# Patient Record
Sex: FEMALE | Race: WHITE | NOT HISPANIC OR LATINO | Employment: OTHER | URBAN - METROPOLITAN AREA
[De-identification: names, ages, dates, MRNs, and addresses within clinical notes are randomized per-mention and may not be internally consistent; named-entity substitution may affect disease eponyms.]

---

## 2017-01-04 ENCOUNTER — TELEPHONE (OUTPATIENT)
Dept: FAMILY MEDICINE | Facility: CLINIC | Age: 62
End: 2017-01-04

## 2017-01-04 NOTE — TELEPHONE ENCOUNTER
----- Message from Megan Colón sent at 1/4/2017  4:32 PM CST -----  Contact:  call //[phone]    Calling to  Speak to the  Nurse about  Seeing the   Doctor  For a  Abdoul  In  Blood  Sugar ,, pt  Would  Per  To see dr   Because  Of    High  sugar, but  If the dr advises  Patient to  See np  She  Will // waiting on  Answer // please call

## 2017-01-04 NOTE — TELEPHONE ENCOUNTER
Called pt, she stated she is having some spike in blood sugar and would like to be seen tomorrow. Was able to schedule her in [place] and she verbally understood.

## 2017-03-05 RX ORDER — FENOFIBRATE 200 MG/1
CAPSULE ORAL
Qty: 90 CAPSULE | Refills: 0 | Status: SHIPPED | OUTPATIENT
Start: 2017-03-05 | End: 2017-03-08 | Stop reason: SDUPTHER

## 2017-03-08 RX ORDER — FENOFIBRATE 200 MG/1
200 CAPSULE ORAL DAILY
Qty: 90 CAPSULE | Refills: 0 | Status: SHIPPED | OUTPATIENT
Start: 2017-03-08 | End: 2017-09-25 | Stop reason: SDUPTHER

## 2017-03-13 ENCOUNTER — TELEPHONE (OUTPATIENT)
Dept: FAMILY MEDICINE | Facility: CLINIC | Age: 62
End: 2017-03-13

## 2017-03-13 DIAGNOSIS — I10 ESSENTIAL HYPERTENSION: Primary | ICD-10-CM

## 2017-03-13 DIAGNOSIS — E03.9 HYPOTHYROIDISM, UNSPECIFIED TYPE: ICD-10-CM

## 2017-03-13 DIAGNOSIS — E11.9 TYPE II OR UNSPECIFIED TYPE DIABETES MELLITUS WITHOUT MENTION OF COMPLICATION, NOT STATED AS UNCONTROLLED: ICD-10-CM

## 2017-03-13 NOTE — TELEPHONE ENCOUNTER
----- Message from Haylie Tejada RT sent at 3/10/2017  3:08 PM CST -----  Contact: pt    pt , requesting lab orders to be put in prior to her appt with Dr. Au on 03/29/2017, please call to confirm, thanks.

## 2017-03-14 NOTE — TELEPHONE ENCOUNTER
----- Message from Kylie Munoz sent at 3/14/2017  4:17 PM CDT -----  Contact: self  Patient called regarding missed call from this morning. Please contact [phone] (chsf)

## 2017-03-14 NOTE — TELEPHONE ENCOUNTER
Called pt, notified lab orders are in and she verbally understood she stated she will come in and have done one day a week before appt.

## 2017-03-15 ENCOUNTER — PATIENT OUTREACH (OUTPATIENT)
Dept: ADMINISTRATIVE | Facility: HOSPITAL | Age: 62
End: 2017-03-15

## 2017-03-15 NOTE — LETTER
March 23, 2017    Bing Kearns  [address] [place] [address]             [facility]  [address]  [place] [address]  Phone: [phone] Dear Mell Kearns:    We have tried to reach you by mychart unsuccessfully.          [facility] is committed to your overall health.  To help you get the most out of each of your visits, we will review your information to make sure you are up to date on all of your recommended tests and/or procedures.       Dr. Au has found that you may be due for One time Hepatitis C screening lab test ( a viral condition that harms the liver), diabetic foot exam, mammogram, Hemoglobin A1C, annual dilated eye exam     If you have had any of the above done at another facility, please bring the records or information with you so that your record at [facility] will be complete.     If you are currently taking medication, please bring it with you to your appointment for review.     Nolvia Manzano LPN Clinical Care Coordinator   [facility] [address] [facility] [address]   [place] [address]   [phone] (p)   [phone] (f)

## 2017-03-21 ENCOUNTER — LAB VISIT (OUTPATIENT)
Dept: LAB | Facility: HOSPITAL | Age: 62
End: 2017-03-21
Attending: FAMILY MEDICINE
Payer: COMMERCIAL

## 2017-03-21 DIAGNOSIS — E11.9 TYPE II OR UNSPECIFIED TYPE DIABETES MELLITUS WITHOUT MENTION OF COMPLICATION, NOT STATED AS UNCONTROLLED: ICD-10-CM

## 2017-03-21 DIAGNOSIS — I10 ESSENTIAL HYPERTENSION: ICD-10-CM

## 2017-03-21 DIAGNOSIS — E03.9 HYPOTHYROIDISM, UNSPECIFIED TYPE: ICD-10-CM

## 2017-03-21 LAB
ALBUMIN SERPL BCP-MCNC: 3.8 G/DL
ALP SERPL-CCNC: 93 U/L
ALT SERPL W/O P-5'-P-CCNC: 58 U/L
ANION GAP SERPL CALC-SCNC: 13 MMOL/L
AST SERPL-CCNC: 53 U/L
BILIRUB SERPL-MCNC: 0.5 MG/DL
BUN SERPL-MCNC: 12 MG/DL
CALCIUM SERPL-MCNC: 9.6 MG/DL
CHLORIDE SERPL-SCNC: 101 MMOL/L
CHOLEST/HDLC SERPL: 5.3 {RATIO}
CO2 SERPL-SCNC: 25 MMOL/L
CREAT SERPL-MCNC: 1.3 MG/DL
EST. GFR  (AFRICAN AMERICAN): 50.8 ML/MIN/1.73 M^2
EST. GFR  (NON AFRICAN AMERICAN): 44.1 ML/MIN/1.73 M^2
GLUCOSE SERPL-MCNC: 221 MG/DL
HDL/CHOLESTEROL RATIO: 18.9 %
HDLC SERPL-MCNC: 169 MG/DL
HDLC SERPL-MCNC: 32 MG/DL
LDLC SERPL CALC-MCNC: 77 MG/DL
NONHDLC SERPL-MCNC: 137 MG/DL
POTASSIUM SERPL-SCNC: 3.8 MMOL/L
PROT SERPL-MCNC: 7.5 G/DL
SODIUM SERPL-SCNC: 139 MMOL/L
TRIGL SERPL-MCNC: 300 MG/DL
TSH SERPL DL<=0.005 MIU/L-ACNC: 2.1 UIU/ML

## 2017-03-21 PROCEDURE — 80061 LIPID PANEL: CPT

## 2017-03-21 PROCEDURE — 36415 COLL VENOUS BLD VENIPUNCTURE: CPT | Mod: PO

## 2017-03-21 PROCEDURE — 83036 HEMOGLOBIN GLYCOSYLATED A1C: CPT

## 2017-03-21 PROCEDURE — 80053 COMPREHEN METABOLIC PANEL: CPT

## 2017-03-21 PROCEDURE — 84443 ASSAY THYROID STIM HORMONE: CPT

## 2017-03-22 LAB
ESTIMATED AVG GLUCOSE: 189 MG/DL
HBA1C MFR BLD HPLC: 8.2 %

## 2017-03-29 ENCOUNTER — OFFICE VISIT (OUTPATIENT)
Dept: FAMILY MEDICINE | Facility: CLINIC | Age: 62
End: 2017-03-29
Payer: COMMERCIAL

## 2017-03-29 VITALS
DIASTOLIC BLOOD PRESSURE: 70 MMHG | WEIGHT: 195.75 LBS | HEIGHT: 65 IN | OXYGEN SATURATION: 92 % | HEART RATE: 86 BPM | SYSTOLIC BLOOD PRESSURE: 102 MMHG | BODY MASS INDEX: 32.61 KG/M2

## 2017-03-29 DIAGNOSIS — Z00.00 ROUTINE HEALTH MAINTENANCE: Primary | ICD-10-CM

## 2017-03-29 DIAGNOSIS — I10 ESSENTIAL HYPERTENSION: ICD-10-CM

## 2017-03-29 DIAGNOSIS — E03.9 HYPOTHYROIDISM, UNSPECIFIED TYPE: ICD-10-CM

## 2017-03-29 PROCEDURE — 3078F DIAST BP <80 MM HG: CPT | Mod: S$GLB,,, | Performed by: FAMILY MEDICINE

## 2017-03-29 PROCEDURE — 99999 PR PBB SHADOW E&M-EST. PATIENT-LVL III: CPT | Mod: PBBFAC,,, | Performed by: FAMILY MEDICINE

## 2017-03-29 PROCEDURE — 3074F SYST BP LT 130 MM HG: CPT | Mod: S$GLB,,, | Performed by: FAMILY MEDICINE

## 2017-03-29 PROCEDURE — 99396 PREV VISIT EST AGE 40-64: CPT | Mod: S$GLB,,, | Performed by: FAMILY MEDICINE

## 2017-03-29 RX ORDER — METFORMIN HYDROCHLORIDE 500 MG/1
500 TABLET, EXTENDED RELEASE ORAL
Qty: 90 TABLET | Refills: 3 | Status: SHIPPED | OUTPATIENT
Start: 2017-03-29 | End: 2018-01-05

## 2017-03-29 NOTE — PROGRESS NOTES
HPI  Bing Kearns is a 61 y.o. female with multiple medical diagnoses as listed in the medical history and problem list that presents for Annual Exam; Hyperglycemia; Diabetic Foot Exam; and Labs Only (would like to be tested for hep c)  .      Diabetes   She presents for her follow-up diabetic visit. She has type 2 diabetes mellitus. Her disease course has been worsening. There are no hypoglycemic associated symptoms. Pertinent negatives for hypoglycemia include no confusion, dizziness, headaches, nervousness/anxiousness or tremors. Associated symptoms include polydipsia and weakness. Pertinent negatives for diabetes include no chest pain, no fatigue, no polyuria and no weight loss. There are no hypoglycemic complications. There are no diabetic complications. Current diabetic treatment includes diet (had discontinued metformin due to good control). She is compliant with treatment some of the time. Her weight is increasing steadily. She is following a generally healthy diet. Her home blood glucose trend is increasing steadily. Her breakfast blood glucose range is generally >200 mg/dl. An ACE inhibitor/angiotensin II receptor blocker is being taken. She sees a podiatrist.Eye exam is current.       PAST MEDICAL HISTORY:  Past Medical History:   Diagnosis Date    Anxiety     Cancer     colon    Chronic kidney disease     stage 3    Diabetes mellitus     Glaucoma     Hyperlipemia     Hypertension     Thyroid disease     hypothyroid       PAST SURGICAL HISTORY:  Past Surgical History:   Procedure Laterality Date     SECTION      COLON SURGERY      COLONOSCOPY Left 2015    Procedure: COLONOSCOPY;  Surgeon: Chet Woodard Jr., MD;  Location[facility];  Service: Endoscopy;  Laterality: Left;    HYSTERECTOMY      PORTACATH PLACEMENT Left     and later removed        SOCIAL HISTORY:  Social History     Social History    Marital status:      Spouse name: N/A    Number of children:  N/A    Years of education: N/A     Occupational History    Not on file.     Social History Main Topics    Smoking status: Former Smoker    Smokeless tobacco: Never Used    Alcohol use No    Drug use: No    Sexual activity: Not on file     Other Topics Concern    Not on file     Social History Narrative       FAMILY HISTORY:  Family History   Problem Relation Age of Onset    Cancer Mother     Heart disease Father     COPD Father     Hypertension Sister     Diabetes Sister     Anemia Sister     Cancer Sister        ALLERGIES AND MEDICATIONS: updated and reviewed.  Review of patient's allergies indicates:   Allergen Reactions    Codeine     Sulfa (sulfonamide antibiotics)      Other reaction(s): Unknown     Current Outpatient Prescriptions   Medication Sig Dispense Refill    amlodipine (NORVASC) 5 MG tablet       aspirin (ECOTRIN) 81 MG EC tablet Take 81 mg by mouth once daily.        atenolol (TENORMIN) 50 MG tablet TAKE ONE TABLET BY MOUTH DAILY 90 tablet 3    cholestyramine (QUESTRAN) 4 gram packet Take 4 g by mouth once daily.        clonazePAM (KLONOPIN) 0.5 MG tablet Take 1 tablet (0.5 mg total) by mouth 2 (two) times daily. 60 tablet 3    cloNIDine (CATAPRES) 0.1 MG tablet Take 0.2 mg by mouth nightly.      fenofibrate micronized (LOFIBRA) 200 MG Cap Take 1 capsule (200 mg total) by mouth once daily. 90 capsule 0    fluticasone (FLONASE) 50 mcg/actuation nasal spray 2 sprays by Each Nare route once daily. Each nare 16 g 9    furosemide (LASIX) 40 MG tablet Take 1 tablet (40 mg total) by mouth once daily. 1 tablet 0    glycopyrrolate (ROBINUL) 2 MG tablet Take 2 mg by mouth 3 (three) times daily.        iron polysaccharides (NIFEREX) 150 mg capsule Take 150 mg by mouth every morning.        latanoprost 0.005 % ophthalmic solution       levothyroxine (SYNTHROID) 75 MCG tablet Take 1 tablet (75 mcg total) by mouth once daily. 90 tablet 1    losartan (COZAAR) 100 MG tablet TAKE ONE  TABLET BY MOUTH ONCE DAILY 90 tablet 2    metoclopramide (REGLAN) 10 MG tablet Take 10 mg by mouth every evening.        omeprazole (PRILOSEC) 20 MG capsule TAKE ONE CAPSULE BY MOUTH TWICE A DAY 60 capsule 10    orphenadrine (NORFLEX) 100 mg tablet Take 1 tablet (100 mg total) by mouth 2 (two) times daily as needed. 180 tablet 3    sertraline (ZOLOFT) 25 MG tablet Take 1 tablet (25 mg total) by mouth once daily. 90 tablet 0    simvastatin (ZOCOR) 40 MG tablet TAKE 1 TABLET BY MOUTH EVERY EVENING 90 tablet 2    sumatriptan (IMITREX) 100 MG tablet Take 1 tablet (100 mg total) by mouth once. 30 tablet 6    tramadol (ULTRAM) 50 mg tablet Take 50 mg by mouth every 6 (six) hours as needed.        diphenoxylate-atropine (LOMOTIL) 2.5-0.025 mg per tablet Take 1 tablet by mouth.      metformin (GLUCOPHAGE-XR) 500 MG 24 hr tablet Take 1 tablet (500 mg total) by mouth daily with breakfast. 90 tablet 3    omega-3 acid ethyl esters (LOVAZA) 1 gram capsule Take 1 capsule (1 g total) by mouth 2 (two) times daily. 180 capsule 4     No current facility-administered medications for this visit.        ROS  Review of Systems   Constitutional: Negative for fatigue, fever, unexpected weight change and weight loss.   HENT: Negative for congestion, hearing loss, rhinorrhea and sore throat.    Eyes: Negative for visual disturbance.   Respiratory: Negative for cough, chest tightness, shortness of breath and wheezing.    Cardiovascular: Negative for chest pain, palpitations and leg swelling.   Gastrointestinal: Negative for abdominal distention, abdominal pain, blood in stool, constipation, diarrhea, nausea and vomiting.   Endocrine: Positive for polydipsia. Negative for polyuria.   Genitourinary: Negative for difficulty urinating, dysuria, frequency, hematuria, menstrual problem, pelvic pain, urgency and vaginal bleeding.   Musculoskeletal: Negative for back pain, joint swelling and neck pain.   Skin: Negative for rash.  "  Neurological: Positive for weakness and light-headedness. Negative for dizziness, tremors, numbness and headaches.   Psychiatric/Behavioral: Negative for confusion, dysphoric mood and sleep disturbance. The patient is not nervous/anxious.        Physical Exam  Vitals:    03/29/17 1327   BP: 102/70   Pulse: 86    Body mass index is 32.58 kg/(m^2).  Weight: 88.8 kg (195 lb 12.3 oz)   Height: 5' 5" (165.1 cm)     Physical Exam   Constitutional: She is oriented to person, place, and time. She appears well-developed and well-nourished.   HENT:   Head: Normocephalic and atraumatic.   Right Ear: External ear normal.   Left Ear: External ear normal.   Nose: Nose normal.   Mouth/Throat: Oropharynx is clear and moist.   Eyes: Conjunctivae and EOM are normal. Pupils are equal, round, and reactive to light. No scleral icterus.   Neck: Normal range of motion. Neck supple. No JVD present. No thyromegaly present.   Cardiovascular: Normal rate, regular rhythm and normal heart sounds.  Exam reveals no gallop and no friction rub.    No murmur heard.  Pulses:       Dorsalis pedis pulses are 1+ on the right side, and 1+ on the left side.   Pulmonary/Chest: Effort normal and breath sounds normal. She has no wheezes. She has no rales.   Abdominal: Soft. Bowel sounds are normal. She exhibits no distension and no mass. There is no tenderness. There is no rebound and no guarding.   Musculoskeletal: Normal range of motion. She exhibits no edema.        Right foot: There is no deformity.        Left foot: There is no deformity.   Feet:   Right Foot:   Protective Sensation: 4 sites tested. 4 sites sensed.   Skin Integrity: Negative for skin breakdown.   Left Foot:   Protective Sensation: 4 sites tested. 4 sites sensed.   Skin Integrity: Negative for skin breakdown.   Lymphadenopathy:     She has no cervical adenopathy.   Neurological: She is alert and oriented to person, place, and time. She has normal strength. No cranial nerve deficit or " sensory deficit.   Skin: Skin is warm and dry. No rash noted.   Vitals reviewed.      Health Maintenance       Date Due Completion Date    Hepatitis C Screening 1955 ---    Foot Exam 9/30/1964 ---    Mammogram 7/29/2017 7/29/2016 (Done)    Override on 7/29/2016: Done ([employer])    Override on 5/27/2014: Done (per [facility] claims data)    Hemoglobin A1c 9/21/2017 3/21/2017    Override on 11/3/2014: Done    Pneumococcal PPSV23 (High Risk) (2) 10/12/2017 10/12/2012    Pap Smear 11/26/2017 11/26/2014 (Done)    Override on 11/26/2014: Done    Eye Exam 12/29/2017 12/29/2016 (Done)    Override on 12/29/2016: Done (in [place]ore's office)    Override on 12/11/2015: Done ([place])    Override on 11/19/2014: Done    Lipid Panel 3/21/2018 3/21/2017    Colonoscopy 9/19/2024 9/19/2014 (Done)    Override on 9/19/2014: Done (per [employer] claims data)    TETANUS VACCINE 7/21/2025 7/21/2015          Assessment & Plan    Routine health maintenance  -     Hepatitis C antibody; Future; Expected date: 6/29/17  - Health maintenance reviewed  - Diet and exercise education.    Type II or unspecified type diabetes mellitus without mention of complication, uncontrolled  -     metformin (GLUCOPHAGE-XR) 500 MG 24 hr tablet; Take 1 tablet (500 mg total) by mouth daily with breakfast.  Dispense: 90 tablet; Refill: 3  -     Hemoglobin A1c; Future; Expected date: 6/27/17  -     Ambulatory Referral to Diabetes Education  - Diet and exercise education.  - Serial glucose monitoring  - Continue current therapy    Essential hypertension  - Continue current therapy  - Serial blood pressure monitoring  - Diet and exercise education.    Hypothyroidism, unspecified type  - Continue current therapy       Return in about 3 months (around 6/29/2017).

## 2017-03-29 NOTE — MR AVS SNAPSHOT
[facility]  [address] [facility] [address] [place] [address]  Phone: [phone]  Fax: [phone]                  Bing Kearns   3/29/2017 1:15 PM   Office Visit    Description:  Female : 1955   Provider:  Benjamín Au MD   Department:  [facility]           Reason for Visit     Annual Exam     Hyperglycemia     Diabetic Foot Exam     Labs Only           Diagnoses this Visit        Comments    Routine health maintenance    -  Primary     Type II or unspecified type diabetes mellitus without mention of complication, uncontrolled         Essential hypertension         Hypothyroidism, unspecified type                To Do List           Future Appointments        Provider Department Dept Phone    2017 1:00 PM [place] ENDOCRINE EDUCATOR [facility] Diabetes Management [phone]    2017 10:00 AM LAB, [place] [facility] [phone]    7/3/2017 2:00 PM Benjamín Au MD [facility] [phone]    7/10/2017 2:45 PM Benjamín Au MD [facility] [phone]      Goals (5 Years of Data)     None      Follow-Up and Disposition     Return in about 3 months (around 2017).    Follow-up and Disposition History       These Medications        Disp Refills Start End    metformin (GLUCOPHAGE-XR) 500 MG 24 hr tablet 90 tablet 3 3/29/2017 3/29/2018    Take 1 tablet (500 mg total) by mouth daily with breakfast. - Oral    Pharmacy: [id number] - [place] - [address] Ph #: [phone]         [facility] On Call     [facility] Nurse Care Line -  Assistance  Registered nurses in the [facility] provide clinical advisement, health education, appointment booking, and other advisory services.  Call for this free service at [phone].             Medications           Message regarding Medications     Verify the changes and/or additions to your  medication regime listed below are the same as discussed with your clinician today.  If any of these changes or additions are incorrect, please notify your healthcare provider.        START taking these NEW medications        Refills    metformin (GLUCOPHAGE-XR) 500 MG 24 hr tablet 3    Sig: Take 1 tablet (500 mg total) by mouth daily with breakfast.    Class: Normal    Route: Oral           Verify that the below list of medications is an accurate representation of the medications you are currently taking.  If none reported, the list may be blank. If incorrect, please contact your healthcare provider. Carry this list with you in case of emergency.           Current Medications     amlodipine (NORVASC) 5 MG tablet     aspirin (ECOTRIN) 81 MG EC tablet Take 81 mg by mouth once daily.      atenolol (TENORMIN) 50 MG tablet TAKE ONE TABLET BY MOUTH DAILY    cholestyramine (QUESTRAN) 4 gram packet Take 4 g by mouth once daily.      clonazePAM (KLONOPIN) 0.5 MG tablet Take 1 tablet (0.5 mg total) by mouth 2 (two) times daily.    cloNIDine (CATAPRES) 0.1 MG tablet Take 0.2 mg by mouth nightly.    fenofibrate micronized (LOFIBRA) 200 MG Cap Take 1 capsule (200 mg total) by mouth once daily.    fluticasone (FLONASE) 50 mcg/actuation nasal spray 2 sprays by Each Nare route once daily. Each nare    furosemide (LASIX) 40 MG tablet Take 1 tablet (40 mg total) by mouth once daily.    glycopyrrolate (ROBINUL) 2 MG tablet Take 2 mg by mouth 3 (three) times daily.      iron polysaccharides (NIFEREX) 150 mg capsule Take 150 mg by mouth every morning.      latanoprost 0.005 % ophthalmic solution     levothyroxine (SYNTHROID) 75 MCG tablet Take 1 tablet (75 mcg total) by mouth once daily.    losartan (COZAAR) 100 MG tablet TAKE ONE TABLET BY MOUTH ONCE DAILY    metoclopramide (REGLAN) 10 MG tablet Take 10 mg by mouth every evening.      omeprazole (PRILOSEC) 20 MG capsule TAKE ONE CAPSULE BY MOUTH TWICE A DAY    orphenadrine (NORFLEX)  "100 mg tablet Take 1 tablet (100 mg total) by mouth 2 (two) times daily as needed.    sertraline (ZOLOFT) 25 MG tablet Take 1 tablet (25 mg total) by mouth once daily.    simvastatin (ZOCOR) 40 MG tablet TAKE 1 TABLET BY MOUTH EVERY EVENING    sumatriptan (IMITREX) 100 MG tablet Take 1 tablet (100 mg total) by mouth once.    tramadol (ULTRAM) 50 mg tablet Take 50 mg by mouth every 6 (six) hours as needed.      diphenoxylate-atropine (LOMOTIL) 2.5-0.025 mg per tablet Take 1 tablet by mouth.    metformin (GLUCOPHAGE-XR) 500 MG 24 hr tablet Take 1 tablet (500 mg total) by mouth daily with breakfast.    omega-3 acid ethyl esters (LOVAZA) 1 gram capsule Take 1 capsule (1 g total) by mouth 2 (two) times daily.           Clinical Reference Information           Your Vitals Were     BP Pulse Height Weight SpO2 BMI    102/70 86 5' 5" (1.651 m) 88.8 kg (195 lb 12.3 oz) 92% 32.58 kg/m2      Blood Pressure          Most Recent Value    BP  102/70      Allergies as of 3/29/2017     Codeine    Sulfa (Sulfonamide Antibiotics)      Immunizations Administered on Date of Encounter - 3/29/2017     None      Orders Placed During Today's Visit      Normal Orders This Visit    Ambulatory Referral to Diabetes Education     Future Labs/Procedures Expected by Expires    Hemoglobin A1c  6/27/2017 9/25/2017    Hepatitis C antibody  6/29/2017 9/29/2017      Language Assistance Services     ATTENTION: Language assistance services are available, free of charge. Please call [phone].      ATENCIÓN: Si irmala daron, tiene a menendez disposición servicios gratuitos de asistencia lingüística. Llame al [phone].     [facility] Ý: N?u b?n nói Ti?ng Vi?t, có các d?ch v? h? tr? ngôn ng? mi?n phí dành cho b?n. G?i s? [phone].         [facility] complies with applicable Federal civil rights laws and does not discriminate on the basis of race, color, national origin, age, disability, or sex.        "

## 2017-03-31 RX ORDER — CLONAZEPAM 0.5 MG/1
TABLET ORAL
Qty: 60 TABLET | Refills: 3 | Status: SHIPPED | OUTPATIENT
Start: 2017-03-31 | End: 2017-09-07 | Stop reason: SDUPTHER

## 2017-04-02 ENCOUNTER — PATIENT MESSAGE (OUTPATIENT)
Dept: FAMILY MEDICINE | Facility: CLINIC | Age: 62
End: 2017-04-02

## 2017-04-17 ENCOUNTER — PATIENT MESSAGE (OUTPATIENT)
Dept: FAMILY MEDICINE | Facility: CLINIC | Age: 62
End: 2017-04-17

## 2017-04-18 RX ORDER — ATENOLOL 50 MG/1
TABLET ORAL
Qty: 90 TABLET | Refills: 3 | Status: SHIPPED | OUTPATIENT
Start: 2017-04-18 | End: 2017-04-19

## 2017-04-19 RX ORDER — ATENOLOL 50 MG/1
50 TABLET ORAL DAILY
Qty: 90 TABLET | Refills: 3 | Status: SHIPPED | OUTPATIENT
Start: 2017-04-19 | End: 2018-04-23 | Stop reason: SDUPTHER

## 2017-04-27 ENCOUNTER — PATIENT MESSAGE (OUTPATIENT)
Dept: FAMILY MEDICINE | Facility: CLINIC | Age: 62
End: 2017-04-27

## 2017-04-27 DIAGNOSIS — E11.9 DIABETES MELLITUS WITHOUT COMPLICATION: Primary | ICD-10-CM

## 2017-05-09 ENCOUNTER — PATIENT MESSAGE (OUTPATIENT)
Dept: FAMILY MEDICINE | Facility: CLINIC | Age: 62
End: 2017-05-09

## 2017-05-24 RX ORDER — ORPHENADRINE CITRATE 100 MG/1
TABLET, EXTENDED RELEASE ORAL
Qty: 180 TABLET | Refills: 3 | Status: SHIPPED | OUTPATIENT
Start: 2017-05-24 | End: 2018-07-09 | Stop reason: SDUPTHER

## 2017-05-29 ENCOUNTER — PATIENT MESSAGE (OUTPATIENT)
Dept: FAMILY MEDICINE | Facility: CLINIC | Age: 62
End: 2017-05-29

## 2017-06-12 ENCOUNTER — PATIENT MESSAGE (OUTPATIENT)
Dept: FAMILY MEDICINE | Facility: CLINIC | Age: 62
End: 2017-06-12

## 2017-06-13 RX ORDER — LOSARTAN POTASSIUM 100 MG/1
TABLET ORAL
Qty: 90 TABLET | Refills: 3 | Status: SHIPPED | OUTPATIENT
Start: 2017-06-13 | End: 2017-09-25 | Stop reason: SDUPTHER

## 2017-06-13 RX ORDER — SIMVASTATIN 40 MG/1
TABLET, FILM COATED ORAL
Qty: 90 TABLET | Refills: 3 | Status: SHIPPED | OUTPATIENT
Start: 2017-06-13 | End: 2018-06-18 | Stop reason: SDUPTHER

## 2017-06-26 ENCOUNTER — LAB VISIT (OUTPATIENT)
Dept: LAB | Facility: HOSPITAL | Age: 62
End: 2017-06-26
Attending: FAMILY MEDICINE
Payer: COMMERCIAL

## 2017-06-26 DIAGNOSIS — E11.9 DIABETES MELLITUS WITHOUT COMPLICATION: ICD-10-CM

## 2017-06-26 PROCEDURE — 83036 HEMOGLOBIN GLYCOSYLATED A1C: CPT

## 2017-06-26 PROCEDURE — 36415 COLL VENOUS BLD VENIPUNCTURE: CPT | Mod: PO

## 2017-06-27 LAB
ESTIMATED AVG GLUCOSE: 148 MG/DL
HBA1C MFR BLD HPLC: 6.8 %

## 2017-07-03 ENCOUNTER — OFFICE VISIT (OUTPATIENT)
Dept: FAMILY MEDICINE | Facility: CLINIC | Age: 62
End: 2017-07-03
Payer: COMMERCIAL

## 2017-07-03 VITALS
BODY MASS INDEX: 32.83 KG/M2 | DIASTOLIC BLOOD PRESSURE: 78 MMHG | WEIGHT: 197.06 LBS | RESPIRATION RATE: 18 BRPM | SYSTOLIC BLOOD PRESSURE: 116 MMHG | HEART RATE: 100 BPM | HEIGHT: 65 IN

## 2017-07-03 DIAGNOSIS — E11.9 DIABETES MELLITUS WITHOUT COMPLICATION: Primary | ICD-10-CM

## 2017-07-03 DIAGNOSIS — Z12.31 ENCOUNTER FOR SCREENING MAMMOGRAM FOR BREAST CANCER: ICD-10-CM

## 2017-07-03 DIAGNOSIS — G47.33 OSA (OBSTRUCTIVE SLEEP APNEA): ICD-10-CM

## 2017-07-03 PROCEDURE — 4010F ACE/ARB THERAPY RXD/TAKEN: CPT | Mod: S$GLB,,, | Performed by: FAMILY MEDICINE

## 2017-07-03 PROCEDURE — 99214 OFFICE O/P EST MOD 30 MIN: CPT | Mod: S$GLB,,, | Performed by: FAMILY MEDICINE

## 2017-07-03 PROCEDURE — 99999 PR PBB SHADOW E&M-EST. PATIENT-LVL III: CPT | Mod: PBBFAC,,, | Performed by: FAMILY MEDICINE

## 2017-07-03 PROCEDURE — 3044F HG A1C LEVEL LT 7.0%: CPT | Mod: S$GLB,,, | Performed by: FAMILY MEDICINE

## 2017-07-03 NOTE — PROGRESS NOTES
Subjective:       Patient ID: Bing Kearns is a 61 y.o. female    Chief Complaint: Diabetes (follow up; hm due: mammogram)    Diabetes   She presents for her follow-up diabetic visit. She has type 2 diabetes mellitus. Her disease course has been stable. Hypoglycemia symptoms include hunger (occasionally down to 70), sweats and tremors. There are no diabetic associated symptoms. There are no hypoglycemic complications. Symptoms are stable. There are no diabetic complications. Current diabetic treatment includes oral agent (dual therapy). She is compliant with treatment all of the time. Her home blood glucose trend is decreasing steadily. An ACE inhibitor/angiotensin II receptor blocker is being taken. Eye exam is current.       Review of Systems   Neurological: Positive for tremors.     Patient with dream behavior that leads to dyspnea. She has witnessed apnea and snoring.    Objective:   Physical Exam   Constitutional: She is oriented to person, place, and time. She appears well-developed and well-nourished.   Neurological: She is alert and oriented to person, place, and time.   Vitals reviewed.       Results for orders placed or performed in visit on 06/26/17   Hemoglobin A1c   Result Value Ref Range    Hemoglobin A1C 6.8 (H) 4.0 - 5.6 %    Estimated Avg Glucose 148 (H) 68 - 131 mg/dL       Assessment:       1. Diabetes mellitus without complication     2. Encounter for screening mammogram for breast cancer  Mammo Digital Screening Bilat with CAD   3. FRANCISCO (obstructive sleep apnea)  Ambulatory consult for Sleep Study         Plan:       Diabetes mellitus without complication  - Diet and exercise education.  - Serial glucose monitoring  - Continue current therapy    Encounter for screening mammogram for breast cancer  -     Mammo Digital Screening Bilat with CAD; Future; Expected date: 07/03/2017    FRANCISCO (obstructive sleep apnea)  -     Ambulatory consult for Sleep Study    Return in about 6 months (around  1/3/2018).

## 2017-09-07 RX ORDER — CLONAZEPAM 0.5 MG/1
TABLET ORAL
Qty: 60 TABLET | Refills: 3 | Status: SHIPPED | OUTPATIENT
Start: 2017-09-07 | End: 2018-03-21 | Stop reason: SDUPTHER

## 2017-09-07 RX ORDER — CLONAZEPAM 0.5 MG/1
0.5 TABLET ORAL 2 TIMES DAILY
Qty: 60 TABLET | Refills: 3 | OUTPATIENT
Start: 2017-09-07

## 2017-09-13 DIAGNOSIS — J30.9 ALLERGIC RHINITIS: ICD-10-CM

## 2017-09-14 RX ORDER — FLUTICASONE PROPIONATE 50 MCG
SPRAY, SUSPENSION (ML) NASAL
Qty: 16 G | Refills: 11 | Status: SHIPPED | OUTPATIENT
Start: 2017-09-14 | End: 2018-01-05 | Stop reason: ALTCHOICE

## 2017-09-26 RX ORDER — LOSARTAN POTASSIUM 100 MG/1
TABLET ORAL
Qty: 90 TABLET | Refills: 3 | Status: SHIPPED | OUTPATIENT
Start: 2017-09-26 | End: 2018-06-08 | Stop reason: CLARIF

## 2017-09-26 RX ORDER — FENOFIBRATE 200 MG/1
200 CAPSULE ORAL DAILY
Qty: 90 CAPSULE | Refills: 3 | Status: SHIPPED | OUTPATIENT
Start: 2017-09-26 | End: 2018-10-05 | Stop reason: SDUPTHER

## 2017-10-11 DIAGNOSIS — E03.9 ACQUIRED HYPOTHYROIDISM: ICD-10-CM

## 2017-10-12 RX ORDER — LEVOTHYROXINE SODIUM 75 UG/1
75 TABLET ORAL DAILY
Qty: 30 TABLET | Refills: 9 | Status: SHIPPED | OUTPATIENT
Start: 2017-10-12 | End: 2018-02-06 | Stop reason: SDUPTHER

## 2018-01-05 ENCOUNTER — LAB VISIT (OUTPATIENT)
Dept: LAB | Facility: HOSPITAL | Age: 63
End: 2018-01-05
Attending: FAMILY MEDICINE
Payer: COMMERCIAL

## 2018-01-05 ENCOUNTER — OFFICE VISIT (OUTPATIENT)
Dept: FAMILY MEDICINE | Facility: CLINIC | Age: 63
End: 2018-01-05
Payer: COMMERCIAL

## 2018-01-05 VITALS
TEMPERATURE: 98 F | HEART RATE: 84 BPM | HEIGHT: 65 IN | WEIGHT: 189.63 LBS | BODY MASS INDEX: 31.59 KG/M2 | SYSTOLIC BLOOD PRESSURE: 114 MMHG | RESPIRATION RATE: 16 BRPM | DIASTOLIC BLOOD PRESSURE: 70 MMHG

## 2018-01-05 DIAGNOSIS — R30.0 DYSURIA: ICD-10-CM

## 2018-01-05 DIAGNOSIS — J31.0 RHINITIS MEDICAMENTOSA: ICD-10-CM

## 2018-01-05 DIAGNOSIS — Z00.00 ROUTINE HEALTH MAINTENANCE: Primary | ICD-10-CM

## 2018-01-05 DIAGNOSIS — Z13.5 DIABETIC RETINOPATHY SCREENING: ICD-10-CM

## 2018-01-05 DIAGNOSIS — T48.5X5A RHINITIS MEDICAMENTOSA: ICD-10-CM

## 2018-01-05 DIAGNOSIS — E11.9 DIABETES MELLITUS WITHOUT COMPLICATION: ICD-10-CM

## 2018-01-05 DIAGNOSIS — E03.9 HYPOTHYROIDISM, UNSPECIFIED TYPE: ICD-10-CM

## 2018-01-05 DIAGNOSIS — I10 ESSENTIAL HYPERTENSION: ICD-10-CM

## 2018-01-05 LAB
BACTERIA #/AREA URNS HPF: ABNORMAL /HPF
BILIRUB UR QL STRIP: NEGATIVE
CLARITY UR: CLEAR
COLOR UR: YELLOW
GLUCOSE UR QL STRIP: NEGATIVE
HGB UR QL STRIP: NEGATIVE
KETONES UR QL STRIP: NEGATIVE
LEUKOCYTE ESTERASE UR QL STRIP: ABNORMAL
MICROSCOPIC COMMENT: ABNORMAL
NITRITE UR QL STRIP: NEGATIVE
PH UR STRIP: 6 [PH] (ref 5–8)
PROT UR QL STRIP: NEGATIVE
SP GR UR STRIP: 1.02 (ref 1–1.03)
SQUAMOUS #/AREA URNS HPF: 2 /HPF
URN SPEC COLLECT METH UR: ABNORMAL
WBC #/AREA URNS HPF: 7 /HPF (ref 0–5)

## 2018-01-05 PROCEDURE — 81000 URINALYSIS NONAUTO W/SCOPE: CPT | Mod: PO

## 2018-01-05 PROCEDURE — 99396 PREV VISIT EST AGE 40-64: CPT | Mod: S$GLB,,, | Performed by: FAMILY MEDICINE

## 2018-01-05 PROCEDURE — 99999 PR PBB SHADOW E&M-EST. PATIENT-LVL III: CPT | Mod: PBBFAC,,, | Performed by: FAMILY MEDICINE

## 2018-01-05 RX ORDER — METFORMIN HYDROCHLORIDE 500 MG/1
500 TABLET, EXTENDED RELEASE ORAL 2 TIMES DAILY WITH MEALS
Qty: 180 TABLET | Refills: 3 | Status: SHIPPED | OUTPATIENT
Start: 2018-01-05 | End: 2019-01-14

## 2018-01-05 RX ORDER — TAMSULOSIN HYDROCHLORIDE 0.4 MG/1
0.4 CAPSULE ORAL DAILY
COMMUNITY
End: 2019-01-29

## 2018-01-05 RX ORDER — FLUTICASONE PROPIONATE 50 MCG
2 SPRAY, SUSPENSION (ML) NASAL 2 TIMES DAILY
Qty: 16 G | Refills: 11 | Status: SHIPPED | OUTPATIENT
Start: 2018-01-05 | End: 2019-01-28 | Stop reason: SDUPTHER

## 2018-01-05 RX ORDER — AZELASTINE 1 MG/ML
1 SPRAY, METERED NASAL 2 TIMES DAILY
Qty: 30 ML | Refills: 11 | Status: SHIPPED | OUTPATIENT
Start: 2018-01-05 | End: 2019-01-05 | Stop reason: SDUPTHER

## 2018-01-05 NOTE — PROGRESS NOTES
HPI  Bing Kearns is a 62 y.o. female with multiple medical diagnoses as listed in the medical history and problem list that presents for Diabetes  .      Diabetes   She presents for her follow-up diabetic visit. She has type 2 diabetes mellitus. Her disease course has been stable. There are no hypoglycemic associated symptoms. Pertinent negatives for hypoglycemia include no confusion, dizziness, headaches, nervousness/anxiousness or tremors. There are no diabetic associated symptoms. Pertinent negatives for diabetes include no chest pain, no fatigue and no weakness. There are no hypoglycemic complications. Symptoms are stable. There are no diabetic complications. Current diabetic treatment includes oral agent (monotherapy). She is compliant with treatment all of the time. An ACE inhibitor/angiotensin II receptor blocker is being taken. Eye exam is not current.   URI    This is a new problem. The current episode started 1 to 4 weeks ago. The problem has been unchanged. Associated symptoms include congestion, coughing, dysuria (seeing Nephrology), rhinorrhea, a sore throat and swollen glands. Pertinent negatives include no abdominal pain, chest pain, diarrhea, headaches, nausea, neck pain, rash, vomiting or wheezing. She has tried decongestant (has been using Afrin hourly) for the symptoms. The treatment provided mild relief.       PAST MEDICAL HISTORY:  Past Medical History:   Diagnosis Date    Anxiety     Cancer     colon    Chronic kidney disease     stage 3    Diabetes mellitus     Glaucoma     Hyperlipemia     Hypertension     Thyroid disease     hypothyroid       PAST SURGICAL HISTORY:  Past Surgical History:   Procedure Laterality Date     SECTION      COLON SURGERY      COLONOSCOPY Left 2015    Procedure: COLONOSCOPY;  Surgeon: Chet Woodard Jr., MD;  Location: [facility];  Service: Endoscopy;  Laterality: Left;    HYSTERECTOMY      PORTACATH PLACEMENT Left     and later  removed        SOCIAL HISTORY:  Social History     Social History    Marital status:      Spouse name: N/A    Number of children: N/A    Years of education: N/A     Occupational History    Not on file.     Social History Main Topics    Smoking status: Former Smoker    Smokeless tobacco: Never Used    Alcohol use No    Drug use: No    Sexual activity: Not on file     Other Topics Concern    Not on file     Social History Narrative    No narrative on file       FAMILY HISTORY:  Family History   Problem Relation Age of Onset    Cancer Mother     Heart disease Father     COPD Father     Hypertension Sister     Diabetes Sister     Anemia Sister     Cancer Sister        ALLERGIES AND MEDICATIONS: updated and reviewed.  Review of patient's allergies indicates:   Allergen Reactions    Codeine     Sulfa (sulfonamide antibiotics)      Other reaction(s): Unknown     Current Outpatient Prescriptions   Medication Sig Dispense Refill    amlodipine (NORVASC) 5 MG tablet Take 5 mg by mouth once daily.       aspirin (ECOTRIN) 81 MG EC tablet Take 81 mg by mouth once daily.        atenolol (TENORMIN) 50 MG tablet Take 1 tablet (50 mg total) by mouth once daily. 90 tablet 3    clonazePAM (KLONOPIN) 0.5 MG tablet TAKE 1 TABLET (0.5 MG TOTAL) BY MOUTH 2 (TWO) TIMES DAILY. 60 tablet 3    cloNIDine (CATAPRES) 0.1 MG tablet Take 0.1 mg by mouth once daily.       diphenoxylate-atropine (LOMOTIL) 2.5-0.025 mg per tablet Take 1 tablet by mouth as needed.       fenofibrate micronized (LOFIBRA) 200 MG Cap TAKE 1 CAPSULE (200 MG TOTAL) BY MOUTH ONCE DAILY. 90 capsule 3    iron polysaccharides (NIFEREX) 150 mg capsule Take 150 mg by mouth every morning.        latanoprost 0.005 % ophthalmic solution       levothyroxine (SYNTHROID) 75 MCG tablet TAKE 1 TABLET (75 MCG TOTAL) BY MOUTH ONCE DAILY. 30 tablet 9    losartan (COZAAR) 100 MG tablet TAKE ONE TABLET BY MOUTH ONCE DAILY 90 tablet 3    omeprazole  (PRILOSEC) 20 MG capsule TAKE ONE CAPSULE BY MOUTH TWICE A DAY (Patient taking differently: TAKE TWO CAPSULEs BY MOUTH TWICE A DAY) 60 capsule 10    orphenadrine (NORFLEX) 100 mg tablet TAKE 1 TABLET BY MOUTH TWICE DAILY AS NEEDED 180 tablet 3    sertraline (ZOLOFT) 25 MG tablet Take 1 tablet (25 mg total) by mouth once daily. 90 tablet 0    simvastatin (ZOCOR) 40 MG tablet TAKE 1 TABLET BY MOUTH EVERY EVENING 90 tablet 3    tamsulosin (FLOMAX) 0.4 mg Cp24 Take 0.4 mg by mouth once daily.      azelastine (ASTELIN) 137 mcg (0.1 %) nasal spray 1 spray (137 mcg total) by Nasal route 2 (two) times daily. 30 mL 11    cholestyramine (QUESTRAN) 4 gram packet Take 4 g by mouth once daily.        fluticasone (FLONASE) 50 mcg/actuation nasal spray 2 sprays (100 mcg total) by Each Nare route 2 (two) times daily. 16 g 11    glycopyrrolate (ROBINUL) 2 MG tablet Take 2 mg by mouth 3 (three) times daily.        metFORMIN (GLUCOPHAGE-XR) 500 MG 24 hr tablet Take 1 tablet (500 mg total) by mouth 2 (two) times daily with meals. 180 tablet 3    sumatriptan (IMITREX) 100 MG tablet Take 1 tablet (100 mg total) by mouth once. 30 tablet 6    tramadol (ULTRAM) 50 mg tablet Take 50 mg by mouth every 6 (six) hours as needed.         No current facility-administered medications for this visit.        ROS  Review of Systems   Constitutional: Negative for fatigue, fever and unexpected weight change.   HENT: Positive for congestion, rhinorrhea and sore throat. Negative for hearing loss.    Eyes: Negative for visual disturbance.   Respiratory: Positive for cough. Negative for chest tightness, shortness of breath and wheezing.    Cardiovascular: Negative for chest pain, palpitations and leg swelling.   Gastrointestinal: Negative for abdominal distention, abdominal pain, blood in stool, constipation, diarrhea, nausea and vomiting.   Genitourinary: Positive for dysuria (seeing Nephrology). Negative for difficulty urinating, frequency,  "hematuria, menstrual problem, pelvic pain, urgency and vaginal bleeding.   Musculoskeletal: Negative for back pain, joint swelling and neck pain.   Skin: Negative for rash.   Neurological: Negative for dizziness, tremors, weakness, light-headedness, numbness and headaches.   Psychiatric/Behavioral: Negative for confusion, dysphoric mood and sleep disturbance. The patient is not nervous/anxious.        Physical Exam  Vitals:    01/05/18 1328   BP: 114/70   Pulse: 84   Resp: 16   Temp: 98.1 °F (36.7 °C)    Body mass index is 31.55 kg/m².  Weight: 86 kg (189 lb 9.5 oz)   Height: 5' 5" (165.1 cm)     Physical Exam   Constitutional: She is oriented to person, place, and time. She appears well-developed and well-nourished.   HENT:   Head: Normocephalic and atraumatic.   Right Ear: External ear normal.   Left Ear: External ear normal.   Nose: Nose normal.   Mouth/Throat: Oropharynx is clear and moist.   Eyes: Conjunctivae and EOM are normal. Pupils are equal, round, and reactive to light. No scleral icterus.   Neck: Normal range of motion. Neck supple. No JVD present. No thyromegaly present.   Cardiovascular: Normal rate, regular rhythm and normal heart sounds.  Exam reveals no gallop and no friction rub.    No murmur heard.  Pulmonary/Chest: Effort normal and breath sounds normal. She has no wheezes. She has no rales.   Abdominal: Soft. Bowel sounds are normal. She exhibits no distension and no mass. There is no tenderness. There is no rebound and no guarding.   Musculoskeletal: Normal range of motion. She exhibits no edema.   Lymphadenopathy:     She has no cervical adenopathy.   Neurological: She is alert and oriented to person, place, and time. She has normal strength. No cranial nerve deficit or sensory deficit.   Skin: Skin is warm and dry. No rash noted.   Vitals reviewed.      Health Maintenance       Date Due Completion Date    Pneumococcal PPSV23 (High Risk) (2) 10/12/2017 10/12/2012    Hemoglobin A1c 12/26/2017 " 6/26/2017    Override on 11/3/2014: Done    Eye Exam 12/29/2017 12/29/2016 (Done)    Override on 12/29/2016: Done (in Lenore's office)    Override on 12/11/2015: Done ([facility])    Override on 11/19/2014: Done    Lipid Panel 03/21/2018 3/21/2017    Foot Exam 03/29/2018 3/29/2017    Mammogram 09/19/2018 9/19/2017    Override on 7/29/2016: Done ([facility])    Override on 5/27/2014: Done (per [facility] claims data)    TETANUS VACCINE 07/21/2025 7/21/2015          Assessment & Plan    Routine health maintenance  -     Comprehensive metabolic panel; Future; Expected date: 01/05/2018  -     Lipid panel; Future; Expected date: 01/05/2018  - Health maintenance reviewed  - Diet and exercise education.    Diabetes mellitus without complication  -     Hemoglobin A1c; Future; Expected date: 01/05/2018  -     INCREASE metFORMIN (GLUCOPHAGE-XR) 500 MG 24 hr tablet; Take 1 tablet (500 mg total) by mouth 2 (two) times daily with meals.  Dispense: 180 tablet; Refill: 3  - Continue current therapy  - Serial blood pressure monitoring  - Diet and exercise education.    Hypothyroidism, unspecified type  -     TSH; Future; Expected date: 01/05/2018  - Continue current therapy    Essential hypertension  - Continue current therapy  - Serial blood pressure monitoring  - Diet and exercise education.    Rhinitis medicamentosa  -     fluticasone (FLONASE) 50 mcg/actuation nasal spray; 2 sprays (100 mcg total) by Each Nare route 2 (two) times daily.  Dispense: 16 g; Refill: 11  -     azelastine (ASTELIN) 137 mcg (0.1 %) nasal spray; 1 spray (137 mcg total) by Nasal route 2 (two) times daily.  Dispense: 30 mL; Refill: 11  - ENT If persists    Dysuria  -     Urinalysis; Future; Expected date: 01/05/2018    Return in about 6 months (around 7/5/2018).

## 2018-02-06 DIAGNOSIS — E03.9 ACQUIRED HYPOTHYROIDISM: ICD-10-CM

## 2018-02-06 RX ORDER — LEVOTHYROXINE SODIUM 75 UG/1
TABLET ORAL
Qty: 30 TABLET | Refills: 11 | Status: SHIPPED | OUTPATIENT
Start: 2018-02-06 | End: 2019-01-27 | Stop reason: SDUPTHER

## 2018-03-21 ENCOUNTER — TELEPHONE (OUTPATIENT)
Dept: FAMILY MEDICINE | Facility: CLINIC | Age: 63
End: 2018-03-21

## 2018-03-21 NOTE — TELEPHONE ENCOUNTER
----- Message from Tacos Jerez sent at 3/21/2018 11:11 AM CDT -----  Contact: Nevaeh Herring with [facility] called to advise that she received a faxed Rx for this patient shebelieves it was attended for [facility]. Suzanna faxed it over. Thanks,

## 2018-03-23 RX ORDER — CLONAZEPAM 0.5 MG/1
TABLET ORAL
Qty: 60 TABLET | Refills: 3 | Status: SHIPPED | OUTPATIENT
Start: 2018-03-23 | End: 2018-07-19 | Stop reason: SDUPTHER

## 2018-03-23 NOTE — TELEPHONE ENCOUNTER
----- Message from Mary Espinosa sent at 3/23/2018 11:28 AM CDT -----  Contact: self [phone]  Patient requesting a refill on clonazepam 0.5.    [facility] [address] [phone].

## 2018-04-02 ENCOUNTER — LAB VISIT (OUTPATIENT)
Dept: LAB | Facility: HOSPITAL | Age: 63
End: 2018-04-02
Attending: FAMILY MEDICINE
Payer: MEDICAID

## 2018-04-02 ENCOUNTER — OFFICE VISIT (OUTPATIENT)
Dept: FAMILY MEDICINE | Facility: CLINIC | Age: 63
End: 2018-04-02
Payer: MEDICAID

## 2018-04-02 VITALS
BODY MASS INDEX: 31.67 KG/M2 | HEART RATE: 83 BPM | WEIGHT: 190.06 LBS | HEIGHT: 65 IN | SYSTOLIC BLOOD PRESSURE: 126 MMHG | OXYGEN SATURATION: 96 % | DIASTOLIC BLOOD PRESSURE: 74 MMHG | RESPIRATION RATE: 16 BRPM

## 2018-04-02 DIAGNOSIS — B37.0 THRUSH: Primary | ICD-10-CM

## 2018-04-02 DIAGNOSIS — E03.9 HYPOTHYROIDISM, UNSPECIFIED TYPE: ICD-10-CM

## 2018-04-02 DIAGNOSIS — E11.9 DIABETES MELLITUS WITHOUT COMPLICATION: ICD-10-CM

## 2018-04-02 DIAGNOSIS — Z00.00 ROUTINE HEALTH MAINTENANCE: ICD-10-CM

## 2018-04-02 DIAGNOSIS — E11.9 TYPE 2 DIABETES MELLITUS WITHOUT COMPLICATION, WITHOUT LONG-TERM CURRENT USE OF INSULIN: ICD-10-CM

## 2018-04-02 LAB
ALBUMIN SERPL BCP-MCNC: 4.2 G/DL
ALP SERPL-CCNC: 80 U/L
ALT SERPL W/O P-5'-P-CCNC: 21 U/L
ANION GAP SERPL CALC-SCNC: 13 MMOL/L
AST SERPL-CCNC: 26 U/L
BILIRUB SERPL-MCNC: 0.5 MG/DL
BUN SERPL-MCNC: 13 MG/DL
CALCIUM SERPL-MCNC: 9.5 MG/DL
CHLORIDE SERPL-SCNC: 103 MMOL/L
CHOLEST SERPL-MCNC: 141 MG/DL
CHOLEST/HDLC SERPL: 3.6 {RATIO}
CO2 SERPL-SCNC: 23 MMOL/L
CREAT SERPL-MCNC: 1.2 MG/DL
EST. GFR  (AFRICAN AMERICAN): 55.6 ML/MIN/1.73 M^2
EST. GFR  (NON AFRICAN AMERICAN): 48.2 ML/MIN/1.73 M^2
ESTIMATED AVG GLUCOSE: 140 MG/DL
GLUCOSE SERPL-MCNC: 154 MG/DL
HBA1C MFR BLD HPLC: 6.5 %
HDLC SERPL-MCNC: 39 MG/DL
HDLC SERPL: 27.7 %
LDLC SERPL CALC-MCNC: 59.6 MG/DL
NONHDLC SERPL-MCNC: 102 MG/DL
POTASSIUM SERPL-SCNC: 4.6 MMOL/L
PROT SERPL-MCNC: 8.1 G/DL
SODIUM SERPL-SCNC: 139 MMOL/L
TRIGL SERPL-MCNC: 212 MG/DL
TSH SERPL DL<=0.005 MIU/L-ACNC: 1.24 UIU/ML

## 2018-04-02 PROCEDURE — 83036 HEMOGLOBIN GLYCOSYLATED A1C: CPT

## 2018-04-02 PROCEDURE — 99999 PR PBB SHADOW E&M-EST. PATIENT-LVL V: CPT | Mod: PBBFAC,,, | Performed by: PHYSICIAN ASSISTANT

## 2018-04-02 PROCEDURE — 36415 COLL VENOUS BLD VENIPUNCTURE: CPT | Mod: PO

## 2018-04-02 PROCEDURE — 80053 COMPREHEN METABOLIC PANEL: CPT

## 2018-04-02 PROCEDURE — 99214 OFFICE O/P EST MOD 30 MIN: CPT | Mod: S$PBB,,, | Performed by: PHYSICIAN ASSISTANT

## 2018-04-02 PROCEDURE — 84443 ASSAY THYROID STIM HORMONE: CPT

## 2018-04-02 PROCEDURE — 80061 LIPID PANEL: CPT

## 2018-04-02 PROCEDURE — 99215 OFFICE O/P EST HI 40 MIN: CPT | Mod: PBBFAC,PO | Performed by: PHYSICIAN ASSISTANT

## 2018-04-02 RX ORDER — NYSTATIN 100000 [USP'U]/ML
6 SUSPENSION ORAL 4 TIMES DAILY
Qty: 473 ML | Refills: 0 | Status: SHIPPED | OUTPATIENT
Start: 2018-04-02 | End: 2018-04-12

## 2018-04-02 NOTE — PATIENT INSTRUCTIONS
Candida Infection: Thrush  Thrush is a type of fungal infection in the mouth and throat. Thrush does not usually affect healthy adults. It is more common in people with a weakened immune system. It is also more likely if you take antibiotics. Thrush is normally not contagious.  Understanding fungus in the mouth and throat  Your mouth and throat normally contain millions of tiny organisms. These include bacteria and yeasts. Many of these do not cause any problems. In fact, they may help fight disease.  Yeasts are a type of fungus. A type of yeast called Candida normally lives on your skin. It is also found on the membranes of your mouth and throat. Usually, this yeast grows only in small amounts and is harmless. But in some cases, Candida can grow out of control and cause thrush. Thrush is related to other kinds of Candida infections that can grow all over the body. Thrush refers to an infection of only the mouth and throat.  What causes thrush?  Thrush happens when something lets too much Candida grow inside your mouth and throat. Certain things that change the normal balance of organisms in the mouth can lead to thrush. One example is antibiotic medicine. This medicine may kill some of the normal bacteria in your mouth. Candida can then grow freely. People on antibiotics have an increased risk for thrush.  You have a higher risk for thrush if you:  · Wear dentures  · Are getting chemotherapy  · Are getting radiation therapy  · Have diabetes  · Have a transplanted organ  · Use corticosteroids  · Have a weakened immune system, such as from AIDS  · Are an older adult  Symptoms of thrush  Some people with thrush do not have any symptoms. Symptoms of thrush can include:  · A dry, cottony feeling in your mouth  · Loss of taste  · Pain while eating or swallowing  · White or red patches inside your mouth or on the back of your throat  Diagnosing thrush  Your health care provider will ask about your medical history and  your symptoms. He or she will look closely at your mouth and throat. White or red patches will be scraped with a tongue depressor. The sample will be sent to a lab to test. This test can usually confirm thrush.  If you have thrush, you may also have esophageal candidiasis. This is common in people who have HIV. Your health care provider may check for this condition with an upper endoscopy. This is a procedure to look at the esophagus. A tissue sample may be taken to test.  Treatment for thrush  It is important to treat thrush early. Untreated, it may turn into a more serious form of widespread infection. Thrush is usually treated with antifungal medicine. The medicine is put directly in your mouth and throat. You may be given a swish and swallow medicine or an antifungal lozenge.  In some cases, you may need an antifungal pill. This can remove Candida throughout your body. Or you may need medicine through an IV. These treatments depend on how severe your infection is, and what other health conditions you have.  If you are at high risk for thrush, you may need to keep taking oral antifungal medicine. This is to help prevent thrush in the future.  What happens if you dont get treated for thrush?  If untreated, the Candida may spread throughout your body. They may even enter your bloodstream. This can cause serious problems, such as organ failure and even death. Bloodstream infection may need to be treated with high doses of antifungal medicine through an IV.  Systemic infection is much more likely in people who are very ill. It is also more common in those who have serious problems with their immune system. Additional risk factors for systemic infection in very ill people include:  · Central venous lines  · IV nutrition  · Broad-spectrum antibiotics  · Kidney failure  · Recent surgery  Preventing thrush  You may be able to help prevent some cases of thrush. Make sure to:  · Practice good oral hygiene. Try using a  chlorhexidine mouthwash.  · Clean your dentures regularly as instructed. Make sure they fit you correctly.  · After using a corticosteroid inhaler, rinse out your mouth with water or mouthwash.  · Do not use broad-spectrum antibiotics, if possible.  · Get treated for health problems that increase your risk for thrush, such as diabetes.     When to call the health care provider  Call your health care provider right away if you have any of these:  · Cottony feeling in your mouth  · Loss of taste  · Pain while eating or swallowing  · White or red patches inside your mouth or on the back of your throat   Date Last Reviewed: 3/19/2015  © 0999-1311 [employer]. [address] [place] [address], [place] [address]. All rights reserved. This information is not intended as a substitute for professional medical care. Always follow your healthcare professional's instructions.

## 2018-04-02 NOTE — PROGRESS NOTES
Subjective:       Patient ID: Bing Kearns is a 62 y.o. female    Chief Complaint: Oral Swelling    HPI  Mrs Kearns presents today CO swelling and pain of her tongue and mouth for the past 3 months.  She has a history of type II DM and her last A1C was 6.8 on 6/26/17.  She did labs this morning but the results are not yet available.        Review of Systems   Constitutional: Negative for chills and fever.   HENT: Negative for congestion.         Tongue swelling   Eyes: Negative for visual disturbance.   Respiratory: Negative for cough.    Cardiovascular: Negative for chest pain.   Gastrointestinal: Negative for abdominal pain, diarrhea, nausea and vomiting.   Skin: Negative for rash.   Neurological: Negative for headaches.        Objective:   Physical Exam   Constitutional: She is oriented to person, place, and time. She appears well-developed and well-nourished.   HENT:   Head: Normocephalic and atraumatic.   Right Ear: External ear normal.   Left Ear: External ear normal.   Nose: Nose normal.   Mouth/Throat: Oropharynx is clear and moist. No oropharyngeal exudate.   tongue shows edema and white film at the lateral aspects of the tongue and adjacent oral mucosa   Eyes: Conjunctivae and EOM are normal. Pupils are equal, round, and reactive to light.   Neck: Normal range of motion. Neck supple. No thyromegaly present.   Cardiovascular: Normal rate, regular rhythm, normal heart sounds and intact distal pulses.  Exam reveals no gallop and no friction rub.    No murmur heard.  Pulmonary/Chest: Effort normal and breath sounds normal. No respiratory distress. She has no wheezes. She has no rales.   Musculoskeletal: Normal range of motion. She exhibits no edema.   Lymphadenopathy:     She has no cervical adenopathy.   Neurological: She is alert and oriented to person, place, and time. She has normal reflexes.   Skin: Skin is warm and dry. No rash noted.   Psychiatric: She has a normal mood and affect. Her behavior  is normal. Judgment and thought content normal.        Assessment:       1. Thrush  nystatin (MYCOSTATIN) 100,000 unit/mL suspension   2. Type 2 diabetes mellitus without complication, without long-term current use of insulin          Plan:       Thrush  -     START nystatin (MYCOSTATIN) 100,000 unit/mL suspension; Take 6 mLs (600,000 Units total) by mouth 4 (four) times daily.  Dispense: 473 mL; Refill: 0  - she will contact me in 48-72 hours to let me know how she is doing, consider oral treatment if liver function on today's labs is ok.      Type 2 diabetes mellitus without complication, without long-term current use of insulin  - continue current treatment

## 2018-04-10 ENCOUNTER — PATIENT MESSAGE (OUTPATIENT)
Dept: FAMILY MEDICINE | Facility: CLINIC | Age: 63
End: 2018-04-10

## 2018-04-10 DIAGNOSIS — B37.0 THRUSH: Primary | ICD-10-CM

## 2018-04-11 RX ORDER — FLUCONAZOLE 200 MG/1
200 TABLET ORAL DAILY
Qty: 7 TABLET | Refills: 0 | Status: SHIPPED | OUTPATIENT
Start: 2018-04-11 | End: 2018-04-18

## 2018-04-17 ENCOUNTER — PATIENT MESSAGE (OUTPATIENT)
Dept: FAMILY MEDICINE | Facility: CLINIC | Age: 63
End: 2018-04-17

## 2018-04-18 NOTE — TELEPHONE ENCOUNTER
I would like to schedule a follow up with  Som on a day with Dr Au is also seeing patient's so that he can take a look.       AEF

## 2018-04-20 ENCOUNTER — LAB VISIT (OUTPATIENT)
Dept: LAB | Facility: HOSPITAL | Age: 63
End: 2018-04-20
Attending: PHYSICIAN ASSISTANT
Payer: MEDICAID

## 2018-04-20 ENCOUNTER — OFFICE VISIT (OUTPATIENT)
Dept: FAMILY MEDICINE | Facility: CLINIC | Age: 63
End: 2018-04-20
Payer: MEDICAID

## 2018-04-20 VITALS
OXYGEN SATURATION: 97 % | BODY MASS INDEX: 31.99 KG/M2 | DIASTOLIC BLOOD PRESSURE: 78 MMHG | WEIGHT: 192 LBS | SYSTOLIC BLOOD PRESSURE: 130 MMHG | HEART RATE: 78 BPM | HEIGHT: 65 IN

## 2018-04-20 DIAGNOSIS — K13.79 MOUTH PAIN: ICD-10-CM

## 2018-04-20 DIAGNOSIS — K13.79 MOUTH PAIN: Primary | ICD-10-CM

## 2018-04-20 LAB — VIT B12 SERPL-MCNC: 269 PG/ML

## 2018-04-20 PROCEDURE — 99999 PR PBB SHADOW E&M-EST. PATIENT-LVL V: CPT | Mod: PBBFAC,,, | Performed by: PHYSICIAN ASSISTANT

## 2018-04-20 PROCEDURE — 82607 VITAMIN B-12: CPT

## 2018-04-20 PROCEDURE — 87070 CULTURE OTHR SPECIMN AEROBIC: CPT

## 2018-04-20 PROCEDURE — 36415 COLL VENOUS BLD VENIPUNCTURE: CPT | Mod: PO

## 2018-04-20 PROCEDURE — 99215 OFFICE O/P EST HI 40 MIN: CPT | Mod: PBBFAC,PO | Performed by: PHYSICIAN ASSISTANT

## 2018-04-20 PROCEDURE — 99214 OFFICE O/P EST MOD 30 MIN: CPT | Mod: S$PBB,,, | Performed by: PHYSICIAN ASSISTANT

## 2018-04-20 NOTE — PROGRESS NOTES
Subjective:       Patient ID: Bing Kearns is a 62 y.o. female    Chief Complaint: Follow-up (Pt. here today for a follow- up visit with brady Son. states she finished Nystatin and Diflucan with no results. pt states she feels as it is in her throat now and her lips are numb)    HPI  Mrs Kearns presents today for follow up for her mouth pain.  She did not get any relief with nystatin X 7 days then she was treated with Diflucan oral daily X 7 days.  She did not get any relief with either treatment.  She reports pain in her tongue and gums.       Review of Systems   Constitutional: Negative for activity change, chills and fever.   HENT: Negative for congestion and sore throat.    Eyes: Negative for visual disturbance.   Respiratory: Negative for cough and shortness of breath.    Cardiovascular: Negative for chest pain and palpitations.   Gastrointestinal: Negative for abdominal pain, diarrhea, nausea and vomiting.   Endocrine: Negative for polydipsia and polyuria.   Musculoskeletal: Negative for myalgias.   Skin: Negative for rash.   Neurological: Negative for headaches.   Psychiatric/Behavioral: The patient is not nervous/anxious.         Objective:   Physical Exam   Constitutional: She is oriented to person, place, and time. She appears well-developed and well-nourished. No distress.   HENT:   Head: Normocephalic and atraumatic.   Tongue, inside of cheeks bilaterally show a white film and some edema.  No open wound, no induration, no vesicles.    Eyes: EOM are normal. Pupils are equal, round, and reactive to light.   Neck: Normal range of motion. Neck supple.   Cardiovascular: Normal rate, regular rhythm, normal heart sounds and intact distal pulses.  Exam reveals no gallop and no friction rub.    No murmur heard.  Pulmonary/Chest: Effort normal and breath sounds normal. No respiratory distress. She has no wheezes. She has no rales. She exhibits no tenderness.   Musculoskeletal: Normal range of motion.    Neurological: She is alert and oriented to person, place, and time.   Skin: Skin is warm and dry. No rash noted. She is not diaphoretic.   Psychiatric: She has a normal mood and affect. Her behavior is normal. Judgment and thought content normal.        Assessment:       1. Mouth pain  Vitamin B12    CULTURE, AEROBIC  (SPECIFY SOURCE)    Ambulatory referral to ENT        Plan:       Mouth pain  -     Vitamin B12; Future; Expected date: 04/20/2018  -     CULTURE, AEROBIC  (SPECIFY SOURCE)  -     Ambulatory referral to ENT

## 2018-04-23 LAB — BACTERIA SPEC AEROBE CULT: NORMAL

## 2018-04-23 RX ORDER — ATENOLOL 50 MG/1
50 TABLET ORAL DAILY
Qty: 90 TABLET | Refills: 2 | Status: SHIPPED | OUTPATIENT
Start: 2018-04-23 | End: 2021-06-01

## 2018-04-25 ENCOUNTER — OFFICE VISIT (OUTPATIENT)
Dept: OTOLARYNGOLOGY | Facility: CLINIC | Age: 63
End: 2018-04-25
Payer: MEDICAID

## 2018-04-25 ENCOUNTER — LAB VISIT (OUTPATIENT)
Dept: LAB | Facility: HOSPITAL | Age: 63
End: 2018-04-25
Attending: NURSE PRACTITIONER
Payer: MEDICAID

## 2018-04-25 VITALS
HEART RATE: 88 BPM | HEIGHT: 65 IN | DIASTOLIC BLOOD PRESSURE: 70 MMHG | BODY MASS INDEX: 31.92 KG/M2 | WEIGHT: 191.56 LBS | SYSTOLIC BLOOD PRESSURE: 110 MMHG

## 2018-04-25 DIAGNOSIS — D50.8 OTHER IRON DEFICIENCY ANEMIA: ICD-10-CM

## 2018-04-25 DIAGNOSIS — K13.70 ORAL MUCOSAL LESION: ICD-10-CM

## 2018-04-25 DIAGNOSIS — D50.8 OTHER IRON DEFICIENCY ANEMIA: Primary | ICD-10-CM

## 2018-04-25 DIAGNOSIS — K13.79 ORAL PAIN OF UNKNOWN ETIOLOGY: ICD-10-CM

## 2018-04-25 LAB
BASOPHILS # BLD AUTO: 0.13 K/UL
BASOPHILS NFR BLD: 1.2 %
DIFFERENTIAL METHOD: ABNORMAL
EOSINOPHIL # BLD AUTO: 0.1 K/UL
EOSINOPHIL NFR BLD: 1 %
ERYTHROCYTE [DISTWIDTH] IN BLOOD BY AUTOMATED COUNT: 12.1 %
FERRITIN SERPL-MCNC: 339 NG/ML
FOLATE SERPL-MCNC: 7.8 NG/ML
HCT VFR BLD AUTO: 37 %
HGB BLD-MCNC: 11.8 G/DL
IMM GRANULOCYTES # BLD AUTO: 0.06 K/UL
IMM GRANULOCYTES NFR BLD AUTO: 0.6 %
LYMPHOCYTES # BLD AUTO: 3.2 K/UL
LYMPHOCYTES NFR BLD: 30.3 %
MCH RBC QN AUTO: 29.6 PG
MCHC RBC AUTO-ENTMCNC: 31.9 G/DL
MCV RBC AUTO: 93 FL
MONOCYTES # BLD AUTO: 0.9 K/UL
MONOCYTES NFR BLD: 8.8 %
NEUTROPHILS # BLD AUTO: 6.1 K/UL
NEUTROPHILS NFR BLD: 58.1 %
NRBC BLD-RTO: 0 /100 WBC
PLATELET # BLD AUTO: 400 K/UL
PMV BLD AUTO: 9.7 FL
RBC # BLD AUTO: 3.98 M/UL
WBC # BLD AUTO: 10.48 K/UL

## 2018-04-25 PROCEDURE — 99215 OFFICE O/P EST HI 40 MIN: CPT | Mod: PBBFAC,PO,25 | Performed by: NURSE PRACTITIONER

## 2018-04-25 PROCEDURE — 88305 TISSUE EXAM BY PATHOLOGIST: CPT | Performed by: PATHOLOGY

## 2018-04-25 PROCEDURE — 40810 EXCISION OF MOUTH LESION: CPT | Mod: PBBFAC,PO,LT | Performed by: NURSE PRACTITIONER

## 2018-04-25 PROCEDURE — 40810 EXCISION OF MOUTH LESION: CPT | Mod: S$PBB,,, | Performed by: NURSE PRACTITIONER

## 2018-04-25 PROCEDURE — 36415 COLL VENOUS BLD VENIPUNCTURE: CPT | Mod: PO

## 2018-04-25 PROCEDURE — 99999 PR PBB SHADOW E&M-EST. PATIENT-LVL V: CPT | Mod: PBBFAC,,, | Performed by: NURSE PRACTITIONER

## 2018-04-25 PROCEDURE — 88305 TISSUE EXAM BY PATHOLOGIST: CPT | Mod: 26,,, | Performed by: PATHOLOGY

## 2018-04-25 PROCEDURE — 82728 ASSAY OF FERRITIN: CPT

## 2018-04-25 PROCEDURE — 86038 ANTINUCLEAR ANTIBODIES: CPT

## 2018-04-25 PROCEDURE — 85025 COMPLETE CBC W/AUTO DIFF WBC: CPT

## 2018-04-25 PROCEDURE — 99214 OFFICE O/P EST MOD 30 MIN: CPT | Mod: 25,S$PBB,, | Performed by: NURSE PRACTITIONER

## 2018-04-25 PROCEDURE — 82746 ASSAY OF FOLIC ACID SERUM: CPT

## 2018-04-25 NOTE — LETTER
April 25, 2018      Lora Golden PA-C  [address] [facility] [address]  [facility] [address]           [facility]  [address] [facility] [address]  [facility] [address]  Phone: [phone]  Fax: [phone]          Patient: Bing Kearns   MR Number: [id number]   YOB: 1955   Date of Visit: 4/25/2018       Dear Lora Golden:    Thank you for referring Bing Kearns to me for evaluation. Attached you will find relevant portions of my assessment and plan of care.    If you have questions, please do not hesitate to call me. I look forward to following Bing Kearns along with you.    Sincerely,    Arianne Pastor, ASHA    Enclosure  CC:  No Recipients    If you would like to receive this communication electronically, please contact [email or web address] or [phone] to request more information on [employer] access.    For providers and/or their staff who would like to refer a patient to [facility], please contact us through our one-stop-shop provider referral line, [facility], at [phone].    If you feel you have received this communication in error or would no longer like to receive these types of communications, please e-mail [email or web address]

## 2018-04-25 NOTE — PROGRESS NOTES
"Subjective:       Patient ID: Bing Kearns is a 62 y.o. female.    Chief Complaint: Oral Pain    HPI   Patient reports painful sores in mouth, on tongue and inside cheeks, since December. She states it is affecting her speech, "can't talk right because I'm tripping over my tongue." Also affecting how her food tastes ("doesn't taste right"). Lips feel tingly. She states the taste buds on the tip of her tongue are raised and very painful. She was given Nystatin and Diflucan by PCP which did not help. Culture showed only normal oropharyngeal bertram with no predominant organism. Vit B12 level was normal.   Patient had worsening anemia labs done in December but states she was told to discontinue her iron supplement in December. She states that is when her mouth issues started.   Her Hgb1C currently 6.5.     Review of Systems   Constitutional: Negative.    HENT: Positive for mouth sores.         Alteration of taste  Painful anterior glossal papillae   Eyes: Negative.    Respiratory: Negative.    Cardiovascular: Negative.    Gastrointestinal: Negative.    Musculoskeletal: Negative.    Skin: Negative.    Neurological: Negative.    Hematological: Negative.    Psychiatric/Behavioral: Negative.        Objective:      Physical Exam   Constitutional: She is oriented to person, place, and time. Vital signs are normal. She appears well-developed and well-nourished. She is cooperative. She does not appear ill. No distress.   HENT:   Head: Normocephalic and atraumatic.   Right Ear: Hearing, tympanic membrane, external ear and ear canal normal. Tympanic membrane is not erythematous. No middle ear effusion.   Left Ear: Hearing, tympanic membrane, external ear and ear canal normal. Tympanic membrane is not erythematous.  No middle ear effusion.   Nose: Nose normal. No mucosal edema or rhinorrhea. Right sinus exhibits no maxillary sinus tenderness and no frontal sinus tenderness. Left sinus exhibits no maxillary sinus tenderness " and no frontal sinus tenderness.   Mouth/Throat: Uvula is midline, oropharynx is clear and moist and mucous membranes are normal. Mucous membranes are not pale, not dry and not cyanotic. Oral lesions (hyperkeratosis bilateral buccal mucosa L>R) present. No oropharyngeal exudate, posterior oropharyngeal edema or posterior oropharyngeal erythema.       No evidence of candida in oral cavity.     SEPARATE PROCEDURE NOTE:    After written consent obtain, hurricane topical anesthetic applied w/gauze to site for several minutes. Area injected with 1% xylocaine w/epi. Cup forceps and scissors used to excise lesion from left buccal mucosa. AgNO3 applied to sites for hemostasis. Pt tolerated procedure well.     Eyes: Conjunctivae, EOM and lids are normal. Pupils are equal, round, and reactive to light. Right eye exhibits no discharge. Left eye exhibits no discharge. No scleral icterus.   Neck: Trachea normal and normal range of motion. Neck supple. No tracheal deviation present. No thyroid mass and no thyromegaly present.   Cardiovascular: Normal rate.    Pulmonary/Chest: Effort normal. No stridor. No respiratory distress. She has no wheezes.   Musculoskeletal: Normal range of motion.   Lymphadenopathy:        Head (right side): No submental, no submandibular, no tonsillar, no preauricular and no posterior auricular adenopathy present.        Head (left side): No submental, no submandibular, no tonsillar, no preauricular and no posterior auricular adenopathy present.     She has no cervical adenopathy.        Right cervical: No superficial cervical and no posterior cervical adenopathy present.       Left cervical: No superficial cervical and no posterior cervical adenopathy present.   Neurological: She is alert and oriented to person, place, and time. She has normal strength. Coordination and gait normal.   Skin: Skin is warm, dry and intact. No lesion and no rash noted. She is not diaphoretic. No cyanosis. No pallor.    Psychiatric: She has a normal mood and affect. Her speech is normal and behavior is normal. Judgment and thought content normal. Cognition and memory are normal.   Nursing note and vitals reviewed.      Assessment:       1. Other iron deficiency anemia    2. Oral mucosal lesion        Plan:     CBC, Ferritin, Folate, SCAR -- will call patient with results as soon as available    Biopsy taken of left buccal mucosa to rule out premalignancy. Will call patient with results as soon as available.  Patient discontinued iron supplement (in the setting of worsening anemia) right before onset of oral symptoms. Consider restarting iron supplements.   Reassured no current evidence of infection (fungal or bacterial)

## 2018-04-26 LAB — ANA SER QL IF: NORMAL

## 2018-05-04 ENCOUNTER — PATIENT MESSAGE (OUTPATIENT)
Dept: OTOLARYNGOLOGY | Facility: CLINIC | Age: 63
End: 2018-05-04

## 2018-05-04 NOTE — TELEPHONE ENCOUNTER
I called [facility] lab and pathology report not ready yet.  Specimen sent to [facility].  It will be about 7 days before report will be in.  Please advise.

## 2018-05-07 ENCOUNTER — PATIENT MESSAGE (OUTPATIENT)
Dept: OTOLARYNGOLOGY | Facility: CLINIC | Age: 63
End: 2018-05-07

## 2018-05-08 ENCOUNTER — TELEPHONE (OUTPATIENT)
Dept: OTOLARYNGOLOGY | Facility: CLINIC | Age: 63
End: 2018-05-08

## 2018-05-08 NOTE — TELEPHONE ENCOUNTER
patient called she is Arianne Pastor NP patient having a lot mouth pain.  Biopsy Arianne did last week is not back Pathology department stated they sen to [place] and still in process. Please advise if you can order some special mouth wash to help with her discomfort.

## 2018-05-15 ENCOUNTER — PATIENT MESSAGE (OUTPATIENT)
Dept: OTOLARYNGOLOGY | Facility: CLINIC | Age: 63
End: 2018-05-15

## 2018-05-22 ENCOUNTER — PATIENT MESSAGE (OUTPATIENT)
Dept: OTOLARYNGOLOGY | Facility: CLINIC | Age: 63
End: 2018-05-22

## 2018-05-24 ENCOUNTER — OFFICE VISIT (OUTPATIENT)
Dept: OTOLARYNGOLOGY | Facility: CLINIC | Age: 63
End: 2018-05-24
Payer: MEDICAID

## 2018-05-24 VITALS
HEART RATE: 81 BPM | BODY MASS INDEX: 31.73 KG/M2 | WEIGHT: 190.69 LBS | TEMPERATURE: 97 F | SYSTOLIC BLOOD PRESSURE: 123 MMHG | DIASTOLIC BLOOD PRESSURE: 84 MMHG

## 2018-05-24 DIAGNOSIS — K13.70 ORAL LESION: Primary | ICD-10-CM

## 2018-05-24 PROCEDURE — 99213 OFFICE O/P EST LOW 20 MIN: CPT | Mod: S$PBB,,, | Performed by: OTOLARYNGOLOGY

## 2018-05-24 PROCEDURE — 99213 OFFICE O/P EST LOW 20 MIN: CPT | Mod: PBBFAC | Performed by: OTOLARYNGOLOGY

## 2018-05-24 PROCEDURE — 99999 PR PBB SHADOW E&M-EST. PATIENT-LVL III: CPT | Mod: PBBFAC,,, | Performed by: OTOLARYNGOLOGY

## 2018-05-24 RX ORDER — LIDOCAINE HYDROCHLORIDE 10 MG/ML
1 INJECTION, SOLUTION EPIDURAL; INFILTRATION; INTRACAUDAL; PERINEURAL ONCE
Status: CANCELLED | OUTPATIENT
Start: 2018-05-24 | End: 2018-05-24

## 2018-05-24 NOTE — LETTER
May 28, 2018      Arianne Pastor, ASHA  [address] [facility] [address] [place] [address]           [facility]  [address]  [facility] [address]  Phone: [phone]  Fax: [phone]          Patient: Bing Kearns   MR Number: [id number]   YOB: 1955   Date of Visit: 5/24/2018       Dear Arianne Pastor:    Thank you for referring Bing Kearns to me for evaluation. Attached you will find relevant portions of my assessment and plan of care.    If you have questions, please do not hesitate to call me. I look forward to following Bing Kearns along with you.    Sincerely,    Velasquez Juárez MD    Enclosure  CC:  No Recipients    If you would like to receive this communication electronically, please contact [email or web address] or [phone] to request more information on [employer] access.    For providers and/or their staff who would like to refer a patient to [facility], please contact us through our one-stop-shop provider referral line, [facility], at [phone].    If you feel you have received this communication in error or would no longer like to receive these types of communications, please e-mail [email or web address]

## 2018-05-28 PROBLEM — K13.70 ORAL LESION: Status: ACTIVE | Noted: 2018-05-28

## 2018-05-28 NOTE — PROGRESS NOTES
Chief Complaint   Patient presents with    Mouth Lesions       HPI   62 y.o. female presents from [facility] Pastor for evaluation of lesions of the bilateral buccal mucosa.  She underwent biopsy of her L buccal mucosa.  Path revealed a proliferative process and noted that verrucous carcinoma could not be ruled out. She has no complaints today and presents for evaluation and discussion of her treatment options.     Review of Systems   Constitutional: Negative for fatigue and unexpected weight change.   HENT: Per HPI.  Eyes: Negative for visual disturbance.   Respiratory: Negative for shortness of breath, hemoptysis   Cardiovascular: Negative for chest pain and palpitations.   Musculoskeletal: Negative for decreased ROM, back pain.   Skin: Negative for rash, sunburn, itching.   Neurological: Negative for dizziness and seizures.   Hematological: Negative for adenopathy. Does not bruise/bleed easily.   Endocrine: Negative for rapid weight loss/weight gain, heat/cold intolerance.     Past Medical History   Patient Active Problem List   Diagnosis    Hyperlipemia    Cancer    HBP (high blood pressure)    Hypothyroid    Type 2 diabetes mellitus    Chronic renal insufficiency, stage II (mild)    Anxiety           Past Surgical History   Past Surgical History:   Procedure Laterality Date     SECTION      COLON SURGERY      COLONOSCOPY Left 2015    Procedure: COLONOSCOPY;  Surgeon: Chet Woodard Jr., MD;  Location: [facility];  Service: Endoscopy;  Laterality: Left;    HYSTERECTOMY      PORTACATH PLACEMENT Left     and later removed          Family History   Family History   Problem Relation Age of Onset    Cancer Mother     Heart disease Father     COPD Father     Hypertension Sister     Diabetes Sister     Anemia Sister     Cancer Sister            Social History   .  Social History     Social History    Marital status:      Spouse name: N/A    Number of children: N/A    Years of  education: N/A     Occupational History    Not on file.     Social History Main Topics    Smoking status: Former Smoker    Smokeless tobacco: Never Used    Alcohol use No    Drug use: No    Sexual activity: Not on file     Other Topics Concern    Not on file     Social History Narrative    No narrative on file         Allergies   Review of patient's allergies indicates:   Allergen Reactions    Codeine     Sulfa (sulfonamide antibiotics)      Other reaction(s): Unknown           Physical Exam     Vitals:    05/24/18 1039   BP: 123/84   Pulse: 81   Temp: 96.8 °F (36 °C)         Body mass index is 31.73 kg/m².      General: AOx3, NAD   Respiratory:  Symmetric chest rise, normal effort  Nose: No gross nasal septal deviation. Inferior Turbinates WNL bilaterally. No septal perforation. No masses/lesions.   Oral Cavity: Leukoplakic changes of the bilateral buccal mucosa.   Oral Tongue mobile, no lesions noted. Hard Palate WNL. No buccal or FOM lesions.  Oropharynx:  No masses/lesions of the posterior pharyngeal wall. Tonsillar fossa without lesions. Soft palate without masses. Midline uvula.   Neck: No scars.  No cervical lymphadenopathy, thyromegaly or thyroid nodules.  Normal range of motion.    Face: House Brackmann I bilaterally.     Assessment/Plan  Problem List Items Addressed This Visit        ENT    Oral lesion - Primary     Proliferative lesion bilateral buccal mucosa.  I recommended that we proceed with narrow-margin excision of her L buccal lesion in order to evaluate the possibility of verrucous carcinoma.      We discussed the risks, benefits, indications, and alternatives to composite resection of the oral soft tissues. The risks were described to include, but not be limited to, infection, trismus,  bleeding, scarring, temporary or permanent impairment of speech and swallowing, delayed healing, malocclusion, poor control of saliva with drooling, pooling of secretions, aspiration, pain, loss of  tongue mobility, recurrence, and the need for additional procedures. Time was allowed for questions, and all questions were answered to the patient's satisfaction.    Surgery is scheduled on 6/15/18.           Relevant Orders    Case Request Operating Room: EXCISION, NEOPLASM, MOUTH (Completed)

## 2018-05-28 NOTE — ASSESSMENT & PLAN NOTE
Proliferative lesion bilateral buccal mucosa.  I recommended that we proceed with narrow-margin excision of her L buccal lesion in order to evaluate the possibility of verrucous carcinoma.      We discussed the risks, benefits, indications, and alternatives to composite resection of the oral soft tissues. The risks were described to include, but not be limited to, infection, trismus,  bleeding, scarring, temporary or permanent impairment of speech and swallowing, delayed healing, malocclusion, poor control of saliva with drooling, pooling of secretions, aspiration, pain, loss of tongue mobility, recurrence, and the need for additional procedures. Time was allowed for questions, and all questions were answered to the patient's satisfaction.    Surgery is scheduled on 6/15/18.

## 2018-06-04 ENCOUNTER — TELEPHONE (OUTPATIENT)
Dept: FAMILY MEDICINE | Facility: CLINIC | Age: 63
End: 2018-06-04

## 2018-06-04 NOTE — TELEPHONE ENCOUNTER
----- Message from Kylie Munoz sent at 6/4/2018  2:13 PM CDT -----  Type:  Patient Returning Call    Who Called:  patient  Who Left Message for Patient: Na  Does the patient know what this is regarding?:  yes  Best Call Back Number:  [phone] (home)     Additional Information: Na     1520- Discharge instructions given to mother who verbalized understanding and stated she has no questions.  ID bands verified.  Clamp and alarm removed.  Sleep sack given.    1535- Mother stated that she is ready for discharge.  Infant secured in car seat by mother and infant discharged to home, no change noted in condition.

## 2018-06-04 NOTE — TELEPHONE ENCOUNTER
Spoke with patient- Dr Au is going to be out the day of her appt in July. Rescheduled to 7/13/18. Patient agrees.

## 2018-06-08 ENCOUNTER — ANESTHESIA EVENT (OUTPATIENT)
Dept: SURGERY | Facility: HOSPITAL | Age: 63
End: 2018-06-08
Payer: MEDICAID

## 2018-06-08 NOTE — PRE ADMISSION SCREENING
Anesthesia Assessment: Preoperative EQUATION    Planned Procedure: Procedure(s) (LRB):  EXCISION, NEOPLASM, MOUTH (Bilateral)  Requested Anesthesia Type:General  Surgeon: Velasquez Juárez MD  Service: ENT  Known or anticipated Date of Surgery:6/15/2018    Surgeon notes: reviewed    Electronic QUestionnaire Assessment completed via nurse interview with patient.        NO AQ    Triage considerations:     The patient has no apparent active cardiac condition (No unstable coronary Syndrome such as severe unstable angina or recent [<1 month] myocardial infarction, decompensated CHF, severe valvular   disease or significant arrhythmia)    Previous anesthesia records:GETA, MAC and Complications noted-HX of PONV   12/16/15 colonoscopy:  Airway/Jaw/Neck:  Airway Findings: Mouth Opening: Normal Tongue: Normal  General Airway Assessment: Adult  Mallampati: II  TM Distance: Normal, at least 6 cm  Jaw/Neck Findings:  Neck ROM: Normal ROM       Last PCP note: within 3 months , within [facility]   Subspecialty notes: Cardiology: General, ENT    Other important co-morbidities: HTN/HLP, DM2, Hypothyroidism, CKD stage 2, HX colon cancer, obesity     Tests already available:  Available tests,  within 3 months . 5/11/18 CBC, renal function. 4/2/18 A1c.             Instructions given. (See in Nurse's note)    Optimization:  Anesthesia Preop Clinic Assessment  Indicated-not required for this procedure       Plan:    Testing:  EKG if not obtained form OS     Consultation:cards for last visit note and testing     Patient  has previously scheduled Medical Appointment: none    Navigation: Tests Scheduled. TBD             Consults scheduled.             Results will be tracked by Preop Clinic.

## 2018-06-08 NOTE — ANESTHESIA PREPROCEDURE EVALUATION
Pre Admission Screening  Tiffanie Leigh RN      []Hide copied text  Anesthesia Assessment: Preoperative EQUATION     Planned Procedure: Procedure(s) (LRB):  EXCISION, NEOPLASM, MOUTH (Bilateral)  Requested Anesthesia Type:General  Surgeon: Velasquez Juárez MD  Service: ENT  Known or anticipated Date of Surgery:6/15/2018     Surgeon notes: reviewed     Electronic QUestionnaire Assessment completed via nurse interview with patient.         NO AQ     Triage considerations:      The patient has no apparent active cardiac condition (No unstable coronary Syndrome such as severe unstable angina or recent [<1 month] myocardial infarction, decompensated CHF, severe valvular   disease or significant arrhythmia)     Previous anesthesia records:GETA, MAC and Complications noted-HX of PONV   12/16/15 colonoscopy:  Airway/Jaw/Neck:  Airway Findings: Mouth Opening: Normal Tongue: Normal  General Airway Assessment: Adult  Mallampati: II  TM Distance: Normal, at least 6 cm  Jaw/Neck Findings:  Neck ROM: Normal ROM        Last PCP note: within 3 months , [facility]   Subspecialty notes: Cardiology: General, ENT     Other important co-morbidities: HTN/HLP, DM2, Hypothyroidism, CKD stage 2, HX colon cancer, obesity     Tests already available:  Available tests,  within 3 months . 5/11/18 CBC, renal function. 4/2/18 A1c.                             Instructions given. (See in Nurse's note)     Optimization:  Anesthesia Preop Clinic Assessment  Indicated-not required for this procedure                Plan:    Testing:  EKG if not obtained form OS                           Consultation:cards for last visit note and testing                           Patient  has previously scheduled Medical Appointment: none     Navigation: Tests Scheduled. TBD                        Consults scheduled.                        Results will be tracked by Preop Clinic.                               Electronically signed by Tiffanie Leigh RN  "at 6/8/2018 12:44 PM        Pre-admit on 6/15/2018            Detailed Report        6/11/18 OS cardiology records obtained and will scan to media. EKG 1/2018, echo & stress test, office note 2/2018 06/08/2018  Bing Kearns is a 62 y.o., female.    Anesthesia Evaluation         Review of Systems  Anesthesia Hx:  Hx of Anesthetic complications Some PONV History of prior surgery of interest to airway management or planning: Previous anesthesia: MAC 12/2015 colonoscopy with MAC.  Procedure performed at an [facility] Facility. Denies Family Hx of Anesthesia complications.  Personal Hx of Anesthesia complications, Post-Operative Nausea/Vomiting, in the past, but not with recent anesthetics / prophylaxis   Social:  Former Smoker, No Alcohol Use    Hematology/Oncology:  Hematology Normal       -- Cancer in past history:  Other (see Oncology comments) Oncology Comments: HX colon cancer treated with colectomy     EENT/Dental:   Oral lesion   Cardiovascular:   Hypertension, well controlled  Denies Angina. hyperlipidemia 3 METS at best Functional Capacity 4 METS  Valvular Heart Disease: (pt states she was told she has a "leaky valve", follows up yearly with cards, no intervention needed at this time.)    Hypertension , Well Controlled on Rx    Pulmonary:  Pulmonary Normal  Denies Shortness of breath.  Denies Recent URI. Reports snoaring   Renal/:  Kidney Function/Disease, Chronic Kidney Disease (CKD) , CKD Stage II (GFR 60-89)    Hepatic/GI:   GERD, well controlled    Musculoskeletal:  Musculoskeletal General/Symptoms: low back pain. Functional capacity is ambulatory without assistance.    Neurological:  Neurology Normal    Endocrine:   Diabetes, type 2  Diabetes, Type 2 Diabetes , controlled by oral hypoglycemics. Typical AM glucose range: 140 , most recent HgA1c value was 6.5 on 4/2/18.  Thyroid Disease " Hypothyroidism  Metabolic Disorders, Obesity / BMI > 30  Psych:   depression          Physical Exam  General:  Obesity    Airway/Jaw/Neck:  Airway Findings: Mouth Opening: Small, but > 3cm Tongue: Normal  General Airway Assessment: Adult  Mallampati: III  Improves to III with phonation.  TM Distance: Normal, at least 6 cm  Jaw/Neck Findings:  Neck ROM: Normal ROM      Dental:  Dental Findings: In tact   Chest/Lungs:  Chest/Lungs Findings: Clear to auscultation         Mental Status:  Mental Status Findings:  Cooperative         Anesthesia Plan  Type of Anesthesia, risks & benefits discussed:  Anesthesia Type:  general  Patient's Preference:   Intra-op Monitoring Plan: standard ASA monitors  Intra-op Monitoring Plan Comments:   Post Op Pain Control Plan: multimodal analgesia, IV/PO Opioids PRN and per primary service following discharge from PACU  Post Op Pain Control Plan Comments:   Induction:   IV  Beta Blocker:  Patient is on a Beta-Blocker and has received one dose within the past 24 hours (No further documentation required).       Informed Consent: Patient understands risks and agrees with Anesthesia plan.  Questions answered. Anesthesia consent signed with patient.  ASA Score: 2     Day of Surgery Review of History & Physical:    H&P update referred to the surgeon.         Ready For Surgery From Anesthesia Perspective.

## 2018-06-14 ENCOUNTER — TELEPHONE (OUTPATIENT)
Dept: OTOLARYNGOLOGY | Facility: CLINIC | Age: 63
End: 2018-06-14

## 2018-06-15 ENCOUNTER — ANESTHESIA (OUTPATIENT)
Dept: SURGERY | Facility: HOSPITAL | Age: 63
End: 2018-06-15
Payer: MEDICAID

## 2018-06-15 ENCOUNTER — HOSPITAL ENCOUNTER (OUTPATIENT)
Facility: HOSPITAL | Age: 63
Discharge: HOME | End: 2018-06-15
Attending: OTOLARYNGOLOGY | Admitting: OTOLARYNGOLOGY
Payer: MEDICAID

## 2018-06-15 VITALS
SYSTOLIC BLOOD PRESSURE: 134 MMHG | HEART RATE: 72 BPM | TEMPERATURE: 98 F | OXYGEN SATURATION: 98 % | HEIGHT: 65 IN | WEIGHT: 190 LBS | RESPIRATION RATE: 18 BRPM | BODY MASS INDEX: 31.65 KG/M2 | DIASTOLIC BLOOD PRESSURE: 77 MMHG

## 2018-06-15 DIAGNOSIS — K13.70 ORAL LESION: ICD-10-CM

## 2018-06-15 LAB
POCT GLUCOSE: 111 MG/DL (ref 70–110)
POCT GLUCOSE: 148 MG/DL (ref 70–110)

## 2018-06-15 PROCEDURE — 82962 GLUCOSE BLOOD TEST: CPT | Performed by: OTOLARYNGOLOGY

## 2018-06-15 PROCEDURE — 00170 ANES INTRAORAL PX NOS: CPT | Performed by: OTOLARYNGOLOGY

## 2018-06-15 PROCEDURE — 37000009 HC ANESTHESIA EA ADD 15 MINS: Performed by: OTOLARYNGOLOGY

## 2018-06-15 PROCEDURE — 71000015 HC POSTOP RECOV 1ST HR: Performed by: OTOLARYNGOLOGY

## 2018-06-15 PROCEDURE — 25000003 PHARM REV CODE 250: Performed by: NURSE ANESTHETIST, CERTIFIED REGISTERED

## 2018-06-15 PROCEDURE — 88305 TISSUE EXAM BY PATHOLOGIST: CPT | Performed by: PATHOLOGY

## 2018-06-15 PROCEDURE — 63600175 PHARM REV CODE 636 W HCPCS: Performed by: ANESTHESIOLOGY

## 2018-06-15 PROCEDURE — 71000033 HC RECOVERY, INTIAL HOUR: Performed by: OTOLARYNGOLOGY

## 2018-06-15 PROCEDURE — 37000008 HC ANESTHESIA 1ST 15 MINUTES: Performed by: OTOLARYNGOLOGY

## 2018-06-15 PROCEDURE — 88305 TISSUE EXAM BY PATHOLOGIST: CPT | Mod: 26,,, | Performed by: PATHOLOGY

## 2018-06-15 PROCEDURE — 25000003 PHARM REV CODE 250: Performed by: OTOLARYNGOLOGY

## 2018-06-15 PROCEDURE — 36000706: Performed by: OTOLARYNGOLOGY

## 2018-06-15 PROCEDURE — D9220A PRA ANESTHESIA: Mod: CRNA,,, | Performed by: NURSE ANESTHETIST, CERTIFIED REGISTERED

## 2018-06-15 PROCEDURE — 27000221 HC OXYGEN, UP TO 24 HOURS

## 2018-06-15 PROCEDURE — 36000707: Performed by: OTOLARYNGOLOGY

## 2018-06-15 PROCEDURE — 40812 EXCISE/REPAIR MOUTH LESION: CPT | Mod: ,,, | Performed by: OTOLARYNGOLOGY

## 2018-06-15 PROCEDURE — D9220A PRA ANESTHESIA: Mod: ANES,,, | Performed by: ANESTHESIOLOGY

## 2018-06-15 PROCEDURE — 94761 N-INVAS EAR/PLS OXIMETRY MLT: CPT | Mod: 59

## 2018-06-15 PROCEDURE — 63600175 PHARM REV CODE 636 W HCPCS: Performed by: OTOLARYNGOLOGY

## 2018-06-15 PROCEDURE — 71000039 HC RECOVERY, EACH ADD'L HOUR: Performed by: OTOLARYNGOLOGY

## 2018-06-15 PROCEDURE — 63600175 PHARM REV CODE 636 W HCPCS: Performed by: NURSE ANESTHETIST, CERTIFIED REGISTERED

## 2018-06-15 RX ORDER — ONDANSETRON 8 MG/1
8 TABLET, ORALLY DISINTEGRATING ORAL ONCE
Status: DISCONTINUED | OUTPATIENT
Start: 2018-06-15 | End: 2018-06-15 | Stop reason: HOSPADM

## 2018-06-15 RX ORDER — ROCURONIUM BROMIDE 10 MG/ML
INJECTION, SOLUTION INTRAVENOUS
Status: DISCONTINUED | OUTPATIENT
Start: 2018-06-15 | End: 2018-06-15

## 2018-06-15 RX ORDER — PROPOFOL 10 MG/ML
VIAL (ML) INTRAVENOUS
Status: DISCONTINUED | OUTPATIENT
Start: 2018-06-15 | End: 2018-06-15

## 2018-06-15 RX ORDER — TRAMADOL HYDROCHLORIDE 50 MG/1
50 TABLET ORAL ONCE AS NEEDED
Status: COMPLETED | OUTPATIENT
Start: 2018-06-15 | End: 2018-06-15

## 2018-06-15 RX ORDER — MIDAZOLAM HYDROCHLORIDE 1 MG/ML
INJECTION, SOLUTION INTRAMUSCULAR; INTRAVENOUS
Status: DISCONTINUED | OUTPATIENT
Start: 2018-06-15 | End: 2018-06-15

## 2018-06-15 RX ORDER — FENTANYL CITRATE 50 UG/ML
INJECTION, SOLUTION INTRAMUSCULAR; INTRAVENOUS
Status: DISCONTINUED | OUTPATIENT
Start: 2018-06-15 | End: 2018-06-15

## 2018-06-15 RX ORDER — SODIUM CHLORIDE 9 MG/ML
INJECTION, SOLUTION INTRAVENOUS CONTINUOUS PRN
Status: DISCONTINUED | OUTPATIENT
Start: 2018-06-15 | End: 2018-06-15

## 2018-06-15 RX ORDER — FENTANYL CITRATE 50 UG/ML
25 INJECTION, SOLUTION INTRAMUSCULAR; INTRAVENOUS EVERY 5 MIN PRN
Status: COMPLETED | OUTPATIENT
Start: 2018-06-15 | End: 2018-06-15

## 2018-06-15 RX ORDER — TRAMADOL HYDROCHLORIDE 50 MG/1
TABLET ORAL
Status: COMPLETED
Start: 2018-06-15 | End: 2018-06-15

## 2018-06-15 RX ORDER — LIDOCAINE HYDROCHLORIDE 10 MG/ML
1 INJECTION, SOLUTION EPIDURAL; INFILTRATION; INTRACAUDAL; PERINEURAL ONCE
Status: DISCONTINUED | OUTPATIENT
Start: 2018-06-15 | End: 2018-06-15 | Stop reason: HOSPADM

## 2018-06-15 RX ORDER — ACETAMINOPHEN 325 MG/1
650 TABLET ORAL EVERY 4 HOURS PRN
Status: DISCONTINUED | OUTPATIENT
Start: 2018-06-15 | End: 2018-06-15 | Stop reason: HOSPADM

## 2018-06-15 RX ORDER — LIDOCAINE HCL/PF 100 MG/5ML
SYRINGE (ML) INTRAVENOUS
Status: DISCONTINUED | OUTPATIENT
Start: 2018-06-15 | End: 2018-06-15

## 2018-06-15 RX ORDER — SODIUM CHLORIDE 0.9 % (FLUSH) 0.9 %
3 SYRINGE (ML) INJECTION
Status: DISCONTINUED | OUTPATIENT
Start: 2018-06-15 | End: 2018-06-15 | Stop reason: HOSPADM

## 2018-06-15 RX ORDER — ONDANSETRON 2 MG/ML
INJECTION INTRAMUSCULAR; INTRAVENOUS
Status: DISCONTINUED | OUTPATIENT
Start: 2018-06-15 | End: 2018-06-15

## 2018-06-15 RX ADMIN — PROPOFOL 30 MG: 10 INJECTION, EMULSION INTRAVENOUS at 01:06

## 2018-06-15 RX ADMIN — FENTANYL CITRATE 25 MCG: 50 INJECTION, SOLUTION INTRAMUSCULAR; INTRAVENOUS at 02:06

## 2018-06-15 RX ADMIN — MIDAZOLAM HYDROCHLORIDE 2 MG: 1 INJECTION, SOLUTION INTRAMUSCULAR; INTRAVENOUS at 01:06

## 2018-06-15 RX ADMIN — LIDOCAINE HYDROCHLORIDE 80 MG: 20 INJECTION, SOLUTION INTRAVENOUS at 01:06

## 2018-06-15 RX ADMIN — SODIUM CHLORIDE: 0.9 INJECTION, SOLUTION INTRAVENOUS at 12:06

## 2018-06-15 RX ADMIN — ONDANSETRON 4 MG: 2 INJECTION INTRAMUSCULAR; INTRAVENOUS at 01:06

## 2018-06-15 RX ADMIN — PROPOFOL 170 MG: 10 INJECTION, EMULSION INTRAVENOUS at 01:06

## 2018-06-15 RX ADMIN — SUGAMMADEX 200 MG: 100 INJECTION, SOLUTION INTRAVENOUS at 01:06

## 2018-06-15 RX ADMIN — TRAMADOL HYDROCHLORIDE 50 MG: 50 TABLET, FILM COATED ORAL at 02:06

## 2018-06-15 RX ADMIN — FENTANYL CITRATE 100 MCG: 50 INJECTION, SOLUTION INTRAMUSCULAR; INTRAVENOUS at 01:06

## 2018-06-15 RX ADMIN — ROCURONIUM BROMIDE 30 MG: 10 INJECTION, SOLUTION INTRAVENOUS at 01:06

## 2018-06-15 RX ADMIN — AMPICILLIN AND SULBACTAM 3 G: 2; 1 INJECTION, POWDER, FOR SOLUTION INTRAVENOUS at 01:06

## 2018-06-15 RX ADMIN — FENTANYL CITRATE 50 MCG: 50 INJECTION, SOLUTION INTRAMUSCULAR; INTRAVENOUS at 01:06

## 2018-06-15 NOTE — PLAN OF CARE
Patient and spouse state they are ready to be discharged. Instructions given to patient and family. Both verbalize understanding. Patient tolerating po liquids with no difficulty. Patient states pain is at a tolerable level for them. Anesthesia consent and surgical consent in chart upon patient's discharge from [facility].

## 2018-06-15 NOTE — BRIEF OP NOTE
[facility]  Brief Operative Note     SUMMARY     Surgery Date: 6/15/2018     Surgeon(s) and Role:     * Velasquez Juárez MD - Primary    Assisting Surgeon: None    Pre-op Diagnosis:  Oral lesion [K13.70]    Post-op Diagnosis:  Post-Op Diagnosis Codes:     * Oral lesion [K13.70]    Procedure(s) (LRB):  EXCISION, NEOPLASM, MOUTH (Left)    Anesthesia: General    Description of the findings of the procedure: Dictated    Findings/Key Components: Dictated    Estimated Blood Loss: Minimal         Specimens:   Specimen (12h ago through future)    Start     Ordered    06/15/18 1333  Specimen to Pathology - Surgery  Once     Comments:  1.) Left buccal lesion- Permanent.      06/15/18 1333          Discharge Note    SUMMARY     Admit Date: 6/15/2018    Discharge Date and Time:  06/15/2018 1:37 PM    Hospital Course (synopsis of major diagnoses, care, treatment, and services provided during the course of the hospital stay): Uncomplicated     Final Diagnosis: Post-Op Diagnosis Codes:     * Oral lesion [K13.70]    Disposition: Home or Self Care    Follow Up/Patient Instructions:   RTC 1 week.    Medications:  Reconciled Home Medications:      Medication List      ASK your doctor about these medications    amLODIPine 5 MG tablet  Commonly known as:  NORVASC  Take 5 mg by mouth once daily.     aspirin 81 MG EC tablet  Commonly known as:  ECOTRIN  Take 81 mg by mouth once daily.     atenolol 50 MG tablet  Commonly known as:  TENORMIN  Take 1 tablet (50 mg total) by mouth once daily.     azelastine 137 mcg (0.1 %) nasal spray  Commonly known as:  ASTELIN  1 spray (137 mcg total) by Nasal route 2 (two) times daily.     cholestyramine 4 gram packet  Commonly known as:  QUESTRAN  Take 4 g by mouth once daily.     clonazePAM 0.5 MG tablet  Commonly known as:  KLONOPIN  TAKE 1 TABLET (0.5 MG TOTAL) BY MOUTH 2 (TWO) TIMES DAILY.     cloNIDine 0.1 MG tablet  Commonly known as:  CATAPRES  Take 0.1 mg by mouth once  daily.     diphenhydrAMINE-aluminum-magnesium hydroxide-simethicone-lidocaine HCl 2%  Swish and spit 10 mLs 4 (four) times daily.     fenofibrate micronized 200 MG Cap  Commonly known as:  LOFIBRA  TAKE 1 CAPSULE (200 MG TOTAL) BY MOUTH ONCE DAILY.     FLOMAX 0.4 mg Cp24  Generic drug:  tamsulosin  Take 0.4 mg by mouth once daily.     fluticasone 50 mcg/actuation nasal spray  Commonly known as:  FLONASE  2 sprays (100 mcg total) by Each Nare route 2 (two) times daily.     glycopyrrolate 2 MG Tab  Commonly known as:  ROBINUL  Take 2 mg by mouth 3 (three) times daily.     iron polysaccharides 150 mg iron Cap  Commonly known as:  NIFEREX  Take 150 mg by mouth every morning.     latanoprost 0.005 % ophthalmic solution     levothyroxine 75 MCG tablet  Commonly known as:  SYNTHROID  TAKE ONE TABLET BY MOUTH DAILY     LOMOTIL 2.5-0.025 mg per tablet  Generic drug:  diphenoxylate-atropine 2.5-0.025 mg  Take 1 tablet by mouth as needed.     metFORMIN 500 MG 24 hr tablet  Commonly known as:  GLUCOPHAGE-XR  Take 1 tablet (500 mg total) by mouth 2 (two) times daily with meals.     omeprazole 20 MG capsule  Commonly known as:  PRILOSEC  TAKE ONE CAPSULE BY MOUTH TWICE A DAY     orphenadrine 100 mg tablet  Commonly known as:  NORFLEX  TAKE 1 TABLET BY MOUTH TWICE DAILY AS NEEDED     sertraline 25 MG tablet  Commonly known as:  ZOLOFT  Take 1 tablet (25 mg total) by mouth once daily.     simvastatin 40 MG tablet  Commonly known as:  ZOCOR  TAKE 1 TABLET BY MOUTH EVERY EVENING     sumatriptan 100 MG tablet  Commonly known as:  IMITREX  Take 1 tablet (100 mg total) by mouth once.     traMADol 50 mg tablet  Commonly known as:  ULTRAM  Take 50 mg by mouth every 6 (six) hours as needed.          No discharge procedures on file.

## 2018-06-15 NOTE — TRANSFER OF CARE
"Anesthesia Transfer of Care Note    Patient: Bing Kearns    Procedure(s) Performed: Procedure(s) (LRB):  EXCISION, NEOPLASM, MOUTH (Left)    Patient location: PACU    Anesthesia Type: general    Transport from OR: Transported from OR on 6-10 L/min O2 by face mask with adequate spontaneous ventilation    Post pain: adequate analgesia    Post assessment: no apparent anesthetic complications and tolerated procedure well    Post vital signs: stable    Level of consciousness: sedated and responds to stimulation    Nausea/Vomiting: no nausea/vomiting    Complications: none    Transfer of care protocol was followed      Last vitals:   Visit Vitals  /70 (BP Location: Left arm, Patient Position: Lying)   Pulse 87   Temp 37.1 °C (98.8 °F) (Temporal)   Resp 20   Ht 5' 5" (1.651 m)   Wt 86.2 kg (190 lb)   SpO2 98%   Breastfeeding? No   BMI 31.62 kg/m²     "

## 2018-06-15 NOTE — PLAN OF CARE
Pt AAOx4. Complaints of moderate but improving pain to incision site (left mouth). Site remains intact no drainage noted. Tolerating sips of water. VSS. Safety maintained. Pt to be discharged home per [facility].

## 2018-06-15 NOTE — INTERVAL H&P NOTE
The patient has been examined and the H&P has been reviewed:    I concur with the findings and no changes have occurred since H&P was written.    Anesthesia/Surgery risks, benefits and alternative options discussed and understood by patient/family.          Active Hospital Problems    Diagnosis  POA    Oral lesion [K13.70]  Yes      Resolved Hospital Problems    Diagnosis Date Resolved POA   No resolved problems to display.

## 2018-06-15 NOTE — DISCHARGE INSTRUCTIONS
Soft diet; advance as tolerated. Keep mouth clean. Oral rinses at needed.     Recovery After Procedural Sedation (Adult)  You have been given medicine by vein to make you sleep during your surgery. This may have included both a pain medicine and sleeping medicine. Most of the effects have worn off. But you may still have some drowsiness for the next 6 to 8 hours.  Home care  Follow these guidelines when you get home:  · For the next 8 hours, you should be watched by a responsible adult. This person should make sure your condition is not getting worse.  · Don't drink any alcohol for the next 24 hours.  · Don't drive, operate dangerous machinery, or make important business or personal decisions during the next 24 hours.  Note: Your healthcare provider may tell you not to take any medicine by mouth for pain or sleep in the next 4 hours. These medicines may react with the medicines you were given in the hospital. This could cause a much stronger response than usual.  Follow-up care  Follow up with your healthcare provider if you are not alert and back to your usual level of activity within 12 hours.  When to seek medical advice  Call your healthcare provider right away if any of these occur:  · Drowsiness gets worse  · Weakness or dizziness gets worse  · Repeated vomiting  · You can't be awakened   Date Last Reviewed: 10/18/2016  © 3979-8582 [employer]. [address] [place] [address], [place] [address]. All rights reserved. This information is not intended as a substitute for professional medical care. Always follow your healthcare professional's instructions.

## 2018-06-15 NOTE — H&P (VIEW-ONLY)
Chief Complaint   Patient presents with    Mouth Lesions       HPI   62 y.o. female [facility] from [facility] for evaluation of lesions of the bilateral buccal mucosa.  She underwent biopsy of her L buccal mucosa.  Path revealed a proliferative process and noted that verrucous carcinoma could not be ruled out. She has no complaints today and presents for evaluation and discussion of her treatment options.     Review of Systems   Constitutional: Negative for fatigue and unexpected weight change.   HENT: Per HPI.  Eyes: Negative for visual disturbance.   Respiratory: Negative for shortness of breath, hemoptysis   Cardiovascular: Negative for chest pain and palpitations.   Musculoskeletal: Negative for decreased ROM, back pain.   Skin: Negative for rash, sunburn, itching.   Neurological: Negative for dizziness and seizures.   Hematological: Negative for adenopathy. Does not bruise/bleed easily.   Endocrine: Negative for rapid weight loss/weight gain, heat/cold intolerance.     Past Medical History   Patient Active Problem List   Diagnosis    Hyperlipemia    Cancer    HBP (high blood pressure)    Hypothyroid    Type 2 diabetes mellitus    Chronic renal insufficiency, stage II (mild)    Anxiety           Past Surgical History   Past Surgical History:   Procedure Laterality Date     SECTION      COLON SURGERY      COLONOSCOPY Left 2015    Procedure: COLONOSCOPY;  Surgeon: Chet Woodard Jr., MD;  Location: [facility];  Service: Endoscopy;  Laterality: Left;    HYSTERECTOMY      PORTACATH PLACEMENT Left     and later removed          Family History   Family History   Problem Relation Age of Onset    Cancer Mother     Heart disease Father     COPD Father     Hypertension Sister     Diabetes Sister     Anemia Sister     Cancer Sister            Social History   .  Social History     Social History    Marital status:      Spouse name: N/A    Number of children: N/A    Years of  education: N/A     Occupational History    Not on file.     Social History Main Topics    Smoking status: Former Smoker    Smokeless tobacco: Never Used    Alcohol use No    Drug use: No    Sexual activity: Not on file     Other Topics Concern    Not on file     Social History Narrative    No narrative on file         Allergies   Review of patient's allergies indicates:   Allergen Reactions    Codeine     Sulfa (sulfonamide antibiotics)      Other reaction(s): Unknown           Physical Exam     Vitals:    05/24/18 1039   BP: 123/84   Pulse: 81   Temp: 96.8 °F (36 °C)         Body mass index is 31.73 kg/m².      General: AOx3, NAD   Respiratory:  Symmetric chest rise, normal effort  Nose: No gross nasal septal deviation. Inferior Turbinates WNL bilaterally. No septal perforation. No masses/lesions.   Oral Cavity: Leukoplakic changes of the bilateral buccal mucosa.   Oral Tongue mobile, no lesions noted. Hard Palate WNL. No buccal or FOM lesions.  Oropharynx:  No masses/lesions of the posterior pharyngeal wall. Tonsillar fossa without lesions. Soft palate without masses. Midline uvula.   Neck: No scars.  No cervical lymphadenopathy, thyromegaly or thyroid nodules.  Normal range of motion.    Face: House Brackmann I bilaterally.     Assessment/Plan  Problem List Items Addressed This Visit        ENT    Oral lesion - Primary     Proliferative lesion bilateral buccal mucosa.  I recommended that we proceed with narrow-margin excision of her L buccal lesion in order to evaluate the possibility of verrucous carcinoma.      We discussed the risks, benefits, indications, and alternatives to composite resection of the oral soft tissues. The risks were described to include, but not be limited to, infection, trismus,  bleeding, scarring, temporary or permanent impairment of speech and swallowing, delayed healing, malocclusion, poor control of saliva with drooling, pooling of secretions, aspiration, pain, loss of  tongue mobility, recurrence, and the need for additional procedures. Time was allowed for questions, and all questions were answered to the patient's satisfaction.    Surgery is scheduled on 6/15/18.           Relevant Orders    Case Request Operating Room: EXCISION, NEOPLASM, MOUTH (Completed)

## 2018-06-18 ENCOUNTER — TELEPHONE (OUTPATIENT)
Dept: OTOLARYNGOLOGY | Facility: CLINIC | Age: 63
End: 2018-06-18

## 2018-06-18 RX ORDER — SIMVASTATIN 80 MG/1
TABLET, FILM COATED ORAL
Qty: 90 TABLET | Refills: 2 | Status: SHIPPED | OUTPATIENT
Start: 2018-06-18 | End: 2019-02-28 | Stop reason: SDUPTHER

## 2018-06-18 NOTE — OP NOTE
DATE OF PROCEDURE:  06/15/2018.    PREOPERATIVE DIAGNOSES:  1.  Bilateral buccal mucosa leukoplakia.  2.  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    POSTOPERATIVE DIAGNOSES:  1.  Bilateral buccal mucosa leukoplakia.  2.  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    PROCEDURE:  Excisional biopsy, left buccal mucosal lesion.    SURGEON:  Velasquez Juárez M.D.    ANESTHESIA:  General endotracheal.    ESTIMATED BLOOD LOSS:  Minimal.    SPECIMENS:  Left buccal mucosal lesion for permanent.    INDICATIONS FOR PROCEDURE:  Ms. Kearns is a 62-year-old woman with a history of   bilateral buccal mucosa leukoplakia.  She had recently undergone an office   biopsy, which could not rule out verrucous carcinoma.  It was recommended that   we proceed to the operating room for the aforementioned procedures in order to   rule out this malignant diagnosis.  She was apprised of the risks, benefits and   alternatives to surgery.  In spite of the risks inherent to surgery, she   provided informed consent.    PROCEDURE IN DETAIL:  The patient was taken to the operating room and placed on   the operating table in a supine position.  General endotracheal anesthesia was   induced by the Anesthesia team.  The patient was then draped in the standard   sterile fashion.    To begin, the whole lesion was exposed by placing a bite block and the   self-retaining cheek retractor.  The lesion in question was noted in the left   buccal mucosa just inferior to the orifice of Stensen's duct.  Taking care to   avoid injury to Stensen's duct, this lesion was circumferentially incised and   amputated from the deep attachments buccinator muscle.  It was sent to Pathology   for permanent analysis.  Once the lesion had been excised, hemostasis was   achieved with electrocautery and the wound was closed with 3-0 Vicryl.  The   patient was then handed back to Anesthesia, awakened, extubated and transported   to Recovery in satisfactory condition.  There were no intraoperative    complications.  I was present and participated in the entire procedure.      CPH/MING  dd: 06/18/2018 07:19:38 (CDT)  td: 06/18/2018 10:24:32 (CDT)  Doc ID   #[id number]  Job ID #[id number]    CC:

## 2018-06-18 NOTE — TELEPHONE ENCOUNTER
----- Message from Lis Viramontes sent at 6/18/2018  1:21 PM CDT -----  Contact: Pt   Pt called to schedule a Post op visit .    Pt would like a call back at [phone]    Thank You

## 2018-06-19 NOTE — ANESTHESIA POSTPROCEDURE EVALUATION
"Anesthesia Post Evaluation    Patient: Bing Kearns    Procedure(s) Performed: Procedure(s) (LRB):  EXCISION, NEOPLASM, MOUTH (Left)    Final Anesthesia Type: general  Patient location during evaluation: PACU  Patient participation: Yes- Able to Participate  Level of consciousness: awake and alert  Post-procedure vital signs: reviewed and stable  Pain management: adequate  Airway patency: patent  PONV status at discharge: No PONV  Anesthetic complications: no      Cardiovascular status: blood pressure returned to baseline  Respiratory status: unassisted  Hydration status: euvolemic  Follow-up not needed.        Visit Vitals  /77   Pulse 72   Temp 36.5 °C (97.7 °F) (Skin)   Resp 18   Ht 5' 5" (1.651 m)   Wt 86.2 kg (190 lb)   SpO2 98%   Breastfeeding? No   BMI 31.62 kg/m²       Pain/Hugo Score: No Data Recorded      "

## 2018-06-21 ENCOUNTER — OFFICE VISIT (OUTPATIENT)
Dept: OTOLARYNGOLOGY | Facility: CLINIC | Age: 63
End: 2018-06-21
Payer: MEDICAID

## 2018-06-21 VITALS
DIASTOLIC BLOOD PRESSURE: 64 MMHG | WEIGHT: 188.69 LBS | SYSTOLIC BLOOD PRESSURE: 123 MMHG | HEART RATE: 89 BPM | TEMPERATURE: 98 F | BODY MASS INDEX: 31.4 KG/M2

## 2018-06-21 DIAGNOSIS — K13.70 ORAL LESION: Primary | ICD-10-CM

## 2018-06-21 PROCEDURE — 99999 PR PBB SHADOW E&M-EST. PATIENT-LVL IV: CPT | Mod: PBBFAC,,, | Performed by: NURSE PRACTITIONER

## 2018-06-21 PROCEDURE — 99214 OFFICE O/P EST MOD 30 MIN: CPT | Mod: PBBFAC | Performed by: NURSE PRACTITIONER

## 2018-06-21 PROCEDURE — 99024 POSTOP FOLLOW-UP VISIT: CPT | Mod: ,,, | Performed by: NURSE PRACTITIONER

## 2018-06-21 NOTE — ASSESSMENT & PLAN NOTE
Bing Kearns is 1 week s/p excisional biopsy, left buccal mucosal lesion. Dr. Juárez was present to discuss her path report. Questions answered. Will RTC in 1 month, sooner if needed.

## 2018-06-21 NOTE — PROGRESS NOTES
Chief Complaint   Patient presents with    Post-op Evaluation       HPI   62 y.o. female presents for a post op visit. She has been doing well since her surgery. She does have pain to her incision site. No other complaints.    She was referred to Dr. Juárez from [facility] for evaluation of lesions of the bilateral buccal mucosa.  She underwent biopsy of her L buccal mucosa.  Path revealed a proliferative process and noted that verrucous carcinoma could not be ruled out. She has no complaints today and presents for evaluation and discussion of her treatment options.     Review of Systems   Constitutional: Negative for fatigue and unexpected weight change.   HENT: Per HPI.  Eyes: Negative for visual disturbance.   Respiratory: Negative for shortness of breath, hemoptysis   Cardiovascular: Negative for chest pain and palpitations.   Musculoskeletal: Negative for decreased ROM, back pain.   Skin: Negative for rash, sunburn, itching.   Neurological: Negative for dizziness and seizures.   Hematological: Negative for adenopathy. Does not bruise/bleed easily.   Endocrine: Negative for rapid weight loss/weight gain, heat/cold intolerance.     Past Medical History   Patient Active Problem List   Diagnosis    Hyperlipemia    Cancer    HBP (high blood pressure)    Hypothyroid    Type 2 diabetes mellitus    Chronic renal insufficiency, stage II (mild)    Anxiety    Oral lesion           Past Surgical History   Past Surgical History:   Procedure Laterality Date     SECTION      COLON SURGERY      COLONOSCOPY Left 2015    Procedure: COLONOSCOPY;  Surgeon: Chet Woodard Jr., MD;  Location: [facility];  Service: Endoscopy;  Laterality: Left;    HYSTERECTOMY      PORTACATH PLACEMENT Left     and later removed     SURGICAL REMOVAL OF NEOPLASM OF MOUTH Left 6/15/2018    Procedure: EXCISION, NEOPLASM, MOUTH;  Surgeon: Velasquez Juárez MD;  Location: [address];  Service: ENT;  Laterality: Left;          Family History   Family History   Problem Relation Age of Onset    Cancer Mother     Heart disease Father     COPD Father     Hypertension Sister     Diabetes Sister     Anemia Sister     Cancer Sister            Social History   .  Social History     Social History    Marital status:      Spouse name: N/A    Number of children: N/A    Years of education: N/A     Occupational History    Not on file.     Social History Main Topics    Smoking status: Former Smoker    Smokeless tobacco: Never Used    Alcohol use No    Drug use: No    Sexual activity: Not on file     Other Topics Concern    Not on file     Social History Narrative    No narrative on file         Allergies   Review of patient's allergies indicates:   Allergen Reactions    Codeine     Sulfa (sulfonamide antibiotics)      Other reaction(s): Unknown           Physical Exam     Vitals:    06/21/18 1054   BP: 123/64   Pulse: 89   Temp: 98.3 °F (36.8 °C)         Body mass index is 31.4 kg/m².      General: AOx3, NAD   Respiratory:  Symmetric chest rise, normal effort  Oral Cavity: Left buccal mucosa Incision CDI.  No redness, drainage, or fluid collection noted.  Edges well approximated. Oral tongue mobile, no lesions noted. Hard Palate WNL.   Oropharynx:  No masses/lesions of the posterior pharyngeal wall. Tonsillar fossa without lesions. Soft palate without masses. Midline uvula.   Neck: No scars.  No cervical lymphadenopathy, thyromegaly or thyroid nodules.  Normal range of motion.    Face: House Brackmann I bilaterally.       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  -Negative for invasive carcinoma.  Comment: The pattern is not typical of verrucous carcinoma. Dr. Sherman Goodwin has reviewed the case and  concurs.    Assessment/Plan  Problem List Items Addressed This Visit        ENT    Oral lesion - Primary     Bing Kearns is 1  week s/p excisional biopsy, left buccal mucosal lesion. Dr. Juárez was present to discuss her path report. Questions answered. Will RTC in 1 month, sooner if needed.

## 2018-07-09 RX ORDER — ORPHENADRINE CITRATE 100 MG/1
TABLET, EXTENDED RELEASE ORAL
Qty: 180 TABLET | Refills: 0 | Status: SHIPPED | OUTPATIENT
Start: 2018-07-09 | End: 2018-10-02 | Stop reason: SDUPTHER

## 2018-07-13 ENCOUNTER — OFFICE VISIT (OUTPATIENT)
Dept: FAMILY MEDICINE | Facility: CLINIC | Age: 63
End: 2018-07-13
Payer: MEDICAID

## 2018-07-13 VITALS
WEIGHT: 190.5 LBS | SYSTOLIC BLOOD PRESSURE: 118 MMHG | HEIGHT: 65 IN | RESPIRATION RATE: 16 BRPM | BODY MASS INDEX: 31.74 KG/M2 | HEART RATE: 80 BPM | DIASTOLIC BLOOD PRESSURE: 80 MMHG

## 2018-07-13 DIAGNOSIS — G25.0 BENIGN ESSENTIAL TREMOR: ICD-10-CM

## 2018-07-13 DIAGNOSIS — E11.9 DIABETES MELLITUS WITHOUT COMPLICATION: Primary | ICD-10-CM

## 2018-07-13 DIAGNOSIS — I10 ESSENTIAL HYPERTENSION: ICD-10-CM

## 2018-07-13 PROCEDURE — 99214 OFFICE O/P EST MOD 30 MIN: CPT | Mod: S$PBB,,, | Performed by: FAMILY MEDICINE

## 2018-07-13 PROCEDURE — 99999 PR PBB SHADOW E&M-EST. PATIENT-LVL III: CPT | Mod: PBBFAC,,, | Performed by: FAMILY MEDICINE

## 2018-07-13 PROCEDURE — 99213 OFFICE O/P EST LOW 20 MIN: CPT | Mod: PBBFAC,PO | Performed by: FAMILY MEDICINE

## 2018-07-13 NOTE — PROGRESS NOTES
Subjective:       Patient ID: Bing Kearns is a 62 y.o. female    Chief Complaint: Follow-up (6 month f/u ) and Shaking (noticed tremors in hands and arms sometimes )    Diabetes   She presents for her follow-up diabetic visit. She has type 2 diabetes mellitus. Her disease course has been stable. Hypoglycemia symptoms include tremors (fine intention tremor). There are no diabetic associated symptoms. There are no hypoglycemic complications. Symptoms are stable. There are no diabetic complications. Current diabetic treatment includes oral agent (monotherapy). She is compliant with treatment all of the time. An ACE inhibitor/angiotensin II receptor blocker is being taken. Eye exam is current.       Review of Systems   Neurological: Positive for tremors (fine intention tremor).        Objective:   Physical Exam   Constitutional: She is oriented to person, place, and time. She appears well-developed and well-nourished.   Cardiovascular:   Pulses:       Dorsalis pedis pulses are 1+ on the right side, and 1+ on the left side.   Musculoskeletal:        Right foot: There is no deformity.        Left foot: There is no deformity.   Feet:   Right Foot:   Protective Sensation: 4 sites tested. 4 sites sensed.   Skin Integrity: Negative for skin breakdown.   Left Foot:   Protective Sensation: 4 sites tested. 4 sites sensed.   Skin Integrity: Negative for skin breakdown.   Neurological: She is alert and oriented to person, place, and time.   Vitals reviewed.       Assessment:       1. Diabetes mellitus without complication     2. Essential hypertension     3. Benign essential tremor          Plan:       Diabetes mellitus without complication  - Diet and exercise education.  - Serial glucose monitoring  - Continue current therapy    Essential hypertension  - Continue current therapy  - Serial blood pressure monitoring  - Diet and exercise education.     Benign essential tremor  - Continue beta blocker  - Reassurance

## 2018-07-18 NOTE — PROGRESS NOTES
Chief Complaint   Patient presents with    Follow-up       HPI   62 y.o. female presents for a post op visit. She has been doing well since her surgery. She states she has a sore throat and dry mouth. The dry mouth has been an ongoing problem. No other complaints.    [facility].  She underwent biopsy of her L buccal mucosa.  Path revealed a proliferative process and noted that verrucous carcinoma could not be ruled out. She has no complaints today and presents for evaluation and discussion of her treatment options.     Review of Systems   Constitutional: Negative for fatigue and unexpected weight change.   HENT: Per HPI.  Eyes: Negative for visual disturbance.   Respiratory: Negative for shortness of breath, hemoptysis   Cardiovascular: Negative for chest pain and palpitations.   Musculoskeletal: Negative for decreased ROM, back pain.   Skin: Negative for rash, sunburn, itching.   Neurological: Negative for dizziness and seizures.   Hematological: Negative for adenopathy. Does not bruise/bleed easily.   Endocrine: Negative for rapid weight loss/weight gain, heat/cold intolerance.     Past Medical History   Patient Active Problem List   Diagnosis    Hyperlipemia    Cancer    HBP (high blood pressure)    Hypothyroid    Type 2 diabetes mellitus    Chronic renal insufficiency, stage II (mild)    Anxiety    Oral lesion           Past Surgical History   Past Surgical History:   Procedure Laterality Date     SECTION      COLON SURGERY      COLONOSCOPY Left 2015    Procedure: COLONOSCOPY;  Surgeon: Chet Woodard Jr., MD;  Location: [facility];  Service: Endoscopy;  Laterality: Left;    HYSTERECTOMY      PORTACATH PLACEMENT Left     and later removed     SURGICAL REMOVAL OF NEOPLASM OF MOUTH Left 6/15/2018    Procedure: EXCISION, NEOPLASM, MOUTH;  Surgeon: Velasquez Juárez MD;  Location: [facility]  2ND FLR;  Service: ENT;  Laterality: Left;         Family History   Family History   Problem Relation Age of Onset    Cancer Mother     Heart disease Father     COPD Father     Hypertension Sister     Diabetes Sister     Anemia Sister     Cancer Sister            Social History   .  Social History     Social History    Marital status:      Spouse name: N/A    Number of children: N/A    Years of education: N/A     Occupational History    Not on file.     Social History Main Topics    Smoking status: Former Smoker    Smokeless tobacco: Never Used    Alcohol use No    Drug use: No    Sexual activity: Not on file     Other Topics Concern    Not on file     Social History Narrative    No narrative on file         Allergies   Review of patient's allergies indicates:   Allergen Reactions    Codeine     Sulfa (sulfonamide antibiotics)      Other reaction(s): Unknown           Physical Exam     Vitals:    07/19/18 1131   BP: 124/72   Pulse: 75   Temp: 99.2 °F (37.3 °C)         Body mass index is 31.77 kg/m².      General: AOx3, NAD   Respiratory:  Symmetric chest rise, normal effort  Oral Cavity: Mouth noted to be dry. Left buccal mucosa Incision CDI.  No redness, drainage, or fluid collection noted.  Edges well approximated. Oral tongue mobile, no lesions noted. Hard Palate WNL.   Oropharynx:  No masses/lesions of the posterior pharyngeal wall. Tonsillar fossa without lesions. Soft palate without masses. Midline uvula.   Neck: No scars.  No cervical lymphadenopathy, thyromegaly or thyroid nodules.  Normal range of motion.    Face: House Brackmann I bilaterally.       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  -Negative for invasive carcinoma.  Comment: The pattern is not typical of verrucous carcinoma. Dr. Sherman Goodwin has reviewed the case and  concurs.    Assessment/Plan  Problem List Items Addressed This Visit         ENT    Oral lesion - Primary     Bing Box Som is 1 month s/p excisional biopsy, left buccal mucosal lesion. Dr. Juárez was present to examine the patient. Other than dry mouth, exam is otherwise normal. Magic Mouthwash prescribed. Questions answered. Will RTC in 1 month, sooner if needed.

## 2018-07-19 ENCOUNTER — OFFICE VISIT (OUTPATIENT)
Dept: OTOLARYNGOLOGY | Facility: CLINIC | Age: 63
End: 2018-07-19
Payer: MEDICAID

## 2018-07-19 VITALS
HEART RATE: 75 BPM | SYSTOLIC BLOOD PRESSURE: 124 MMHG | WEIGHT: 190.94 LBS | BODY MASS INDEX: 31.77 KG/M2 | TEMPERATURE: 99 F | DIASTOLIC BLOOD PRESSURE: 72 MMHG

## 2018-07-19 DIAGNOSIS — K13.70 ORAL LESION: Primary | ICD-10-CM

## 2018-07-19 PROCEDURE — 99024 POSTOP FOLLOW-UP VISIT: CPT | Mod: ,,, | Performed by: NURSE PRACTITIONER

## 2018-07-19 PROCEDURE — 99999 PR PBB SHADOW E&M-EST. PATIENT-LVL IV: CPT | Mod: PBBFAC,,, | Performed by: NURSE PRACTITIONER

## 2018-07-19 PROCEDURE — 99214 OFFICE O/P EST MOD 30 MIN: CPT | Mod: PBBFAC | Performed by: NURSE PRACTITIONER

## 2018-07-19 RX ORDER — CLONAZEPAM 0.5 MG/1
TABLET ORAL
Qty: 60 TABLET | Refills: 3 | Status: SHIPPED | OUTPATIENT
Start: 2018-07-19 | End: 2018-12-31 | Stop reason: SDUPTHER

## 2018-07-19 NOTE — ASSESSMENT & PLAN NOTE
Bing Kearns is 1 month s/p excisional biopsy, left buccal mucosal lesion. Dr. Juárez was present to examine the patient. Other than dry mouth, exam is otherwise normal. Magic Mouthwash prescribed. Questions answered. Will RTC in 1 month, sooner if needed.

## 2018-08-23 ENCOUNTER — OFFICE VISIT (OUTPATIENT)
Dept: OTOLARYNGOLOGY | Facility: CLINIC | Age: 63
End: 2018-08-23
Payer: MEDICAID

## 2018-08-23 VITALS
SYSTOLIC BLOOD PRESSURE: 111 MMHG | DIASTOLIC BLOOD PRESSURE: 60 MMHG | TEMPERATURE: 98 F | BODY MASS INDEX: 31.92 KG/M2 | WEIGHT: 191.81 LBS | HEART RATE: 88 BPM

## 2018-08-23 DIAGNOSIS — K11.7 XEROSTOMIA: Primary | ICD-10-CM

## 2018-08-23 DIAGNOSIS — K13.70 ORAL LESION: ICD-10-CM

## 2018-08-23 PROCEDURE — 99213 OFFICE O/P EST LOW 20 MIN: CPT | Mod: PBBFAC | Performed by: OTOLARYNGOLOGY

## 2018-08-23 PROCEDURE — 99999 PR PBB SHADOW E&M-EST. PATIENT-LVL III: CPT | Mod: PBBFAC,,, | Performed by: OTOLARYNGOLOGY

## 2018-08-23 PROCEDURE — 99024 POSTOP FOLLOW-UP VISIT: CPT | Mod: ,,, | Performed by: OTOLARYNGOLOGY

## 2018-08-23 NOTE — ASSESSMENT & PLAN NOTE
Etiology uncertain.  Anti SS-A and SS-B ab ordered to assess for Sjogren's.  I will contact her with the results of these studies.

## 2018-08-23 NOTE — PROGRESS NOTES
Chief Complaint   Patient presents with    Post-op Evaluation       HPI   62 y.o. female presents from [facility] for evaluation of lesions of the bilateral buccal mucosa.  She underwent biopsy of her L buccal mucosa.  Path revealed a proliferative process and noted that verrucous carcinoma could not be ruled out. Biopsy of the L buccal mucosa revealed mild dysplasia and parakeratosis.  She reports persistent dry mouth, altered taste and burning of the mouth.  She has used Magic Mouthwash with some benefit.     Review of Systems   Constitutional: Negative for fatigue and unexpected weight change.   HENT: Per HPI.  Eyes: Negative for visual disturbance.   Respiratory: Negative for shortness of breath, hemoptysis   Cardiovascular: Negative for chest pain and palpitations.   Musculoskeletal: Negative for decreased ROM, back pain.   Skin: Negative for rash, sunburn, itching.   Neurological: Negative for dizziness and seizures.   Hematological: Negative for adenopathy. Does not bruise/bleed easily.   Endocrine: Negative for rapid weight loss/weight gain, heat/cold intolerance.     Past Medical History   Patient Active Problem List   Diagnosis    Hyperlipemia    Cancer    HBP (high blood pressure)    Hypothyroid    Type 2 diabetes mellitus    Chronic renal insufficiency, stage II (mild)    Anxiety    Oral lesion    Xerostomia           Past Surgical History   Past Surgical History:   Procedure Laterality Date     SECTION      COLON SURGERY      HYSTERECTOMY      PORTACATH PLACEMENT Left     and later removed          Family History   Family History   Problem Relation Age of Onset    Cancer Mother     Heart disease Father     COPD Father     Hypertension Sister     Diabetes Sister     Anemia Sister     Cancer Sister            Social History   .  Social History     Socioeconomic History    Marital status:      Spouse name: Not on file    Number of children: Not on file    Years  of education: Not on file    Highest education level: Not on file   Social Needs    Financial resource strain: Not on file    Food insecurity - worry: Not on file    Food insecurity - inability: Not on file    Transportation needs - medical: Not on file    Transportation needs - non-medical: Not on file   Occupational History    Not on file   Tobacco Use    Smoking status: Former Smoker    Smokeless tobacco: Never Used   Substance and Sexual Activity    Alcohol use: No    Drug use: No    Sexual activity: Not on file   Other Topics Concern    Not on file   Social History Narrative    Not on file         Allergies   Review of patient's allergies indicates:   Allergen Reactions    Codeine     Sulfa (sulfonamide antibiotics)      Other reaction(s): Unknown           Physical Exam     Vitals:    08/23/18 1026   BP: 111/60   Pulse: 88   Temp: 98.2 °F (36.8 °C)         Body mass index is 31.92 kg/m².      General: AOx3, NAD   Respiratory:  Symmetric chest rise, normal effort  Nose: No gross nasal septal deviation. Inferior Turbinates WNL bilaterally. No septal perforation. No masses/lesions.   Oral Cavity: Leukoplakic changes of the bilateral buccal mucosa.   Oral Tongue mobile.  Small (3 mm) leukoplakic lesion L lateral tongue. Hard Palate WNL. No buccal or FOM lesions.  Oropharynx:  No masses/lesions of the posterior pharyngeal wall. Tonsillar fossa without lesions. Soft palate without masses. Midline uvula.   Neck: No scars.  No cervical lymphadenopathy, thyromegaly or thyroid nodules.  Normal range of motion.    Face: House Brackmann I bilaterally.     Assessment/Plan  Problem List Items Addressed This Visit        ENT    Oral lesion     Biopsies benign.  Will follow.             Other    Xerostomia - Primary     Etiology uncertain.  Anti SS-A and SS-B ab ordered to assess for Sjogren's.  I will contact her with the results of these studies.          Relevant Orders    ANTI -SSA ANTIBODY    ANTI-SSB  ANTIBODY

## 2018-08-27 ENCOUNTER — PATIENT MESSAGE (OUTPATIENT)
Dept: OTOLARYNGOLOGY | Facility: CLINIC | Age: 63
End: 2018-08-27

## 2018-10-02 RX ORDER — ORPHENADRINE CITRATE 100 MG/1
TABLET, EXTENDED RELEASE ORAL
Qty: 180 TABLET | Refills: 0 | Status: SHIPPED | OUTPATIENT
Start: 2018-10-02 | End: 2019-01-06 | Stop reason: SDUPTHER

## 2018-10-08 RX ORDER — FENOFIBRATE 200 MG/1
200 CAPSULE ORAL DAILY
Qty: 90 CAPSULE | Refills: 3 | Status: SHIPPED | OUTPATIENT
Start: 2018-10-08 | End: 2019-08-28 | Stop reason: SDUPTHER

## 2018-10-08 NOTE — TELEPHONE ENCOUNTER
----- Message from Josiah López sent at 10/8/2018  4:19 PM CDT -----  Type:  RX Refill Request    Who Called:  Patient  Refill or New Rx:  Refill  RX Name and Strength:  Losartan 100 mg tab  How is the patient currently taking it? (ex. 1XDay):  1XDay  Is this a 30 day or 90 day RX:  90  Preferred Pharmacy with phone number:        [facility]/pharmacy #[id number] - [place], [place] - [address] [address]  [address] [place] [address]  Phone: [phone] Fax: [phone]      Local or Mail Order: Local   Ordering Provider:  Angely  Best Call Back Number:  [phone]    PATIENT STATES THAT SHE IS COMPLETELY OUT.

## 2018-10-09 RX ORDER — LOSARTAN POTASSIUM 100 MG/1
TABLET ORAL
Qty: 90 TABLET | Refills: 3 | Status: SHIPPED | OUTPATIENT
Start: 2018-10-09 | End: 2019-12-30 | Stop reason: SDUPTHER

## 2018-10-19 DIAGNOSIS — E11.9 TYPE 2 DIABETES MELLITUS WITHOUT COMPLICATION: ICD-10-CM

## 2018-11-27 ENCOUNTER — PATIENT MESSAGE (OUTPATIENT)
Dept: OTOLARYNGOLOGY | Facility: CLINIC | Age: 63
End: 2018-11-27

## 2018-12-20 ENCOUNTER — PATIENT MESSAGE (OUTPATIENT)
Dept: FAMILY MEDICINE | Facility: CLINIC | Age: 63
End: 2018-12-20

## 2019-01-02 RX ORDER — CLONAZEPAM 0.5 MG/1
TABLET ORAL
Qty: 60 TABLET | Refills: 3 | Status: SHIPPED | OUTPATIENT
Start: 2019-01-02 | End: 2019-07-23 | Stop reason: SDUPTHER

## 2019-01-05 DIAGNOSIS — T48.5X5A RHINITIS MEDICAMENTOSA: ICD-10-CM

## 2019-01-05 DIAGNOSIS — J31.0 RHINITIS MEDICAMENTOSA: ICD-10-CM

## 2019-01-06 RX ORDER — AZELASTINE 1 MG/ML
1 SPRAY, METERED NASAL 2 TIMES DAILY
Qty: 30 ML | Refills: 9 | Status: SHIPPED | OUTPATIENT
Start: 2019-01-06 | End: 2019-11-25 | Stop reason: SDUPTHER

## 2019-01-07 RX ORDER — ORPHENADRINE CITRATE 100 MG/1
TABLET, EXTENDED RELEASE ORAL
Qty: 180 TABLET | Refills: 2 | Status: SHIPPED | OUTPATIENT
Start: 2019-01-07 | End: 2019-11-12 | Stop reason: SDUPTHER

## 2019-01-11 DIAGNOSIS — E11.9 TYPE 2 DIABETES MELLITUS WITHOUT COMPLICATION, UNSPECIFIED WHETHER LONG TERM INSULIN USE: ICD-10-CM

## 2019-01-14 ENCOUNTER — OFFICE VISIT (OUTPATIENT)
Dept: FAMILY MEDICINE | Facility: CLINIC | Age: 64
End: 2019-01-14
Payer: MEDICAID

## 2019-01-14 ENCOUNTER — TELEPHONE (OUTPATIENT)
Dept: OTOLARYNGOLOGY | Facility: CLINIC | Age: 64
End: 2019-01-14

## 2019-01-14 ENCOUNTER — TELEPHONE (OUTPATIENT)
Dept: FAMILY MEDICINE | Facility: CLINIC | Age: 64
End: 2019-01-14

## 2019-01-14 VITALS
TEMPERATURE: 99 F | OXYGEN SATURATION: 97 % | BODY MASS INDEX: 26.63 KG/M2 | HEIGHT: 65 IN | HEART RATE: 84 BPM | WEIGHT: 159.81 LBS | SYSTOLIC BLOOD PRESSURE: 122 MMHG | DIASTOLIC BLOOD PRESSURE: 84 MMHG

## 2019-01-14 DIAGNOSIS — Z00.00 ROUTINE HEALTH MAINTENANCE: ICD-10-CM

## 2019-01-14 DIAGNOSIS — E11.9 DIABETES MELLITUS WITHOUT COMPLICATION: Primary | ICD-10-CM

## 2019-01-14 DIAGNOSIS — E03.9 HYPOTHYROIDISM, UNSPECIFIED TYPE: ICD-10-CM

## 2019-01-14 DIAGNOSIS — Z23 NEED FOR PNEUMOCOCCAL VACCINATION: Primary | ICD-10-CM

## 2019-01-14 DIAGNOSIS — I10 ESSENTIAL HYPERTENSION: ICD-10-CM

## 2019-01-14 PROCEDURE — 99999 PR PBB SHADOW E&M-EST. PATIENT-LVL IV: CPT | Mod: PBBFAC,,, | Performed by: FAMILY MEDICINE

## 2019-01-14 PROCEDURE — 99999 PR PBB SHADOW E&M-EST. PATIENT-LVL IV: ICD-10-PCS | Mod: PBBFAC,,, | Performed by: FAMILY MEDICINE

## 2019-01-14 PROCEDURE — 90471 IMMUNIZATION ADMIN: CPT | Mod: PBBFAC,PO

## 2019-01-14 PROCEDURE — 99214 OFFICE O/P EST MOD 30 MIN: CPT | Mod: PBBFAC,PO | Performed by: FAMILY MEDICINE

## 2019-01-14 PROCEDURE — 99396 PREV VISIT EST AGE 40-64: CPT | Mod: S$PBB,,, | Performed by: FAMILY MEDICINE

## 2019-01-14 PROCEDURE — 99396 PR PREVENTIVE VISIT,EST,40-64: ICD-10-PCS | Mod: S$PBB,,, | Performed by: FAMILY MEDICINE

## 2019-01-14 RX ORDER — METFORMIN HYDROCHLORIDE 500 MG/1
500 TABLET, EXTENDED RELEASE ORAL DAILY
COMMUNITY
Start: 2019-01-14 | End: 2019-01-28 | Stop reason: SDUPTHER

## 2019-01-14 NOTE — TELEPHONE ENCOUNTER
----- Message from Tacos Jerez sent at 1/14/2019  2:33 PM CST -----  Contact: patient  Type: Needs Medical Advice    Who Called:  patient  Symptoms (please be specific):  Sores in mouth  How long has patient had these symptoms:    Pharmacy name and phone #:    Best Call Back Number: [phone]  Additional Information: patient stated that  recommends that she see . Requesting a call back

## 2019-01-14 NOTE — TELEPHONE ENCOUNTER
Dr Au would like to know if you will evaluate this patient.  She has mouth issue for over a year. Pt has seen Arianne Pastor NP and Dr. Juárez with no relief.  Please advise

## 2019-01-14 NOTE — PROGRESS NOTES
HPI  Bing Kearns is a 63 y.o. female with multiple medical diagnoses as listed in the medical history and problem list that presents for Follow-up  .    HPI  Here today for routine health maintenance.  She is still seeing ENT regarding persistent painful mouth lesions    PAST MEDICAL HISTORY:  Past Medical History:   Diagnosis Date    Anxiety     Cancer     colon    Chronic kidney disease     stage 3    Diabetes mellitus     Glaucoma     Hyperlipemia     Hypertension     Thyroid disease     hypothyroid       PAST SURGICAL HISTORY:  Past Surgical History:   Procedure Laterality Date     SECTION      COLON SURGERY      COLONOSCOPY Left 2015    [facility]    EXCISION, NEOPLASM, MOUTH Left 6/15/2018    [facility] Velasquez [facility]    HYSTERECTOMY      PORTACATH PLACEMENT Left     and later removed        SOCIAL HISTORY:  Social History     Socioeconomic History    Marital status:      Spouse name: Not on file    Number of children: Not on file    Years of education: Not on file    Highest education level: Not on file   Social Needs    Financial resource strain: Not on file    Food insecurity - worry: Not on file    Food insecurity - inability: Not on file    Transportation needs - medical: Not on file    Transportation needs - non-medical: Not on file   Occupational History    Not on file   Tobacco Use    Smoking status: Former Smoker    Smokeless tobacco: Never Used   Substance and Sexual Activity    Alcohol use: No    Drug use: No    Sexual activity: Not on file   Other Topics Concern    Not on file   Social History Narrative    Not on file       FAMILY HISTORY:  Family History   Problem Relation Age of Onset    Cancer Mother     Heart disease Father     COPD Father     Hypertension Sister     Diabetes Sister     Anemia Sister     Cancer Sister        ALLERGIES AND MEDICATIONS: updated  and reviewed.  Review of patient's allergies indicates:   Allergen Reactions    Codeine     Sulfa (sulfonamide antibiotics)      Other reaction(s): Unknown     Current Outpatient Medications   Medication Sig Dispense Refill    (Magic mouthwash) 1:1:1 Benadryl 12.5mg/5ml liq, aluminum & magnesium hydroxide-simehticone (Maalox), lidocaine viscous 2% Swish and spit 5 mLs every 4 (four) hours as needed. for mouth sores 450 mL 2    amlodipine (NORVASC) 5 MG tablet Take 5 mg by mouth once daily.       aspirin (ECOTRIN) 81 MG EC tablet Take 81 mg by mouth once daily.        atenolol (TENORMIN) 50 MG tablet Take 1 tablet (50 mg total) by mouth once daily. 90 tablet 2    azelastine (ASTELIN) 137 mcg (0.1 %) nasal spray 1 SPRAY (137 MCG TOTAL) BY NASAL ROUTE 2 (TWO) TIMES DAILY. 30 mL 9    cholestyramine (QUESTRAN) 4 gram packet Take 4 g by mouth once daily.        clonazePAM (KLONOPIN) 0.5 MG tablet TAKE 1 TABLET (0.5 MG TOTAL) BY MOUTH 2 (TWO) TIMES DAILY. 60 tablet 3    cloNIDine (CATAPRES) 0.1 MG tablet Take 0.1 mg by mouth once daily.       diphenoxylate-atropine (LOMOTIL) 2.5-0.025 mg per tablet Take 1 tablet by mouth as needed.       fenofibrate micronized (LOFIBRA) 200 MG Cap Take 1 capsule (200 mg total) by mouth once daily. 90 capsule 3    fluticasone (FLONASE) 50 mcg/actuation nasal spray 2 sprays (100 mcg total) by Each Nare route 2 (two) times daily. 16 g 11    glycopyrrolate (ROBINUL) 2 MG tablet Take 2 mg by mouth 3 (three) times daily.        iron polysaccharides (NIFEREX) 150 mg capsule Take 150 mg by mouth every morning.        latanoprost 0.005 % ophthalmic solution       levothyroxine (SYNTHROID) 75 MCG tablet TAKE ONE TABLET BY MOUTH DAILY 30 tablet 11    losartan (COZAAR) 100 MG tablet TAKE ONE TABLET BY MOUTH ONCE DAILY 90 tablet 3    omeprazole (PRILOSEC) 20 MG capsule TAKE ONE CAPSULE BY MOUTH TWICE A DAY (Patient taking differently: TAKE TWO CAPSULEs BY MOUTH TWICE A DAY) 60  "capsule 10    orphenadrine (NORFLEX) 100 mg tablet TAKE 1 TABLET BY MOUTH TWICE DAILY AS NEEDED 180 tablet 2    sertraline (ZOLOFT) 25 MG tablet Take 1 tablet (25 mg total) by mouth once daily. 90 tablet 0    simvastatin (ZOCOR) 80 MG tablet TAKE ONE TABLET BY MOUTH IN THE EVENING 90 tablet 2    tamsulosin (FLOMAX) 0.4 mg Cp24 Take 0.4 mg by mouth once daily.      tramadol (ULTRAM) 50 mg tablet Take 50 mg by mouth every 6 (six) hours as needed.        metFORMIN (GLUCOPHAGE-XR) 500 MG 24 hr tablet Take 1 tablet (500 mg total) by mouth once daily.      sumatriptan (IMITREX) 100 MG tablet Take 1 tablet (100 mg total) by mouth once. 30 tablet 6     No current facility-administered medications for this visit.        ROS  Review of Systems   Constitutional: Negative for activity change and unexpected weight change.   HENT: Positive for rhinorrhea (stable, chronic). Negative for hearing loss and trouble swallowing.    Eyes: Positive for visual disturbance. Negative for discharge.   Respiratory: Negative for chest tightness and wheezing.    Cardiovascular: Negative for chest pain and palpitations.   Gastrointestinal: Positive for diarrhea (stable, chronic.  Seeing Dr. Bosch). Negative for blood in stool, constipation and vomiting.   Endocrine: Negative for polydipsia and polyuria.   Genitourinary: Negative for difficulty urinating, dysuria, hematuria and menstrual problem.   Musculoskeletal: Positive for neck pain (stable, chronic). Negative for arthralgias and joint swelling.   Neurological: Positive for weakness (occasionally in extremities with tremors.  occasional low blood sugar weekly). Negative for headaches.   Psychiatric/Behavioral: Negative for confusion and dysphoric mood.       Physical Exam  Vitals:    01/14/19 1317   BP: 122/84   Pulse: 84   Temp: 99.2 °F (37.3 °C)    Body mass index is 26.6 kg/m².  Weight: 72.5 kg (159 lb 13.3 oz)   Height: 5' 5" (165.1 cm)     Physical Exam   Constitutional: She is " oriented to person, place, and time. She appears well-developed and well-nourished.   HENT:   Head: Normocephalic and atraumatic.   Right Ear: External ear normal.   Left Ear: External ear normal.   Nose: Nose normal.   Mouth/Throat: Oropharynx is clear and moist.   Eyes: Conjunctivae and EOM are normal. Pupils are equal, round, and reactive to light. No scleral icterus.   Neck: Normal range of motion. Neck supple. No JVD present. No thyromegaly present.   Cardiovascular: Normal rate, regular rhythm and normal heart sounds. Exam reveals no gallop and no friction rub.   No murmur heard.  Pulmonary/Chest: Effort normal and breath sounds normal. She has no wheezes. She has no rales.   Abdominal: Soft. Bowel sounds are normal. She exhibits no distension and no mass. There is no tenderness. There is no rebound and no guarding.   Musculoskeletal: Normal range of motion. She exhibits no edema.   Lymphadenopathy:     She has no cervical adenopathy.   Neurological: She is alert and oriented to person, place, and time. She has normal strength. No cranial nerve deficit or sensory deficit.   Skin: Skin is warm and dry. No rash noted.   Vitals reviewed.      Health Maintenance       Date Due Completion Date    Pneumococcal Vaccine (Highest Risk) (3 of 3 - PPSV23) 10/12/2017 4/11/2016    Mammogram 09/19/2018 9/19/2017    Override on 7/29/2016: Done ([facility])    Override on 5/27/2014: Done (per [facility] claims data)    Hemoglobin A1c 10/02/2018 4/2/2018    Override on 11/3/2014: Done    Eye Exam 12/13/2018 12/13/2017 (Done)    Override on 12/13/2017: Done (with Dr. Tolentino's office)    Override on 12/29/2016: Done (in Lenore's office)    Override on 12/11/2015: Done (Tam)    Override on 11/19/2014: Done    Lipid Panel 04/02/2019 4/2/2018    Foot Exam 07/13/2019 7/13/2018    Low Dose Statin 08/23/2019 8/23/2018    TETANUS VACCINE 07/21/2025 7/21/2015    Colonoscopy 12/16/2025 12/16/2015    Override on 9/19/2014:  Done (per [facility] claims data)          Assessment & Plan    Diabetes mellitus without complication  -     Comprehensive metabolic panel; Future; Expected date: 01/14/2019  -     Lipid panel; Future; Expected date: 01/14/2019  -     Hemoglobin A1c; Future; Expected date: 01/14/2019  - REDUCE Metformin to QD secondary to hypoglycemia  - Follow-up in about 6 months (around 7/14/2019).    Essential hypertension  - Continue current therapy  - Serial blood pressure monitoring  - Diet and exercise education.    Hypothyroidism, unspecified type  -     TSH; Future; Expected date: 01/14/2019  - Continue current therapy    Routine health maintenance  -     Mammo Digital Screening Bilat; Future; Expected date: 01/14/2019  - Continue ENT regarding mouth lesions      Follow-up in about 6 months (around 7/14/2019).

## 2019-01-16 ENCOUNTER — PATIENT MESSAGE (OUTPATIENT)
Dept: FAMILY MEDICINE | Facility: CLINIC | Age: 64
End: 2019-01-16

## 2019-01-16 DIAGNOSIS — F41.9 ANXIETY: Primary | ICD-10-CM

## 2019-01-16 NOTE — TELEPHONE ENCOUNTER
Let us try hydroxyzine.  He should not drive when taking this medication.  I will send to the pharmacy now.

## 2019-01-27 ENCOUNTER — PATIENT MESSAGE (OUTPATIENT)
Dept: FAMILY MEDICINE | Facility: CLINIC | Age: 64
End: 2019-01-27

## 2019-01-27 DIAGNOSIS — E03.9 ACQUIRED HYPOTHYROIDISM: ICD-10-CM

## 2019-01-27 DIAGNOSIS — T48.5X5A RHINITIS MEDICAMENTOSA: ICD-10-CM

## 2019-01-27 DIAGNOSIS — J31.0 RHINITIS MEDICAMENTOSA: ICD-10-CM

## 2019-01-28 ENCOUNTER — PATIENT MESSAGE (OUTPATIENT)
Dept: FAMILY MEDICINE | Facility: CLINIC | Age: 64
End: 2019-01-28

## 2019-01-28 DIAGNOSIS — E11.9 DIABETES MELLITUS WITHOUT COMPLICATION: ICD-10-CM

## 2019-01-28 RX ORDER — METFORMIN HYDROCHLORIDE 500 MG/1
500 TABLET, EXTENDED RELEASE ORAL DAILY
Qty: 90 TABLET | Refills: 0 | Status: SHIPPED | OUTPATIENT
Start: 2019-01-28 | End: 2019-04-11 | Stop reason: SDUPTHER

## 2019-01-28 RX ORDER — LEVOTHYROXINE SODIUM 75 UG/1
TABLET ORAL
Qty: 30 TABLET | Refills: 9 | Status: SHIPPED | OUTPATIENT
Start: 2019-01-28 | End: 2019-11-13 | Stop reason: SDUPTHER

## 2019-01-28 RX ORDER — FLUTICASONE PROPIONATE 50 MCG
2 SPRAY, SUSPENSION (ML) NASAL 2 TIMES DAILY
Qty: 16 G | Refills: 11 | Status: SHIPPED | OUTPATIENT
Start: 2019-01-28 | End: 2020-03-12

## 2019-01-29 ENCOUNTER — LAB VISIT (OUTPATIENT)
Dept: LAB | Facility: HOSPITAL | Age: 64
End: 2019-01-29
Attending: FAMILY MEDICINE
Payer: MEDICAID

## 2019-01-29 ENCOUNTER — OFFICE VISIT (OUTPATIENT)
Dept: OTOLARYNGOLOGY | Facility: CLINIC | Age: 64
End: 2019-01-29
Payer: MEDICAID

## 2019-01-29 VITALS
SYSTOLIC BLOOD PRESSURE: 124 MMHG | WEIGHT: 189 LBS | DIASTOLIC BLOOD PRESSURE: 83 MMHG | BODY MASS INDEX: 31.49 KG/M2 | HEIGHT: 65 IN

## 2019-01-29 DIAGNOSIS — E03.9 HYPOTHYROIDISM, UNSPECIFIED TYPE: ICD-10-CM

## 2019-01-29 DIAGNOSIS — K13.79 EROSION OF ORAL MUCOSA: Primary | ICD-10-CM

## 2019-01-29 DIAGNOSIS — E11.9 DIABETES MELLITUS WITHOUT COMPLICATION: ICD-10-CM

## 2019-01-29 DIAGNOSIS — K11.7 XEROSTOMIA: ICD-10-CM

## 2019-01-29 DIAGNOSIS — K13.21 LEUKOPLAKIA OF ORAL MUCOSA: ICD-10-CM

## 2019-01-29 LAB
ALBUMIN SERPL BCP-MCNC: 4.1 G/DL
ALP SERPL-CCNC: 106 U/L
ALT SERPL W/O P-5'-P-CCNC: 28 U/L
ANION GAP SERPL CALC-SCNC: 10 MMOL/L
AST SERPL-CCNC: 40 U/L
BILIRUB SERPL-MCNC: 0.5 MG/DL
BUN SERPL-MCNC: 16 MG/DL
CALCIUM SERPL-MCNC: 10.3 MG/DL
CHLORIDE SERPL-SCNC: 99 MMOL/L
CHOLEST SERPL-MCNC: 159 MG/DL
CHOLEST/HDLC SERPL: 3.5 {RATIO}
CO2 SERPL-SCNC: 25 MMOL/L
CREAT SERPL-MCNC: 1.1 MG/DL
EST. GFR  (AFRICAN AMERICAN): >60 ML/MIN/1.73 M^2
EST. GFR  (NON AFRICAN AMERICAN): 53.2 ML/MIN/1.73 M^2
ESTIMATED AVG GLUCOSE: 169 MG/DL
GLUCOSE SERPL-MCNC: 190 MG/DL
HBA1C MFR BLD HPLC: 7.5 %
HDLC SERPL-MCNC: 45 MG/DL
HDLC SERPL: 28.3 %
LDLC SERPL CALC-MCNC: 62.4 MG/DL
NONHDLC SERPL-MCNC: 114 MG/DL
POTASSIUM SERPL-SCNC: 4.2 MMOL/L
PROT SERPL-MCNC: 8 G/DL
SODIUM SERPL-SCNC: 134 MMOL/L
TRIGL SERPL-MCNC: 258 MG/DL
TSH SERPL DL<=0.005 MIU/L-ACNC: 2.5 UIU/ML

## 2019-01-29 PROCEDURE — 99213 OFFICE O/P EST LOW 20 MIN: CPT | Mod: S$PBB,,, | Performed by: OTOLARYNGOLOGY

## 2019-01-29 PROCEDURE — 36415 COLL VENOUS BLD VENIPUNCTURE: CPT | Mod: PO

## 2019-01-29 PROCEDURE — 80053 COMPREHEN METABOLIC PANEL: CPT

## 2019-01-29 PROCEDURE — 99213 PR OFFICE/OUTPT VISIT, EST, LEVL III, 20-29 MIN: ICD-10-PCS | Mod: S$PBB,,, | Performed by: OTOLARYNGOLOGY

## 2019-01-29 PROCEDURE — 99999 PR PBB SHADOW E&M-EST. PATIENT-LVL III: ICD-10-PCS | Mod: PBBFAC,,, | Performed by: OTOLARYNGOLOGY

## 2019-01-29 PROCEDURE — 99213 OFFICE O/P EST LOW 20 MIN: CPT | Mod: PBBFAC,PO | Performed by: OTOLARYNGOLOGY

## 2019-01-29 PROCEDURE — 80061 LIPID PANEL: CPT

## 2019-01-29 PROCEDURE — 99999 PR PBB SHADOW E&M-EST. PATIENT-LVL III: CPT | Mod: PBBFAC,,, | Performed by: OTOLARYNGOLOGY

## 2019-01-29 PROCEDURE — 84443 ASSAY THYROID STIM HORMONE: CPT

## 2019-01-29 PROCEDURE — 83036 HEMOGLOBIN GLYCOSYLATED A1C: CPT

## 2019-01-29 RX ORDER — IRON POLYSACCHARIDE COMPLEX 150 MG
CAPSULE ORAL
COMMUNITY
End: 2019-02-19

## 2019-01-29 RX ORDER — DIPHENOXYLATE HYDROCHLORIDE AND ATROPINE SULFATE 2.5; .025 MG/1; MG/1
1 TABLET ORAL
COMMUNITY
End: 2019-12-03 | Stop reason: CLARIF

## 2019-01-29 RX ORDER — CLOBETASOL PROPIONATE 0.05 MG/G
GEL TOPICAL 2 TIMES DAILY
Qty: 30 G | Refills: 1 | Status: SHIPPED | OUTPATIENT
Start: 2019-01-29 | End: 2019-02-28 | Stop reason: SDUPTHER

## 2019-01-29 RX ORDER — DICYCLOMINE HYDROCHLORIDE 10 MG/1
CAPSULE ORAL
Refills: 0 | COMMUNITY
Start: 2019-01-04 | End: 2020-03-10

## 2019-01-29 NOTE — PROGRESS NOTES
Patient Name:  Date of Encounter: 1/29/2019  Provider: Dedra Khan MD  Referring MD:  PCP:  Derm:  Cardiology:    CC:  Dry mouth; chronic mouth pain due to sores on cheeks and tongue    HPI   63 y.o. female presents from [facility]  for evaluation of lesions of the bilateral buccal mucosa.  She underwent biopsy of her L buccal mucosa.  Path revealed a proliferative process and noted that verrucous carcinoma could not be ruled out. Biopsy of the L buccal mucosa revealed mild dysplasia and parakeratosis.  She reports persistent dry mouth, altered taste and burning of the mouth.  She has used Magic Mouthwash with some benefit.     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  -Negative for invasive carcinoma.  Comment: The pattern is not typical of verrucous    She reports that she was last seen by Dr. Juárez August/2018.  She has been seen by other physicians including a dermatologist who have told her that there is nothing that they can do for her symptoms of xerostomia, numbness of the oral cavity, and discomfort with the lesions involving her buccal mucosa and tongue. She also reports dysguesia to all foods x1 year--  she feels like her taste buds or swollen    ROS:  Constitutional: Negative for activity change and appetite change, weight loss.   Taste: + dysguesia  Pain: + chronic mouth pain  Eyes: Negative for discharge, visual changes.   Respiratory: Negative for difficulty breathing and wheezing   Cardiovascular: Negative for chest pain.   Gastrointestinal: Negative for abdominal distention and abdominal pain.   Endocrine: Negative for cold intolerance and heat intolerance.   Genitourinary: Negative for dysuria.   Musculoskeletal: Negative for gait problem, muscle pain and joint swelling.   Skin: Negative for color change and pallor; negative for skin lesions.   Neurological: Negative for syncope and weakness; no numbness  face.   Psychiatric/Behavioral: Negative for agitation and confusion; negative for depression.    Physical Exam:      Constitutional  · General Appearance: well nourished, well-developed, alert, oriented, in no acute distress  · Communication: ability, understanding, normal  Head and Face  · Inspection: normocephalic, atraumatic, no scars, lesions or masses   · Palpation: no stepoffs, sinus tenderness or masses  · Parotid glands: no masses, stones, swelling or tenderness  Eyes  · Ocular Motility / Alignment: normal alignment, motility, no proptosis, enophthalmus or nystagmus  · Conjunctiva: not injected  · Eyelids: no hooding, lag, entropion, or ectropion  Nose  · External Nose: no lesions, tenderness, trauma or deformity  Oral Cavity / Oropharynx  · Lips: upper and lower lips pink and moist  · Teeth: good dentition  · Gingiva: healthy  · Oral Mucosa: diffuse leukoplakia left buccsal mucsoa that his rough to palpation extending from post sup aspect adjacent to retromolar trigone to oral commissure with a well healed incision horizontally through the lesion; leukoplakia right buccual mucosa from mid buccal mucosa to oral commissure; slightly painful to touch  · Floor of Mouth: normal, no lesions, salivary ducts patent  · Tongue: moist, normal mobility, mild leukoplakia left lateral tongue at junction of ventral tongue  · Palate: soft and hard palates without lesions or ulcers  Oropharynx: tonsils and walls without erythema, exudate, base of tongue soft to palpation  Neck  · Inspection and Palpation: no erythema, induration, emphysema, tenderness or masses  · Larynx and Trachea: normal position; normal crepitus  · Thyroid: no tenderness, enlargement or nodules  · Submandibular Glands: no masses or tenderness  Lymphatic:  · Anterior, Posterior, Submandibular, Submental, Supraclavicular: no lymphadenopathy present  Chest / Respiratory  · Chest: no stridor or retractions, normal effort and  expansion  Cardiovascular:  · Pulses: 2+ carotid pulses bilaterally  · Auscultation: deferred  Neurological  · Cranial Nerves: grossly intact  · General: no focal deficits  Psychiatric  · Orientation: oriented to time, place and person  · Mood and Affect: no depression, anxiety or agitation  Extremities  · Inspection: moves all extremities well      Test results:  Labs:  Path: see path results above    Records reviewed: path and notes in Epic as summarized above     Assessment:   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 Previous biopsy was negative for carcinoma -it revealed mild dysplasia.  Mild leukoplakia tongue  These lesions are similar in appearance to erosive lichen planus, although patient has no history of lichen planus.    Plan:  Biotene oral  products for xerostomia  Clobetasol gel to lesions and mouth twice daily  Follow-up in 1 month for re-evaluation.  At that time if there are any suspicious areas biopsies will be performed.

## 2019-01-31 ENCOUNTER — TELEPHONE (OUTPATIENT)
Dept: OTOLARYNGOLOGY | Facility: CLINIC | Age: 64
End: 2019-01-31

## 2019-02-06 NOTE — TELEPHONE ENCOUNTER
I spoke with the patient she picked up RX at a different pharmacy and only picked up for $22.00.  She said thanks for being willing to work on prior auth but she picked it up and has been using it daily.

## 2019-02-07 ENCOUNTER — TELEPHONE (OUTPATIENT)
Dept: OTOLARYNGOLOGY | Facility: CLINIC | Age: 64
End: 2019-02-07

## 2019-02-07 NOTE — TELEPHONE ENCOUNTER
Left vm patient needs to reschedule appointment to earlier that morning Dr. Khan will not be in clinic at the current appointment time

## 2019-02-19 ENCOUNTER — OFFICE VISIT (OUTPATIENT)
Dept: OTOLARYNGOLOGY | Facility: CLINIC | Age: 64
End: 2019-02-19
Payer: COMMERCIAL

## 2019-02-19 VITALS
WEIGHT: 193.5 LBS | HEART RATE: 81 BPM | BODY MASS INDEX: 32.24 KG/M2 | DIASTOLIC BLOOD PRESSURE: 70 MMHG | HEIGHT: 65 IN | SYSTOLIC BLOOD PRESSURE: 104 MMHG

## 2019-02-19 DIAGNOSIS — K13.21 LEUKOPLAKIA OF ORAL MUCOSA: ICD-10-CM

## 2019-02-19 DIAGNOSIS — K13.21 LEUKOPLAKIA, TONGUE: Primary | ICD-10-CM

## 2019-02-19 DIAGNOSIS — K13.21 LEUKOPLAKIA ORAL MUCOSA: ICD-10-CM

## 2019-02-19 DIAGNOSIS — K11.7 XEROSTOMIA: ICD-10-CM

## 2019-02-19 PROCEDURE — 88312 SPECIAL STAINS GROUP 1: CPT | Mod: 26,,, | Performed by: PATHOLOGY

## 2019-02-19 PROCEDURE — 99213 OFFICE O/P EST LOW 20 MIN: CPT | Mod: 25,S$GLB,, | Performed by: OTOLARYNGOLOGY

## 2019-02-19 PROCEDURE — 99999 PR PBB SHADOW E&M-EST. PATIENT-LVL IV: CPT | Mod: PBBFAC,,, | Performed by: OTOLARYNGOLOGY

## 2019-02-19 PROCEDURE — 88305 TISSUE EXAM BY PATHOLOGIST: CPT | Mod: 26,,, | Performed by: PATHOLOGY

## 2019-02-19 PROCEDURE — 41100 BIOPSY OF TONGUE: CPT | Mod: S$GLB,,, | Performed by: OTOLARYNGOLOGY

## 2019-02-19 PROCEDURE — 3008F PR BODY MASS INDEX (BMI) DOCUMENTED: ICD-10-PCS | Mod: CPTII,S$GLB,, | Performed by: OTOLARYNGOLOGY

## 2019-02-19 PROCEDURE — 88305 TISSUE SPECIMEN TO PATHOLOGY, ENT: ICD-10-PCS | Mod: 26,,, | Performed by: PATHOLOGY

## 2019-02-19 PROCEDURE — 3008F BODY MASS INDEX DOCD: CPT | Mod: CPTII,S$GLB,, | Performed by: OTOLARYNGOLOGY

## 2019-02-19 PROCEDURE — 99999 PR PBB SHADOW E&M-EST. PATIENT-LVL IV: ICD-10-PCS | Mod: PBBFAC,,, | Performed by: OTOLARYNGOLOGY

## 2019-02-19 PROCEDURE — 40808 PR BIOPSY OF MOUTH LESION: ICD-10-PCS | Mod: 51,S$GLB,, | Performed by: OTOLARYNGOLOGY

## 2019-02-19 PROCEDURE — 99213 PR OFFICE/OUTPT VISIT, EST, LEVL III, 20-29 MIN: ICD-10-PCS | Mod: 25,S$GLB,, | Performed by: OTOLARYNGOLOGY

## 2019-02-19 PROCEDURE — 88305 TISSUE EXAM BY PATHOLOGIST: CPT | Performed by: PATHOLOGY

## 2019-02-19 PROCEDURE — 41100 PR BIOPSY TONGUE,ANTER 2/3: ICD-10-PCS | Mod: S$GLB,,, | Performed by: OTOLARYNGOLOGY

## 2019-02-19 PROCEDURE — 88312 TISSUE SPECIMEN TO PATHOLOGY, ENT: ICD-10-PCS | Mod: 26,,, | Performed by: PATHOLOGY

## 2019-02-19 PROCEDURE — 40808 BIOPSY OF MOUTH LESION: CPT | Mod: 51,S$GLB,, | Performed by: OTOLARYNGOLOGY

## 2019-02-19 NOTE — PROGRESS NOTES
Patient Name:  Date of Encounter: 1/29/2019  Provider: Dedra Khan MD  Referring MD:  PCP:  Derm:  Cardiology:    CC:  Dry mouth; chronic mouth pain due to sores on cheeks and tongue    HPI   63 y.o. female presents from [facility] for evaluation of lesions of the bilateral buccal mucosa.  She underwent biopsy of her L buccal mucosa.  Path revealed a proliferative process and noted that verrucous carcinoma could not be ruled out. Biopsy of the L buccal mucosa revealed mild dysplasia and parakeratosis.  She reports persistent dry mouth, altered taste and burning of the mouth.  She has used Magic Mouthwash with some benefit.     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  -Negative for invasive carcinoma.  Comment: The pattern is not typical of verrucous    She reports that she was last seen by Dr. Juárez August/2018.  She has been seen by other physicians including a dermatologist who have told her that there is nothing that they can do for her symptoms of xerostomia, numbness of the oral cavity, and discomfort with the lesions involving her buccal mucosa and tongue. She also reports dysguesia to all foods x1 year--  she feels like her taste buds or swollen    2/19/2019    Patient is here for F/U mouth pain/lesions. 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  She was rx'ed clobetasol gel at time of last visit.  She reports that it has not helped  She has an area on each side of tongue that is painful.  Biotene has helped minimally for dry mouth    ROS:  Constitutional: Negative for activity change and appetite change, weight loss.   Taste: + dysguesia  Pain: + chronic mouth pain  Eyes: Negative for discharge, visual changes.   Respiratory: Negative for difficulty breathing and wheezing   Cardiovascular: Negative for chest pain.    Gastrointestinal: Negative for abdominal distention and abdominal pain.   Endocrine: Negative for cold intolerance and heat intolerance.   Genitourinary: Negative for dysuria.   Musculoskeletal: Negative for gait problem, muscle pain and joint swelling.   Skin: Negative for color change and pallor; negative for skin lesions.   Neurological: Negative for syncope and weakness; no numbness face.   Psychiatric/Behavioral: Negative for agitation and confusion; negative for depression.    Physical Exam:      Constitutional  · General Appearance: well nourished, well-developed, alert, oriented, in no acute distress  · Communication: ability, understanding, normal  Oral Cavity / Oropharynx  · Lips: upper and lower lips pink and moist  · Teeth: good dentition  · Gingiva: healthy  · Oral Mucosa: diffuse leukoplakia left buccsal mucsoa that his rough to palpation extending from post sup aspect adjacent to retromolar trigone to oral commissure with a well healed incision horizontally through the lesion; leukoplakia right buccual mucosa from mid buccal mucosa to oral commissure; slightly painful to touch--worse than last visit  · Floor of Mouth: normal, no lesions, salivary ducts patent  · Tongue: moist, normal mobility,thickened leukoplakia right left lateral tongue at junction of ventral tongue, L>R--overall worse than last visit  · Palate: soft and hard palates without lesions or ulcers  Oropharynx: tonsils and walls without erythema, exudate, base of tongue soft to palpation  Neurological  · Cranial Nerves: grossly intact  · General: no focal deficits  Psychiatric  · Orientation: oriented to time, place and person  · Mood and Affect: no depression, anxiety or agitation     Assessment:   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 Previous biopsy was negative for carcinoma -it revealed mild dysplasia.  Thickened leukoplakia bilatearl lateral tongue  L>R  Lesions overall worse than last visit  These lesions are similar in appearance to erosive lichen planus, although patient has no history of lichen planus.    Plan:  Continue clobetasol gel and Biotene  Biopsy of left lateral tongue and left cheek today  Follow-up in 8 days for results    Procedure  Area to be biopsied on the left lateral tongue and left cheek were more thickened than the other areas.  A consent was signed by the patient explaining risks, benefits and alternatives and she was to proceed with biopsies following questions  The areas to be biopsy were injected with 1% lidocaine with 1:100,000 epinephrine  A 3 mm punch biopsy was used to biopsy the lesion on the left lateral tongue and a 3 mm punch biopsy was used to biopsy the lesion involving the left buccal mucosa  Hemostasis was obtained using silver nitrate  Patient rinsed until clear  There was no bleeding  There were no complications    She was instructed to rinse following each meal

## 2019-02-20 PROBLEM — K13.21 LEUKOPLAKIA, TONGUE: Status: ACTIVE | Noted: 2019-02-20

## 2019-02-20 PROBLEM — K13.21 LEUKOPLAKIA ORAL MUCOSA: Status: ACTIVE | Noted: 2019-02-20

## 2019-02-28 ENCOUNTER — OFFICE VISIT (OUTPATIENT)
Dept: OTOLARYNGOLOGY | Facility: CLINIC | Age: 64
End: 2019-02-28
Payer: COMMERCIAL

## 2019-02-28 VITALS — WEIGHT: 193.13 LBS | BODY MASS INDEX: 32.18 KG/M2 | HEIGHT: 65 IN

## 2019-02-28 DIAGNOSIS — K11.7 XEROSTOMIA: Primary | ICD-10-CM

## 2019-02-28 DIAGNOSIS — K13.79 EROSION OF ORAL MUCOSA: ICD-10-CM

## 2019-02-28 DIAGNOSIS — K13.21 LEUKOPLAKIA OF ORAL MUCOSA: ICD-10-CM

## 2019-02-28 PROCEDURE — 99024 POSTOP FOLLOW-UP VISIT: CPT | Mod: S$GLB,,, | Performed by: OTOLARYNGOLOGY

## 2019-02-28 PROCEDURE — 99999 PR PBB SHADOW E&M-EST. PATIENT-LVL III: CPT | Mod: PBBFAC,,, | Performed by: OTOLARYNGOLOGY

## 2019-02-28 PROCEDURE — 99999 PR PBB SHADOW E&M-EST. PATIENT-LVL III: ICD-10-PCS | Mod: PBBFAC,,, | Performed by: OTOLARYNGOLOGY

## 2019-02-28 PROCEDURE — 99024 PR POST-OP FOLLOW-UP VISIT: ICD-10-PCS | Mod: S$GLB,,, | Performed by: OTOLARYNGOLOGY

## 2019-02-28 RX ORDER — CLOBETASOL PROPIONATE 0.05 MG/G
GEL TOPICAL 2 TIMES DAILY
Qty: 60 G | Refills: 5 | Status: SHIPPED | OUTPATIENT
Start: 2019-02-28 | End: 2019-03-30

## 2019-02-28 NOTE — PROGRESS NOTES
Patient Name:  Date of Encounter: 1/29/2019  Provider: Dedra Khan MD  Referring MD:  PCP:  Derm:  Cardiology:    CC:  Dry mouth; chronic mouth pain due to sores on cheeks and tongue    HPI   63 y.o. female presents from [facility] for evaluation of lesions of the bilateral buccal mucosa.  She underwent biopsy of her L buccal mucosa.  Path revealed a proliferative process and noted that verrucous carcinoma could not be ruled out. Biopsy of the L buccal mucosa revealed mild dysplasia and parakeratosis.  She reports persistent dry mouth, altered taste and burning of the mouth.  She has used Magic Mouthwash with some benefit.     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  -Negative for invasive carcinoma.  Comment: The pattern is not typical of verrucous    She reports that she was last seen by Dr. Juárez August/2018.  She has been seen by other physicians including a dermatologist who have told her that there is nothing that they can do for her symptoms of xerostomia, numbness of the oral cavity, and discomfort with the lesions involving her buccal mucosa and tongue. She also reports dysguesia to all foods x1 year--  she feels like her taste buds or swollen    2/19/2019    Patient is here for F/U mouth pain/lesions. 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  She was rx'ed clobetasol gel at time of last visit.  She reports that it has not helped  She has an area on each side of tongue that is painful.  Biotene has helped minimally for dry mouth    2/27/2019  Patient is here today for biopsy results.  She reports that the tissue overlying her tongue buccal mucosa is much softer.  Her tongue actually peeled and it was clear, but the leukoplakia return within the next 48 hr.  Her pain however has decreased from an 8 down to a 2.  Overall she  has improved    ROS:  Constitutional: Negative for activity change and appetite change, weight loss.   Taste: + dysguesia  Pain: + chronic mouth pain--better  Eyes: Negative for discharge, visual changes.   Respiratory: Negative for difficulty breathing and wheezing   Cardiovascular: Negative for chest pain.   Gastrointestinal: Negative for abdominal distention and abdominal pain.   Endocrine: Negative for cold intolerance and heat intolerance.   Genitourinary: Negative for dysuria.   Musculoskeletal: Negative for gait problem, muscle pain and joint swelling.   Skin: Negative for color change and pallor; negative for skin lesions.   Neurological: Negative for syncope and weakness; no numbness face.   Psychiatric/Behavioral: Negative for agitation and confusion; negative for depression.    Physical Exam:      Constitutional  · General Appearance: well nourished, well-developed, alert, oriented, in no acute distress  · Communication: ability, understanding, normal  Oral Cavity / Oropharynx  · Lips: upper and lower lips pink and moist  · Teeth: good dentition  · Gingiva: healthy  · Oral Mucosa: diffuse leukoplakia left buccsal mucsoa that his rough to palpation extending from post sup aspect adjacent to retromolar trigone to oral commissure with a well healed incision horizontally through the lesion; leukoplakia right buccual mucosa from mid buccal mucosa to oral commissure; pain to palpation resolved; lesions much softer and not as thick  · Floor of Mouth: normal, no lesions, salivary ducts patent  · Tongue: moist, normal mobility,thickened leukoplakia right left lateral tongue at junction of ventral tongue, L>R--much improved since last visit (see abovr)  · Palate: soft and hard palates without lesions or ulcers  Oropharynx: tonsils and walls without erythema, exudate, base of tongue soft to palpation  Neurological  · Cranial Nerves: grossly intact  · General: no focal deficits  Psychiatric  · Orientation: oriented  to time, place and person  · Mood and Affect: no depression, anxiety or agitation    FINAL PATHOLOGIC DIAGNOSIS  1. TONGUE, LEFT LATERAL, BIOPSY:  Acutely inflamed squamous mucosa with marked acanthosis (thickening) and severe parakeratosis, see note.  A GMS stain for fungal elements is pending and will be reported in a supplemental report .  No atypia or carcinoma.  2. ORAL CAVITY, LEFT BUCCAL MUCOSA, BIOPSY:  Superficial fragments of acanthotic (thickened) squamous mucosa with parakeratosis, see note.  A GMS stain for fungal elements is pending and will be reported in a supplemental report .  No atypia or carcinoma.  NOTE: In specimen 1, focal areas of acute and chronic submucosal inflammation are noted; however, the findings  are not characteristic of a lichenoid mucositis. In specimen 2, the fragment is superficial and evaluation for  lichenoid mucositis is limited by the superficial nature of the biopsy. Of note, both specimens 1 and 2 demonstrate  markedly thickened squamous mucosa with parakeratosis which would attribute to the findings noted clinically.  GMS stains for fungal elements on both parts are pending. The overall findings are suggestive chronic irritation.  Definitive features of a lichenoid type inflammatory process are not identified.    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 Previous biopsy was negative for carcinoma -it revealed mild dysplasia.  Thickened leukoplakia bilatearl lateral tongue L>R  Lesions and pain overall much improved with continued use of Clobetasol  These lesions are similar in appearance to erosive lichen planus vs lichenoid inflammation--however biopsy dud not confirm this    Plan:  Continue clobetasol gel and Biotene  Follow-up in 5 weeks

## 2019-03-04 RX ORDER — SIMVASTATIN 80 MG/1
80 TABLET, FILM COATED ORAL NIGHTLY
Qty: 90 TABLET | Refills: 3 | Status: SHIPPED | OUTPATIENT
Start: 2019-03-04 | End: 2020-03-02 | Stop reason: SDUPTHER

## 2019-04-11 DIAGNOSIS — E11.9 DIABETES MELLITUS WITHOUT COMPLICATION: ICD-10-CM

## 2019-04-14 RX ORDER — METFORMIN HYDROCHLORIDE 500 MG/1
TABLET, EXTENDED RELEASE ORAL
Qty: 90 TABLET | Refills: 2 | Status: SHIPPED | OUTPATIENT
Start: 2019-04-14 | End: 2019-05-20 | Stop reason: SDUPTHER

## 2019-04-16 ENCOUNTER — OFFICE VISIT (OUTPATIENT)
Dept: OTOLARYNGOLOGY | Facility: CLINIC | Age: 64
End: 2019-04-16
Payer: COMMERCIAL

## 2019-04-16 VITALS
DIASTOLIC BLOOD PRESSURE: 83 MMHG | HEART RATE: 83 BPM | SYSTOLIC BLOOD PRESSURE: 127 MMHG | BODY MASS INDEX: 33.76 KG/M2 | HEIGHT: 65 IN | WEIGHT: 202.63 LBS | RESPIRATION RATE: 16 BRPM

## 2019-04-16 DIAGNOSIS — K13.21 LEUKOPLAKIA OF ORAL MUCOSA: Primary | ICD-10-CM

## 2019-04-16 DIAGNOSIS — R43.2 DYSGEUSIA: ICD-10-CM

## 2019-04-16 PROCEDURE — 99999 PR PBB SHADOW E&M-EST. PATIENT-LVL IV: ICD-10-PCS | Mod: PBBFAC,,, | Performed by: OTOLARYNGOLOGY

## 2019-04-16 PROCEDURE — 3074F PR MOST RECENT SYSTOLIC BLOOD PRESSURE < 130 MM HG: ICD-10-PCS | Mod: CPTII,S$GLB,, | Performed by: OTOLARYNGOLOGY

## 2019-04-16 PROCEDURE — 3074F SYST BP LT 130 MM HG: CPT | Mod: CPTII,S$GLB,, | Performed by: OTOLARYNGOLOGY

## 2019-04-16 PROCEDURE — 3008F PR BODY MASS INDEX (BMI) DOCUMENTED: ICD-10-PCS | Mod: CPTII,S$GLB,, | Performed by: OTOLARYNGOLOGY

## 2019-04-16 PROCEDURE — 99213 PR OFFICE/OUTPT VISIT, EST, LEVL III, 20-29 MIN: ICD-10-PCS | Mod: S$GLB,,, | Performed by: OTOLARYNGOLOGY

## 2019-04-16 PROCEDURE — 3079F DIAST BP 80-89 MM HG: CPT | Mod: CPTII,S$GLB,, | Performed by: OTOLARYNGOLOGY

## 2019-04-16 PROCEDURE — 3079F PR MOST RECENT DIASTOLIC BLOOD PRESSURE 80-89 MM HG: ICD-10-PCS | Mod: CPTII,S$GLB,, | Performed by: OTOLARYNGOLOGY

## 2019-04-16 PROCEDURE — 99213 OFFICE O/P EST LOW 20 MIN: CPT | Mod: S$GLB,,, | Performed by: OTOLARYNGOLOGY

## 2019-04-16 PROCEDURE — 3008F BODY MASS INDEX DOCD: CPT | Mod: CPTII,S$GLB,, | Performed by: OTOLARYNGOLOGY

## 2019-04-16 PROCEDURE — 99999 PR PBB SHADOW E&M-EST. PATIENT-LVL IV: CPT | Mod: PBBFAC,,, | Performed by: OTOLARYNGOLOGY

## 2019-04-16 NOTE — PROGRESS NOTES
Patient Name:  Date of Encounter: 1/29/2019  Provider: Dedra Khan MD  Referring MD:  PCP:  Derm:  Cardiology:    CC:  Dry mouth; chronic mouth pain due to sores on cheeks and tongue    HPI   63 y.o. female presents from [facility] for evaluation of lesions of the bilateral buccal mucosa.  She underwent biopsy of her L buccal mucosa.  Path revealed a proliferative process and noted that verrucous carcinoma could not be ruled out. Biopsy of the L buccal mucosa revealed mild dysplasia and parakeratosis.  She reports persistent dry mouth, altered taste and burning of the mouth.  She has used Magic Mouthwash with some benefit.     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  -Negative for invasive carcinoma.  Comment: The pattern is not typical of verrucous    She reports that she was last seen by Dr. Juárez August/2018.  She has been seen by other physicians including a dermatologist who have told her that there is nothing that they can do for her symptoms of xerostomia, numbness of the oral cavity, and discomfort with the lesions involving her buccal mucosa and tongue. She also reports dysguesia to all foods x1 year--  she feels like her taste buds or swollen    2/19/2019    Patient is here for F/U mouth pain/lesions. 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  She was rx'ed clobetasol gel at time of last visit.  She reports that it has not helped  She has an area on each side of tongue that is painful.  Biotene has helped minimally for dry mouth    She denies any change in mouthwash, tooth paste or any medicines prior to the start of this problem    2/27/2019  Patient is here today for biopsy results.  She reports that the tissue overlying her tongue buccal mucosa is much softer.  Her tongue actually peeled and it was clear, but the  leukoplakia return within the next 48 hr.  Her pain however has decreased from an 8 down to a 2.  Overall she has improved    4/16/2019  Patient is here for follow-up of leukoplakia bilateral tongue and buccal mucosa. She has had biopsies in the past which were significant for dysplasia but no carcinoma.  She has been treated with clobetasol gel and  Biotene which have improved her symptoms but the lesions remain. The gel helps the lesions on her buccal mucosa but not the tongue.  She has pain along the lateral border of tongue and along the ventral surface  She continues to bite her cheeks    She also has a foul taste in her mouth and most foods do not taste good to her.    ROS:  Constitutional: Negative for activity change and appetite change, weight loss.   Taste: + dysguesia  Pain: + chronic mouth pain--better  Eyes: Negative for discharge, visual changes.   Respiratory: Negative for difficulty breathing and wheezing   Cardiovascular: Negative for chest pain.   Gastrointestinal: Negative for abdominal distention and abdominal pain.   Endocrine: Negative for cold intolerance and heat intolerance.   Genitourinary: Negative for dysuria.   Musculoskeletal: Negative for gait problem, muscle pain and joint swelling.   Skin: Negative for color change and pallor; negative for skin lesions.   Neurological: Negative for syncope and weakness; no numbness face.   Psychiatric/Behavioral: Negative for agitation and confusion; negative for depression.    Physical Exam:      Constitutional  · General Appearance: well nourished, well-developed, alert, oriented, in no acute distress  · Communication: ability, understanding, normal  Oral Cavity / Oropharynx  · Lips: upper and lower lips pink and moist  · Teeth: good dentition  · Gingiva: healthy  · Oral Mucosa: diffuse leukoplakia left buccal mucsoa that is not as thick as previous exam and is no longer rough to palpation-it is also nontender;leukoplakia right buccal mucosa not as  severe as left--soft to palpation and non tender;  Leukoplakia right and left lateral tongue is improved and not as thick--also NT  · Floor of Mouth: normal, no lesions, salivary ducts patent  · Palate: soft and hard palates without lesions or ulcers  Oropharynx: tonsils and walls without erythema, exudate, base of tongue soft to palpation  Neurological  · Cranial Nerves: grossly intact  · General: no focal deficits  Psychiatric  · Orientation: oriented to time, place and person  · Mood and Affect: no depression, anxiety or agitation      Assessment:   Leukoplakia involving bilateral buccal mucosa and lateral borders of tongue  Lesions and pain overall much improved with continued use of Clobetasol but foul taste is unimproved and lesions persist  Previous biopsies are are consistent with acute and chronically inflamed mucosa with parakeratosis and 1 biopsy with mild dysplasia  Etiology unclear    Plan:  Continue clobetasol gel, magic mouthwash a biopsy is consistent with acute and chronic nd Biotene  Will discuss with OMFS and call patient  May benefit from referral to [facility]

## 2019-04-17 ENCOUNTER — TELEPHONE (OUTPATIENT)
Dept: FAMILY MEDICINE | Facility: CLINIC | Age: 64
End: 2019-04-17

## 2019-04-17 NOTE — TELEPHONE ENCOUNTER
----- Message from Josiah López sent at 4/17/2019 11:48 AM CDT -----  Type:  Sooner Apoointment Request    Caller is requesting a sooner appointment.  Caller declined first available appointment listed below.  Caller will not accept being placed on the waitlist and is requesting a message be sent to doctor.    Name of Caller:  Patient  When is the first available appointment?  07/19  Symptoms:  3 month FU  Best Call Back Number:  [phone]  Additional Information:  Patient's appointment on 04/17 was canceled without her knowledge

## 2019-04-18 ENCOUNTER — PATIENT MESSAGE (OUTPATIENT)
Dept: FAMILY MEDICINE | Facility: CLINIC | Age: 64
End: 2019-04-18

## 2019-04-18 PROBLEM — R43.2 DYSGEUSIA: Status: ACTIVE | Noted: 2019-04-18

## 2019-04-19 ENCOUNTER — PATIENT MESSAGE (OUTPATIENT)
Dept: FAMILY MEDICINE | Facility: CLINIC | Age: 64
End: 2019-04-19

## 2019-04-19 ENCOUNTER — PATIENT MESSAGE (OUTPATIENT)
Dept: OTOLARYNGOLOGY | Facility: CLINIC | Age: 64
End: 2019-04-19

## 2019-04-22 ENCOUNTER — PATIENT MESSAGE (OUTPATIENT)
Dept: FAMILY MEDICINE | Facility: CLINIC | Age: 64
End: 2019-04-22

## 2019-04-29 ENCOUNTER — PATIENT MESSAGE (OUTPATIENT)
Dept: FAMILY MEDICINE | Facility: CLINIC | Age: 64
End: 2019-04-29

## 2019-04-29 ENCOUNTER — PATIENT MESSAGE (OUTPATIENT)
Dept: OTOLARYNGOLOGY | Facility: CLINIC | Age: 64
End: 2019-04-29

## 2019-04-30 ENCOUNTER — TELEPHONE (OUTPATIENT)
Dept: OTOLARYNGOLOGY | Facility: CLINIC | Age: 64
End: 2019-04-30

## 2019-04-30 DIAGNOSIS — K13.21 LEUKOPLAKIA OF ORAL CAVITY: Primary | ICD-10-CM

## 2019-04-30 DIAGNOSIS — R43.2 DYSGEUSIA: ICD-10-CM

## 2019-04-30 DIAGNOSIS — K14.6 BURNING TONGUE: ICD-10-CM

## 2019-04-30 NOTE — TELEPHONE ENCOUNTER
Called Dr. Singh;s office got the fax number and faxed information to office and received confirmation.

## 2019-04-30 NOTE — TELEPHONE ENCOUNTER
----- Message from Dedra Khan MD sent at 4/30/2019 10:31 AM CDT -----  Referral placed in Epic to dr Singh  Dentist on [facility]  I have his office number in referral  Please call and get fax and send and schedule ASAP  Thanks

## 2019-05-06 ENCOUNTER — OFFICE VISIT (OUTPATIENT)
Dept: FAMILY MEDICINE | Facility: CLINIC | Age: 64
End: 2019-05-06
Payer: COMMERCIAL

## 2019-05-06 VITALS
BODY MASS INDEX: 34.19 KG/M2 | SYSTOLIC BLOOD PRESSURE: 130 MMHG | HEART RATE: 76 BPM | WEIGHT: 205.5 LBS | OXYGEN SATURATION: 97 % | DIASTOLIC BLOOD PRESSURE: 80 MMHG

## 2019-05-06 DIAGNOSIS — E11.65 UNCONTROLLED TYPE 2 DIABETES MELLITUS WITH HYPERGLYCEMIA: Primary | ICD-10-CM

## 2019-05-06 PROCEDURE — 99214 PR OFFICE/OUTPT VISIT, EST, LEVL IV, 30-39 MIN: ICD-10-PCS | Mod: S$GLB,,, | Performed by: FAMILY MEDICINE

## 2019-05-06 PROCEDURE — 3045F PR MOST RECENT HEMOGLOBIN A1C LEVEL 7.0-9.0%: ICD-10-PCS | Mod: CPTII,S$GLB,, | Performed by: FAMILY MEDICINE

## 2019-05-06 PROCEDURE — 3008F PR BODY MASS INDEX (BMI) DOCUMENTED: ICD-10-PCS | Mod: CPTII,S$GLB,, | Performed by: FAMILY MEDICINE

## 2019-05-06 PROCEDURE — 3075F PR MOST RECENT SYSTOLIC BLOOD PRESS GE 130-139MM HG: ICD-10-PCS | Mod: CPTII,S$GLB,, | Performed by: FAMILY MEDICINE

## 2019-05-06 PROCEDURE — 3075F SYST BP GE 130 - 139MM HG: CPT | Mod: CPTII,S$GLB,, | Performed by: FAMILY MEDICINE

## 2019-05-06 PROCEDURE — 99214 OFFICE O/P EST MOD 30 MIN: CPT | Mod: S$GLB,,, | Performed by: FAMILY MEDICINE

## 2019-05-06 PROCEDURE — 3008F BODY MASS INDEX DOCD: CPT | Mod: CPTII,S$GLB,, | Performed by: FAMILY MEDICINE

## 2019-05-06 PROCEDURE — 3079F DIAST BP 80-89 MM HG: CPT | Mod: CPTII,S$GLB,, | Performed by: FAMILY MEDICINE

## 2019-05-06 PROCEDURE — 3045F PR MOST RECENT HEMOGLOBIN A1C LEVEL 7.0-9.0%: CPT | Mod: CPTII,S$GLB,, | Performed by: FAMILY MEDICINE

## 2019-05-06 PROCEDURE — 99999 PR PBB SHADOW E&M-EST. PATIENT-LVL III: CPT | Mod: PBBFAC,,, | Performed by: FAMILY MEDICINE

## 2019-05-06 PROCEDURE — 3079F PR MOST RECENT DIASTOLIC BLOOD PRESSURE 80-89 MM HG: ICD-10-PCS | Mod: CPTII,S$GLB,, | Performed by: FAMILY MEDICINE

## 2019-05-06 PROCEDURE — 99999 PR PBB SHADOW E&M-EST. PATIENT-LVL III: ICD-10-PCS | Mod: PBBFAC,,, | Performed by: FAMILY MEDICINE

## 2019-05-06 NOTE — PROGRESS NOTES
Subjective:       Patient ID: Bing Kearns is a 63 y.o. female    Chief Complaint: Follow-up    Diabetes   She presents for her follow-up diabetic visit. Her disease course has been worsening. There are no hypoglycemic associated symptoms. Pertinent negatives for hypoglycemia include no confusion or headaches. Associated symptoms include fatigue, polydipsia, polyuria and weakness. Pertinent negatives for diabetes include no chest pain. There are no hypoglycemic complications. Symptoms are worsening. There are no diabetic complications. Current diabetic treatment includes oral agent (monotherapy) (increased metformin to bid). She is compliant with treatment all of the time. Her breakfast blood glucose range is generally >200 mg/dl. An ACE inhibitor/angiotensin II receptor blocker is being taken. Eye exam is current.       Review of Systems   Constitutional: Positive for fatigue and unexpected weight change. Negative for activity change.   HENT: Negative for hearing loss, rhinorrhea and trouble swallowing.    Eyes: Negative for discharge and visual disturbance.   Respiratory: Negative for chest tightness and wheezing.    Cardiovascular: Negative for chest pain and palpitations.   Gastrointestinal: Positive for diarrhea. Negative for blood in stool, constipation and vomiting.   Endocrine: Positive for polydipsia and polyuria.   Genitourinary: Negative for difficulty urinating, dysuria, hematuria and menstrual problem.   Musculoskeletal: Negative for arthralgias, joint swelling and neck pain.   Neurological: Positive for weakness. Negative for headaches.   Psychiatric/Behavioral: Negative for confusion and dysphoric mood.        Objective:   Physical Exam   Constitutional: She is oriented to person, place, and time. She appears well-developed and well-nourished.   Neurological: She is alert and oriented to person, place, and time.   Vitals reviewed.       Assessment:       1. Uncontrolled type 2 diabetes  mellitus with hyperglycemia  Hemoglobin A1c    dulaglutide (TRULICITY) 0.75 mg/0.5 mL PnIj        Plan:       Uncontrolled type 2 diabetes mellitus with hyperglycemia  -     Hemoglobin A1c; Future; Expected date: 08/04/2019  -     ADD dulaglutide (TRULICITY) 0.75 mg/0.5 mL PnIj; Inject 0.5 mLs (0.75 mg total) into the skin every 7 days.  Dispense: 0.5 mL; Refill: 11  - Diet and exercise education.  - Serial glucose monitoring  - Continue current therapy   - Follow up in about 3 months (around 8/6/2019).

## 2019-05-07 ENCOUNTER — DOCUMENTATION ONLY (OUTPATIENT)
Dept: FAMILY MEDICINE | Facility: CLINIC | Age: 64
End: 2019-05-07

## 2019-05-08 ENCOUNTER — LAB VISIT (OUTPATIENT)
Dept: LAB | Facility: HOSPITAL | Age: 64
End: 2019-05-08
Attending: INTERNAL MEDICINE
Payer: COMMERCIAL

## 2019-05-08 DIAGNOSIS — E11.9 DIABETES MELLITUS WITHOUT COMPLICATION: ICD-10-CM

## 2019-05-08 DIAGNOSIS — R80.9 PROTEINURIA: ICD-10-CM

## 2019-05-08 DIAGNOSIS — E11.29 TYPE II DIABETES MELLITUS WITH RENAL MANIFESTATIONS: ICD-10-CM

## 2019-05-08 DIAGNOSIS — N18.30 CHRONIC KIDNEY DISEASE, STAGE III (MODERATE): ICD-10-CM

## 2019-05-08 DIAGNOSIS — D63.1 ANEMIA OF CHRONIC RENAL FAILURE: Primary | ICD-10-CM

## 2019-05-08 DIAGNOSIS — N18.9 ANEMIA OF CHRONIC RENAL FAILURE: Primary | ICD-10-CM

## 2019-05-08 DIAGNOSIS — I10 ESSENTIAL HYPERTENSION, MALIGNANT: ICD-10-CM

## 2019-05-08 LAB
ALBUMIN SERPL BCP-MCNC: 3.6 G/DL (ref 3.5–5.2)
ALBUMIN/CREAT UR: NORMAL UG/MG (ref 0–30)
ANION GAP SERPL CALC-SCNC: 11 MMOL/L (ref 8–16)
BASOPHILS # BLD AUTO: 0.1 K/UL (ref 0–0.2)
BASOPHILS NFR BLD: 0.7 % (ref 0–1.9)
BILIRUB UR QL STRIP: NEGATIVE
BUN SERPL-MCNC: 14 MG/DL (ref 8–23)
CALCIUM SERPL-MCNC: 9.9 MG/DL (ref 8.7–10.5)
CHLORIDE SERPL-SCNC: 96 MMOL/L (ref 95–110)
CLARITY UR: CLEAR
CO2 SERPL-SCNC: 23 MMOL/L (ref 23–29)
COLOR UR: YELLOW
CREAT SERPL-MCNC: 1 MG/DL (ref 0.5–1.4)
CREAT UR-MCNC: 24 MG/DL (ref 15–325)
DIFFERENTIAL METHOD: ABNORMAL
EOSINOPHIL # BLD AUTO: 0.2 K/UL (ref 0–0.5)
EOSINOPHIL NFR BLD: 1.3 % (ref 0–8)
ERYTHROCYTE [DISTWIDTH] IN BLOOD BY AUTOMATED COUNT: 12.7 % (ref 11.5–14.5)
EST. GFR  (AFRICAN AMERICAN): >60 ML/MIN/1.73 M^2
EST. GFR  (NON AFRICAN AMERICAN): 59.7 ML/MIN/1.73 M^2
GLUCOSE SERPL-MCNC: 119 MG/DL (ref 70–110)
GLUCOSE UR QL STRIP: NEGATIVE
HCT VFR BLD AUTO: 36 % (ref 37–48.5)
HGB BLD-MCNC: 11.6 G/DL (ref 12–16)
HGB UR QL STRIP: NEGATIVE
IMM GRANULOCYTES # BLD AUTO: 0.14 K/UL (ref 0–0.04)
IMM GRANULOCYTES NFR BLD AUTO: 1 % (ref 0–0.5)
KETONES UR QL STRIP: NEGATIVE
LEUKOCYTE ESTERASE UR QL STRIP: NEGATIVE
LYMPHOCYTES # BLD AUTO: 2.9 K/UL (ref 1–4.8)
LYMPHOCYTES NFR BLD: 21.4 % (ref 18–48)
MAGNESIUM SERPL-MCNC: 2 MG/DL (ref 1.6–2.6)
MCH RBC QN AUTO: 30.1 PG (ref 27–31)
MCHC RBC AUTO-ENTMCNC: 32.2 G/DL (ref 32–36)
MCV RBC AUTO: 93 FL (ref 82–98)
MICROALBUMIN UR DL<=1MG/L-MCNC: <2.5 UG/ML
MONOCYTES # BLD AUTO: 1.4 K/UL (ref 0.3–1)
MONOCYTES NFR BLD: 10.1 % (ref 4–15)
NEUTROPHILS # BLD AUTO: 8.8 K/UL (ref 1.8–7.7)
NEUTROPHILS NFR BLD: 65.5 % (ref 38–73)
NITRITE UR QL STRIP: NEGATIVE
NRBC BLD-RTO: 0 /100 WBC
PH UR STRIP: 6 [PH] (ref 5–8)
PHOSPHATE SERPL-MCNC: 3.9 MG/DL (ref 2.7–4.5)
PLATELET # BLD AUTO: 412 K/UL (ref 150–350)
PMV BLD AUTO: 9.4 FL (ref 9.2–12.9)
POTASSIUM SERPL-SCNC: 4.7 MMOL/L (ref 3.5–5.1)
PROT UR QL STRIP: NEGATIVE
RBC # BLD AUTO: 3.86 M/UL (ref 4–5.4)
SODIUM SERPL-SCNC: 130 MMOL/L (ref 136–145)
SP GR UR STRIP: <=1.005 (ref 1–1.03)
URATE SERPL-MCNC: 4.4 MG/DL (ref 2.4–5.7)
URN SPEC COLLECT METH UR: ABNORMAL
WBC # BLD AUTO: 13.43 K/UL (ref 3.9–12.7)

## 2019-05-08 PROCEDURE — 85025 COMPLETE CBC W/AUTO DIFF WBC: CPT

## 2019-05-08 PROCEDURE — 80069 RENAL FUNCTION PANEL: CPT

## 2019-05-08 PROCEDURE — 36415 COLL VENOUS BLD VENIPUNCTURE: CPT | Mod: PO

## 2019-05-08 PROCEDURE — 83735 ASSAY OF MAGNESIUM: CPT

## 2019-05-08 PROCEDURE — 87086 URINE CULTURE/COLONY COUNT: CPT

## 2019-05-08 PROCEDURE — 81003 URINALYSIS AUTO W/O SCOPE: CPT | Mod: PO

## 2019-05-08 PROCEDURE — 84550 ASSAY OF BLOOD/URIC ACID: CPT

## 2019-05-08 PROCEDURE — 82043 UR ALBUMIN QUANTITATIVE: CPT

## 2019-05-09 ENCOUNTER — PATIENT MESSAGE (OUTPATIENT)
Dept: FAMILY MEDICINE | Facility: CLINIC | Age: 64
End: 2019-05-09

## 2019-05-10 LAB — BACTERIA UR CULT: NORMAL

## 2019-05-13 ENCOUNTER — PATIENT MESSAGE (OUTPATIENT)
Dept: FAMILY MEDICINE | Facility: CLINIC | Age: 64
End: 2019-05-13

## 2019-05-13 DIAGNOSIS — E11.9 DIABETES MELLITUS WITHOUT COMPLICATION: ICD-10-CM

## 2019-05-15 ENCOUNTER — PATIENT MESSAGE (OUTPATIENT)
Dept: FAMILY MEDICINE | Facility: CLINIC | Age: 64
End: 2019-05-15

## 2019-05-20 RX ORDER — METFORMIN HYDROCHLORIDE 500 MG/1
500 TABLET, EXTENDED RELEASE ORAL DAILY
Qty: 90 TABLET | Refills: 3 | Status: SHIPPED | OUTPATIENT
Start: 2019-05-20 | End: 2019-06-25 | Stop reason: SDUPTHER

## 2019-05-21 ENCOUNTER — PATIENT MESSAGE (OUTPATIENT)
Dept: FAMILY MEDICINE | Facility: CLINIC | Age: 64
End: 2019-05-21

## 2019-06-23 ENCOUNTER — PATIENT MESSAGE (OUTPATIENT)
Dept: FAMILY MEDICINE | Facility: CLINIC | Age: 64
End: 2019-06-23

## 2019-06-23 DIAGNOSIS — E11.9 DIABETES MELLITUS WITHOUT COMPLICATION: ICD-10-CM

## 2019-06-25 ENCOUNTER — TELEPHONE (OUTPATIENT)
Dept: FAMILY MEDICINE | Facility: CLINIC | Age: 64
End: 2019-06-25

## 2019-06-26 RX ORDER — METFORMIN HYDROCHLORIDE 500 MG/1
500 TABLET, EXTENDED RELEASE ORAL 2 TIMES DAILY WITH MEALS
Qty: 180 TABLET | Refills: 3 | Status: SHIPPED | OUTPATIENT
Start: 2019-06-26 | End: 2019-08-29 | Stop reason: SDUPTHER

## 2019-07-25 RX ORDER — CLONAZEPAM 0.5 MG/1
TABLET ORAL
Qty: 60 TABLET | Refills: 3 | Status: SHIPPED | OUTPATIENT
Start: 2019-07-25 | End: 2019-12-30 | Stop reason: SDUPTHER

## 2019-07-26 ENCOUNTER — PATIENT OUTREACH (OUTPATIENT)
Dept: ADMINISTRATIVE | Facility: HOSPITAL | Age: 64
End: 2019-07-26

## 2019-07-26 DIAGNOSIS — Z12.31 VISIT FOR SCREENING MAMMOGRAM: Primary | ICD-10-CM

## 2019-08-06 ENCOUNTER — LAB VISIT (OUTPATIENT)
Dept: LAB | Facility: HOSPITAL | Age: 64
End: 2019-08-06
Attending: FAMILY MEDICINE
Payer: COMMERCIAL

## 2019-08-06 DIAGNOSIS — E11.65 UNCONTROLLED TYPE 2 DIABETES MELLITUS WITH HYPERGLYCEMIA: ICD-10-CM

## 2019-08-06 LAB
ESTIMATED AVG GLUCOSE: 169 MG/DL (ref 68–131)
HBA1C MFR BLD HPLC: 7.5 % (ref 4–5.6)

## 2019-08-06 PROCEDURE — 36415 COLL VENOUS BLD VENIPUNCTURE: CPT | Mod: PO

## 2019-08-06 PROCEDURE — 83036 HEMOGLOBIN GLYCOSYLATED A1C: CPT

## 2019-08-27 ENCOUNTER — PATIENT MESSAGE (OUTPATIENT)
Dept: OTOLARYNGOLOGY | Facility: CLINIC | Age: 64
End: 2019-08-27

## 2019-08-29 ENCOUNTER — OFFICE VISIT (OUTPATIENT)
Dept: FAMILY MEDICINE | Facility: CLINIC | Age: 64
End: 2019-08-29
Payer: COMMERCIAL

## 2019-08-29 VITALS
BODY MASS INDEX: 18.26 KG/M2 | HEIGHT: 65 IN | WEIGHT: 109.56 LBS | SYSTOLIC BLOOD PRESSURE: 114 MMHG | HEART RATE: 92 BPM | OXYGEN SATURATION: 96 % | DIASTOLIC BLOOD PRESSURE: 78 MMHG

## 2019-08-29 DIAGNOSIS — E11.9 DIABETES MELLITUS WITHOUT COMPLICATION: ICD-10-CM

## 2019-08-29 DIAGNOSIS — G47.33 OBSTRUCTIVE SLEEP APNEA SYNDROME: Primary | ICD-10-CM

## 2019-08-29 DIAGNOSIS — Z12.39 SCREENING FOR BREAST CANCER: ICD-10-CM

## 2019-08-29 DIAGNOSIS — N60.19 FIBROCYSTIC BREAST DISEASE (FCBD), UNSPECIFIED LATERALITY: ICD-10-CM

## 2019-08-29 PROCEDURE — 99214 OFFICE O/P EST MOD 30 MIN: CPT | Mod: S$GLB,,, | Performed by: PHYSICIAN ASSISTANT

## 2019-08-29 PROCEDURE — 3008F BODY MASS INDEX DOCD: CPT | Mod: CPTII,S$GLB,, | Performed by: PHYSICIAN ASSISTANT

## 2019-08-29 PROCEDURE — 99999 PR PBB SHADOW E&M-EST. PATIENT-LVL IV: ICD-10-PCS | Mod: PBBFAC,,, | Performed by: PHYSICIAN ASSISTANT

## 2019-08-29 PROCEDURE — 3045F PR MOST RECENT HEMOGLOBIN A1C LEVEL 7.0-9.0%: ICD-10-PCS | Mod: CPTII,S$GLB,, | Performed by: PHYSICIAN ASSISTANT

## 2019-08-29 PROCEDURE — 3045F PR MOST RECENT HEMOGLOBIN A1C LEVEL 7.0-9.0%: CPT | Mod: CPTII,S$GLB,, | Performed by: PHYSICIAN ASSISTANT

## 2019-08-29 PROCEDURE — 3074F SYST BP LT 130 MM HG: CPT | Mod: CPTII,S$GLB,, | Performed by: PHYSICIAN ASSISTANT

## 2019-08-29 PROCEDURE — 3078F PR MOST RECENT DIASTOLIC BLOOD PRESSURE < 80 MM HG: ICD-10-PCS | Mod: CPTII,S$GLB,, | Performed by: PHYSICIAN ASSISTANT

## 2019-08-29 PROCEDURE — 99999 PR PBB SHADOW E&M-EST. PATIENT-LVL IV: CPT | Mod: PBBFAC,,, | Performed by: PHYSICIAN ASSISTANT

## 2019-08-29 PROCEDURE — 3008F PR BODY MASS INDEX (BMI) DOCUMENTED: ICD-10-PCS | Mod: CPTII,S$GLB,, | Performed by: PHYSICIAN ASSISTANT

## 2019-08-29 PROCEDURE — 3078F DIAST BP <80 MM HG: CPT | Mod: CPTII,S$GLB,, | Performed by: PHYSICIAN ASSISTANT

## 2019-08-29 PROCEDURE — 3074F PR MOST RECENT SYSTOLIC BLOOD PRESSURE < 130 MM HG: ICD-10-PCS | Mod: CPTII,S$GLB,, | Performed by: PHYSICIAN ASSISTANT

## 2019-08-29 PROCEDURE — 99214 PR OFFICE/OUTPT VISIT, EST, LEVL IV, 30-39 MIN: ICD-10-PCS | Mod: S$GLB,,, | Performed by: PHYSICIAN ASSISTANT

## 2019-08-29 RX ORDER — FENOFIBRATE 200 MG/1
200 CAPSULE ORAL DAILY
Qty: 90 CAPSULE | Refills: 2 | Status: SHIPPED | OUTPATIENT
Start: 2019-08-29 | End: 2020-03-02 | Stop reason: SDUPTHER

## 2019-08-29 RX ORDER — ACETAMINOPHEN 500 MG
1 TABLET ORAL 2 TIMES DAILY
Refills: 3 | COMMUNITY
Start: 2019-07-24

## 2019-08-29 RX ORDER — SUCRALFATE 1 G/1
TABLET ORAL
Refills: 0 | COMMUNITY
Start: 2019-06-26 | End: 2019-08-29

## 2019-08-29 RX ORDER — METFORMIN HYDROCHLORIDE 500 MG/1
1000 TABLET, EXTENDED RELEASE ORAL 2 TIMES DAILY WITH MEALS
Qty: 360 TABLET | Refills: 4 | Status: SHIPPED | OUTPATIENT
Start: 2019-08-29 | End: 2020-03-02 | Stop reason: SDUPTHER

## 2019-08-29 RX ORDER — CLOBETASOL PROPIONATE 0.05 MG/G
GEL TOPICAL
Refills: 3 | COMMUNITY
Start: 2019-08-06 | End: 2019-12-03 | Stop reason: CLARIF

## 2019-08-29 RX ORDER — SERTRALINE HYDROCHLORIDE 50 MG/1
50 TABLET, FILM COATED ORAL NIGHTLY
Refills: 2 | COMMUNITY
Start: 2019-08-15

## 2019-08-29 RX ORDER — PANTOPRAZOLE SODIUM 40 MG/1
40 TABLET, DELAYED RELEASE ORAL DAILY
Refills: 0 | COMMUNITY
Start: 2019-06-26 | End: 2019-08-29

## 2019-08-29 RX ORDER — OMEPRAZOLE 40 MG/1
40 CAPSULE, DELAYED RELEASE ORAL
Refills: 0 | COMMUNITY
Start: 2019-07-11

## 2019-08-29 NOTE — PROGRESS NOTES
Subjective:       Patient ID: Bing Kearns is a 63 y.o. female    Chief Complaint: medication follow up (here to f/u on trulicity. c/o of bodyaches since she started Trulicity.Ketan horse on calves.Needs refills)    HPI  The patient presents today for follow-up for her diabetes.  PCP is Dr. Au.  Three months ago he started her on Trulicity.  Since that time she has been having nausea, abdominal issues and body aches.  Her A1c was 7.5 5 months ago and again 7.5 when it was last checked 2 weeks ago.  She also takes metformin  mg b.i.d. she has been on higher dose of this medication in the past and did not have any side effects.    She had an abnormal mammogram on 10/8/18 that showed fibroglandular density.      Review of Systems   Constitutional: Positive for unexpected weight change. Negative for activity change.   HENT: Negative for hearing loss, rhinorrhea and trouble swallowing.    Eyes: Negative for discharge and visual disturbance.   Respiratory: Negative for chest tightness and wheezing.    Cardiovascular: Negative for chest pain and palpitations.   Gastrointestinal: Positive for constipation, diarrhea and vomiting. Negative for blood in stool.   Endocrine: Negative for polydipsia and polyuria.   Genitourinary: Negative for difficulty urinating, dysuria, hematuria and menstrual problem.   Musculoskeletal: Positive for arthralgias and neck pain. Negative for joint swelling.   Neurological: Positive for weakness. Negative for headaches.   Psychiatric/Behavioral: Positive for dysphoric mood. Negative for confusion.        Objective:   Physical Exam   Constitutional: She is oriented to person, place, and time. She appears well-developed and well-nourished. No distress.   HENT:   Head: Normocephalic and atraumatic.   Eyes: Pupils are equal, round, and reactive to light. EOM are normal.   Neck: Normal range of motion. Neck supple.   Cardiovascular: Normal rate, regular rhythm, normal heart  sounds and intact distal pulses. Exam reveals no gallop and no friction rub.   No murmur heard.  Pulmonary/Chest: Effort normal and breath sounds normal. No respiratory distress. She has no wheezes. She has no rales. She exhibits no tenderness.   Musculoskeletal: Normal range of motion.   Neurological: She is alert and oriented to person, place, and time.   Skin: Skin is warm and dry. No rash noted. She is not diaphoretic.   Psychiatric: She has a normal mood and affect. Her behavior is normal. Judgment and thought content normal.        Assessment:       1. Obstructive sleep apnea syndrome  Ambulatory referral to Sleep Disorders   2. Diabetes mellitus without complication  metFORMIN (GLUCOPHAGE-XR) 500 MG 24 hr tablet    Hemoglobin A1c   3. Screening for breast cancer  Mammo Digital Diagnostic Bilateral   4. Fibrocystic breast disease (FCBD), unspecified laterality  Mammo Digital Diagnostic Bilateral        Plan:       Obstructive sleep apnea syndrome  -     Ambulatory referral to Sleep Disorders    Diabetes mellitus without complication  -     metFORMIN (GLUCOPHAGE-XR) 500 MG 24 hr tablet; Take 2 tablets (1,000 mg total) by mouth 2 (two) times daily with meals.  Dispense: 360 tablet; Refill: 4  -     Hemoglobin A1c; Future; Expected date: 11/15/2019  [facility]    Screening for breast cancer  -     Mammo Digital Diagnostic Bilateral; Future; Expected date: 08/29/2019    Fibrocystic breast disease (FCBD), unspecified laterality  -     Mammo Digital Diagnostic Bilateral; Future; Expected date: 08/29/2019

## 2019-09-02 ENCOUNTER — PATIENT MESSAGE (OUTPATIENT)
Dept: FAMILY MEDICINE | Facility: CLINIC | Age: 64
End: 2019-09-02

## 2019-09-02 DIAGNOSIS — K13.79 MOUTH PAIN: Primary | ICD-10-CM

## 2019-09-03 ENCOUNTER — TELEPHONE (OUTPATIENT)
Dept: OTOLARYNGOLOGY | Facility: CLINIC | Age: 64
End: 2019-09-03

## 2019-09-03 NOTE — TELEPHONE ENCOUNTER
Spoke with patient  Oral surgeon has referred her to a dental pathologist at [facility] in [place]  She stopped Clobetasol gel because it was affecting her gums  She is requesting a refill of Magic mouthwash which will be called into the pharmacy  She has a follow-up appointment with me October 1st of this year

## 2019-09-09 ENCOUNTER — TELEPHONE (OUTPATIENT)
Dept: URGENT CARE | Facility: CLINIC | Age: 64
End: 2019-09-09

## 2019-09-09 ENCOUNTER — OFFICE VISIT (OUTPATIENT)
Dept: URGENT CARE | Facility: CLINIC | Age: 64
End: 2019-09-09
Payer: COMMERCIAL

## 2019-09-09 ENCOUNTER — TELEPHONE (OUTPATIENT)
Dept: FAMILY MEDICINE | Facility: CLINIC | Age: 64
End: 2019-09-09

## 2019-09-09 VITALS
OXYGEN SATURATION: 97 % | WEIGHT: 205 LBS | SYSTOLIC BLOOD PRESSURE: 104 MMHG | TEMPERATURE: 98 F | BODY MASS INDEX: 34.16 KG/M2 | HEART RATE: 89 BPM | HEIGHT: 65 IN | DIASTOLIC BLOOD PRESSURE: 68 MMHG

## 2019-09-09 DIAGNOSIS — M79.662 PAIN OF LEFT CALF: ICD-10-CM

## 2019-09-09 DIAGNOSIS — R06.02 SOB (SHORTNESS OF BREATH): Primary | ICD-10-CM

## 2019-09-09 PROCEDURE — 3078F PR MOST RECENT DIASTOLIC BLOOD PRESSURE < 80 MM HG: ICD-10-PCS | Mod: CPTII,S$GLB,, | Performed by: PHYSICIAN ASSISTANT

## 2019-09-09 PROCEDURE — 3074F PR MOST RECENT SYSTOLIC BLOOD PRESSURE < 130 MM HG: ICD-10-PCS | Mod: CPTII,S$GLB,, | Performed by: PHYSICIAN ASSISTANT

## 2019-09-09 PROCEDURE — 93010 EKG 12-LEAD: ICD-10-PCS | Mod: S$GLB,,, | Performed by: INTERNAL MEDICINE

## 2019-09-09 PROCEDURE — 3008F PR BODY MASS INDEX (BMI) DOCUMENTED: ICD-10-PCS | Mod: CPTII,S$GLB,, | Performed by: PHYSICIAN ASSISTANT

## 2019-09-09 PROCEDURE — 99214 PR OFFICE/OUTPT VISIT, EST, LEVL IV, 30-39 MIN: ICD-10-PCS | Mod: S$GLB,,, | Performed by: PHYSICIAN ASSISTANT

## 2019-09-09 PROCEDURE — 99214 OFFICE O/P EST MOD 30 MIN: CPT | Mod: S$GLB,,, | Performed by: PHYSICIAN ASSISTANT

## 2019-09-09 PROCEDURE — 3078F DIAST BP <80 MM HG: CPT | Mod: CPTII,S$GLB,, | Performed by: PHYSICIAN ASSISTANT

## 2019-09-09 PROCEDURE — 71046 X-RAY EXAM CHEST 2 VIEWS: CPT | Mod: S$GLB,,, | Performed by: RADIOLOGY

## 2019-09-09 PROCEDURE — 3008F BODY MASS INDEX DOCD: CPT | Mod: CPTII,S$GLB,, | Performed by: PHYSICIAN ASSISTANT

## 2019-09-09 PROCEDURE — 93010 ELECTROCARDIOGRAM REPORT: CPT | Mod: S$GLB,,, | Performed by: INTERNAL MEDICINE

## 2019-09-09 PROCEDURE — 93005 EKG 12-LEAD: ICD-10-PCS | Mod: S$GLB,,, | Performed by: PHYSICIAN ASSISTANT

## 2019-09-09 PROCEDURE — 71046 XR CHEST PA AND LATERAL: ICD-10-PCS | Mod: S$GLB,,, | Performed by: RADIOLOGY

## 2019-09-09 PROCEDURE — 93005 ELECTROCARDIOGRAM TRACING: CPT | Mod: S$GLB,,, | Performed by: PHYSICIAN ASSISTANT

## 2019-09-09 PROCEDURE — 3074F SYST BP LT 130 MM HG: CPT | Mod: CPTII,S$GLB,, | Performed by: PHYSICIAN ASSISTANT

## 2019-09-09 NOTE — PROGRESS NOTES
"Subjective:       Patient ID: Bing Kearns is a 63 y.o. female.    Vitals:  height is 5' 5" (1.651 m) and weight is 93 kg (205 lb). Her tympanic temperature is 98.1 °F (36.7 °C). Her blood pressure is 104/68 and her pulse is 89. Her oxygen saturation is 97%.     Chief Complaint: Shortness of Breath    Recently stopped a medication (trulucity, clobetasol, around 8/29, due to n/v diarrhea).  Swelling between ankles and feet, travels up to the mid part of the lower leg. + pain. Took "an old lasix, I don't know the dose, starting on Saturday which helped a little". 5 Days ago patient noticed the swelling and shortness of breath, intermittent in nature, exertion induced. "I couldn't walk to my car or walk up a flight of stairs with out getting SOB" Pt states her blood sugar has been high (took this AM - 210; has been running 200-390 but "it's normal when it's regulated" ) Just recently added metformin 2,000 daily with little effect. Next apt 11/2019.  Pt has a cardiology appt tomorrow but they could not see her today.    Shortness of Breath   This is a new problem. The current episode started in the past 7 days. The problem occurs every few minutes. The problem has been unchanged. Associated symptoms include leg pain and leg swelling. Pertinent negatives include no ear pain, fever, hemoptysis, rash, sore throat, sputum production, vomiting or wheezing.       Constitution: Negative for chills, sweating, fatigue and fever.   HENT: Negative for ear pain, congestion, sinus pain, sinus pressure, sore throat and voice change.    Neck: Negative for painful lymph nodes.   Cardiovascular: Positive for leg swelling and sob on exertion.   Eyes: Negative for eye redness.   Respiratory: Positive for shortness of breath. Negative for chest tightness, cough, sputum production, bloody sputum, COPD, stridor, wheezing and asthma.    Gastrointestinal: Negative for nausea and vomiting.   Musculoskeletal: Negative for muscle ache. "   Skin: Negative for rash.   Allergic/Immunologic: Negative for seasonal allergies and asthma.   Hematologic/Lymphatic: Negative for swollen lymph nodes.       Objective:      Physical Exam   Constitutional: She is oriented to person, place, and time. She appears well-developed and well-nourished. She is cooperative.  Non-toxic appearance. She does not appear ill. No distress.   HENT:   Head: Normocephalic and atraumatic.   Right Ear: Hearing, tympanic membrane, external ear and ear canal normal.   Left Ear: Hearing, tympanic membrane, external ear and ear canal normal.   Nose: Nose normal. No mucosal edema, rhinorrhea or nasal deformity. No epistaxis. Right sinus exhibits no maxillary sinus tenderness and no frontal sinus tenderness. Left sinus exhibits no maxillary sinus tenderness and no frontal sinus tenderness.   Mouth/Throat: Uvula is midline, oropharynx is clear and moist and mucous membranes are normal. No trismus in the jaw. Normal dentition. No uvula swelling. No posterior oropharyngeal erythema.   Eyes: Conjunctivae and lids are normal. No scleral icterus.   Sclera clear bilat   Neck: Trachea normal, full passive range of motion without pain and phonation normal. Neck supple.   Cardiovascular: Normal rate, regular rhythm, normal heart sounds, intact distal pulses and normal pulses.   Pulmonary/Chest: Effort normal. No respiratory distress. She has decreased breath sounds. She has no wheezes.   Abdominal: Soft. Normal appearance and bowel sounds are normal. She exhibits no distension. There is no tenderness.   Musculoskeletal: Normal range of motion. She exhibits no edema or deformity.   Neurological: She is alert and oriented to person, place, and time. She exhibits normal muscle tone. Coordination normal.   Skin: Skin is warm, dry and intact. She is not diaphoretic. No pallor.        Psychiatric: She has a normal mood and affect. Her speech is normal and behavior is normal. Judgment and thought content  normal. Cognition and memory are normal.   Nursing note and vitals reviewed.      Assessment:       1. SOB (shortness of breath)    2. Pain of left calf        Plan:         SOB (shortness of breath)  -     XR CHEST PA AND LATERAL; Future; Expected date: 09/09/2019  -     IN OFFICE EKG 12-LEAD (to [place])  -     Refer to Emergency Dept.    Pain of left calf  -     XR CHEST PA AND LATERAL; Future; Expected date: 09/09/2019  -     IN OFFICE EKG 12-LEAD (to [facility])  -     Refer to Emergency Dept.    Xr Chest Pa And Lateral    Result Date: 9/9/2019  EXAMINATION: XR CHEST PA AND LATERAL CLINICAL HISTORY: Shortness of breath TECHNIQUE: PA and lateral views of the chest were performed. COMPARISON: 06/26/2019 FINDINGS: There is no consolidation, effusion, or pneumothorax.  Cardiomediastinal silhouette is unremarkable.  Regional osseous structures are unremarkable.     No acute cardiopulmonary process. Electronically signed by: Nikita Brown MD Date:    09/09/2019 Time:    13:36     discussed case with medical director Dr. Sheehan--will refer to ED given multiple comorbids, PE, EKG.        If not allergic,take tylenol (acetominophen) for fever control, chills, or body aches every 4 hours. Do not exceed 4000 mg/ day.If not allergic, take Motrin (Ibuprofen) every 4 hours for fever, chills, pain or inflammation. Do not exceed 2400 mg/day. You can alternate taking tylenol and motrin.  If you were prescribed a narcotic medication, do not drive or operate heavy equipment or machinery while taking these medications.  You must understand that you've received an Urgent Care treatment only and that you may be released before all your medical problems are known or treated. You, the patient, will arrange for follow up care as instructed.  Follow up with your PCP or specialty clinic as directed in the next 1-2 weeks if not improved or as needed.  You can call [phone] to schedule an appointment with the appropriate provider.  If  your condition worsens we recommend that you receive another evaluation at the emergency room immediately or contact your primary medical clinics after hours call service to discuss your concerns.  Please return here or go to the Emergency Department for any concerns or worsening of condition.

## 2019-09-12 ENCOUNTER — TELEPHONE (OUTPATIENT)
Dept: URGENT CARE | Facility: CLINIC | Age: 64
End: 2019-09-12

## 2019-09-17 ENCOUNTER — PATIENT MESSAGE (OUTPATIENT)
Dept: FAMILY MEDICINE | Facility: CLINIC | Age: 64
End: 2019-09-17

## 2019-09-17 DIAGNOSIS — E11.22 TYPE 2 DIABETES MELLITUS WITH CHRONIC KIDNEY DISEASE, WITHOUT LONG-TERM CURRENT USE OF INSULIN, UNSPECIFIED CKD STAGE: Primary | ICD-10-CM

## 2019-09-18 ENCOUNTER — PATIENT MESSAGE (OUTPATIENT)
Dept: FAMILY MEDICINE | Facility: CLINIC | Age: 64
End: 2019-09-18

## 2019-09-18 NOTE — TELEPHONE ENCOUNTER
The patient needs to be scheduled for diabetes education.  Orders are in please schedule patient.

## 2019-09-19 ENCOUNTER — TELEPHONE (OUTPATIENT)
Dept: FAMILY MEDICINE | Facility: CLINIC | Age: 64
End: 2019-09-19

## 2019-09-23 ENCOUNTER — OFFICE VISIT (OUTPATIENT)
Dept: OTOLARYNGOLOGY | Facility: CLINIC | Age: 64
End: 2019-09-23
Payer: MEDICARE

## 2019-09-23 VITALS
DIASTOLIC BLOOD PRESSURE: 82 MMHG | WEIGHT: 201.06 LBS | SYSTOLIC BLOOD PRESSURE: 135 MMHG | HEIGHT: 65 IN | BODY MASS INDEX: 33.5 KG/M2

## 2019-09-23 DIAGNOSIS — R49.0 DYSPHONIA: ICD-10-CM

## 2019-09-23 DIAGNOSIS — R09.89 CHRONIC THROAT CLEARING: ICD-10-CM

## 2019-09-23 DIAGNOSIS — K13.21 LEUKOPLAKIA OF ORAL CAVITY: ICD-10-CM

## 2019-09-23 DIAGNOSIS — J39.2 SWELLING OF THROAT: ICD-10-CM

## 2019-09-23 DIAGNOSIS — G89.29 CHRONIC THROAT PAIN: Primary | ICD-10-CM

## 2019-09-23 DIAGNOSIS — R07.0 CHRONIC THROAT PAIN: Primary | ICD-10-CM

## 2019-09-23 DIAGNOSIS — K13.21 LEUKOPLAKIA, TONGUE: ICD-10-CM

## 2019-09-23 DIAGNOSIS — R05.3 CHRONIC COUGH: ICD-10-CM

## 2019-09-23 PROCEDURE — 99999 PR PBB SHADOW E&M-EST. PATIENT-LVL III: CPT | Mod: PBBFAC,,, | Performed by: NURSE PRACTITIONER

## 2019-09-23 PROCEDURE — 3008F BODY MASS INDEX DOCD: CPT | Mod: S$GLB,,, | Performed by: NURSE PRACTITIONER

## 2019-09-23 PROCEDURE — 99215 OFFICE O/P EST HI 40 MIN: CPT | Mod: 25,S$GLB,, | Performed by: NURSE PRACTITIONER

## 2019-09-23 PROCEDURE — 99215 PR OFFICE/OUTPT VISIT, EST, LEVL V, 40-54 MIN: ICD-10-PCS | Mod: 25,S$GLB,, | Performed by: NURSE PRACTITIONER

## 2019-09-23 PROCEDURE — 3075F PR MOST RECENT SYSTOLIC BLOOD PRESS GE 130-139MM HG: ICD-10-PCS | Mod: S$GLB,,, | Performed by: NURSE PRACTITIONER

## 2019-09-23 PROCEDURE — 99999 PR PBB SHADOW E&M-EST. PATIENT-LVL III: ICD-10-PCS | Mod: PBBFAC,,, | Performed by: NURSE PRACTITIONER

## 2019-09-23 PROCEDURE — 3008F PR BODY MASS INDEX (BMI) DOCUMENTED: ICD-10-PCS | Mod: S$GLB,,, | Performed by: NURSE PRACTITIONER

## 2019-09-23 PROCEDURE — 3079F DIAST BP 80-89 MM HG: CPT | Mod: S$GLB,,, | Performed by: NURSE PRACTITIONER

## 2019-09-23 PROCEDURE — 3075F SYST BP GE 130 - 139MM HG: CPT | Mod: S$GLB,,, | Performed by: NURSE PRACTITIONER

## 2019-09-23 PROCEDURE — 31575 DIAGNOSTIC LARYNGOSCOPY: CPT | Mod: S$GLB,,, | Performed by: NURSE PRACTITIONER

## 2019-09-23 PROCEDURE — 31575 PR LARYNGOSCOPY, FLEXIBLE; DIAGNOSTIC: ICD-10-PCS | Mod: S$GLB,,, | Performed by: NURSE PRACTITIONER

## 2019-09-23 PROCEDURE — 3079F PR MOST RECENT DIASTOLIC BLOOD PRESSURE 80-89 MM HG: ICD-10-PCS | Mod: S$GLB,,, | Performed by: NURSE PRACTITIONER

## 2019-09-23 RX ORDER — IRON POLYSACCHARIDE COMPLEX 150 MG
150 CAPSULE ORAL 2 TIMES DAILY
COMMUNITY
End: 2021-11-08 | Stop reason: CLARIF

## 2019-09-23 RX ORDER — GLYCOPYRROLATE 2 MG/1
TABLET ORAL
COMMUNITY
End: 2019-12-03 | Stop reason: CLARIF

## 2019-09-23 RX ORDER — DIPHENOXYLATE HYDROCHLORIDE AND ATROPINE SULFATE 2.5; .025 MG/1; MG/1
TABLET ORAL
COMMUNITY
End: 2020-03-10

## 2019-09-23 RX ORDER — TRAMADOL HYDROCHLORIDE 50 MG/1
50 TABLET ORAL EVERY 6 HOURS PRN
COMMUNITY
End: 2020-03-10

## 2019-09-23 NOTE — PATIENT INSTRUCTIONS
[facility] Advance ([facility]) twice daily  CT soft tissue neck with contrast if not improving    Some of the Top Considerations for Recurrent Sore Throat:     1. Nasal allergies -- Typical constellation of symptoms seen with nasal allergies: itchy, red, watery eyes; itchy, red, watery nose; excessive sneezing; excessive stuffiness. Discuss with your primary care provider whether you should see an allergist or take daily allergy medications.     2. Silent reflux -- Typical constellation of symptoms seen with silent reflux: post-nasal drip sensation with absence of significant runny nose or nasal congestion, sensation of thick or too much mucus in the back of throat, raspy voice, frequent throat clearing, lump in the back of throat, frequent sore throats. Discuss with your primary care provider whether you should see a gastroenterologist or take daily reflux medications.     3. Sinus Infection -- Typical constellation of symptoms seen with acute bacterial sinus infection are:  Green-gold, foul-smelling, foul-tasting mucus from nose and throat, inability to breathe through nose, inability to smell or taste well, facial pain and swelling, dental pain, headaches around eyes, sore throat and productive cough. Sinus imaging may be needed to rule out infection if these symptoms are present.    4. Pharyngitis/Tonsillitis -- Typical constellation of symptoms seen with acute bacterial tonsillitis/pharyngitis are:  Smelly pus (exudate), very red inflamed throat, swollen lymph nodes, fever, general malaise, absence of cough. If these symptoms are present, you may need a throat swab.

## 2019-09-26 ENCOUNTER — HOSPITAL ENCOUNTER (OUTPATIENT)
Dept: RADIOLOGY | Facility: HOSPITAL | Age: 64
Discharge: HOME | End: 2019-09-26
Attending: NURSE PRACTITIONER
Payer: MEDICARE

## 2019-09-26 DIAGNOSIS — R07.0 CHRONIC THROAT PAIN: ICD-10-CM

## 2019-09-26 DIAGNOSIS — R49.0 DYSPHONIA: ICD-10-CM

## 2019-09-26 DIAGNOSIS — J39.2 SWELLING OF THROAT: ICD-10-CM

## 2019-09-26 DIAGNOSIS — G89.29 CHRONIC THROAT PAIN: ICD-10-CM

## 2019-09-26 DIAGNOSIS — R09.89 CHRONIC THROAT CLEARING: ICD-10-CM

## 2019-09-26 DIAGNOSIS — R05.3 CHRONIC COUGH: ICD-10-CM

## 2019-09-26 PROCEDURE — 70491 CT SOFT TISSUE NECK W/DYE: CPT | Mod: TC,PO

## 2019-09-26 PROCEDURE — 25500020 PHARM REV CODE 255: Mod: PO | Performed by: NURSE PRACTITIONER

## 2019-09-26 PROCEDURE — 70491 CT SOFT TISSUE NECK WITH CONTRAST: ICD-10-PCS | Mod: 26,,, | Performed by: RADIOLOGY

## 2019-09-26 PROCEDURE — 70491 CT SOFT TISSUE NECK W/DYE: CPT | Mod: 26,,, | Performed by: RADIOLOGY

## 2019-09-26 RX ADMIN — IOHEXOL 75 ML: 350 INJECTION, SOLUTION INTRAVENOUS at 01:09

## 2019-09-27 ENCOUNTER — TELEPHONE (OUTPATIENT)
Dept: OTOLARYNGOLOGY | Facility: CLINIC | Age: 64
End: 2019-09-27

## 2019-09-27 ENCOUNTER — DOCUMENTATION ONLY (OUTPATIENT)
Dept: FAMILY MEDICINE | Facility: CLINIC | Age: 64
End: 2019-09-27

## 2019-09-27 ENCOUNTER — PATIENT MESSAGE (OUTPATIENT)
Dept: OTOLARYNGOLOGY | Facility: CLINIC | Age: 64
End: 2019-09-27

## 2019-09-27 NOTE — PROGRESS NOTES
PA initiated and APPROVED for Orphenadrine  mg tablets  CMM: [id number] - [id number]  VITOR /BCBSLA    Valid 8/28/19-9/26/2020

## 2019-09-27 NOTE — TELEPHONE ENCOUNTER
Spoke with patient gave results verbalized understanding but has a question about report she sees on mychart about her lymph nodes, please advise.

## 2019-09-27 NOTE — TELEPHONE ENCOUNTER
----- Message from Arianne Pastor NP sent at 9/26/2019  3:55 PM CDT -----  CT is normal. No masses or abnormalities noted.

## 2019-10-07 ENCOUNTER — OFFICE VISIT (OUTPATIENT)
Dept: SPINE | Facility: CLINIC | Age: 64
End: 2019-10-07
Payer: MEDICARE

## 2019-10-07 VITALS
HEART RATE: 83 BPM | WEIGHT: 201.06 LBS | SYSTOLIC BLOOD PRESSURE: 117 MMHG | DIASTOLIC BLOOD PRESSURE: 78 MMHG | BODY MASS INDEX: 33.5 KG/M2 | HEIGHT: 65 IN

## 2019-10-07 DIAGNOSIS — G89.29 CHRONIC LEFT-SIDED LOW BACK PAIN WITH LEFT-SIDED SCIATICA: Primary | ICD-10-CM

## 2019-10-07 DIAGNOSIS — M54.42 CHRONIC LEFT-SIDED LOW BACK PAIN WITH LEFT-SIDED SCIATICA: Primary | ICD-10-CM

## 2019-10-07 PROCEDURE — 99204 PR OFFICE/OUTPT VISIT, NEW, LEVL IV, 45-59 MIN: ICD-10-PCS | Mod: S$GLB,,, | Performed by: PHYSICAL MEDICINE & REHABILITATION

## 2019-10-07 PROCEDURE — 1101F PR PT FALLS ASSESS DOC 0-1 FALLS W/OUT INJ PAST YR: ICD-10-PCS | Mod: S$GLB,,, | Performed by: PHYSICAL MEDICINE & REHABILITATION

## 2019-10-07 PROCEDURE — 3008F PR BODY MASS INDEX (BMI) DOCUMENTED: ICD-10-PCS | Mod: S$GLB,,, | Performed by: PHYSICAL MEDICINE & REHABILITATION

## 2019-10-07 PROCEDURE — 3074F SYST BP LT 130 MM HG: CPT | Mod: S$GLB,,, | Performed by: PHYSICAL MEDICINE & REHABILITATION

## 2019-10-07 PROCEDURE — 3078F PR MOST RECENT DIASTOLIC BLOOD PRESSURE < 80 MM HG: ICD-10-PCS | Mod: S$GLB,,, | Performed by: PHYSICAL MEDICINE & REHABILITATION

## 2019-10-07 PROCEDURE — 3074F PR MOST RECENT SYSTOLIC BLOOD PRESSURE < 130 MM HG: ICD-10-PCS | Mod: S$GLB,,, | Performed by: PHYSICAL MEDICINE & REHABILITATION

## 2019-10-07 PROCEDURE — 3008F BODY MASS INDEX DOCD: CPT | Mod: S$GLB,,, | Performed by: PHYSICAL MEDICINE & REHABILITATION

## 2019-10-07 PROCEDURE — 99204 OFFICE O/P NEW MOD 45 MIN: CPT | Mod: S$GLB,,, | Performed by: PHYSICAL MEDICINE & REHABILITATION

## 2019-10-07 PROCEDURE — 3078F DIAST BP <80 MM HG: CPT | Mod: S$GLB,,, | Performed by: PHYSICAL MEDICINE & REHABILITATION

## 2019-10-07 PROCEDURE — 1101F PT FALLS ASSESS-DOCD LE1/YR: CPT | Mod: S$GLB,,, | Performed by: PHYSICAL MEDICINE & REHABILITATION

## 2019-10-07 NOTE — PROGRESS NOTES
SUBJECTIVE:    Patient ID: Bing Kearns is a 64 y.o. female.    Chief Complaint: Back Pain    This is a 64-year-old woman who sees Dr. Au for her primary care.  Has had hypertension hyperlipidemia and rather poorly controlled diabetes mellitus.  Remote history of colon cancer.  Long history of low back pain and radicular leg pain.  I know her from my previous spine practice.  She presents with complaints of primarily left-sided low back pain at the lumbosacral junction and left leg discomfort down the posterior portion of the leg radiating all the way down to the heel.  This is been going on since July.  There is no weakness.  No bowel or bladder dysfunction fever chills sweats or unexpected weight loss.  Same type of symptoms she had back than.  Responded well to epidural steroid injections.  She is looking to repeat those injections.  I do not have my old records at this time.  We will get them.  Current pain level is 5/10.  Leg pain worse than back        Past Medical History:   Diagnosis Date    Anxiety     Cancer     colon    Chronic kidney disease     stage 3    Diabetes mellitus     Glaucoma     Hyperlipemia     Hypertension     Thyroid disease     hypothyroid     Social History     Socioeconomic History    Marital status:      Spouse name: Not on file    Number of children: Not on file    Years of education: Not on file    Highest education level: Not on file   Occupational History    Not on file   Social Needs    Financial resource strain: Somewhat hard    Food insecurity:     Worry: Never true     Inability: Never true    Transportation needs:     Medical: Not on file     Non-medical: Not on file   Tobacco Use    Smoking status: Former Smoker    Smokeless tobacco: Never Used   Substance and Sexual Activity    Alcohol use: No     Frequency: Never     Drinks per session: Patient refused     Binge frequency: Never    Drug use: No    Sexual activity: Not on file  "  Lifestyle    Physical activity:     Days per week: Not on file     Minutes per session: Not on file    Stress: Very much   Relationships    Social connections:     Talks on phone: More than three times a week     Gets together: Once a week     Attends Alevism service: Not on file     Active member of club or organization: No     Attends meetings of clubs or organizations: Patient refused     Relationship status:    Other Topics Concern    Not on file   Social History Narrative    Not on file     Past Surgical History:   Procedure Laterality Date     SECTION      COLON SURGERY      COLONOSCOPY Left 2015    Procedure: COLONOSCOPY;  Surgeon: Chet Woodard Jr., MD;  Location[facility];  Service: Endoscopy;  Laterality: Left;    HYSTERECTOMY      PORTACATH PLACEMENT Left     and later removed     SURGICAL REMOVAL OF NEOPLASM OF MOUTH Left 6/15/2018    Procedure: EXCISION, NEOPLASM, MOUTH;  Surgeon: Velasquez Juárez MD;  Location: [address];  Service: ENT;  Laterality: Left;     Family History   Problem Relation Age of Onset    Cancer Mother     Heart disease Father     COPD Father     Hypertension Sister     Diabetes Sister     Anemia Sister     Cancer Sister      Vitals:    10/07/19 1306   BP: 117/78   Pulse: 83   Weight: 91.2 kg (201 lb 1 oz)   Height: 5' 5" (1.651 m)       Review of Systems   Constitutional: Negative for chills, diaphoresis, fatigue, fever and unexpected weight change.   HENT: Negative for trouble swallowing.    Eyes: Negative for visual disturbance.   Respiratory: Negative for shortness of breath.    Cardiovascular: Negative for chest pain.   Gastrointestinal: Negative for abdominal pain, constipation, nausea and vomiting.   Genitourinary: Negative for difficulty urinating.   Musculoskeletal: Negative for arthralgias, back pain, gait problem, joint swelling, myalgias, neck pain and neck stiffness.   Neurological: Negative for dizziness, speech " difficulty, weakness, light-headedness, numbness and headaches.          Objective:      Physical Exam   Constitutional: She is oriented to person, place, and time. She appears well-developed and well-nourished.   Neurological: She is alert and oriented to person, place, and time.   She is awake and in no acute distress  No point tenderness or palpable masses about the lumbar spine  Forward flexion and extension are normal and painless  Deep tendon reflexes are +1 at both knees and both ankles  Strength is normal in both lower extremities  Straight leg raising negative bilaterally           Assessment:       1. Chronic left-sided low back pain with left-sided sciatica           Plan:     she has a nonfocal examination from a neurological standpoint and no historical red flags.  She has symptoms of S1 radiculitis with no evidence of nerve root dysfunction.  We will get the records from my previous practice and try to reproduce those injections since they have helped sometimes up to 2 years.  We will get in touch with her for scheduling once I get the records      Chronic left-sided low back pain with left-sided sciatica

## 2019-10-15 ENCOUNTER — TELEPHONE (OUTPATIENT)
Dept: OTOLARYNGOLOGY | Facility: CLINIC | Age: 64
End: 2019-10-15

## 2019-10-16 ENCOUNTER — TELEPHONE (OUTPATIENT)
Dept: OTOLARYNGOLOGY | Facility: CLINIC | Age: 64
End: 2019-10-16

## 2019-10-16 NOTE — TELEPHONE ENCOUNTER
Spoke with patient advised she needed to schedule appointment with Dr. Khan patient stated she does not have transportation and she will call back when she gets transportation

## 2019-10-24 ENCOUNTER — TELEPHONE (OUTPATIENT)
Dept: OTOLARYNGOLOGY | Facility: CLINIC | Age: 64
End: 2019-10-24

## 2019-11-12 ENCOUNTER — PATIENT MESSAGE (OUTPATIENT)
Dept: SPINE | Facility: CLINIC | Age: 64
End: 2019-11-12

## 2019-11-12 NOTE — PROGRESS NOTES
Patient Name:  Date of Encounter: 1/29/2019  Provider: Dedra Khan MD  Referring MD:  PCP:  Derm:  Cardiology:     CC:  Dry mouth; chronic mouth pain due to sores on cheeks and tongue     HPI   63 y.o. female [facility] for evaluation of lesions of the bilateral buccal mucosa.  She underwent biopsy of her L buccal mucosa.  Path revealed a proliferative process and noted that verrucous carcinoma could not be ruled out. Biopsy of the L buccal mucosa revealed mild dysplasia and parakeratosis.  She reports persistent dry mouth, altered taste and burning of the mouth.  She has used Magic Mouthwash with some benefit.      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  -Negative for invasive carcinoma.  Comment: The pattern is not typical of verrucous     She reports that she was last seen by Dr. Juárez August/2018.  She has been seen by other physicians including a dermatologist who have told her that there is nothing that they can do for her symptoms of xerostomia, numbness of the oral cavity, and discomfort with the lesions involving her buccal mucosa and tongue. She also reports dysguesia to all foods x1 year--  she feels like her taste buds or swollen     2/19/2019     Patient is here for F/U mouth pain/lesions. 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  She was rx'ed clobetasol gel at time of last visit.  She reports that it has not helped  She has an area on each side of tongue that is painful.  Biotene has helped minimally for dry mouth     She denies any change in mouthwash, tooth paste or any medicines prior to the start of this problem     2/27/2019  Patient is here today for biopsy results.  She reports that the tissue overlying her tongue buccal mucosa is much softer.  Her tongue actually peeled and it was clear, but the  leukoplakia return within the next 48 hr.  Her pain however has decreased from an 8 down to a 2.  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 She has had biopsies in the past which were significant for dysplasia but no carcinoma.  She has been treated with clobetasol gel and  Biotene which have improved her symptoms but the lesions remain. The gel helps the lesions on her buccal mucosa but not the tongue.  She has pain along the lateral border of tongue and along the ventral surface  She continues to bite her cheeks     She also has a foul taste in her mouth and most foods do not taste good to her.    11/13/2019  Patient was seen and [facility].  There are pathologist was concerned about her lesions and recommend a larger biopsy.  Patient has delayed her follow-up due to lack of transportation.  Today only midline tongue hurts.  However, buccal lesions do become very painful and it is difficult to speak on phone, etc.  Overall lesions are the same--wax and wane.  Dysgeusia has improved.     ROS:  Constitutional: Negative for activity change and appetite change, weight loss.   Pain: + chronic mouth pain--better  Eyes: Negative for discharge, visual changes.   Respiratory: Negative for difficulty breathing and wheezing   Cardiovascular: Negative for chest pain.   Gastrointestinal: Negative for abdominal distention and abdominal pain.   Endocrine: Negative for cold intolerance and heat intolerance.   Genitourinary: Negative for dysuria.   Musculoskeletal: Negative for gait problem, muscle pain and joint swelling.   Skin: Negative for color change and pallor; negative for skin lesions.   Neurological: Negative for syncope and weakness; no numbness face.   Psychiatric/Behavioral: Negative for agitation and confusion; negative for depression.     Physical Exam:      Constitutional  · General Appearance: well nourished, well-developed,  alert, oriented, in no acute distress  · Communication: ability, understanding, normal  Oral Cavity / Oropharynx  · Lips: upper and lower lips pink and moist  · Teeth: good dentition  · Gingiva: healthy  · Oral Mucosa:  diffuse leukoplakia left buccal mucsoa that is not as thick as previous exam-photos reviewed; right buccal mucosa not as severe as left--soft to palpation and non tender;  Leukoplakia right and left lateral tongue is much improved  · Floor of Mouth: normal, no lesions, salivary ducts patent  · Palate: soft and hard palates without lesions or ulcers  Oropharynx: tonsils and walls without erythema, exudate, base of tongue soft to palpation  Neurological  · Cranial Nerves: grossly intact  · General: no focal deficits  Psychiatric  · Orientation: oriented to time, place and person  · Mood and Affect: no depression, anxiety or agitation        Heather Valenzuela DDS [facility]--photos with yellow area to be excised      Assessment:   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 Valenzuela and [facility].  She is concerned that there could be leukoplakia in the left buccal mucosa that is outlined in yellow       Plan:  Excisional biopsy 12/4 in OR under [facility], possible Alloderm  Risks, benefits and alternatives were discussed with the patient and questions were answered.  Risks include but are not limited to bleeding, scarring, infection, hematoma, bruising on the cheek internally and externally, delayed wound healing and need for further treatment

## 2019-11-13 ENCOUNTER — OFFICE VISIT (OUTPATIENT)
Dept: OTOLARYNGOLOGY | Facility: CLINIC | Age: 64
End: 2019-11-13
Payer: MEDICARE

## 2019-11-13 ENCOUNTER — TELEPHONE (OUTPATIENT)
Dept: SPINE | Facility: CLINIC | Age: 64
End: 2019-11-13

## 2019-11-13 ENCOUNTER — TELEPHONE (OUTPATIENT)
Dept: PAIN MEDICINE | Facility: CLINIC | Age: 64
End: 2019-11-13

## 2019-11-13 ENCOUNTER — TELEPHONE (OUTPATIENT)
Dept: FAMILY MEDICINE | Facility: CLINIC | Age: 64
End: 2019-11-13

## 2019-11-13 ENCOUNTER — LAB VISIT (OUTPATIENT)
Dept: LAB | Facility: HOSPITAL | Age: 64
End: 2019-11-13
Attending: OTOLARYNGOLOGY
Payer: MEDICARE

## 2019-11-13 VITALS
DIASTOLIC BLOOD PRESSURE: 81 MMHG | SYSTOLIC BLOOD PRESSURE: 138 MMHG | HEIGHT: 65 IN | WEIGHT: 195.13 LBS | BODY MASS INDEX: 32.51 KG/M2

## 2019-11-13 DIAGNOSIS — R43.2 DYSGEUSIA: ICD-10-CM

## 2019-11-13 DIAGNOSIS — Z01.818 PRE-OP EVALUATION: ICD-10-CM

## 2019-11-13 DIAGNOSIS — K13.21 LEUKOPLAKIA ORAL MUCOSA: Primary | ICD-10-CM

## 2019-11-13 DIAGNOSIS — K13.21 LEUKOPLAKIA, TONGUE: ICD-10-CM

## 2019-11-13 DIAGNOSIS — K11.7 XEROSTOMIA: ICD-10-CM

## 2019-11-13 DIAGNOSIS — E03.9 ACQUIRED HYPOTHYROIDISM: ICD-10-CM

## 2019-11-13 LAB
ANION GAP SERPL CALC-SCNC: 10 MMOL/L (ref 8–16)
BASOPHILS # BLD AUTO: 0.12 K/UL (ref 0–0.2)
BASOPHILS NFR BLD: 1.1 % (ref 0–1.9)
BUN SERPL-MCNC: 8 MG/DL (ref 8–23)
CALCIUM SERPL-MCNC: 9.8 MG/DL (ref 8.7–10.5)
CHLORIDE SERPL-SCNC: 102 MMOL/L (ref 95–110)
CO2 SERPL-SCNC: 23 MMOL/L (ref 23–29)
CREAT SERPL-MCNC: 1.2 MG/DL (ref 0.5–1.4)
DIFFERENTIAL METHOD: ABNORMAL
EOSINOPHIL # BLD AUTO: 0.2 K/UL (ref 0–0.5)
EOSINOPHIL NFR BLD: 1.6 % (ref 0–8)
ERYTHROCYTE [DISTWIDTH] IN BLOOD BY AUTOMATED COUNT: 13.2 % (ref 11.5–14.5)
EST. GFR  (AFRICAN AMERICAN): 54.8 ML/MIN/1.73 M^2
EST. GFR  (NON AFRICAN AMERICAN): 47.5 ML/MIN/1.73 M^2
GLUCOSE SERPL-MCNC: 198 MG/DL (ref 70–110)
HCT VFR BLD AUTO: 37.8 % (ref 37–48.5)
HGB BLD-MCNC: 11.3 G/DL (ref 12–16)
IMM GRANULOCYTES # BLD AUTO: 0.06 K/UL (ref 0–0.04)
IMM GRANULOCYTES NFR BLD AUTO: 0.5 % (ref 0–0.5)
LYMPHOCYTES # BLD AUTO: 2.3 K/UL (ref 1–4.8)
LYMPHOCYTES NFR BLD: 20.6 % (ref 18–48)
MCH RBC QN AUTO: 28 PG (ref 27–31)
MCHC RBC AUTO-ENTMCNC: 29.9 G/DL (ref 32–36)
MCV RBC AUTO: 94 FL (ref 82–98)
MONOCYTES # BLD AUTO: 1 K/UL (ref 0.3–1)
MONOCYTES NFR BLD: 9.2 % (ref 4–15)
NEUTROPHILS # BLD AUTO: 7.3 K/UL (ref 1.8–7.7)
NEUTROPHILS NFR BLD: 67 % (ref 38–73)
NRBC BLD-RTO: 0 /100 WBC
PLATELET # BLD AUTO: 397 K/UL (ref 150–350)
PMV BLD AUTO: 9.7 FL (ref 9.2–12.9)
POTASSIUM SERPL-SCNC: 4.3 MMOL/L (ref 3.5–5.1)
RBC # BLD AUTO: 4.03 M/UL (ref 4–5.4)
SODIUM SERPL-SCNC: 135 MMOL/L (ref 136–145)
WBC # BLD AUTO: 10.92 K/UL (ref 3.9–12.7)

## 2019-11-13 PROCEDURE — 99213 PR OFFICE/OUTPT VISIT, EST, LEVL III, 20-29 MIN: ICD-10-PCS | Mod: S$GLB,,, | Performed by: OTOLARYNGOLOGY

## 2019-11-13 PROCEDURE — 3075F PR MOST RECENT SYSTOLIC BLOOD PRESS GE 130-139MM HG: ICD-10-PCS | Mod: S$GLB,,, | Performed by: OTOLARYNGOLOGY

## 2019-11-13 PROCEDURE — 99213 OFFICE O/P EST LOW 20 MIN: CPT | Mod: S$GLB,,, | Performed by: OTOLARYNGOLOGY

## 2019-11-13 PROCEDURE — 1101F PT FALLS ASSESS-DOCD LE1/YR: CPT | Mod: S$GLB,,, | Performed by: OTOLARYNGOLOGY

## 2019-11-13 PROCEDURE — 80048 BASIC METABOLIC PNL TOTAL CA: CPT

## 2019-11-13 PROCEDURE — 99999 PR PBB SHADOW E&M-EST. PATIENT-LVL III: CPT | Mod: PBBFAC,,, | Performed by: OTOLARYNGOLOGY

## 2019-11-13 PROCEDURE — 85025 COMPLETE CBC W/AUTO DIFF WBC: CPT

## 2019-11-13 PROCEDURE — 3075F SYST BP GE 130 - 139MM HG: CPT | Mod: S$GLB,,, | Performed by: OTOLARYNGOLOGY

## 2019-11-13 PROCEDURE — 3079F DIAST BP 80-89 MM HG: CPT | Mod: S$GLB,,, | Performed by: OTOLARYNGOLOGY

## 2019-11-13 PROCEDURE — 3079F PR MOST RECENT DIASTOLIC BLOOD PRESSURE 80-89 MM HG: ICD-10-PCS | Mod: S$GLB,,, | Performed by: OTOLARYNGOLOGY

## 2019-11-13 PROCEDURE — 99999 PR PBB SHADOW E&M-EST. PATIENT-LVL III: ICD-10-PCS | Mod: PBBFAC,,, | Performed by: OTOLARYNGOLOGY

## 2019-11-13 PROCEDURE — 3008F PR BODY MASS INDEX (BMI) DOCUMENTED: ICD-10-PCS | Mod: S$GLB,,, | Performed by: OTOLARYNGOLOGY

## 2019-11-13 PROCEDURE — 3008F BODY MASS INDEX DOCD: CPT | Mod: S$GLB,,, | Performed by: OTOLARYNGOLOGY

## 2019-11-13 PROCEDURE — 1101F PR PT FALLS ASSESS DOC 0-1 FALLS W/OUT INJ PAST YR: ICD-10-PCS | Mod: S$GLB,,, | Performed by: OTOLARYNGOLOGY

## 2019-11-13 RX ORDER — GABAPENTIN 100 MG/1
100 CAPSULE ORAL 2 TIMES DAILY
Refills: 3 | COMMUNITY
Start: 2019-10-31 | End: 2020-10-06 | Stop reason: SDUPTHER

## 2019-11-13 RX ORDER — ORPHENADRINE CITRATE 100 MG/1
TABLET, EXTENDED RELEASE ORAL
Qty: 60 TABLET | Refills: 6 | Status: SHIPPED | OUTPATIENT
Start: 2019-11-13 | End: 2020-03-02 | Stop reason: SDUPTHER

## 2019-11-13 RX ORDER — LEVOTHYROXINE SODIUM 75 UG/1
TABLET ORAL
Qty: 90 TABLET | Refills: 0 | Status: SHIPPED | OUTPATIENT
Start: 2019-11-13 | End: 2019-12-30 | Stop reason: SDUPTHER

## 2019-11-13 RX ORDER — METFORMIN HYDROCHLORIDE 500 MG/1
TABLET ORAL
COMMUNITY
End: 2019-12-03 | Stop reason: CLARIF

## 2019-11-13 RX ORDER — FUROSEMIDE 20 MG/1
20 TABLET ORAL DAILY PRN
Refills: 4 | COMMUNITY
Start: 2019-10-31 | End: 2021-09-17

## 2019-11-13 NOTE — TELEPHONE ENCOUNTER
----- Message from Megan Colón sent at 11/13/2019 10:54 AM CST -----  Type:  Patient Returning Call    Who Called:  pt  Who Left Message for Patient:  Nurse   Does the patient know what this is regarding?: na  Best Call Back Number: [phone]  Additional Information:  Calling  back

## 2019-11-13 NOTE — TELEPHONE ENCOUNTER
I have refilled the patient's Synthroid for 90 days.  She should come in as soon as possible for blood work to check her TSH.  Orders are in, please schedule patient.

## 2019-11-13 NOTE — TELEPHONE ENCOUNTER
----- Message from Kenan Vaughan MD sent at 11/13/2019  8:32 AM CST -----  Please schedule for transforaminal epidural steroid injection on the left at L5-S1.

## 2019-11-13 NOTE — TELEPHONE ENCOUNTER
Advised pt that Dr Vaughan reviewed old records and put referral into Dr Porter and [facility] and that someone from that office will call her to schedule precedure

## 2019-11-14 ENCOUNTER — PES CALL (OUTPATIENT)
Dept: ADMINISTRATIVE | Facility: CLINIC | Age: 64
End: 2019-11-14

## 2019-11-14 NOTE — TELEPHONE ENCOUNTER
----- Message from Sanna Baldwin sent at 11/14/2019 11:03 AM CST -----  Type:  Patient Returning Call    Who Called:  Patient  Who Left Message for Patient: Pat  Does the patient know what this is regarding?:  appointment  Best Call Back Number: [phone]  Additional Information:

## 2019-11-19 ENCOUNTER — TELEPHONE (OUTPATIENT)
Dept: OTOLARYNGOLOGY | Facility: CLINIC | Age: 64
End: 2019-11-19

## 2019-11-19 NOTE — TELEPHONE ENCOUNTER
----- Message from Cliff Yu, Patient Care Assistant sent at 11/19/2019 10:09 AM CST -----  Contact: Self  Pt would like to speak to someone about possibly rescheduling her surgery'    Please advise, pt can be reached at [phone].

## 2019-11-21 ENCOUNTER — TELEPHONE (OUTPATIENT)
Dept: OTOLARYNGOLOGY | Facility: CLINIC | Age: 64
End: 2019-11-21

## 2019-11-21 NOTE — TELEPHONE ENCOUNTER
Spoke with patient advised that we would forward information to Dr. Khan. She says its the mesh you want to use, its experimental that is why insurance is denying it. Please advise

## 2019-11-21 NOTE — TELEPHONE ENCOUNTER
----- Message from Tacos Jerez sent at 11/21/2019 10:44 AM CST -----  Type: Needs Medical Advice    Who Called:  patient  Symptoms (please be specific):    How long has patient had these symptoms:    Pharmacy name and phone #:    Best Call Back Number: [phone]  Additional Information: stated that her surgery on 12/04 was denied by her insurance,because the derma blend was not approved by [facility]. Requesting call back to discuss wanted to know if there is anything else that can be used for the surgery?

## 2019-11-22 NOTE — TELEPHONE ENCOUNTER
Spoke with Abbi in surgery at [facility] to remove Alloderm off the surgery instructions alloderm is removed. Called and made patient aware, patient verbalized understanding

## 2019-11-25 DIAGNOSIS — T48.5X5A RHINITIS MEDICAMENTOSA: ICD-10-CM

## 2019-11-25 DIAGNOSIS — J31.0 RHINITIS MEDICAMENTOSA: ICD-10-CM

## 2019-11-27 RX ORDER — AZELASTINE 1 MG/ML
SPRAY, METERED NASAL
Qty: 90 ML | Refills: 0 | Status: SHIPPED | OUTPATIENT
Start: 2019-11-27 | End: 2019-12-03 | Stop reason: CLARIF

## 2019-11-27 NOTE — PROGRESS NOTES
Refill Authorization Note     is requesting a refill authorization.    Brief assessment and rationale for refill: REVIEW: unclear indications  Name and strength of medication: azelastine (ASTELIN) 137 mcg (0.1 %) nasal spray                                      Comments:   Requested Prescriptions   Pending Prescriptions Disp Refills    azelastine (ASTELIN) 137 mcg (0.1 %) nasal spray [Pharmacy Med Name: AZELASTINE 0.1% (137 MCG) SPRY] 90 mL 0     Sig: SPRAY ONCE IN EACH NOSTRIL TWICE A DAY       Ear, Nose, and Throat: Nasal Preparations - Antiallergy Failed - 11/25/2019  6:43 PM        Failed - An appropriate indication is on the problem list     Allergic Rhinitis  Sinusitis  Seasonal Allergies              Passed - Patient is at least 18 years old        Passed - Office visit in past 12 months or future 90 days     Recent Outpatient Visits            2 weeks ago Leukoplakia oral mucosa    [place] [facility] Dedra Khan MD    1 month ago Chronic left-sided low back pain with left-sided sciatica    [place] [facility] Kenan Vaughan MD    2 months ago Chronic throat pain    [place] [facility] Arianne Pastor NP    2 months ago SOB (shortness of breath)    [facility] [place] Edgar Roca PA-C    3 months ago Obstructive sleep apnea syndrome    [facility] Lora Golden PA-C          Future Appointments              In 1 month Benjamín Au MD [facility]

## 2019-11-27 NOTE — TELEPHONE ENCOUNTER
I have reviewed and agree with the assessment below with changes. Pt taking medication for rhinitis. Approve 3 more.    Requested Prescriptions     Pending Prescriptions Disp Refills    azelastine (ASTELIN) 137 mcg (0.1 %) nasal spray [Pharmacy Med Name: AZELASTINE 0.1% (137 MCG) SPRY] 90 mL 0     Sig: SPRAY ONCE IN EACH NOSTRIL TWICE A DAY

## 2019-12-02 ENCOUNTER — TELEPHONE (OUTPATIENT)
Dept: OTOLARYNGOLOGY | Facility: CLINIC | Age: 64
End: 2019-12-02

## 2019-12-02 DIAGNOSIS — M54.16 LUMBAR RADICULOPATHY: Primary | ICD-10-CM

## 2019-12-02 NOTE — TELEPHONE ENCOUNTER
----- Message from Carli Pablo sent at 12/2/2019 10:55 AM CST -----  Contact: Patient  Patient requesting a call back from someone regarding her surgery this week...    Please call at: [phone]

## 2019-12-04 DIAGNOSIS — S01.502A OPEN WOUND OF MOUTH, INITIAL ENCOUNTER: Primary | ICD-10-CM

## 2019-12-04 RX ORDER — CHLORHEXIDINE GLUCONATE ORAL RINSE 1.2 MG/ML
15 SOLUTION DENTAL 2 TIMES DAILY
Qty: 473 ML | Refills: 0 | Status: SHIPPED | OUTPATIENT
Start: 2019-12-04 | End: 2019-12-11 | Stop reason: SDUPTHER

## 2019-12-04 RX ORDER — HYDROCODONE BITARTRATE AND ACETAMINOPHEN 10; 325 MG/1; MG/1
1 TABLET ORAL EVERY 6 HOURS PRN
Qty: 40 TABLET | Refills: 0 | Status: SHIPPED | OUTPATIENT
Start: 2019-12-04 | End: 2019-12-14

## 2019-12-06 ENCOUNTER — PES CALL (OUTPATIENT)
Dept: ADMINISTRATIVE | Facility: CLINIC | Age: 64
End: 2019-12-06

## 2019-12-06 ENCOUNTER — TELEPHONE (OUTPATIENT)
Dept: FAMILY MEDICINE | Facility: CLINIC | Age: 64
End: 2019-12-06

## 2019-12-06 NOTE — TELEPHONE ENCOUNTER
----- Message from Armaan Kapoor sent at 12/6/2019  1:15 PM CST -----  Ms. Kearns had declined diabetes appt due to the fact she's about to have surgery.

## 2019-12-10 ENCOUNTER — TELEPHONE (OUTPATIENT)
Dept: PAIN MEDICINE | Facility: CLINIC | Age: 64
End: 2019-12-10

## 2019-12-10 NOTE — TELEPHONE ENCOUNTER
----- Message from Mary Espinosa sent at 12/10/2019  3:55 PM CST -----  Contact: self [phone]  Patient is requesting a call back from the nurse stated she need details; time and location of upcoming injection.    Please call the patient upon request at phone number [phone].

## 2019-12-10 NOTE — TELEPHONE ENCOUNTER
Pt stated she does not have any info about her upcoming procedure.  Advised we spoke with her on 11/14 and scheduled it. Pt was given all the info again along with the [facility] number.

## 2019-12-11 ENCOUNTER — TELEPHONE (OUTPATIENT)
Dept: PAIN MEDICINE | Facility: CLINIC | Age: 64
End: 2019-12-11

## 2019-12-11 ENCOUNTER — OFFICE VISIT (OUTPATIENT)
Dept: OTOLARYNGOLOGY | Facility: CLINIC | Age: 64
End: 2019-12-11
Payer: MEDICARE

## 2019-12-11 VITALS
DIASTOLIC BLOOD PRESSURE: 72 MMHG | SYSTOLIC BLOOD PRESSURE: 114 MMHG | HEIGHT: 65 IN | BODY MASS INDEX: 31.81 KG/M2 | WEIGHT: 190.94 LBS

## 2019-12-11 DIAGNOSIS — S01.502A OPEN WOUND OF MOUTH, INITIAL ENCOUNTER: ICD-10-CM

## 2019-12-11 PROCEDURE — 99024 POSTOP FOLLOW-UP VISIT: CPT | Mod: S$GLB,,, | Performed by: OTOLARYNGOLOGY

## 2019-12-11 PROCEDURE — 99999 PR PBB SHADOW E&M-EST. PATIENT-LVL IV: ICD-10-PCS | Mod: PBBFAC,,, | Performed by: OTOLARYNGOLOGY

## 2019-12-11 PROCEDURE — 99999 PR PBB SHADOW E&M-EST. PATIENT-LVL IV: CPT | Mod: PBBFAC,,, | Performed by: OTOLARYNGOLOGY

## 2019-12-11 PROCEDURE — 99024 PR POST-OP FOLLOW-UP VISIT: ICD-10-PCS | Mod: S$GLB,,, | Performed by: OTOLARYNGOLOGY

## 2019-12-11 RX ORDER — CHLORHEXIDINE GLUCONATE ORAL RINSE 1.2 MG/ML
15 SOLUTION DENTAL 2 TIMES DAILY
Qty: 1000 ML | Refills: 0 | Status: SHIPPED | OUTPATIENT
Start: 2019-12-11 | End: 2020-01-10

## 2019-12-11 RX ORDER — OXYCODONE AND ACETAMINOPHEN 7.5; 325 MG/1; MG/1
1 TABLET ORAL EVERY 6 HOURS PRN
Qty: 28 TABLET | Refills: 0 | Status: SHIPPED | OUTPATIENT
Start: 2019-12-11 | End: 2020-03-10

## 2019-12-11 NOTE — PROGRESS NOTES
Patient Name:  Date of Encounter: 1/29/2019  Provider: Dedra Khan MD  Referring MD:  PCP:  Derm:  Cardiology:     CC:  Dry mouth; chronic mouth pain due to sores on cheeks and tongue     HPI   63 y.o. female presents from [facility] for evaluation of lesions of the bilateral buccal mucosa.  She underwent biopsy of her L buccal mucosa.  Path revealed a proliferative process and noted that verrucous carcinoma could not be ruled out. Biopsy of the L buccal mucosa revealed mild dysplasia and parakeratosis.  She reports persistent dry mouth, altered taste and burning of the mouth.  She has used Magic Mouthwash with some benefit.      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  -Negative for invasive carcinoma.  Comment: The pattern is not typical of verrucous     She reports that she was last seen by Dr. Juárez August/2018.  She has been seen by other physicians including a dermatologist who have told her that there is nothing that they can do for her symptoms of xerostomia, numbness of the oral cavity, and discomfort with the lesions involving her buccal mucosa and tongue. She also reports dysguesia to all foods x1 year--  she feels like her taste buds or swollen     2/19/2019     Patient is here for F/U mouth pain/lesions. 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  She was rx'ed clobetasol gel at time of last visit.  She reports that it has not helped  She has an area on each side of tongue that is painful.  Biotene has helped minimally for dry mouth     She denies any change in mouthwash, tooth paste or any medicines prior to the start of this problem     2/27/2019  Patient is here today for biopsy results.  She reports that the tissue overlying her tongue buccal mucosa is much softer.  Her tongue actually peeled and it was clear, but the  leukoplakia return within the next 48 hr.  Her pain however has decreased from an 8 down to a 2.  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 She has had biopsies in the past which were significant for dysplasia but no carcinoma.  She has been treated with clobetasol gel and  Biotene which have improved her symptoms but the lesions remain. The gel helps the lesions on her buccal mucosa but not the tongue.  She has pain along the lateral border of tongue and along the ventral surface  She continues to bite her cheeks     She also has a foul taste in her mouth and most foods do not taste good to her.      11/13/2019  Patient was seen and [facility].  There are pathologist was concerned about her lesions and recommend a larger biopsy.  Patient has delayed her follow-up due to lack of transportation.  Today only midline tongue hurts.  However, buccal lesions do become very painful and it is difficult to speak on phone, etc.  Overall lesions are the same--wax and wane.  Dysgeusia has improved.    12/11/2019  Patient is here for 1st postop visit status post wide local excision left buccal mucosa in the area of concern.    Tolerating diet.  Patient noticed that her sutures popped yesterday with pain.  Her pain is better today  She also reports that the lesions on her tongue and right buccal mucosa have disappeared     ROS:  Constitutional: Negative for activity change and appetite change, weight loss.   Eyes: Negative for discharge, visual changes.   Respiratory: Negative for difficulty breathing and wheezing   Cardiovascular: Negative for chest pain.   Gastrointestinal: Negative for abdominal distention and abdominal pain.   Endocrine: Negative for cold intolerance and heat intolerance.   Genitourinary: Negative for dysuria.   Musculoskeletal: Negative for gait problem, muscle pain and joint swelling.   Skin: Negative for color  change and pallor; negative for skin lesions.   Neurological: Negative for syncope and weakness; no numbness face.   Psychiatric/Behavioral: Negative for agitation and confusion; negative for depression.     Physical Exam:      Constitutional  · General Appearance: well nourished, well-developed, alert, oriented, in no acute distress  · Communication: ability, understanding, normal  Oral Cavity / Oropharynx  · Lips: upper and lower lips pink and moist  · Teeth: good dentition  · Gingiva: healthy  · Oral Mucosa: no lesions oral cavity buccal mucosa or tongue--resolved; large ulcer where mucosa excised left buccal mucosa  · Floor of Mouth: normal, no lesions, salivary ducts patent  · Palate: soft and hard palates without lesions or ulcers  Oropharynx: tonsils and walls without erythema, exudate, base of tongue soft to palpation  Neurological  · Cranial Nerves: grossly intact  · General: no focal deficits  Psychiatric  · Orientation: oriented to time, place and person  · Mood and Affect: no depression, anxiety or agitation    PATH  LEFT ANTERIOR INFERIOR BUCCAL MUCOSA, RESECTION:  - NO TUMOR OR DYSPLASIA SEEN.    - CHRONIC ULCER WITH ASSOCIATED IRRITATION CHANGES (FIBROSIS, HYPER-   AND PARAKERATOSIS,   CHRONIC INFLAMMATION).      Assessment:   Status post excision of area of concern involving left buccal mucosa-no dysplasia or carcinoma seen on pathology  Wound opened with large healing ulcer-base is clean  All the lesions have completely resolved    Plan:  I was sent a copy of the pathology report to Dr. Heather Valenzuela at the [facility]; she would likely want to review the slides herself  Patient is to follow-up with Dr. Valenzuela as scheduled  She will follow-up with me in 1 month to check the wound healing process  Pain medicine refilled.  Peridex refilled

## 2019-12-11 NOTE — H&P (VIEW-ONLY)
Patient Name:  Date of Encounter: 1/29/2019  Provider: Dedra Khan MD  Referring MD:  PCP:  Derm:  Cardiology:     CC:  Dry mouth; chronic mouth pain due to sores on cheeks and tongue     HPI   63 y.o. female presents from [facility] for evaluation of lesions of the bilateral buccal mucosa.  She underwent biopsy of her L buccal mucosa.  Path revealed a proliferative process and noted that verrucous carcinoma could not be ruled out. Biopsy of the L buccal mucosa revealed mild dysplasia and parakeratosis.  She reports persistent dry mouth, altered taste and burning of the mouth.  She has used Magic Mouthwash with some benefit.      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  -Negative for invasive carcinoma.  Comment: The pattern is not typical of verrucous     She reports that she was last seen by Dr. Juárez August/2018.  She has been seen by other physicians including a dermatologist who have told her that there is nothing that they can do for her symptoms of xerostomia, numbness of the oral cavity, and discomfort with the lesions involving her buccal mucosa and tongue. She also reports dysguesia to all foods x1 year--  she feels like her taste buds or swollen     2/19/2019     Patient is here for F/U mouth pain/lesions. 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  She was rx'ed clobetasol gel at time of last visit.  She reports that it has not helped  She has an area on each side of tongue that is painful.  Biotene has helped minimally for dry mouth     She denies any change in mouthwash, tooth paste or any medicines prior to the start of this problem     2/27/2019  Patient is here today for biopsy results.  She reports that the tissue overlying her tongue buccal mucosa is much softer.  Her tongue actually peeled and it was clear, but the  leukoplakia return within the next 48 hr.  Her pain however has decreased from an 8 down to a 2.  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 She has had biopsies in the past which were significant for dysplasia but no carcinoma.  She has been treated with clobetasol gel and  Biotene which have improved her symptoms but the lesions remain. The gel helps the lesions on her buccal mucosa but not the tongue.  She has pain along the lateral border of tongue and along the ventral surface  She continues to bite her cheeks     She also has a foul taste in her mouth and most foods do not taste good to her.      11/13/2019  Patient was seen and [facility].  There are pathologist was concerned about her lesions and recommend a larger biopsy.  Patient has delayed her follow-up due to lack of transportation.  Today only midline tongue hurts.  However, buccal lesions do become very painful and it is difficult to speak on phone, etc.  Overall lesions are the same--wax and wane.  Dysgeusia has improved.    12/11/2019  Patient is here for 1st postop visit status post wide local excision left buccal mucosa in the area of concern.    Tolerating diet.  Patient noticed that her sutures popped yesterday with pain.  Her pain is better today  She also reports that the lesions on her tongue and right buccal mucosa have disappeared     ROS:  Constitutional: Negative for activity change and appetite change, weight loss.   Eyes: Negative for discharge, visual changes.   Respiratory: Negative for difficulty breathing and wheezing   Cardiovascular: Negative for chest pain.   Gastrointestinal: Negative for abdominal distention and abdominal pain.   Endocrine: Negative for cold intolerance and heat intolerance.   Genitourinary: Negative for dysuria.   Musculoskeletal: Negative for gait problem, muscle pain and joint swelling.   Skin: Negative for color  change and pallor; negative for skin lesions.   Neurological: Negative for syncope and weakness; no numbness face.   Psychiatric/Behavioral: Negative for agitation and confusion; negative for depression.     Physical Exam:      Constitutional  · General Appearance: well nourished, well-developed, alert, oriented, in no acute distress  · Communication: ability, understanding, normal  Oral Cavity / Oropharynx  · Lips: upper and lower lips pink and moist  · Teeth: good dentition  · Gingiva: healthy  · Oral Mucosa: no lesions oral cavity buccal mucosa or tongue--resolved; large ulcer where mucosa excised left buccal mucosa  · Floor of Mouth: normal, no lesions, salivary ducts patent  · Palate: soft and hard palates without lesions or ulcers  Oropharynx: tonsils and walls without erythema, exudate, base of tongue soft to palpation  Neurological  · Cranial Nerves: grossly intact  · General: no focal deficits  Psychiatric  · Orientation: oriented to time, place and person  · Mood and Affect: no depression, anxiety or agitation    PATH  LEFT ANTERIOR INFERIOR BUCCAL MUCOSA, RESECTION:  - NO TUMOR OR DYSPLASIA SEEN.    - CHRONIC ULCER WITH ASSOCIATED IRRITATION CHANGES (FIBROSIS, HYPER-   AND PARAKERATOSIS,   CHRONIC INFLAMMATION).      Assessment:   Status post excision of area of concern involving left buccal mucosa-no dysplasia or carcinoma seen on pathology  Wound opened with large healing ulcer-base is clean  All the lesions have completely resolved    Plan:  I was sent a copy of the pathology report to Dr. Heather Valenzuela at the [facility]; she would likely want to review the slides herself  Patient is to follow-up with Dr. Valenzuela as scheduled  She will follow-up with me in 1 month to check the wound healing process  Pain medicine refilled.  Peridex refilled

## 2019-12-13 ENCOUNTER — PATIENT OUTREACH (OUTPATIENT)
Dept: ADMINISTRATIVE | Facility: HOSPITAL | Age: 64
End: 2019-12-13

## 2019-12-20 ENCOUNTER — HOSPITAL ENCOUNTER (OUTPATIENT)
Facility: AMBULARY SURGERY CENTER | Age: 64
Discharge: HOME | End: 2019-12-20
Attending: ANESTHESIOLOGY | Admitting: ANESTHESIOLOGY
Payer: MEDICARE

## 2019-12-20 DIAGNOSIS — M54.16 LUMBAR RADICULITIS: Primary | ICD-10-CM

## 2019-12-20 LAB — POCT GLUCOSE: 125 MG/DL (ref 70–110)

## 2019-12-20 PROCEDURE — 99152 PR MOD CONSCIOUS SEDATION, SAME PHYS, 5+ YRS, FIRST 15 MIN: ICD-10-PCS | Mod: ,,, | Performed by: ANESTHESIOLOGY

## 2019-12-20 PROCEDURE — 64483 PR EPIDURAL INJ, ANES/STEROID, TRANSFORAMINAL, LUMB/SACR, SNGL LEVL: ICD-10-PCS | Mod: LT,,, | Performed by: ANESTHESIOLOGY

## 2019-12-20 PROCEDURE — 64483 NJX AA&/STRD TFRM EPI L/S 1: CPT | Mod: LT,,, | Performed by: ANESTHESIOLOGY

## 2019-12-20 PROCEDURE — 64483 NJX AA&/STRD TFRM EPI L/S 1: CPT | Performed by: ANESTHESIOLOGY

## 2019-12-20 PROCEDURE — 99152 MOD SED SAME PHYS/QHP 5/>YRS: CPT | Mod: ,,, | Performed by: ANESTHESIOLOGY

## 2019-12-20 RX ORDER — BUPIVACAINE HYDROCHLORIDE 2.5 MG/ML
INJECTION, SOLUTION EPIDURAL; INFILTRATION; INTRACAUDAL
Status: DISCONTINUED | OUTPATIENT
Start: 2019-12-20 | End: 2019-12-20 | Stop reason: HOSPADM

## 2019-12-20 RX ORDER — MIDAZOLAM HYDROCHLORIDE 1 MG/ML
INJECTION INTRAMUSCULAR; INTRAVENOUS
Status: DISCONTINUED | OUTPATIENT
Start: 2019-12-20 | End: 2019-12-20 | Stop reason: HOSPADM

## 2019-12-20 RX ORDER — SODIUM CHLORIDE 9 MG/ML
INJECTION, SOLUTION INTRAVENOUS CONTINUOUS
Status: ACTIVE | OUTPATIENT
Start: 2019-12-20

## 2019-12-20 RX ORDER — SODIUM CHLORIDE, SODIUM LACTATE, POTASSIUM CHLORIDE, CALCIUM CHLORIDE 600; 310; 30; 20 MG/100ML; MG/100ML; MG/100ML; MG/100ML
INJECTION, SOLUTION INTRAVENOUS ONCE AS NEEDED
Status: ACTIVE | OUTPATIENT
Start: 2019-12-20 | End: 2031-05-18

## 2019-12-20 RX ORDER — LIDOCAINE HYDROCHLORIDE 10 MG/ML
INJECTION, SOLUTION EPIDURAL; INFILTRATION; INTRACAUDAL; PERINEURAL
Status: DISCONTINUED | OUTPATIENT
Start: 2019-12-20 | End: 2019-12-20 | Stop reason: HOSPADM

## 2019-12-20 RX ORDER — DEXAMETHASONE SODIUM PHOSPHATE 10 MG/ML
INJECTION INTRAMUSCULAR; INTRAVENOUS
Status: DISCONTINUED | OUTPATIENT
Start: 2019-12-20 | End: 2019-12-20 | Stop reason: HOSPADM

## 2019-12-20 RX ORDER — FENTANYL CITRATE 50 UG/ML
INJECTION, SOLUTION INTRAMUSCULAR; INTRAVENOUS
Status: DISCONTINUED | OUTPATIENT
Start: 2019-12-20 | End: 2019-12-20 | Stop reason: HOSPADM

## 2019-12-20 RX ADMIN — SODIUM CHLORIDE: 9 INJECTION, SOLUTION INTRAVENOUS at 12:12

## 2019-12-20 NOTE — PLAN OF CARE
Tolerated procedure well.  Dressed at bedside w asst. Back injection site clear.  Denies dizziness. amb to car with walker mario act well.  Some weakness left leg - had injection in left heel earlier.  Stable walking with walker.

## 2019-12-20 NOTE — OP NOTE
PROCEDURE DATE: 12/20/2019    PROCEDURE: Left L5-s1 transforaminal epidural steroid injection under fluoroscopy    DIAGNOSIS: Lumbar disc displacement without myelopathy  Post op diagnosis: Same    PHYSICIAN: Job Porter MD    MEDICATIONS INJECTED:  Dexamethasone 5mg (0.5ml) and 1.5ml 0.25% bupivicaine at each nerve root.     LOCAL ANESTHETIC INJECTED:  Lidocaine 1%. 2 ml per site.    SEDATION MEDICATIONS: RN Iv sedation    ESTIMATED BLOOD LOSS:  None    COMPLICATIONS:  None    TECHNIQUE:   A time-out was taken to identify patient and procedure side prior to starting the procedure. The patient was placed in a prone position, prepped and draped in the usual sterile fashion using ChloraPrep and sterile towels.  The area to be injected was determined under fluoroscopic guidance in AP and oblique view.  Local anesthetic was given by raising a wheal and going down to the hub of a 25-gauge 1.5 inch needle.  In oblique view, a 5 inch 22-gauge bent-tip spinal needle was introduced towards 6 oclock position of the pedicle of each above named nerve root level.  The needle was walked medially then hinged into the neural foramen and position was confirmed in AP and lateral views.  1ml contrast dye was injected to confirm appropriate placement and that there was no vascular uptake.  After negative aspiration for blood or CSF, the medication was then injected. This was performed at the left L5-S1 level(s). The patient tolerated the procedure well.    The patient was monitored after the procedure.  Patient was given post procedure and discharge instructions to follow at home. The patient was discharged in a stable condition.

## 2019-12-20 NOTE — DISCHARGE SUMMARY
[facility]  Discharge Note  Short Stay    Admit Date: 12/20/2019    Discharge Date and Time: 12/20/2019    Attending Physician: Job Porter MD     Discharge Provider: Job Porter    Diagnoses:  Active Hospital Problems    Diagnosis  POA    *Lumbar radiculitis [M54.16]  Yes      Resolved Hospital Problems   No resolved problems to display.       Hospital Course: Lumbar VITOR  Discharged Condition: Good    Final Diagnoses:   Active Hospital Problems    Diagnosis  POA    *Lumbar radiculitis [M54.16]  Yes      Resolved Hospital Problems   No resolved problems to display.       Disposition: Home or Self Care    Follow up/Patient Instructions:    Medications:  Reconciled Home Medications:      Medication List      CHANGE how you take these medications    atenolol 50 MG tablet  Commonly known as:  TENORMIN  Take 1 tablet (50 mg total) by mouth once daily.  What changed:  when to take this     fenofibrate micronized 200 MG Cap  Commonly known as:  LOFIBRA  TAKE 1 CAPSULE (200 MG TOTAL) BY MOUTH ONCE DAILY.  What changed:  when to take this        CONTINUE taking these medications    * (MAGIC MOUTHWASH) 1:1:1 BENADRYL 12.5MG/5ML LIQ, ALUMINUM & MAGNESIUM  Swish and spit 15 mLs every 4 (four) hours as needed. for mouth sores     * diphenhydrAMINE-aluminum-magnesium hydroxide-simethicone-lidocaine HCl 2%  Swish and spit 15 mLs every 4 (four) hours as needed.     amLODIPine 5 MG tablet  Commonly known as:  NORVASC  Take 5 mg by mouth once daily.     aspirin 81 MG EC tablet  Commonly known as:  ECOTRIN  Take 81 mg by mouth once daily.     chlorhexidine 0.12 % solution  Commonly known as:  PERIDEX  Use as directed 15 mLs in the mouth or throat 2 (two) times daily.     cholecalciferol (vitamin D3) 50 mcg (2,000 unit) Cap  Commonly known as:  VITAMIN D3  Take 1 capsule by mouth once daily.     clonazePAM 0.5 MG tablet  Commonly known as:  KLONOPIN  TAKE 1 TABLET (0.5 MG TOTAL) BY MOUTH 2 (TWO) TIMES DAILY.     cloNIDine  0.1 MG tablet  Commonly known as:  CATAPRES  Take 0.1 mg by mouth once daily.     dicyclomine 10 MG capsule  Commonly known as:  BENTYL  TAKE 1 CAPSULE BY MOUTH THREE TIMES A DAY AS NEEDED FOR PAIN     Ferrex 150 150 mg iron Cap  Generic drug:  iron polysaccharides  Take 150 mg by mouth 2 (two) times daily.     fluticasone propionate 50 mcg/actuation nasal spray  Commonly known as:  FLONASE  2 sprays (100 mcg total) by Each Nare route 2 (two) times daily.     furosemide 20 MG tablet  Commonly known as:  LASIX  Take 20 mg by mouth daily as needed.     gabapentin 100 MG capsule  Commonly known as:  NEURONTIN  Take 100 mg by mouth 2 (two) times daily.     latanoprost 0.005 % Dpem  Place 2 drops into both eyes every evening.     levothyroxine 75 MCG tablet  Commonly known as:  SYNTHROID  TAKE 1 TABLET BY MOUTH EVERY DAY     Lomotil 2.5-0.025 mg per tablet  Generic drug:  diphenoxylate-atropine 2.5-0.025 mg  Lomotil     losartan 100 MG tablet  Commonly known as:  COZAAR  TAKE ONE TABLET BY MOUTH ONCE DAILY     metFORMIN 500 MG 24 hr tablet  Commonly known as:  GLUCOPHAGE-XR  Take 2 tablets (1,000 mg total) by mouth 2 (two) times daily with meals.     omeprazole 40 MG capsule  Commonly known as:  PRILOSEC  TAKE 1 CAPSULE TWICE DAILY 30 MINUTES PRIOR TO BREAKFAST AND DINNER     orphenadrine 100 mg tablet  Commonly known as:  NORFLEX  TAKE 1 TABLET BY MOUTH TWICE DAILY AS NEEDED     oxyCODONE-acetaminophen 7.5-325 mg per tablet  Commonly known as:  PERCOCET  Take 1 tablet by mouth every 6 (six) hours as needed for Pain.     sertraline 50 MG tablet  Commonly known as:  ZOLOFT  Take 50 mg by mouth every evening.     simvastatin 80 MG tablet  Commonly known as:  ZOCOR  Take 1 tablet (80 mg total) by mouth every evening.     SITagliptin 100 MG Tab  Commonly known as:  JANUVIA  Take 100 mg by mouth once daily.     traMADol 50 mg tablet  Commonly known as:  ULTRAM  Take 50 mg by mouth every 6 (six) hours as needed.         * This  list has 2 medication(s) that are the same as other medications prescribed for you. Read the directions carefully, and ask your doctor or other care provider to review them with you.              Discharge Procedure Orders   Call MD for:  temperature >100.4     Call MD for:  persistent nausea and vomiting or diarrhea     Call MD for:  severe uncontrolled pain     Call MD for:  redness, tenderness, or signs of infection (pain, swelling, redness, odor or green/yellow discharge around incision site)     Call MD for:  difficulty breathing or increased cough     Call MD for:  severe persistent headache        Follow up with MD in 2-3 weeks    Discharge Procedure Orders (must include Diet, Follow-up, Activity):   Discharge Procedure Orders (must include Diet, Follow-up, Activity)   Call MD for:  temperature >100.4     Call MD for:  persistent nausea and vomiting or diarrhea     Call MD for:  severe uncontrolled pain     Call MD for:  redness, tenderness, or signs of infection (pain, swelling, redness, odor or green/yellow discharge around incision site)     Call MD for:  difficulty breathing or increased cough     Call MD for:  severe persistent headache

## 2019-12-20 NOTE — DISCHARGE INSTRUCTIONS
Before leaving, please make sure you have all your personal belongings such as glasses, purses, wallets, keys, cell phones, jewelry, jackets etc     Pain injection instructions:     This procedure may take a couple weeks to relieve pain  You may get some pain relief from the local anesthetic initally.    No driving for 24 hrs.   Activity as tolerated- gradually increase activities.  Dont lift over 10 lbs for 24 hrs   No heat at injection sites x 2 days. No heating pads, hot tubs, saunas, or swimming in any body of water or pool for 2 days.  Use ice pack for mild swelling and for comfort , apply for 20 minutes, remove for 20 minute intervals. No direct contact of ice itself  to skin.  May shower today. No tub baths for two days.      Resume Aspirin, Plavix, or Coumadin the day after the procedure unless otherwise instructed.   If diabetic,monitor your glucose carefully as steroids can increase your glucose level    Seek immediate medical help for:   Severe increase in your usual pain or appearance of new pain.  Prolonged (mor than 8 hours) or increasing weakness or numbness in the legs or arms. Numbing medicine was injected and can affect the messages to and from the brain and legs or arms.  .    Fever above 101 ,Drainage,redness,active bleeding, or increased swelling at the injection site.  Headache, shortness of breath, chest pain, or breathing problems.    After Surgery:  Always be aware that any surgery can cause these symptoms:    Pain- Medication can be prescribed for pain to decrease your pain but may not completely take your pain away. Over the Counter pain medicine my be enough and you can always use Ice and rest to help ease pain.    Bleeding- a little bleeding after a surgery is usually within normal.  If there is a lot of blood you need to notify your MD.  Emergency treatments of bleeding are cold application, elevation of the bleeding site and compression.    Infection- Infection after surgery is NOT a  normal occurrence.  Signs of infection are fever, swelling, hot to touch the incision.  If this occurs notify your MD immediately.    Nausea- this can be common after a surgery especially if you have had anesthesia medicine or are taking pain medicine.  Steroids have a side effect of nausea sometimes. Staying on clear liquids, bland foods, gingerale, or over the counter anti nausea medicines can help.  If you vomit more than once, notify your MD.  Anti Nausea medicines can be prescribed.   Anesthesia information    Anesthesia Safety      You have been given medicine  to sedate you during your procedure today. This may have included both a pain medicine and sleeping medicine. Most of the effects have worn off; however, you may continue to have some drowsiness for the next  24 hours. Anesthesia and pain medicines can cause nausea, sleepiness, dizziness and  constipation.    HOME CARE:  1) For the next EIGHT HOURS, you should be watched by a responsible adult to look for any worsening of your condition.  2) DO NOT DRINK any ALCOHOL for the next 24 HOURS.  3) DO NOT DRIVE or operate dangerous machinery during the next 24 HOURS.  FOLLOW UP with your doctor or this facility if you are not alert and back to your usual level of activity within 24 hrs.  GET PROMPT MEDICAL ATTENTION if any of the following occur:  -- Increased drowsiness  -- Increased weakness or dizziness  -- Repeated vomiting  -- If you cannot be awakened

## 2019-12-23 VITALS
RESPIRATION RATE: 18 BRPM | HEIGHT: 65 IN | WEIGHT: 190.94 LBS | TEMPERATURE: 98 F | OXYGEN SATURATION: 96 % | HEART RATE: 76 BPM | SYSTOLIC BLOOD PRESSURE: 118 MMHG | BODY MASS INDEX: 31.81 KG/M2 | DIASTOLIC BLOOD PRESSURE: 58 MMHG

## 2019-12-30 ENCOUNTER — TELEPHONE (OUTPATIENT)
Dept: OTOLARYNGOLOGY | Facility: CLINIC | Age: 64
End: 2019-12-30

## 2019-12-30 ENCOUNTER — HOSPITAL ENCOUNTER (OUTPATIENT)
Dept: RADIOLOGY | Facility: HOSPITAL | Age: 64
Discharge: HOME | End: 2019-12-30
Attending: PHYSICIAN ASSISTANT
Payer: MEDICARE

## 2019-12-30 DIAGNOSIS — N60.19 FIBROCYSTIC BREAST DISEASE (FCBD), UNSPECIFIED LATERALITY: ICD-10-CM

## 2019-12-30 DIAGNOSIS — E03.9 ACQUIRED HYPOTHYROIDISM: ICD-10-CM

## 2019-12-30 DIAGNOSIS — Z12.39 SCREENING FOR BREAST CANCER: ICD-10-CM

## 2019-12-30 DIAGNOSIS — Z12.31 BREAST CANCER SCREENING BY MAMMOGRAM: ICD-10-CM

## 2019-12-30 PROCEDURE — 77067 SCR MAMMO BI INCL CAD: CPT | Mod: 26,,, | Performed by: RADIOLOGY

## 2019-12-30 PROCEDURE — 77063 MAMMO DIGITAL SCREENING BILAT WITH TOMOSYNTHESIS_CAD: ICD-10-PCS | Mod: 26,,, | Performed by: RADIOLOGY

## 2019-12-30 PROCEDURE — 77067 SCR MAMMO BI INCL CAD: CPT | Mod: TC,PO

## 2019-12-30 PROCEDURE — 77067 MAMMO DIGITAL SCREENING BILAT WITH TOMOSYNTHESIS_CAD: ICD-10-PCS | Mod: 26,,, | Performed by: RADIOLOGY

## 2019-12-30 PROCEDURE — 77063 BREAST TOMOSYNTHESIS BI: CPT | Mod: 26,,, | Performed by: RADIOLOGY

## 2019-12-30 RX ORDER — LOSARTAN POTASSIUM 100 MG/1
100 TABLET ORAL DAILY
Qty: 90 TABLET | Refills: 1 | Status: SHIPPED | OUTPATIENT
Start: 2019-12-30 | End: 2020-06-09

## 2019-12-30 RX ORDER — CLONAZEPAM 0.5 MG/1
TABLET ORAL
Qty: 60 TABLET | Refills: 3 | Status: SHIPPED | OUTPATIENT
Start: 2019-12-30 | End: 2020-03-02 | Stop reason: SDUPTHER

## 2019-12-30 RX ORDER — LEVOTHYROXINE SODIUM 75 UG/1
75 TABLET ORAL DAILY
Qty: 90 TABLET | Refills: 1 | Status: SHIPPED | OUTPATIENT
Start: 2019-12-30 | End: 2020-07-24

## 2019-12-30 NOTE — TELEPHONE ENCOUNTER
Spoke with USHA Devine in Pathology advised to please send most recent slides to [facility] in care of Dr. Lynn Valenzuela she will send slides out asap

## 2020-01-15 NOTE — PROGRESS NOTES
Patient Name:  Date of Encounter: 1/29/2019  Provider: Dedra Khan MD  Referring MD:  PCP:  Derm:  Cardiology:     CC:  Dry mouth; chronic mouth pain due to sores on cheeks and tongue     HPI   63 y.o. female presents from [facility] for evaluation of lesions of the bilateral buccal mucosa.  She underwent biopsy of her L buccal mucosa.  Path revealed a proliferative process and noted that verrucous carcinoma could not be ruled out. Biopsy of the L buccal mucosa revealed mild dysplasia and parakeratosis.  She reports persistent dry mouth, altered taste and burning of the mouth.  She has used Magic Mouthwash with some benefit.      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  -Negative for invasive carcinoma.  Comment: The pattern is not typical of verrucous     She reports that she was last seen by Dr. Juárez August/2018.  She has been seen by other physicians including a dermatologist who have told her that there is nothing that they can do for her symptoms of xerostomia, numbness of the oral cavity, and discomfort with the lesions involving her buccal mucosa and tongue. She also reports dysguesia to all foods x1 year--  she feels like her taste buds or swollen     2/19/2019     Patient is here for F/U mouth pain/lesions. 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  She was rx'ed clobetasol gel at time of last visit.  She reports that it has not helped  She has an area on each side of tongue that is painful.  Biotene has helped minimally for dry mouth     She denies any change in mouthwash, tooth paste or any medicines prior to the start of this problem     2/27/2019  Patient is here today for biopsy results.  She reports that the tissue overlying her tongue buccal mucosa is much softer.  Her tongue actually peeled and it was clear, but the  leukoplakia return within the next 48 hr.  Her pain however has decreased from an 8 down to a 2.  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 She has had biopsies in the past which were significant for dysplasia but no carcinoma.  She has been treated with clobetasol gel and  Biotene which have improved her symptoms but the lesions remain. The gel helps the lesions on her buccal mucosa but not the tongue.  She has pain along the lateral border of tongue and along the ventral surface  She continues to bite her cheeks     She also has a foul taste in her mouth and most foods do not taste good to her.      11/13/2019  Patient was seen and [facility].  There are pathologist was concerned about her lesions and recommend a larger biopsy.  Patient has delayed her follow-up due to lack of transportation.  Today only midline tongue hurts.  However, buccal lesions do become very painful and it is difficult to speak on phone, etc.  Overall lesions are the same--wax and wane.  Dysgeusia has improved.    12/11/2019  Patient is here for 1st postop visit status post wide local excision left buccal mucosa in the area of concern.    Tolerating diet.  Patient noticed that her sutures popped yesterday with pain.  Her pain is better today  She also reports that the lesions on her tongue and right buccal mucosa have disappeared    1/21/2020  Patient is here today for re-evaluation defect left buccal mucosa status post wide local excision of abnormal tissue.  The pathology specimen was also reviewed by the oral pathologist at [facility] in [place] .  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  She reports only 2 areas on her tongue that are whitish in  color but are not painful.  Xerostomia has markedly improved.  The only topical medicine that she occassionally uses this Magic mouthwash     ROS:  Constitutional: Negative for activity change and appetite change, weight loss.   Eyes: Negative for discharge, visual changes.   Respiratory: Negative for difficulty breathing and wheezing   Cardiovascular: Negative for chest pain.   Gastrointestinal: Negative for abdominal distention and abdominal pain.   Endocrine: Negative for cold intolerance and heat intolerance.   Genitourinary: Negative for dysuria.   Musculoskeletal: Negative for gait problem, muscle pain and joint swelling.   Skin: Negative for color change and pallor; negative for skin lesions.   Neurological: Negative for syncope and weakness; no numbness face.   Psychiatric/Behavioral: Negative for agitation and confusion; negative for depression.     Physical Exam:      Constitutional  · General Appearance: well nourished, well-developed, alert, oriented, in no acute distress  · Communication: ability, understanding, normal  Oral Cavity / Oropharynx  · Lips: upper and lower lips pink and moist  · Teeth: good dentition  · Gingiva: healthy  · Oral Mucosa:  Defect left buccal mucosa is here with a linear scar; no of the lesions on the buccal mucosa or dorsal tongue; area of leukoplakia approximately 5 mm bilaterally on the lateral tongue where the tongue hits the molars mucosa excised left buccal mucosa  · Floor of Mouth: normal, no lesions, salivary ducts patent  · Palate: soft and hard palates without lesions or ulcers  Oropharynx: tonsils and walls without erythema, exudate, base of tongue soft to palpation  Neurological  · Cranial Nerves: grossly intact  · General: no focal deficits  Psychiatric  · Orientation: oriented to time, place and person  · Mood and Affect: no depression, anxiety or agitation    PATH  Reviewed also at [facility] by the or pathologist, Dr. Heather Valenzuela.  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  Therefore patient will follow up on a regular basis with Dr. Valenzuela and she will be referred back to me if there are any areas of concern  Follow-up with me p.r.n.

## 2020-01-17 ENCOUNTER — OFFICE VISIT (OUTPATIENT)
Dept: SPINE | Facility: CLINIC | Age: 65
End: 2020-01-17
Payer: MEDICARE

## 2020-01-17 ENCOUNTER — TELEPHONE (OUTPATIENT)
Dept: PAIN MEDICINE | Facility: CLINIC | Age: 65
End: 2020-01-17

## 2020-01-17 VITALS — WEIGHT: 190.94 LBS | BODY MASS INDEX: 31.81 KG/M2 | HEIGHT: 65 IN

## 2020-01-17 DIAGNOSIS — M54.42 CHRONIC LEFT-SIDED LOW BACK PAIN WITH LEFT-SIDED SCIATICA: Primary | ICD-10-CM

## 2020-01-17 DIAGNOSIS — E11.9 TYPE 2 DIABETES MELLITUS WITHOUT COMPLICATION, UNSPECIFIED WHETHER LONG TERM INSULIN USE: ICD-10-CM

## 2020-01-17 DIAGNOSIS — G89.29 CHRONIC LEFT-SIDED LOW BACK PAIN WITH LEFT-SIDED SCIATICA: Primary | ICD-10-CM

## 2020-01-17 PROCEDURE — 99213 PR OFFICE/OUTPT VISIT, EST, LEVL III, 20-29 MIN: ICD-10-PCS | Mod: S$GLB,,, | Performed by: PHYSICAL MEDICINE & REHABILITATION

## 2020-01-17 PROCEDURE — 1101F PT FALLS ASSESS-DOCD LE1/YR: CPT | Mod: S$GLB,,, | Performed by: PHYSICAL MEDICINE & REHABILITATION

## 2020-01-17 PROCEDURE — 3008F BODY MASS INDEX DOCD: CPT | Mod: S$GLB,,, | Performed by: PHYSICAL MEDICINE & REHABILITATION

## 2020-01-17 PROCEDURE — 1101F PR PT FALLS ASSESS DOC 0-1 FALLS W/OUT INJ PAST YR: ICD-10-PCS | Mod: S$GLB,,, | Performed by: PHYSICAL MEDICINE & REHABILITATION

## 2020-01-17 PROCEDURE — 99213 OFFICE O/P EST LOW 20 MIN: CPT | Mod: S$GLB,,, | Performed by: PHYSICAL MEDICINE & REHABILITATION

## 2020-01-17 PROCEDURE — 3008F PR BODY MASS INDEX (BMI) DOCUMENTED: ICD-10-PCS | Mod: S$GLB,,, | Performed by: PHYSICAL MEDICINE & REHABILITATION

## 2020-01-17 NOTE — PROGRESS NOTES
SUBJECTIVE:    Patient ID: Bing Kearns is a 64 y.o. female.    Chief Complaint: Follow-up (Post Proc)    She is here for follow-up status post transforaminal epidural steroid injection on left at L5-S1 on 2019 with Dr. Porter.  No improvement from that procedure.  Still has a primary complaint of left lower gluteal discomfort and low back pain radiating into the left calf.  These are familiar symptoms.  There are persistent.  She recently started gabapentin for neuropathy associated with chronic kidney disease but that has not improved the pain in the left leg.  Also has developed recently left lateral hip discomfort.  Hurts to lie on that side.  Current pain level is 5/10        Past Medical History:   Diagnosis Date    Anxiety     Cancer     colon    Chronic diarrhea     Chronic kidney disease     stage 3    Diabetes mellitus     GERD (gastroesophageal reflux disease)     Glaucoma     History of migraine headaches     Hyperlipemia     Hypertension     PONV (postoperative nausea and vomiting)     Sleep apnea with use of continuous positive airway pressure (CPAP)     waiting on mask to arrive    Thyroid disease     hypothyroid     Social History     Socioeconomic History    Marital status:      Spouse name: Not on file    Number of children: Not on file    Years of education: Not on file    Highest education level: Not on file   Occupational History    Not on file   Social Needs    Financial resource strain: Somewhat hard    Food insecurity:     Worry: Never true     Inability: Never true    Transportation needs:     Medical: Not on file     Non-medical: Not on file   Tobacco Use    Smoking status: Former Smoker     Last attempt to quit: 2000     Years since quittin.0    Smokeless tobacco: Never Used   Substance and Sexual Activity    Alcohol use: No     Frequency: Never     Drinks per session: Patient refused     Binge frequency: Never    Drug use: No    Sexual  activity: Not on file   Lifestyle    Physical activity:     Days per week: Not on file     Minutes per session: Not on file    Stress: Very much   Relationships    Social connections:     Talks on phone: More than three times a week     Gets together: Once a week     Attends Anabaptist service: Not on file     Active member of club or organization: No     Attends meetings of clubs or organizations: Patient refused     Relationship status:    Other Topics Concern    Not on file   Social History Narrative    Not on file     Past Surgical History:   Procedure Laterality Date    APPENDECTOMY      done during colon resection    CATARACT EXTRACTION, BILATERAL Bilateral      SECTION      COLON SURGERY  2005    COLONOSCOPY Left 2015    Procedure: COLONOSCOPY;  Surgeon: Chet Woodard Jr., MD;  Location: [facility] ENDO;  Service: Endoscopy;  Laterality: Left;    EXCISION OF LESION OF BUCCAL MUCOSA Left 2019    Procedure: EXCISION, LESION, BUCCAL MUCOSA;  Surgeon: Dedra Khan MD;  Location: [facility] OR;  Service: ENT;  Laterality: Left;    HERNIA REPAIR      repaired during colon resection    HYSTERECTOMY      INSERTION OF VENOUS ACCESS PORT      PORTACATH PLACEMENT Left     and later removed     REPLACEMENT OF VASCULAR ACCESS PORT      SURGICAL REMOVAL OF NEOPLASM OF MOUTH Left 6/15/2018    Procedure: EXCISION, NEOPLASM, MOUTH;  Surgeon: Velasquez Juárez MD;  Location: [address];  Service: ENT;  Laterality: Left;    TRANSFORAMINAL EPIDURAL INJECTION OF STEROID Left 2019    Procedure: Injection,steroid,epidural,transforaminal approach;  Surgeon: Job Porter MD;  Location: [facility] OR;  Service: Pain Management;  Laterality: Left;  L5-S1     Family History   Problem Relation Age of Onset    Cancer Mother     Breast cancer Mother     Heart disease Father     COPD Father     Hypertension Sister     Diabetes Sister     Anemia Sister     Cancer Sister      Vitals:     "01/17/20 1131   Weight: 86.6 kg (190 lb 14.7 oz)   Height: 5' 5" (1.651 m)       Review of Systems   Constitutional: Negative for chills, diaphoresis, fatigue, fever and unexpected weight change.   HENT: Negative for trouble swallowing.    Eyes: Negative for visual disturbance.   Respiratory: Negative for shortness of breath.    Cardiovascular: Negative for chest pain.   Gastrointestinal: Negative for abdominal pain, constipation, nausea and vomiting.   Genitourinary: Negative for difficulty urinating.   Musculoskeletal: Negative for arthralgias, back pain, gait problem, joint swelling, myalgias, neck pain and neck stiffness.   Neurological: Negative for dizziness, speech difficulty, weakness, light-headedness, numbness and headaches.          Objective:      Physical Exam   Constitutional: She is oriented to person, place, and time. She appears well-developed and well-nourished.   Neurological: She is alert and oriented to person, place, and time.   She has mild tenderness over the right greater trochanter           Assessment:       1. Chronic left-sided low back pain with left-sided sciatica           Plan:     I am going to get her set up for transforaminal injections on the left at L5-S1 and at S1 foramen.  I think she has a mild trochanteric bursitis.  At this time not she is not interested in doing anything specifically about that.  Maybe will get better when she gets to steroid injection.  Follow-up with me after the procedure      Chronic left-sided low back pain with left-sided sciatica        "

## 2020-01-17 NOTE — TELEPHONE ENCOUNTER
----- Message from Kenan Vaughan MD sent at 1/17/2020 11:45 AM CST -----  Please schedule for transforaminal epidural steroid injection on left at L5-S1 and at S1

## 2020-01-20 ENCOUNTER — TELEPHONE (OUTPATIENT)
Dept: PAIN MEDICINE | Facility: CLINIC | Age: 65
End: 2020-01-20

## 2020-01-20 NOTE — TELEPHONE ENCOUNTER
----- Message from Talisha Marroquin LPN sent at 1/17/2020  4:53 PM CST -----      ----- Message -----  From: Josiah López  Sent: 1/17/2020   4:11 PM CST  To: [facility] Staff    Type:  Patient Returning Call    Who Called:  Patient  Who Left Message for Patient:  Aida  Does the patient know what this is regarding?:  Scheduling procedure  Best Call Back Number:  [phone]  Additional Information:

## 2020-01-21 ENCOUNTER — OFFICE VISIT (OUTPATIENT)
Dept: OTOLARYNGOLOGY | Facility: CLINIC | Age: 65
End: 2020-01-21
Payer: MEDICARE

## 2020-01-21 VITALS
DIASTOLIC BLOOD PRESSURE: 72 MMHG | RESPIRATION RATE: 20 BRPM | BODY MASS INDEX: 31.19 KG/M2 | WEIGHT: 187.19 LBS | SYSTOLIC BLOOD PRESSURE: 109 MMHG | HEIGHT: 65 IN | HEART RATE: 86 BPM

## 2020-01-21 DIAGNOSIS — K13.21 LEUKOPLAKIA ORAL MUCOSA: Primary | ICD-10-CM

## 2020-01-21 DIAGNOSIS — K13.21 LEUKOPLAKIA, TONGUE: ICD-10-CM

## 2020-01-21 PROBLEM — S01.502A: Status: RESOLVED | Noted: 2019-12-11 | Resolved: 2020-01-21

## 2020-01-21 PROCEDURE — 3074F PR MOST RECENT SYSTOLIC BLOOD PRESSURE < 130 MM HG: ICD-10-PCS | Mod: S$GLB,,, | Performed by: OTOLARYNGOLOGY

## 2020-01-21 PROCEDURE — 3078F PR MOST RECENT DIASTOLIC BLOOD PRESSURE < 80 MM HG: ICD-10-PCS | Mod: S$GLB,,, | Performed by: OTOLARYNGOLOGY

## 2020-01-21 PROCEDURE — 3008F PR BODY MASS INDEX (BMI) DOCUMENTED: ICD-10-PCS | Mod: S$GLB,,, | Performed by: OTOLARYNGOLOGY

## 2020-01-21 PROCEDURE — 1101F PT FALLS ASSESS-DOCD LE1/YR: CPT | Mod: S$GLB,,, | Performed by: OTOLARYNGOLOGY

## 2020-01-21 PROCEDURE — 99999 PR PBB SHADOW E&M-EST. PATIENT-LVL III: ICD-10-PCS | Mod: PBBFAC,,, | Performed by: OTOLARYNGOLOGY

## 2020-01-21 PROCEDURE — 3074F SYST BP LT 130 MM HG: CPT | Mod: S$GLB,,, | Performed by: OTOLARYNGOLOGY

## 2020-01-21 PROCEDURE — 1101F PR PT FALLS ASSESS DOC 0-1 FALLS W/OUT INJ PAST YR: ICD-10-PCS | Mod: S$GLB,,, | Performed by: OTOLARYNGOLOGY

## 2020-01-21 PROCEDURE — 99999 PR PBB SHADOW E&M-EST. PATIENT-LVL III: CPT | Mod: PBBFAC,,, | Performed by: OTOLARYNGOLOGY

## 2020-01-21 PROCEDURE — 99213 OFFICE O/P EST LOW 20 MIN: CPT | Mod: S$GLB,,, | Performed by: OTOLARYNGOLOGY

## 2020-01-21 PROCEDURE — 3008F BODY MASS INDEX DOCD: CPT | Mod: S$GLB,,, | Performed by: OTOLARYNGOLOGY

## 2020-01-21 PROCEDURE — 99213 PR OFFICE/OUTPT VISIT, EST, LEVL III, 20-29 MIN: ICD-10-PCS | Mod: S$GLB,,, | Performed by: OTOLARYNGOLOGY

## 2020-01-21 PROCEDURE — 3078F DIAST BP <80 MM HG: CPT | Mod: S$GLB,,, | Performed by: OTOLARYNGOLOGY

## 2020-01-22 ENCOUNTER — TELEPHONE (OUTPATIENT)
Dept: PAIN MEDICINE | Facility: CLINIC | Age: 65
End: 2020-01-22

## 2020-01-22 NOTE — TELEPHONE ENCOUNTER
----- Message from Zenaida Euceda sent at 1/22/2020 11:35 AM CST -----  Contact: patient  Type:  Patient Returning Call    Who Called:  patient  Who Left Message for Patient:  Aida  Does the patient know what this is regarding?:  No  Best Call Back Number:    Additional Information:  Please advise-thank you

## 2020-01-23 DIAGNOSIS — M54.16 LUMBAR RADICULOPATHY: Primary | ICD-10-CM

## 2020-02-03 ENCOUNTER — TELEPHONE (OUTPATIENT)
Dept: FAMILY MEDICINE | Facility: CLINIC | Age: 65
End: 2020-02-03

## 2020-02-03 LAB
LEFT EYE DM RETINOPATHY: NEGATIVE
RIGHT EYE DM RETINOPATHY: NEGATIVE

## 2020-02-03 NOTE — TELEPHONE ENCOUNTER
----- Message from Lexie Singh sent at 2/3/2020  3:53 PM CST -----  Contact: Self -[phone]  .Type:  Patient Returning Call    Who Called:Bing Kearns  Who Left Message for Patient:Unsure   Does the patient know what this is regarding?:Unsure   Would the patient rather a call back or a response via [facility]? Call back    Best Call Back Number:.[phone] (home)   Additional Information:       Thank you,   Lexie Singh

## 2020-02-19 ENCOUNTER — PATIENT MESSAGE (OUTPATIENT)
Dept: SURGERY | Facility: HOSPITAL | Age: 65
End: 2020-02-19

## 2020-02-19 ENCOUNTER — TELEPHONE (OUTPATIENT)
Dept: PAIN MEDICINE | Facility: CLINIC | Age: 65
End: 2020-02-19

## 2020-02-19 NOTE — TELEPHONE ENCOUNTER
----- Message from Tami Overton MD sent at 2/19/2020  1:46 PM CST -----  I have not seen this patient in the past. In addition, the patient is asking about her diabetes medication management.

## 2020-03-02 ENCOUNTER — PATIENT MESSAGE (OUTPATIENT)
Dept: FAMILY MEDICINE | Facility: CLINIC | Age: 65
End: 2020-03-02

## 2020-03-02 DIAGNOSIS — E11.9 DIABETES MELLITUS WITHOUT COMPLICATION: ICD-10-CM

## 2020-03-03 RX ORDER — CLONAZEPAM 0.5 MG/1
TABLET ORAL
Qty: 120 TABLET | Refills: 0 | Status: SHIPPED | OUTPATIENT
Start: 2020-03-03 | End: 2020-06-11 | Stop reason: SDUPTHER

## 2020-03-03 RX ORDER — METFORMIN HYDROCHLORIDE 500 MG/1
1000 TABLET, EXTENDED RELEASE ORAL 2 TIMES DAILY WITH MEALS
Qty: 360 TABLET | Refills: 0 | Status: SHIPPED | OUTPATIENT
Start: 2020-03-03 | End: 2020-07-28 | Stop reason: SDUPTHER

## 2020-03-03 RX ORDER — FENOFIBRATE 200 MG/1
200 CAPSULE ORAL DAILY
Qty: 90 CAPSULE | Refills: 0 | Status: SHIPPED | OUTPATIENT
Start: 2020-03-03 | End: 2020-04-17

## 2020-03-03 RX ORDER — ORPHENADRINE CITRATE 100 MG/1
100 TABLET, EXTENDED RELEASE ORAL 2 TIMES DAILY PRN
Qty: 180 TABLET | Refills: 0 | Status: SHIPPED | OUTPATIENT
Start: 2020-03-03 | End: 2020-06-24

## 2020-03-03 RX ORDER — SIMVASTATIN 80 MG/1
80 TABLET, FILM COATED ORAL NIGHTLY
Qty: 90 TABLET | Refills: 0 | Status: SHIPPED | OUTPATIENT
Start: 2020-03-03 | End: 2020-06-04

## 2020-03-03 NOTE — TELEPHONE ENCOUNTER
Refill Authorization Note     is requesting a refill authorization.    Brief assessment and rationale for refill: APPROVE: prr                                         Comments:   Requested Prescriptions   Pending Prescriptions Disp Refills    metFORMIN (GLUCOPHAGE-XR) 500 MG XR 24hr tablet 360 tablet 0     Sig: Take 2 tablets (1,000 mg total) by mouth 2 (two) times daily with meals.       Endocrinology:  Diabetes - Biguanides Passed - 3/3/2020 10:10 AM        Passed - Patient is at least 18 years old        Passed - Office visit in past 12 months or future 90 days.     Recent Outpatient Visits            1 month ago Leukoplakia oral mucosa    María Khan MD    1 month ago Chronic left-sided low back pain with left-sided sciatica    [facility] Kenan Vaughan MD    2 months ago Open wound of mouth, initial encounter    María Khan MD    3 months ago Leukoplakia oral mucosa    María Khan MD    4 months ago Chronic left-sided low back pain with left-sided sciatica    [facility] Kenan Vaughan MD          Future Appointments              In 1 week Gabi Case MD [facility] [place]                Passed - Cr is 1.3 or below and within 360 days     Creatinine   Date Value Ref Range Status   02/04/2020 0.97 0.50 - 1.40 mg/dL Final   02/04/2020 0.97 0.50 - 1.40 mg/dL Final   12/04/2019 0.88 0.50 - 1.40 mg/dL Final              Passed - HBA1C is 7.9 or below and within 180 days     Hemoglobin A1C   Date Value Ref Range Status   02/04/2020 7.4 (H) 0.0 - 5.6 % Final     Comment:     Reference Interval:  5.0 - 5.6 Normal   5.7 - 6.4 High Risk   > 6.5 Diabetic    Hgb A1c results are standardized based on the (NGSP) National   Glycohemoglobin Standardization Program.    Hemoglobin A1C levels are related to mean serum/plasma glucose   during the preceding 2-3 months.        11/13/2019 8.4  (H) 4.0 - 5.6 % Final     Comment:     ADA Screening Guidelines:  5.7-6.4%  Consistent with prediabetes  >or=6.5%  Consistent with diabetes  High levels of fetal hemoglobin interfere with the HbA1C  assay. Heterozygous hemoglobin variants (HbS, HgC, etc)do  not significantly interfere with this assay.   However, presence of multiple variants may affect accuracy.     08/06/2019 7.5 (H) 4.0 - 5.6 % Final     Comment:     ADA Screening Guidelines:  5.7-6.4%  Consistent with prediabetes  >or=6.5%  Consistent with diabetes  High levels of fetal hemoglobin interfere with the HbA1C  assay. Heterozygous hemoglobin variants (HbS, HgC, etc)do  not significantly interfere with this assay.   However, presence of multiple variants may affect accuracy.                Passed - eGFR is 30 or above and within 360 days     eGFR if non    Date Value Ref Range Status   02/04/2020 >60 >60 mL/min/1.73 m^2 Final     Comment:     Calculation used to obtain the estimated glomerular filtration  rate (eGFR) is the CKD-EPI equation.      02/04/2020 >60 >60 mL/min/1.73 m^2 Final     Comment:     Calculation used to obtain the estimated glomerular filtration  rate (eGFR) is the CKD-EPI equation.      12/04/2019 >60 >60 mL/min/1.73 m^2 Final     Comment:     Calculation used to obtain the estimated glomerular filtration  rate (eGFR) is the CKD-EPI equation.        eGFR if    Date Value Ref Range Status   02/04/2020 >60 >60 mL/min/1.73 m^2 Final   02/04/2020 >60 >60 mL/min/1.73 m^2 Final   12/04/2019 >60 >60 mL/min/1.73 m^2 Final               Appointments  past 12m or future 3m with PCP    Date Provider   Last Visit   5/6/2019 Benjamín Au MD   Next Visit   Visit date not found Benjamín Au MD   .  ED visits in past 90 days: [unfilled]       Note composed:10:43 AM 03/03/2020

## 2020-03-03 NOTE — PROGRESS NOTES
Refill Authorization Note     is requesting a refill authorization.    Brief assessment and rationale for refill: REVIEW: recent ED visit          Medication Therapy Plan: A1C<8                              Comments:   Requested Prescriptions   Pending Prescriptions Disp Refills    metFORMIN (GLUCOPHAGE-XR) 500 MG XR 24hr tablet 360 tablet 0     Sig: Take 2 tablets (1,000 mg total) by mouth 2 (two) times daily with meals.       Endocrinology:  Diabetes - Biguanides Passed - 3/2/2020  6:59 AM        Passed - Patient is at least 18 years old        Passed - Office visit in past 12 months or future 90 days.     Recent Outpatient Visits            1 month ago Leukoplakia oral mucosa    María Khan MD    1 month ago Chronic left-sided low back pain with left-sided sciatica    [facility] Kenan Vaughan MD    2 months ago Open wound of mouth, initial encounter    María Khan MD    3 months ago Leukoplakia oral mucosa    María Khan MD    4 months ago Chronic left-sided low back pain with left-sided sciatica    [facility] Kenan Vaughan MD          Future Appointments              In 1 week Gabi Case MD [facility] [place]                Passed - Cr is 1.3 or below and within 360 days     Creatinine   Date Value Ref Range Status   02/04/2020 0.97 0.50 - 1.40 mg/dL Final   02/04/2020 0.97 0.50 - 1.40 mg/dL Final   12/04/2019 0.88 0.50 - 1.40 mg/dL Final              Passed - HBA1C is 7.9 or below and within 180 days     Hemoglobin A1C   Date Value Ref Range Status   02/04/2020 7.4 (H) 0.0 - 5.6 % Final     Comment:     Reference Interval:  5.0 - 5.6 Normal   5.7 - 6.4 High Risk   > 6.5 Diabetic    Hgb A1c results are standardized based on the (NGSP) National   Glycohemoglobin Standardization Program.    Hemoglobin A1C levels are related to mean serum/plasma glucose   during the  preceding 2-3 months.        11/13/2019 8.4 (H) 4.0 - 5.6 % Final     Comment:     ADA Screening Guidelines:  5.7-6.4%  Consistent with prediabetes  >or=6.5%  Consistent with diabetes  High levels of fetal hemoglobin interfere with the HbA1C  assay. Heterozygous hemoglobin variants (HbS, HgC, etc)do  not significantly interfere with this assay.   However, presence of multiple variants may affect accuracy.     08/06/2019 7.5 (H) 4.0 - 5.6 % Final     Comment:     ADA Screening Guidelines:  5.7-6.4%  Consistent with prediabetes  >or=6.5%  Consistent with diabetes  High levels of fetal hemoglobin interfere with the HbA1C  assay. Heterozygous hemoglobin variants (HbS, HgC, etc)do  not significantly interfere with this assay.   However, presence of multiple variants may affect accuracy.                Passed - eGFR is 30 or above and within 360 days     eGFR if non    Date Value Ref Range Status   02/04/2020 >60 >60 mL/min/1.73 m^2 Final     Comment:     Calculation used to obtain the estimated glomerular filtration  rate (eGFR) is the CKD-EPI equation.      02/04/2020 >60 >60 mL/min/1.73 m^2 Final     Comment:     Calculation used to obtain the estimated glomerular filtration  rate (eGFR) is the CKD-EPI equation.      12/04/2019 >60 >60 mL/min/1.73 m^2 Final     Comment:     Calculation used to obtain the estimated glomerular filtration  rate (eGFR) is the CKD-EPI equation.        eGFR if    Date Value Ref Range Status   02/04/2020 >60 >60 mL/min/1.73 m^2 Final   02/04/2020 >60 >60 mL/min/1.73 m^2 Final   12/04/2019 >60 >60 mL/min/1.73 m^2 Final               Appointments  past 12m or future 3m with PCP    Date Provider   Last Visit   5/6/2019 Benjamín Au MD   Next Visit   Visit date not found Benjamín Au MD   .  ED visits in past 90 days: 0       Note composed:10:09 AM 03/03/2020

## 2020-03-10 ENCOUNTER — OFFICE VISIT (OUTPATIENT)
Dept: FAMILY MEDICINE | Facility: CLINIC | Age: 65
End: 2020-03-10
Payer: MEDICARE

## 2020-03-10 VITALS
HEIGHT: 65 IN | HEART RATE: 74 BPM | WEIGHT: 186.81 LBS | DIASTOLIC BLOOD PRESSURE: 82 MMHG | SYSTOLIC BLOOD PRESSURE: 130 MMHG | BODY MASS INDEX: 31.13 KG/M2

## 2020-03-10 DIAGNOSIS — F41.9 ANXIETY: ICD-10-CM

## 2020-03-10 DIAGNOSIS — N18.2 CHRONIC RENAL INSUFFICIENCY, STAGE II (MILD): ICD-10-CM

## 2020-03-10 DIAGNOSIS — I10 ESSENTIAL HYPERTENSION: ICD-10-CM

## 2020-03-10 DIAGNOSIS — M65.4 DE QUERVAIN'S TENOSYNOVITIS: ICD-10-CM

## 2020-03-10 DIAGNOSIS — K13.21 LEUKOPLAKIA ORAL MUCOSA: ICD-10-CM

## 2020-03-10 DIAGNOSIS — Z85.038 HISTORY OF COLON CANCER: ICD-10-CM

## 2020-03-10 DIAGNOSIS — E78.5 HYPERLIPIDEMIA, UNSPECIFIED HYPERLIPIDEMIA TYPE: ICD-10-CM

## 2020-03-10 DIAGNOSIS — E03.4 HYPOTHYROIDISM DUE TO ACQUIRED ATROPHY OF THYROID: ICD-10-CM

## 2020-03-10 DIAGNOSIS — J31.0 RHINITIS MEDICAMENTOSA: ICD-10-CM

## 2020-03-10 DIAGNOSIS — T48.5X5A RHINITIS MEDICAMENTOSA: ICD-10-CM

## 2020-03-10 DIAGNOSIS — K21.9 GASTROESOPHAGEAL REFLUX DISEASE, ESOPHAGITIS PRESENCE NOT SPECIFIED: ICD-10-CM

## 2020-03-10 DIAGNOSIS — G47.33 OSA (OBSTRUCTIVE SLEEP APNEA): ICD-10-CM

## 2020-03-10 DIAGNOSIS — Z11.4 ENCOUNTER FOR SCREENING FOR HIV: ICD-10-CM

## 2020-03-10 DIAGNOSIS — E11.9 TYPE 2 DIABETES MELLITUS WITHOUT COMPLICATION, WITHOUT LONG-TERM CURRENT USE OF INSULIN: Primary | ICD-10-CM

## 2020-03-10 PROCEDURE — 3079F PR MOST RECENT DIASTOLIC BLOOD PRESSURE 80-89 MM HG: ICD-10-PCS | Mod: S$GLB,,, | Performed by: INTERNAL MEDICINE

## 2020-03-10 PROCEDURE — 3075F SYST BP GE 130 - 139MM HG: CPT | Mod: S$GLB,,, | Performed by: INTERNAL MEDICINE

## 2020-03-10 PROCEDURE — 1101F PT FALLS ASSESS-DOCD LE1/YR: CPT | Mod: S$GLB,,, | Performed by: INTERNAL MEDICINE

## 2020-03-10 PROCEDURE — 99999 PR PBB SHADOW E&M-EST. PATIENT-LVL III: ICD-10-PCS | Mod: PBBFAC,,, | Performed by: INTERNAL MEDICINE

## 2020-03-10 PROCEDURE — 3051F PR MOST RECENT HEMOGLOBIN A1C LEVEL 7.0 - < 8.0%: ICD-10-PCS | Mod: S$GLB,,, | Performed by: INTERNAL MEDICINE

## 2020-03-10 PROCEDURE — 3008F PR BODY MASS INDEX (BMI) DOCUMENTED: ICD-10-PCS | Mod: S$GLB,,, | Performed by: INTERNAL MEDICINE

## 2020-03-10 PROCEDURE — 99214 PR OFFICE/OUTPT VISIT, EST, LEVL IV, 30-39 MIN: ICD-10-PCS | Mod: S$GLB,,, | Performed by: INTERNAL MEDICINE

## 2020-03-10 PROCEDURE — 3051F HG A1C>EQUAL 7.0%<8.0%: CPT | Mod: S$GLB,,, | Performed by: INTERNAL MEDICINE

## 2020-03-10 PROCEDURE — 99214 OFFICE O/P EST MOD 30 MIN: CPT | Mod: S$GLB,,, | Performed by: INTERNAL MEDICINE

## 2020-03-10 PROCEDURE — 3075F PR MOST RECENT SYSTOLIC BLOOD PRESS GE 130-139MM HG: ICD-10-PCS | Mod: S$GLB,,, | Performed by: INTERNAL MEDICINE

## 2020-03-10 PROCEDURE — 1101F PR PT FALLS ASSESS DOC 0-1 FALLS W/OUT INJ PAST YR: ICD-10-PCS | Mod: S$GLB,,, | Performed by: INTERNAL MEDICINE

## 2020-03-10 PROCEDURE — 99999 PR PBB SHADOW E&M-EST. PATIENT-LVL III: CPT | Mod: PBBFAC,,, | Performed by: INTERNAL MEDICINE

## 2020-03-10 PROCEDURE — 3079F DIAST BP 80-89 MM HG: CPT | Mod: S$GLB,,, | Performed by: INTERNAL MEDICINE

## 2020-03-10 PROCEDURE — 3008F BODY MASS INDEX DOCD: CPT | Mod: S$GLB,,, | Performed by: INTERNAL MEDICINE

## 2020-03-10 RX ORDER — INSULIN PUMP SYRINGE, 3 ML
EACH MISCELLANEOUS
Qty: 1 EACH | Refills: 0 | Status: SHIPPED | OUTPATIENT
Start: 2020-03-10 | End: 2020-04-14

## 2020-03-10 RX ORDER — LANCETS
1 EACH MISCELLANEOUS 3 TIMES DAILY PRN
Qty: 100 EACH | Refills: 11 | Status: SHIPPED | OUTPATIENT
Start: 2020-03-10 | End: 2021-01-15

## 2020-03-10 NOTE — PROGRESS NOTES
Assessment and Plan:    1. Type 2 diabetes mellitus without complication, without long-term current use of insulin  Had been seeing Dr. Overton, prefers to switch to [facility] as this is closer to her home.  Last A1c: 7.4 Feb 2020  Foot exam: MARTA sent for podiatry notes with Dr. Helms  Eye exam: MARTA sent for Feb eye exam with Dr. Lo  Nephropathy screening: Has known CKD, last prot:Cr was elevated in Feb 2020, on ARB  Lipids: Taking simvastatin  Medications: Januvia and metformin  We discussed the role of diet and exercise in managing diabetes at this visit.   - Ambulatory referral/consult to Endocrinology; Future  - blood-glucose meter kit; Use as instructed  Dispense: 1 each; Refill: 0  - lancets Misc; 1 lancet by Misc.(Non-Drug; Combo Route) route 3 (three) times daily as needed.  Dispense: 100 each; Refill: 11  - blood sugar diagnostic Strp; 1 strip by Misc.(Non-Drug; Combo Route) route 3 (three) times daily as needed.  Dispense: 100 each; Refill: 11    2. De Quervain's tenosynovitis  Exam c/w this diagnosis, has had injections for carpal tunnel but this seems more consistent with tenosynovitis. Interested in seeing hand surgeon.  - Ambulatory referral/consult to Orthopedics; Future    3. Anxiety  OK to continue sertraline, discussed trying to keep clonazepam as more of a PRN medication rather than nightly. Will continue no more than #40 per month (had been prescribed #120 by Dr. Au earlier this month, this will last until I see her in 3 months).     4. Essential hypertension  Controlled, continue current medications.     5. Hyperlipidemia, unspecified hyperlipidemia type  Controlled, continue current medications.     6. Hypothyroidism due to acquired atrophy of thyroid  Controlled, continue current dose of levothyroxine.    7. Chronic renal insufficiency, stage II (mild)  Continue follow up with Dr. Vanessa, avoid nephrotoxic medications.     8. Gastroesophageal reflux disease, esophagitis  presence not specified  Continue PPI, continue follow up with Dr. Bosch.     9. History of colon cancer  Continue follow up with Dr. Bosch.     10. FRANCISCO (obstructive sleep apnea)  Continue CPAP, continue follow up with Dr. Jain.     11. Leukoplakia oral mucosa  Continue follow up with Dr. Valenzuela.     12. Encounter for screening for HIV  Will draw with next labs.  - HIV 1/2 Ag/Ab (4th Gen); Future            Schedule with Dr [facility]  Endocrine- Schedule with Thais Helms for podiatry notes  MARTA Dr. Turcios for eye exam  MARTA Dr. Vanessa for clinic notes    ______________________________________________________________________  Subjective:    Chief Complaint:  Establish care, follow up chronic medical conditions.     HPI:  Bing is a 64 y.o. year old woman here to establish care and discuss chronic medical conditions.     Anxiety- Taking sertraline 50 mg daily and clonazepam 0.5 mg once or twice a day. Doing OK on this regimen.    DM2- Taking Januvia 100 mg daily, metformin 1000 mg BID. Last A1c was 7.4. She has been prescribed gabapentin for diabetic neuropathy. She had been seeing Dr. Overton, but wanted to switch to [facility] in [place] which is closer for her.    HTN- Taking losartan 100 mg daily, lasix 20 mg daily, atenolol 50 mg nightly, amlodipine 5 mg nightly. She sees Dr. Tao for Cardiology.     Hypothyroidism- Taking levohtyroxine 75 mcg daily, this was per PCP.    HLD- Taking fenofibrate 200 mg daily and simvastatin 80 daily.    GERD- Taking omeprazole daily per GI. She even occasionally has breakthrough symptoms with this.    CKD- Sees Dr. Vanessa for Nephrology.     History of colon cancer- Sees Dr. Bosch now.     Sleep apnea- Uses CPAP, sees Dr. Jain    Chronic back pain- Seeing Dr. Vaughan. Planning VITOR.    Leukoplakia- Sees Dr. Khan and Dr. Valenzuela with [facility]. Has had multiple biopsies which were slightly atypical, but no cancer.     Past Medical  History:  Past Medical History:   Diagnosis Date    Anxiety     Cancer     colon    Chronic diarrhea     Chronic kidney disease     stage 3    Diabetes mellitus     GERD (gastroesophageal reflux disease)     Glaucoma     History of migraine headaches     Hyperlipemia     Hypertension     PONV (postoperative nausea and vomiting)     Sleep apnea with use of continuous positive airway pressure (CPAP)     waiting on mask to arrive    Thyroid disease     hypothyroid       Past Surgical History:  Past Surgical History:   Procedure Laterality Date    APPENDECTOMY      done during colon resection    CATARACT EXTRACTION, BILATERAL Bilateral      SECTION      COLON SURGERY      COLONOSCOPY Left 2015    Procedure: COLONOSCOPY;  Surgeon: Chet Woodard Jr., MD;  Location: [facility] ENDO;  Service: Endoscopy;  Laterality: Left;    EXCISION OF LESION OF BUCCAL MUCOSA Left 2019    Procedure: EXCISION, LESION, BUCCAL MUCOSA;  Surgeon: Dedra Khan MD;  Location: [facility] OR;  Service: ENT;  Laterality: Left;    HERNIA REPAIR      repaired during colon resection    HYSTERECTOMY      INSERTION OF VENOUS ACCESS PORT      PORTACATH PLACEMENT Left     and later removed     REPLACEMENT OF VASCULAR ACCESS PORT      SURGICAL REMOVAL OF NEOPLASM OF MOUTH Left 6/15/2018    Procedure: EXCISION, NEOPLASM, MOUTH;  Surgeon: Velasquez Juárez MD;  Location: [address];  Service: ENT;  Laterality: Left;    TRANSFORAMINAL EPIDURAL INJECTION OF STEROID Left 2019    Procedure: Injection,steroid,epidural,transforaminal approach;  Surgeon: Job Porter MD;  Location: [facility] OR;  Service: Pain Management;  Laterality: Left;  L5-S1       Family History:  Family History   Problem Relation Age of Onset    Cancer Mother     Breast cancer Mother     Heart disease Father     COPD Father     Hypertension Sister     Diabetes Sister     Anemia Sister     Cancer Sister        Social History:  Social  History     Socioeconomic History    Marital status:      Spouse name: Not on file    Number of children: Not on file    Years of education: Not on file    Highest education level: Not on file   Occupational History    Not on file   Social Needs    Financial resource strain: Somewhat hard    Food insecurity:     Worry: Never true     Inability: Never true    Transportation needs:     Medical: Not on file     Non-medical: Not on file   Tobacco Use    Smoking status: Former Smoker     Last attempt to quit: 2000     Years since quittin.2    Smokeless tobacco: Never Used   Substance and Sexual Activity    Alcohol use: No     Frequency: Never     Drinks per session: Patient refused     Binge frequency: Never    Drug use: No    Sexual activity: Not on file   Lifestyle    Physical activity:     Days per week: Not on file     Minutes per session: Not on file    Stress: Very much   Relationships    Social connections:     Talks on phone: More than three times a week     Gets together: Once a week     Attends Tenriism service: Not on file     Active member of club or organization: No     Attends meetings of clubs or organizations: Patient refused     Relationship status:    Other Topics Concern    Not on file   Social History Narrative    Not on file       Medications:  Current Outpatient Medications on File Prior to Visit   Medication Sig Dispense Refill    amlodipine (NORVASC) 5 MG tablet Take 5 mg by mouth once daily.       aspirin (ECOTRIN) 81 MG EC tablet Take 81 mg by mouth once daily.        atenolol (TENORMIN) 50 MG tablet Take 1 tablet (50 mg total) by mouth once daily. 90 tablet 2    cholecalciferol, vitamin D3, (VITAMIN D3) 2,000 unit Cap Take 1 capsule by mouth once daily.  3    clonazePAM (KLONOPIN) 0.5 MG tablet TAKE 1 TABLET (0.5 MG TOTAL) BY MOUTH 2 (TWO) TIMES DAILY. 120 tablet 0    cloNIDine (CATAPRES) 0.1 MG tablet Take 0.1 mg by mouth once daily.        fenofibrate micronized (LOFIBRA) 200 MG Cap Take 1 capsule (200 mg total) by mouth once daily. 90 capsule 0    fluticasone (FLONASE) 50 mcg/actuation nasal spray 2 sprays (100 mcg total) by Each Nare route 2 (two) times daily. 16 g 11    furosemide (LASIX) 20 MG tablet Take 20 mg by mouth daily as needed.   4    gabapentin (NEURONTIN) 100 MG capsule Take 100 mg by mouth 2 (two) times daily.  3    iron polysaccharides (FERREX 150) 150 mg iron Cap Take 150 mg by mouth 2 (two) times daily.       latanoprost 0.005 % dpem Place 2 drops into both eyes every evening.       levothyroxine (SYNTHROID) 75 MCG tablet Take 1 tablet (75 mcg total) by mouth once daily. 90 tablet 1    losartan (COZAAR) 100 MG tablet Take 1 tablet (100 mg total) by mouth once daily. 90 tablet 1    metFORMIN (GLUCOPHAGE-XR) 500 MG XR 24hr tablet Take 2 tablets (1,000 mg total) by mouth 2 (two) times daily with meals. 360 tablet 0    omeprazole (PRILOSEC) 40 MG capsule TAKE 1 CAPSULE TWICE DAILY 30 MINUTES PRIOR TO BREAKFAST AND DINNER  0    orphenadrine (NORFLEX) 100 mg tablet Take 1 tablet (100 mg total) by mouth 2 (two) times daily as needed. 180 tablet 0    sertraline (ZOLOFT) 50 MG tablet Take 50 mg by mouth every evening.  2    simvastatin (ZOCOR) 80 MG tablet Take 1 tablet (80 mg total) by mouth every evening. 90 tablet 0    SITagliptin (JANUVIA) 100 MG Tab Take 100 mg by mouth once daily.      (Magic mouthwash) 1:1:1 Benadryl 12.5mg/5ml liq, aluminum & magnesium hydroxide-simehticone (Maalox), lidocaine viscous 2% Swish and spit 15 mLs every 4 (four) hours as needed. for mouth sores (Patient not taking: Reported on 3/10/2020) 360 mL 0    diphenhydrAMINE-aluminum-magnesium hydroxide-simethicone-lidocaine HCl 2% Swish and spit 15 mLs every 4 (four) hours as needed. (Patient not taking: Reported on 3/10/2020) 500 mL 2    [DISCONTINUED] dicyclomine (BENTYL) 10 MG capsule TAKE 1 CAPSULE BY MOUTH THREE TIMES A DAY AS NEEDED FOR PAIN   0    [DISCONTINUED] diphenoxylate-atropine 2.5-0.025 mg (LOMOTIL) 2.5-0.025 mg per tablet Lomotil      [DISCONTINUED] oxyCODONE-acetaminophen (PERCOCET) 7.5-325 mg per tablet Take 1 tablet by mouth every 6 (six) hours as needed for Pain. (Patient not taking: Reported on 1/21/2020) 28 tablet 0    [DISCONTINUED] traMADol (ULTRAM) 50 mg tablet Take 50 mg by mouth every 6 (six) hours as needed.        Current Facility-Administered Medications on File Prior to Visit   Medication Dose Route Frequency Provider Last Rate Last Dose    0.9%  NaCl infusion   Intravenous Continuous Job Porter MD 10 mL/hr at 12/20/19 1245      barium sulfate 98 % SusR 120 mL  120 mL Oral ONCE PRN Carloz Bosch MD        ez paque ba sulfate 120 mL  120 mL Oral ONCE PRN Carloz Bosch MD        lactated ringers infusion   Intravenous Once PRN Job Porter MD        sodium bicarbonate-citric acid-simethicone (EZ GAS II) 2.21-1.53 gram/4 gram oral granules 4 g  1 packet Oral ONCE PRN Carloz Bosch MD           Allergies:  Codeine; Sulfa (sulfonamide antibiotics); and Trulicity [dulaglutide]    Immunizations:  Immunization History   Administered Date(s) Administered    Influenza 10/21/2008, 12/30/2009, 10/22/2010, 10/12/2011, 09/19/2012, 10/18/2013, 10/27/2014, 12/18/2015, 08/29/2019    Influenza - High Dose - PF (65 years and older) 10/26/2018    Influenza - Quadrivalent - MDCK - PF 11/21/2017, 10/26/2018    Influenza - Quadrivalent - PF (6 months and older) 11/21/2017    Influenza A (H1N1) 2009 Monovalent - IM 11/17/2009    Influenza Split 09/19/2012, 10/18/2013, 10/27/2014    Influenza Whole 12/18/2015    Pneumococcal Conjugate - 13 Valent 04/11/2016    Pneumococcal Polysaccharide - 23 Valent 10/12/2012, 01/14/2019    Tdap 07/21/2015    Zoster 12/03/2014       Review of Systems:  Review of Systems   Constitutional: Negative for activity change and unexpected weight change.   HENT: Negative for hearing loss,  "rhinorrhea and trouble swallowing.    Eyes: Negative for discharge and visual disturbance.   Respiratory: Negative for chest tightness and wheezing.    Cardiovascular: Negative for chest pain and palpitations.   Gastrointestinal: Positive for diarrhea. Negative for blood in stool, constipation and vomiting.   Endocrine: Negative for polydipsia and polyuria.   Genitourinary: Negative for difficulty urinating, dysuria, hematuria and menstrual problem.   Musculoskeletal: Positive for arthralgias and neck pain. Negative for joint swelling.   Neurological: Positive for weakness. Negative for headaches.   Psychiatric/Behavioral: Positive for dysphoric mood. Negative for confusion.     Entered by patient and reviewed and updated during visit      Objective:    Vitals:  Vitals:    03/10/20 1251   BP: 130/82   Pulse: 74   Weight: 84.8 kg (186 lb 13.4 oz)   Height: 5' 5" (1.651 m)   PainSc:   6       Physical Exam   Constitutional: She is oriented to person, place, and time. She appears well-developed and well-nourished. No distress.   HENT:   Mouth/Throat: Oropharynx is clear and moist.   Eyes: No scleral icterus.   Cardiovascular: Normal rate and regular rhythm.   No murmur heard.  Pulmonary/Chest: Effort normal and breath sounds normal. No respiratory distress. She has no wheezes.   Musculoskeletal: She exhibits no edema.   left wrist with tenderness over base of thumb and wrist, worsens with Finkelstein maneuver   Neurological: She is alert and oriented to person, place, and time.   Skin: Skin is warm and dry.   Psychiatric: She has a normal mood and affect. Her behavior is normal.   Vitals reviewed.      Body mass index is 31.09 kg/m².      Data:  Previous labs reviewed and pertinent for A1c 7.4.        Gabi Case MD  Internal Medicine    "

## 2020-03-11 ENCOUNTER — TELEPHONE (OUTPATIENT)
Dept: PAIN MEDICINE | Facility: CLINIC | Age: 65
End: 2020-03-11

## 2020-03-11 ENCOUNTER — PATIENT MESSAGE (OUTPATIENT)
Dept: SURGERY | Facility: AMBULARY SURGERY CENTER | Age: 65
End: 2020-03-11

## 2020-03-11 NOTE — TELEPHONE ENCOUNTER
----- Message from Cem Macdonald sent at 3/11/2020  3:15 PM CDT -----  Type:  Sooner Apoointment Request    Caller is requesting a sooner appointment.  Caller declined first available appointment listed below.  Caller will not accept being placed on the waitlist and is requesting a message be sent to doctor.    Name of Caller: self   When is the first available appointment?    Symptoms:   Best Call Back Number:  [phone]   Additional Information:  Patient requesting to cancel procedure for Friday 03/13/2020

## 2020-03-12 RX ORDER — FLUTICASONE PROPIONATE 50 MCG
SPRAY, SUSPENSION (ML) NASAL
Qty: 48 G | Refills: 0 | Status: SHIPPED | OUTPATIENT
Start: 2020-03-12 | End: 2020-04-14

## 2020-04-13 DIAGNOSIS — J31.0 RHINITIS MEDICAMENTOSA: ICD-10-CM

## 2020-04-13 DIAGNOSIS — E11.9 TYPE 2 DIABETES MELLITUS WITHOUT COMPLICATION, WITHOUT LONG-TERM CURRENT USE OF INSULIN: ICD-10-CM

## 2020-04-13 DIAGNOSIS — T48.5X5A RHINITIS MEDICAMENTOSA: ICD-10-CM

## 2020-04-14 RX ORDER — FLUTICASONE PROPIONATE 50 MCG
SPRAY, SUSPENSION (ML) NASAL
Qty: 48 G | Refills: 0 | Status: SHIPPED | OUTPATIENT
Start: 2020-04-14 | End: 2020-06-05

## 2020-04-14 RX ORDER — DEXTROSE 4 G
TABLET,CHEWABLE ORAL
Qty: 1 EACH | Refills: 0 | Status: SHIPPED | OUTPATIENT
Start: 2020-04-14

## 2020-04-16 NOTE — PROGRESS NOTES
Refill Routing Note     Medication(s) are appropriate for refill:    Non Participating Provider in Refill Center     Appointments  past 15m or future 3m with PCP    Date Provider   Last Visit   3/10/2020 Gabi Case MD   Next Visit   6/11/2020 Gabi Case MD       Automatic Epic Protocol Generated Data:    Requested Prescriptions   Pending Prescriptions Disp Refills    orphenadrine (NORFLEX) 100 mg tablet [Pharmacy Med Name: Orphenadrine Citrate  MG Oral Tablet Extended Release 12 Hour]  0     Sig: Take 1 tablet by mouth twice daily as needed       There is no refill protocol information for this order       fenofibrate micronized (LOFIBRA) 200 MG Cap [Pharmacy Med Name: Fenofibrate Micronized 200 MG Oral Capsule]  0     Sig: Take 1 capsule by mouth once daily       Cardiovascular:  Antilipid - Fibric Acid Derivatives Passed - 4/16/2020  4:23 PM        Passed - Patient is at least 18 years old        Passed - Office visit in past 12 months or future 90 days.     Recent Outpatient Visits            1 month ago Type 2 diabetes mellitus without complication, without long-term current use of insulin    [facility] Gabi Case MD    2 months ago Leukoplakia oral mucosa    [facility] Dedra Khan MD    3 months ago Chronic left-sided low back pain with left-sided sciatica    [facility] Kenan Vaughan MD    4 months ago Open wound of mouth, initial encounter    [facility] ABDELRAHMAN Khan MD    5 months ago Leukoplakia oral mucosa    [facility] Dedra Khan MD          Future Appointments              In 6 days LINDSEY [facility]awla[facility] [facility]    In 1 week Pilo Paez MD [facility]    In 1 month Gabi Case MD [facility] [place]                Passed - Lipid Panel completed in last 360 days     Lab Results    Component Value Date    CHOL 138 02/04/2020    HDL 40 02/04/2020    LDLCALC 58.4 (L) 02/04/2020    TRIG 198 (H) 02/04/2020             Passed - ALT is 94 or below and within 360 days     ALT   Date Value Ref Range Status   02/04/2020 20 0 - 35 U/L Final   11/13/2019 18 10 - 44 U/L Final   09/09/2019 30 10 - 44 U/L Final              Passed - AST is 54 or below and within 360 days     AST   Date Value Ref Range Status   02/04/2020 33 14 - 36 U/L Final   11/13/2019 24 10 - 40 U/L Final   09/09/2019 41 (H) 14 - 36 U/L Final              Passed - Cr is 1.3 or below and within 360 days     Creatinine   Date Value Ref Range Status   02/04/2020 0.97 0.50 - 1.40 mg/dL Final   02/04/2020 0.97 0.50 - 1.40 mg/dL Final   12/04/2019 0.88 0.50 - 1.40 mg/dL Final              Passed - eGFR is 30 or above and within 360 days     eGFR if non    Date Value Ref Range Status   02/04/2020 >60 >60 mL/min/1.73 m^2 Final     Comment:     Calculation used to obtain the estimated glomerular filtration  rate (eGFR) is the CKD-EPI equation.      02/04/2020 >60 >60 mL/min/1.73 m^2 Final     Comment:     Calculation used to obtain the estimated glomerular filtration  rate (eGFR) is the CKD-EPI equation.      12/04/2019 >60 >60 mL/min/1.73 m^2 Final     Comment:     Calculation used to obtain the estimated glomerular filtration  rate (eGFR) is the CKD-EPI equation.        eGFR if    Date Value Ref Range Status   02/04/2020 >60 >60 mL/min/1.73 m^2 Final   02/04/2020 >60 >60 mL/min/1.73 m^2 Final   12/04/2019 >60 >60 mL/min/1.73 m^2 Final              Passed - WBC in normal range and within 360 days     WBC   Date Value Ref Range Status   02/04/2020 8.66 3.90 - 12.70 K/uL Final   11/13/2019 10.92 3.90 - 12.70 K/uL Final   10/14/2019 8.89 3.90 - 12.70 K/uL Final                 Note created:4:23 PM 04/16/2020

## 2020-04-16 NOTE — TELEPHONE ENCOUNTER
Orphenadrine is not a prescription that I usually prescribe and we had not discussed this. She told me that she sees a Dr. Vaughan for back pain. Is this not for back pain? Is there a reason this is not being managed by her pain management physician?

## 2020-04-17 RX ORDER — ORPHENADRINE CITRATE 100 MG/1
TABLET, EXTENDED RELEASE ORAL
Refills: 0 | OUTPATIENT
Start: 2020-04-17

## 2020-04-17 RX ORDER — FENOFIBRATE 200 MG/1
CAPSULE ORAL
Qty: 90 CAPSULE | Refills: 1 | Status: SHIPPED | OUTPATIENT
Start: 2020-04-17 | End: 2020-07-28 | Stop reason: SDUPTHER

## 2020-04-17 NOTE — TELEPHONE ENCOUNTER
Called and spoke with patient. The request for the orphenadrine was a mistake. The only medication the patient needs refilled is the fenofribrate. Please advise and review. Thank you in advance.

## 2020-04-22 ENCOUNTER — TELEPHONE (OUTPATIENT)
Dept: FAMILY MEDICINE | Facility: CLINIC | Age: 65
End: 2020-04-22

## 2020-05-06 ENCOUNTER — PATIENT MESSAGE (OUTPATIENT)
Dept: ADMINISTRATIVE | Facility: HOSPITAL | Age: 65
End: 2020-05-06

## 2020-05-22 ENCOUNTER — PATIENT OUTREACH (OUTPATIENT)
Dept: ADMINISTRATIVE | Facility: OTHER | Age: 65
End: 2020-05-22

## 2020-06-03 NOTE — PROGRESS NOTES
Refill Routing Note    Medication(s) are not appropriate for processing by [facility]:       Non-participating provider           Medication reconciliation completed: No      Automatic Epic Protocol Generated Data:    Requested Prescriptions   Pending Prescriptions Disp Refills    simvastatin (ZOCOR) 80 MG tablet [Pharmacy Med Name: Simvastatin 80 MG Oral Tablet] 90 tablet 0     Sig: TAKE 1 TABLET BY MOUTH ONCE DAILY IN THE EVENING       Cardiovascular:  Antilipid - Statins Passed - 6/3/2020 12:01 PM        Passed - Patient is at least 18 years old        Passed - Office visit in past 12 months or future 90 days.     Recent Outpatient Visits            2 months ago Type 2 diabetes mellitus without complication, without long-term current use of insulin    [facility] Gabi Case MD    4 months ago Leukoplakia oral mucosa    [facility] Dedra Khan MD    4 months ago Chronic left-sided low back pain with left-sided sciatica    [place] [facility] Kenan Vaughan MD    5 months ago Open wound of mouth, initial encounter    [place]  ABDELRAHMAN Khan MD    6 months ago Leukoplakia oral mucosa    [facility] Dedra Khan MD          Future Appointments              In 6 days LINDSEY Chawla,FNP-C [facility], [facility]    In 1 week Gabi Case MD [facility]    In 1 week [facility]                Passed - Lipid Panel completed in last 360 days     Lab Results   Component Value Date    CHOL 138 02/04/2020    HDL 40 02/04/2020    LDLCALC 58.4 (L) 02/04/2020    TRIG 198 (H) 02/04/2020             Passed - ALT is 94 or below and within 360 days     ALT   Date Value Ref Range Status   02/04/2020 20 0 - 35 U/L Final   11/13/2019 18 10 - 44 U/L Final   09/09/2019 30 10 - 44 U/L Final              Passed -  AST is 54 or below and within 360 days     AST   Date Value Ref Range Status   02/04/2020 33 14 - 36 U/L Final   11/13/2019 24 10 - 40 U/L Final   09/09/2019 41 (H) 14 - 36 U/L Final                 Appointments  past 12m or future 3m with PCP    Date Provider   Last Visit   3/10/2020 Gabi Case MD   Next Visit   6/11/2020 Gabi Case MD   ED visits in past 90 days: 0     Note composed:3:11 PM 06/03/2020

## 2020-06-04 RX ORDER — SIMVASTATIN 80 MG/1
TABLET, FILM COATED ORAL
Qty: 90 TABLET | Refills: 0 | Status: SHIPPED | OUTPATIENT
Start: 2020-06-04 | End: 2020-10-08

## 2020-06-05 ENCOUNTER — PATIENT OUTREACH (OUTPATIENT)
Dept: ADMINISTRATIVE | Facility: OTHER | Age: 65
End: 2020-06-05

## 2020-06-09 ENCOUNTER — TELEPHONE (OUTPATIENT)
Dept: FAMILY MEDICINE | Facility: CLINIC | Age: 65
End: 2020-06-09

## 2020-06-09 ENCOUNTER — OFFICE VISIT (OUTPATIENT)
Dept: ENDOCRINOLOGY | Facility: CLINIC | Age: 65
End: 2020-06-09
Payer: MEDICARE

## 2020-06-09 ENCOUNTER — LAB VISIT (OUTPATIENT)
Dept: LAB | Facility: HOSPITAL | Age: 65
End: 2020-06-09
Attending: NURSE PRACTITIONER
Payer: MEDICARE

## 2020-06-09 VITALS
WEIGHT: 184.5 LBS | HEART RATE: 88 BPM | BODY MASS INDEX: 30.74 KG/M2 | DIASTOLIC BLOOD PRESSURE: 72 MMHG | HEIGHT: 65 IN | SYSTOLIC BLOOD PRESSURE: 112 MMHG

## 2020-06-09 DIAGNOSIS — E03.4 HYPOTHYROIDISM DUE TO ACQUIRED ATROPHY OF THYROID: ICD-10-CM

## 2020-06-09 DIAGNOSIS — E78.5 HYPERLIPIDEMIA, UNSPECIFIED HYPERLIPIDEMIA TYPE: ICD-10-CM

## 2020-06-09 DIAGNOSIS — I10 ESSENTIAL HYPERTENSION: ICD-10-CM

## 2020-06-09 DIAGNOSIS — E11.9 TYPE 2 DIABETES MELLITUS WITHOUT COMPLICATION, WITHOUT LONG-TERM CURRENT USE OF INSULIN: ICD-10-CM

## 2020-06-09 DIAGNOSIS — E11.9 TYPE 2 DIABETES MELLITUS WITHOUT COMPLICATION, WITHOUT LONG-TERM CURRENT USE OF INSULIN: Primary | ICD-10-CM

## 2020-06-09 PROCEDURE — 3078F DIAST BP <80 MM HG: CPT | Mod: S$GLB,,, | Performed by: NURSE PRACTITIONER

## 2020-06-09 PROCEDURE — 3078F PR MOST RECENT DIASTOLIC BLOOD PRESSURE < 80 MM HG: ICD-10-PCS | Mod: S$GLB,,, | Performed by: NURSE PRACTITIONER

## 2020-06-09 PROCEDURE — 99999 PR PBB SHADOW E&M-EST. PATIENT-LVL V: CPT | Mod: PBBFAC,,, | Performed by: NURSE PRACTITIONER

## 2020-06-09 PROCEDURE — 3074F SYST BP LT 130 MM HG: CPT | Mod: S$GLB,,, | Performed by: NURSE PRACTITIONER

## 2020-06-09 PROCEDURE — 3008F BODY MASS INDEX DOCD: CPT | Mod: S$GLB,,, | Performed by: NURSE PRACTITIONER

## 2020-06-09 PROCEDURE — 3051F PR MOST RECENT HEMOGLOBIN A1C LEVEL 7.0 - < 8.0%: ICD-10-PCS | Mod: S$GLB,,, | Performed by: NURSE PRACTITIONER

## 2020-06-09 PROCEDURE — 1101F PR PT FALLS ASSESS DOC 0-1 FALLS W/OUT INJ PAST YR: ICD-10-PCS | Mod: S$GLB,,, | Performed by: NURSE PRACTITIONER

## 2020-06-09 PROCEDURE — 3051F HG A1C>EQUAL 7.0%<8.0%: CPT | Mod: S$GLB,,, | Performed by: NURSE PRACTITIONER

## 2020-06-09 PROCEDURE — 83036 HEMOGLOBIN GLYCOSYLATED A1C: CPT

## 2020-06-09 PROCEDURE — 3074F PR MOST RECENT SYSTOLIC BLOOD PRESSURE < 130 MM HG: ICD-10-PCS | Mod: S$GLB,,, | Performed by: NURSE PRACTITIONER

## 2020-06-09 PROCEDURE — 36415 COLL VENOUS BLD VENIPUNCTURE: CPT | Mod: PN

## 2020-06-09 PROCEDURE — 99204 OFFICE O/P NEW MOD 45 MIN: CPT | Mod: S$GLB,,, | Performed by: NURSE PRACTITIONER

## 2020-06-09 PROCEDURE — 1101F PT FALLS ASSESS-DOCD LE1/YR: CPT | Mod: S$GLB,,, | Performed by: NURSE PRACTITIONER

## 2020-06-09 PROCEDURE — 99204 PR OFFICE/OUTPT VISIT, NEW, LEVL IV, 45-59 MIN: ICD-10-PCS | Mod: S$GLB,,, | Performed by: NURSE PRACTITIONER

## 2020-06-09 PROCEDURE — 99999 PR PBB SHADOW E&M-EST. PATIENT-LVL V: ICD-10-PCS | Mod: PBBFAC,,, | Performed by: NURSE PRACTITIONER

## 2020-06-09 PROCEDURE — 3008F PR BODY MASS INDEX (BMI) DOCUMENTED: ICD-10-PCS | Mod: S$GLB,,, | Performed by: NURSE PRACTITIONER

## 2020-06-09 RX ORDER — OLMESARTAN MEDOXOMIL 40 MG/1
40 TABLET ORAL DAILY
COMMUNITY
Start: 2020-05-14

## 2020-06-09 NOTE — PROGRESS NOTES
CC: Ms. Bing Kearns arrives today for management of Type 2 DM and review of chronic medical conditions, as listed in the Visit Diagnosis section of this encounter.       HPI: Ms. Bing Kearns was diagnosed with Type 2 DM in her late 50s. She was diagnosed based on lab work. Initial treatment consisted of metformin. Januvia was added in 2019. + FH of DM in sister, maternal GF. Denies hospitalizations due to DM.   Follows with Dr. Vanessa for CKD 2.     This is her first time being seen in endocrine at [facility]. She previously followed with Dr. Overton.    Patient states that glucoses have been uncontrolled since the fall. She states that she took Trulicity at this time but felt hypoglycemic symptoms and stopped.     She states that Januvia costs $45 per month and she can't afford another branded medication.     BG readings are checked 3x/day. Brings meter with the following readings:  7 day average: 214  14 day average: 229  30 day average: 238    Hypoglycemia: No but feels symptoms with glucose < 140  Hypoglycemic Symptoms: hunger and jitteriness  Hypoglycemia Treatment: juice    Missing Insulin/PO medication doses: No  Timing prandial insulin 5-15 minutes before meals: n/a    Dietary Habits: Eats 3 meals/day. Usually sleeps in and first meal is noon. Occasional snacking on chips. Avoids sugary beverages. .    Last DM education appointment: not recently    Follows with Otolaryngology, Dr. 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    She states that she stopped taking simvastatin a few months ago, due to leg pain.     Regarding hypothyroidism, patient is compliant with taking levothyroxine 75 mg daily.       CURRENT DIABETIC MEDS: metformin 1000 mg BID, Januvia 100 mg daily  Glucometer type: One Touch Ultra 2    Previous DM treatments:  Trulicity - panic attacks, blood sugars in the 70s    Last Eye Exam: 1/2020, no  + glaucoma. Dr. Turcios  Last Podiatry Exam:  "1/2020, Dr. Helms    REVIEW OF SYSTEMS  Constitutional: no c/o fatigue, weakness, or weight loss.   Eyes: denies visual disturbances.  ENT: denies dysphagia, hearing loss  Cardiac: no palpitations or chest pain.  Respiratory: no cough. + dyspnea. She was recently referred to [facility]   GI: no c/o abdominal pain. Denies h/o pancreatitis. + nausea, diarrhea since colectomy for colon cancer.  : denies urinary frequency, burning, discharge, frequent UTIs  Skin: no lesions or rashes. + bruise to L arm from running into door frame.   Musculoskeletal: denies difficulty mobilizing, denies muscle or joint pains  Neuro: + numbness, tingling in feet, hands.   Endocrine: denies polyphagia, polydipsia, polyuria       Personally reviewed Past Medical, Surgical, Social History.    Vital Signs  /72   Pulse 88   Ht 5' 5" (1.651 m)   Wt 83.7 kg (184 lb 8.4 oz)   LMP  (LMP Unknown)   BMI 30.71 kg/m²     Personally reviewed the below labs:    Hemoglobin A1C   Date Value Ref Range Status   02/04/2020 7.4 (H) 0.0 - 5.6 % Final     Comment:     Reference Interval:  5.0 - 5.6 Normal   5.7 - 6.4 High Risk   > 6.5 Diabetic    Hgb A1c results are standardized based on the (NGSP) National   Glycohemoglobin Standardization Program.    Hemoglobin A1C levels are related to mean serum/plasma glucose   during the preceding 2-3 months.        11/13/2019 8.4 (H) 4.0 - 5.6 % Final     Comment:     ADA Screening Guidelines:  5.7-6.4%  Consistent with prediabetes  >or=6.5%  Consistent with diabetes  High levels of fetal hemoglobin interfere with the HbA1C  assay. Heterozygous hemoglobin variants (HbS, HgC, etc)do  not significantly interfere with this assay.   However, presence of multiple variants may affect accuracy.     08/06/2019 7.5 (H) 4.0 - 5.6 % Final     Comment:     ADA Screening Guidelines:  5.7-6.4%  Consistent with prediabetes  >or=6.5%  Consistent with diabetes  High levels of fetal hemoglobin interfere with the " HbA1C  assay. Heterozygous hemoglobin variants (HbS, HgC, etc)do  not significantly interfere with this assay.   However, presence of multiple variants may affect accuracy.         Chemistry        Component Value Date/Time     02/04/2020 1302     02/04/2020 1302    K 4.1 02/04/2020 1302    K 4.1 02/04/2020 1302     02/04/2020 1302     02/04/2020 1302    CO2 29 02/04/2020 1302    CO2 29 02/04/2020 1302    BUN 14 02/04/2020 1302    BUN 14 02/04/2020 1302    CREATININE 0.97 02/04/2020 1302    CREATININE 0.97 02/04/2020 1302     (H) 02/04/2020 1302     (H) 02/04/2020 1302        Component Value Date/Time    CALCIUM 9.3 02/04/2020 1302    CALCIUM 9.3 02/04/2020 1302    ALKPHOS 99 02/04/2020 1302    AST 33 02/04/2020 1302    ALT 20 02/04/2020 1302    BILITOT 0.5 02/04/2020 1302    ESTGFRAFRICA >60 02/04/2020 1302    ESTGFRAFRICA >60 02/04/2020 1302    EGFRNONAA >60 02/04/2020 1302    EGFRNONAA >60 02/04/2020 1302          Lab Results   Component Value Date    CHOL 138 02/04/2020    CHOL 159 01/29/2019    CHOL 141 04/02/2018     Lab Results   Component Value Date    HDL 40 02/04/2020    HDL 45 01/29/2019    HDL 39 (L) 04/02/2018     Lab Results   Component Value Date    LDLCALC 58.4 (L) 02/04/2020    LDLCALC 62.4 (L) 01/29/2019    LDLCALC 59.6 (L) 04/02/2018     Lab Results   Component Value Date    TRIG 198 (H) 02/04/2020    TRIG 258 (H) 01/29/2019    TRIG 212 (H) 04/02/2018     Lab Results   Component Value Date    CHOLHDL 29.0 02/04/2020    CHOLHDL 28.3 01/29/2019    CHOLHDL 27.7 04/02/2018       Lab Results   Component Value Date    MICALBCREAT Unable to calculate 02/04/2020     Lab Results   Component Value Date    TSH 2.040 02/04/2020       CrCl cannot be calculated (Patient's most recent lab result is older than the maximum 7 days allowed.).    Vit D, 25-Hydroxy   Date Value Ref Range Status   12/08/2016 36 30 - 96 ng/mL Final     Comment:     Vitamin D deficiency.........<10  ng/mL                              Vitamin D insufficiency......10-29 ng/mL       Vitamin D sufficiency........> or equal to 30 ng/mL  Vitamin D toxicity............>100 ng/mL           PHYSICAL EXAMINATION  Constitutional: Appears well, no distress  Neck: Supple, trachea midline; no thyromegaly or nodules.   Respiratory: CTA, even and unlabored.  Cardiovascular: RRR, no murmurs, no carotid bruits. DP pulses  2+ bilaterally; no edema.    GI: bowel sounds active, no hernia noted  Skin: warm and dry; no lipohypertrophy, or acanthosis nigracans observed.  Psych: normal affect, pleasant mood, conversant  Musculoskeletal: steady gait, no clubbing, full ROM to all extremities  Neuro: DTR 1+ BUE/1+BLE.  Feet: appropriate footwear.    A1c target < 7%      Assessment/Plan  1. Type 2 diabetes mellitus without complication, without long-term current use of insulin  -- Uncontrolled. A1c today. I expect this to be higher than her February result of 7.4%. We discussed the options of basal insulin, Ozempic, and pioglitazone. Cost will be a concern with more than 1 branded med. Pt states that she'd like to avoid insulin as long as possible. Cannot take [place]LL[place], due to sulfa allergy, and patient would like to avoid SGLT2-I, due to cost and possible effect on renal function.   -- start Ozempic 0.25 mg weekly. After 4 weeks, increase dose to 0.5 mg weekly. Discussed medication's mechanism of action, side effects, and contraindications. Advised pt to notify me for extreme nausea, abdominal pain, etc.  -- discontinue Januvia  -- continue metformin  -- BG monitoring 2x/day. Advised pt to notify me if BG remains >150 after reaching 0.5 mg dose of Ozempic.     -- Discussed diagnosis of DM, A1c goals, progression of disease, long term complications and tx options.  -- Reviewed hypoglycemia management: treat with 4 oz of juice, 4 oz regular soda, or 4 glucose tablets. Monitor and repeat treatment every 15 minutes until BG is >70 Then have a  snack, which includes 15 grams of complex carbohydrates and protein.   Advised patient to check BG before activities, such as driving or exercise.    -- takes aspirin, ARB     2. Essential hypertension  -- controlled  -- continue current meds and monitor   3. Hyperlipidemia, unspecified hyperlipidemia type  -- uncontrolled with elevated TRG  -- LDL stable  -- will consider adding a different statin (other than simvastatin - takes amlodipine) in the future if LDL increases.    4. Hypothyroidism due to acquired atrophy of thyroid  -- stable  -- continue current levothyroxine dose.        FOLLOW UP  Follow up in about 3 months (around 9/9/2020).   Patient instructed to bring BG logs to each follow up   Patient encouraged to call for any BG/medication issues, concerns, or questions.    Orders Placed This Encounter   Procedures    Hemoglobin A1C    Hemoglobin A1C    Comprehensive metabolic panel

## 2020-06-09 NOTE — LETTER
June 9, 2020      Gabi Case MD  [address]  [place] [address]           [facility]  [address]  [place] [address]  Phone: [phone]  Fax: [phone]          Patient: Bing Kearns   MR Number: [id number]   YOB: 1955   Date of Visit: 6/9/2020       Dear Dr. Gabi Case:    Thank you for referring Bing Kearns to me for evaluation. Attached you will find relevant portions of my assessment and plan of care.    If you have questions, please do not hesitate to call me. I look forward to following Bing Kearns along with you.    Sincerely,    LINDSEY Chawla,REBECCAP-C    Enclosure  CC:  No Recipients    If you would like to receive this communication electronically, please contact [email or web address] or [phone] to request more information on [facility] Link access.    For providers and/or their staff who would like to refer a patient to [facility], please contact us through our one-stop-shop provider referral line, [facility], at [phone].    If you feel you have received this communication in error or would no longer like to receive these types of communications, please e-mail [email or web address]

## 2020-06-10 ENCOUNTER — PATIENT MESSAGE (OUTPATIENT)
Dept: FAMILY MEDICINE | Facility: CLINIC | Age: 65
End: 2020-06-10

## 2020-06-10 LAB
ESTIMATED AVG GLUCOSE: 171 MG/DL (ref 68–131)
HBA1C MFR BLD HPLC: 7.6 % (ref 4–5.6)

## 2020-06-11 ENCOUNTER — PATIENT OUTREACH (OUTPATIENT)
Dept: ADMINISTRATIVE | Facility: OTHER | Age: 65
End: 2020-06-11

## 2020-06-11 ENCOUNTER — OFFICE VISIT (OUTPATIENT)
Dept: FAMILY MEDICINE | Facility: CLINIC | Age: 65
End: 2020-06-11
Payer: MEDICARE

## 2020-06-11 ENCOUNTER — PATIENT MESSAGE (OUTPATIENT)
Dept: ENDOCRINOLOGY | Facility: CLINIC | Age: 65
End: 2020-06-11

## 2020-06-11 VITALS
BODY MASS INDEX: 30.73 KG/M2 | TEMPERATURE: 98 F | WEIGHT: 184.44 LBS | HEART RATE: 80 BPM | HEIGHT: 65 IN | DIASTOLIC BLOOD PRESSURE: 76 MMHG | OXYGEN SATURATION: 97 % | SYSTOLIC BLOOD PRESSURE: 124 MMHG | RESPIRATION RATE: 18 BRPM

## 2020-06-11 DIAGNOSIS — E11.9 TYPE 2 DIABETES MELLITUS WITHOUT COMPLICATION, WITHOUT LONG-TERM CURRENT USE OF INSULIN: ICD-10-CM

## 2020-06-11 DIAGNOSIS — I10 ESSENTIAL HYPERTENSION: ICD-10-CM

## 2020-06-11 DIAGNOSIS — F41.9 ANXIETY: Primary | ICD-10-CM

## 2020-06-11 PROCEDURE — 3078F PR MOST RECENT DIASTOLIC BLOOD PRESSURE < 80 MM HG: ICD-10-PCS | Mod: S$GLB,,, | Performed by: INTERNAL MEDICINE

## 2020-06-11 PROCEDURE — 3008F BODY MASS INDEX DOCD: CPT | Mod: S$GLB,,, | Performed by: INTERNAL MEDICINE

## 2020-06-11 PROCEDURE — 1101F PT FALLS ASSESS-DOCD LE1/YR: CPT | Mod: S$GLB,,, | Performed by: INTERNAL MEDICINE

## 2020-06-11 PROCEDURE — 3008F PR BODY MASS INDEX (BMI) DOCUMENTED: ICD-10-PCS | Mod: S$GLB,,, | Performed by: INTERNAL MEDICINE

## 2020-06-11 PROCEDURE — 3051F PR MOST RECENT HEMOGLOBIN A1C LEVEL 7.0 - < 8.0%: ICD-10-PCS | Mod: S$GLB,,, | Performed by: INTERNAL MEDICINE

## 2020-06-11 PROCEDURE — 99999 PR PBB SHADOW E&M-EST. PATIENT-LVL III: ICD-10-PCS | Mod: PBBFAC,,, | Performed by: INTERNAL MEDICINE

## 2020-06-11 PROCEDURE — 1101F PR PT FALLS ASSESS DOC 0-1 FALLS W/OUT INJ PAST YR: ICD-10-PCS | Mod: S$GLB,,, | Performed by: INTERNAL MEDICINE

## 2020-06-11 PROCEDURE — 3074F PR MOST RECENT SYSTOLIC BLOOD PRESSURE < 130 MM HG: ICD-10-PCS | Mod: S$GLB,,, | Performed by: INTERNAL MEDICINE

## 2020-06-11 PROCEDURE — 3078F DIAST BP <80 MM HG: CPT | Mod: S$GLB,,, | Performed by: INTERNAL MEDICINE

## 2020-06-11 PROCEDURE — 3051F HG A1C>EQUAL 7.0%<8.0%: CPT | Mod: S$GLB,,, | Performed by: INTERNAL MEDICINE

## 2020-06-11 PROCEDURE — 99214 OFFICE O/P EST MOD 30 MIN: CPT | Mod: S$GLB,,, | Performed by: INTERNAL MEDICINE

## 2020-06-11 PROCEDURE — 3074F SYST BP LT 130 MM HG: CPT | Mod: S$GLB,,, | Performed by: INTERNAL MEDICINE

## 2020-06-11 PROCEDURE — 99999 PR PBB SHADOW E&M-EST. PATIENT-LVL III: CPT | Mod: PBBFAC,,, | Performed by: INTERNAL MEDICINE

## 2020-06-11 PROCEDURE — 99214 PR OFFICE/OUTPT VISIT, EST, LEVL IV, 30-39 MIN: ICD-10-PCS | Mod: S$GLB,,, | Performed by: INTERNAL MEDICINE

## 2020-06-11 RX ORDER — CLONAZEPAM 0.5 MG/1
TABLET ORAL
Qty: 30 TABLET | Refills: 0 | Status: SHIPPED | OUTPATIENT
Start: 2020-06-11 | End: 2021-04-21 | Stop reason: SDUPTHER

## 2020-06-11 NOTE — PROGRESS NOTES
Assessment and Plan:    1. Anxiety  Patient states she has done really well with cutting back to using this as needed. Now needing this only ~1-2 times a week. Thinks she has enough to last another month or two. Will give one addition prescription today.  - clonazePAM (KLONOPIN) 0.5 MG tablet; TAKE 1 TABLET (0.5 MG TOTAL) BY MOUTH 2 (TWO) TIMES DAILY AS NEEDED  Dispense: 30 tablet; Refill: 0    2. Type 2 diabetes mellitus without complication, without long-term current use of insulin  Last A1c: 7.6 June 2020, 7.4 Feb 2020  Foot exam: MARTA sent again for podiatry notes with Dr. Helms  Eye exam: MARTA sent again for recent eye exam with Dr. Lo  Nephropathy screening: Not elevated 2/4/20  Lipids: Taking simvastatin  Medications: Ozempic and metformin    3. Essential hypertension  Controlled on current medications, will continue.      ______________________________________________________________________  Subjective:    Chief Complaint:  Follow up chronic medical conditions.    HPI:  Bing is a 64 y.o. year old female here to follow up chronic medical conditions.     DM- Had established care with endocrine earlier this week. Recent A1c 7.6. Started ozempic, stopped januvia, continued metformin. She has not filled the ozempic yet, had to be ordered.    Anxiety- Taking sertraline, had discussed last visit trying to use clonazepam only PRN. Discussed no more than #40 monthly. She reports that she had worked on decreasing this initially and is currently only taking this about 1-2 per week. States that she is doing really well with this.    HTN- Taking olmesartan 40 (was on losartan previously, but had been running high but Dr. Tao changed this last visit), lasix 20 mg daily PRN, atenolol 50 mg nightly, amlodipine 5 mg nightly. She sees Dr. Tao for Cardiology.     Medications:  Current Outpatient Medications on File Prior to Visit   Medication Sig Dispense Refill    (Magic mouthwash) 1:1:1 Benadryl  12.5mg/5ml liq, aluminum & magnesium hydroxide-simehticone (Maalox), lidocaine viscous 2% Swish and spit 15 mLs every 4 (four) hours as needed. for mouth sores 360 mL 0    amlodipine (NORVASC) 5 MG tablet Take 5 mg by mouth once daily.       aspirin (ECOTRIN) 81 MG EC tablet Take 81 mg by mouth once daily.        atenolol (TENORMIN) 50 MG tablet Take 1 tablet (50 mg total) by mouth once daily. 90 tablet 2    blood sugar diagnostic Strp 1 strip by Misc.(Non-Drug; Combo Route) route 3 (three) times daily as needed. 100 each 11    blood-glucose meter (ONETOUCH ULTRA2 METER) Misc USE AS DIRECTED 1 each 0    cholecalciferol, vitamin D3, (VITAMIN D3) 2,000 unit Cap Take 1 capsule by mouth once daily.  3    clonazePAM (KLONOPIN) 0.5 MG tablet TAKE 1 TABLET (0.5 MG TOTAL) BY MOUTH 2 (TWO) TIMES DAILY. (Patient taking differently: 2 (two) times daily as needed. TAKE 1 TABLET (0.5 MG TOTAL) BY MOUTH 2 (TWO) TIMES DAILY.) 120 tablet 0    cloNIDine (CATAPRES) 0.1 MG tablet Take 0.1 mg by mouth once daily.       fenofibrate micronized (LOFIBRA) 200 MG Cap Take 1 capsule by mouth once daily 90 capsule 1    fluticasone propionate (FLONASE) 50 mcg/actuation nasal spray USE 2 SPRAY(S) IN EACH NOSTRIL TWICE DAILY 48 g 0    furosemide (LASIX) 20 MG tablet Take 20 mg by mouth daily as needed.   4    gabapentin (NEURONTIN) 100 MG capsule Take 100 mg by mouth 2 (two) times daily.  3    iron polysaccharides (FERREX 150) 150 mg iron Cap Take 150 mg by mouth 2 (two) times daily.       lancets Misc 1 lancet by Misc.(Non-Drug; Combo Route) route 3 (three) times daily as needed. 100 each 11    latanoprost 0.005 % dpem Place 2 drops into both eyes every evening.       levothyroxine (SYNTHROID) 75 MCG tablet Take 1 tablet (75 mcg total) by mouth once daily. 90 tablet 1    metFORMIN (GLUCOPHAGE-XR) 500 MG XR 24hr tablet Take 2 tablets (1,000 mg total) by mouth 2 (two) times daily with meals. 360 tablet 0    olmesartan (BENICAR)  40 MG tablet Take 40 mg by mouth once daily.      omeprazole (PRILOSEC) 40 MG capsule TAKE 1 CAPSULE TWICE DAILY 30 MINUTES PRIOR TO BREAKFAST AND DINNER  0    orphenadrine (NORFLEX) 100 mg tablet Take 1 tablet (100 mg total) by mouth 2 (two) times daily as needed. 180 tablet 0    sertraline (ZOLOFT) 50 MG tablet Take 50 mg by mouth every evening.  2    semaglutide (OZEMPIC) 0.25 mg or 0.5 mg(2 mg/1.5 mL) PnIj Inject 0.25 mg into the skin every 7 days. After 4 weeks, increase dose to 0.5 mg every 7 days (Patient not taking: Reported on 6/11/2020) 1 Syringe 11    simvastatin (ZOCOR) 80 MG tablet TAKE 1 TABLET BY MOUTH ONCE DAILY IN THE EVENING (Patient not taking: Reported on 6/11/2020) 90 tablet 0    [DISCONTINUED] diphenhydrAMINE-aluminum-magnesium hydroxide-simethicone-lidocaine HCl 2% Swish and spit 15 mLs every 4 (four) hours as needed. (Patient not taking: Reported on 3/10/2020) 500 mL 2     Current Facility-Administered Medications on File Prior to Visit   Medication Dose Route Frequency Provider Last Rate Last Dose    0.9%  NaCl infusion   Intravenous Continuous Job Porter MD 10 mL/hr at 12/20/19 1245      barium sulfate 98 % SusR 120 mL  120 mL Oral ONCE PRN Carloz Bosch MD        ez paque ba sulfate 120 mL  120 mL Oral ONCE PRN Carloz Bosch MD        lactated ringers infusion   Intravenous Once PRN Job Porter MD        sodium bicarbonate-citric acid-simethicone (EZ GAS II) 2.21-1.53 gram/4 gram oral granules 4 g  1 packet Oral ONCE PRN Carloz Bosch MD           Review of Systems:  Review of Systems   Constitutional: Negative for chills and fever.   Respiratory: Negative for shortness of breath and wheezing.    Cardiovascular: Negative for chest pain and leg swelling.   Gastrointestinal: Negative for constipation and diarrhea.   Neurological: Negative for seizures and syncope.   Psychiatric/Behavioral: Negative for dysphoric mood. The patient is not nervous/anxious.   "      Past Medical History:  Past Medical History:   Diagnosis Date    Anxiety     Cancer     colon    Chronic diarrhea     Chronic kidney disease     stage 3    Diabetes mellitus     GERD (gastroesophageal reflux disease)     Glaucoma     History of migraine headaches     Hyperlipemia     Hypertension     PONV (postoperative nausea and vomiting)     Sleep apnea with use of continuous positive airway pressure (CPAP)     waiting on mask to arrive    Thyroid disease     hypothyroid       Objective:    Vitals:  Vitals:    06/11/20 1137   BP: 124/76   Pulse: 80   Resp: 18   Temp: 97.9 °F (36.6 °C)   TempSrc: Temporal   SpO2: 97%   Weight: 83.7 kg (184 lb 6.6 oz)   Height: 5' 5" (1.651 m)   PainSc:   4   PainLoc: Generalized       Physical Exam   Constitutional: She is oriented to person, place, and time. She appears well-developed and well-nourished. No distress.   HENT:   Mouth/Throat: Oropharynx is clear and moist.   Eyes: Conjunctivae are normal. Right eye exhibits no discharge. Left eye exhibits no discharge.   Cardiovascular: Normal rate and regular rhythm.   Pulmonary/Chest: Effort normal. No respiratory distress.   Neurological: She is alert and oriented to person, place, and time.   Skin: Skin is warm and dry.   Psychiatric: She has a normal mood and affect. Her behavior is normal. Judgment and thought content normal.   Vitals reviewed.      Data:  Previous labs reviewed and pertinent for A1c 7.6.      Gabi Case MD  Internal Medicine  "

## 2020-06-15 ENCOUNTER — OFFICE VISIT (OUTPATIENT)
Dept: ORTHOPEDICS | Facility: CLINIC | Age: 65
End: 2020-06-15
Payer: MEDICARE

## 2020-06-15 ENCOUNTER — HOSPITAL ENCOUNTER (OUTPATIENT)
Dept: RADIOLOGY | Facility: HOSPITAL | Age: 65
Discharge: HOME | End: 2020-06-15
Attending: ORTHOPAEDIC SURGERY
Payer: MEDICARE

## 2020-06-15 VITALS
WEIGHT: 184.5 LBS | SYSTOLIC BLOOD PRESSURE: 124 MMHG | BODY MASS INDEX: 30.71 KG/M2 | HEART RATE: 84 BPM | TEMPERATURE: 99 F | DIASTOLIC BLOOD PRESSURE: 68 MMHG

## 2020-06-15 DIAGNOSIS — M65.4 DE QUERVAIN'S TENOSYNOVITIS: ICD-10-CM

## 2020-06-15 DIAGNOSIS — G56.02 LEFT CARPAL TUNNEL SYNDROME: Primary | ICD-10-CM

## 2020-06-15 DIAGNOSIS — M65.331 TRIGGER FINGER, RIGHT MIDDLE FINGER: ICD-10-CM

## 2020-06-15 PROCEDURE — 3008F BODY MASS INDEX DOCD: CPT | Mod: S$GLB,,, | Performed by: ORTHOPAEDIC SURGERY

## 2020-06-15 PROCEDURE — 99203 OFFICE O/P NEW LOW 30 MIN: CPT | Mod: 25,S$GLB,, | Performed by: ORTHOPAEDIC SURGERY

## 2020-06-15 PROCEDURE — 73110 X-RAY EXAM OF WRIST: CPT | Mod: TC,PO,LT

## 2020-06-15 PROCEDURE — 1100F PTFALLS ASSESS-DOCD GE2>/YR: CPT | Mod: S$GLB,,, | Performed by: ORTHOPAEDIC SURGERY

## 2020-06-15 PROCEDURE — 3074F SYST BP LT 130 MM HG: CPT | Mod: S$GLB,,, | Performed by: ORTHOPAEDIC SURGERY

## 2020-06-15 PROCEDURE — 20550 TENDON SHEATH: ICD-10-PCS | Mod: LT,S$GLB,, | Performed by: ORTHOPAEDIC SURGERY

## 2020-06-15 PROCEDURE — 3008F PR BODY MASS INDEX (BMI) DOCUMENTED: ICD-10-PCS | Mod: S$GLB,,, | Performed by: ORTHOPAEDIC SURGERY

## 2020-06-15 PROCEDURE — 3288F FALL RISK ASSESSMENT DOCD: CPT | Mod: S$GLB,,, | Performed by: ORTHOPAEDIC SURGERY

## 2020-06-15 PROCEDURE — 3074F PR MOST RECENT SYSTOLIC BLOOD PRESSURE < 130 MM HG: ICD-10-PCS | Mod: S$GLB,,, | Performed by: ORTHOPAEDIC SURGERY

## 2020-06-15 PROCEDURE — 3288F PR FALLS RISK ASSESSMENT DOCUMENTED: ICD-10-PCS | Mod: S$GLB,,, | Performed by: ORTHOPAEDIC SURGERY

## 2020-06-15 PROCEDURE — 73110 XR WRIST COMPLETE 3 VIEWS LEFT: ICD-10-PCS | Mod: 26,LT,, | Performed by: RADIOLOGY

## 2020-06-15 PROCEDURE — 99999 PR PBB SHADOW E&M-EST. PATIENT-LVL V: CPT | Mod: PBBFAC,,, | Performed by: ORTHOPAEDIC SURGERY

## 2020-06-15 PROCEDURE — 73110 X-RAY EXAM OF WRIST: CPT | Mod: 26,LT,, | Performed by: RADIOLOGY

## 2020-06-15 PROCEDURE — 20526 CARPAL TUNNEL: ICD-10-PCS | Mod: 59,51,LT,S$GLB | Performed by: ORTHOPAEDIC SURGERY

## 2020-06-15 PROCEDURE — 3078F PR MOST RECENT DIASTOLIC BLOOD PRESSURE < 80 MM HG: ICD-10-PCS | Mod: S$GLB,,, | Performed by: ORTHOPAEDIC SURGERY

## 2020-06-15 PROCEDURE — 1100F PR PT FALLS ASSESS DOC 2+ FALLS/FALL W/INJURY/YR: ICD-10-PCS | Mod: S$GLB,,, | Performed by: ORTHOPAEDIC SURGERY

## 2020-06-15 PROCEDURE — 20526 THER INJECTION CARP TUNNEL: CPT | Mod: 59,51,LT,S$GLB | Performed by: ORTHOPAEDIC SURGERY

## 2020-06-15 PROCEDURE — 3078F DIAST BP <80 MM HG: CPT | Mod: S$GLB,,, | Performed by: ORTHOPAEDIC SURGERY

## 2020-06-15 PROCEDURE — 20550 NJX 1 TENDON SHEATH/LIGAMENT: CPT | Mod: LT,S$GLB,, | Performed by: ORTHOPAEDIC SURGERY

## 2020-06-15 PROCEDURE — 99999 PR PBB SHADOW E&M-EST. PATIENT-LVL V: ICD-10-PCS | Mod: PBBFAC,,, | Performed by: ORTHOPAEDIC SURGERY

## 2020-06-15 PROCEDURE — 99203 PR OFFICE/OUTPT VISIT, NEW, LEVL III, 30-44 MIN: ICD-10-PCS | Mod: 25,S$GLB,, | Performed by: ORTHOPAEDIC SURGERY

## 2020-06-15 RX ORDER — TRIAMCINOLONE ACETONIDE 40 MG/ML
40 INJECTION, SUSPENSION INTRA-ARTICULAR; INTRAMUSCULAR
Status: DISCONTINUED | OUTPATIENT
Start: 2020-06-15 | End: 2020-06-16 | Stop reason: HOSPADM

## 2020-06-15 RX ADMIN — TRIAMCINOLONE ACETONIDE 40 MG: 40 INJECTION, SUSPENSION INTRA-ARTICULAR; INTRAMUSCULAR at 01:06

## 2020-06-15 NOTE — PROGRESS NOTES
6/15/2020    Chief Complaint:  Chief Complaint   Patient presents with    Left Hand - Pain       HPI:  Bing Kearns is a 64 y.o. female, who presents to clinic today she has a history of bilateral hand pain.  She states that the left hand and wrist is the worst.  She states that this been going on for months.  States that she has had pain over the radial styloid of the wrist and pain with extension of the wrist.  She also states that she has had some tingling and numbness in the left hand.  States that activities such as flexing the wrist de reproduce the pain.  She is also complaining of catching and triggering of the middle finger on her right hand.    PMHX:  Past Medical History:   Diagnosis Date    Anxiety     Cancer     colon    Chronic diarrhea     Chronic kidney disease     stage 3    Diabetes mellitus     GERD (gastroesophageal reflux disease)     Glaucoma     History of migraine headaches     Hyperlipemia     Hypertension     PONV (postoperative nausea and vomiting)     Sleep apnea with use of continuous positive airway pressure (CPAP)     waiting on mask to arrive    Thyroid disease     hypothyroid       PSHX:  Past Surgical History:   Procedure Laterality Date    APPENDECTOMY      done during colon resection    CATARACT EXTRACTION, BILATERAL Bilateral      SECTION      COLON SURGERY      COLONOSCOPY Left 2015    Procedure: COLONOSCOPY;  Surgeon: Chet Woodard Jr., MD;  Location: [facility] ENDO;  Service: Endoscopy;  Laterality: Left;    EXCISION OF LESION OF BUCCAL MUCOSA Left 2019    Procedure: EXCISION, LESION, BUCCAL MUCOSA;  Surgeon: Dedra Khan MD;  Location: [facility] OR;  Service: ENT;  Laterality: Left;    HERNIA REPAIR      repaired during colon resection    HYSTERECTOMY      INSERTION OF VENOUS ACCESS PORT      PORTACATH PLACEMENT Left     and later removed     REPLACEMENT OF VASCULAR ACCESS PORT      SURGICAL REMOVAL OF NEOPLASM OF MOUTH Left  6/15/2018    Procedure: EXCISION, NEOPLASM, MOUTH;  Surgeon: Velasquez Juárez MD;  Location: [facility] OR 2ND FLR;  Service: ENT;  Laterality: Left;    TRANSFORAMINAL EPIDURAL INJECTION OF STEROID Left 2019    Procedure: Injection,steroid,epidural,transforaminal approach;  Surgeon: Job Porter MD;  Location: [facility] OR;  Service: Pain Management;  Laterality: Left;  L5-S1       FMHX:  Family History   Problem Relation Age of Onset    Cancer Mother     Breast cancer Mother     Heart disease Father     COPD Father     Hypertension Sister     Diabetes Sister     Anemia Sister     Cancer Sister        SOCHX:  Social History     Tobacco Use    Smoking status: Former Smoker     Quit date:      Years since quittin.4    Smokeless tobacco: Never Used   Substance Use Topics    Alcohol use: No     Frequency: Never     Drinks per session: Patient refused     Binge frequency: Never       ALLERGIES:  Codeine, Sulfa (sulfonamide antibiotics), and Trulicity [dulaglutide]    CURRENT MEDICATIONS:  Current Outpatient Medications on File Prior to Visit   Medication Sig Dispense Refill    (Magic mouthwash) 1:1:1 Benadryl 12.5mg/5ml liq, aluminum & magnesium hydroxide-simehticone (Maalox), lidocaine viscous 2% Swish and spit 15 mLs every 4 (four) hours as needed. for mouth sores 360 mL 0    amlodipine (NORVASC) 5 MG tablet Take 5 mg by mouth once daily.       aspirin (ECOTRIN) 81 MG EC tablet Take 81 mg by mouth once daily.        atenolol (TENORMIN) 50 MG tablet Take 1 tablet (50 mg total) by mouth once daily. 90 tablet 2    blood sugar diagnostic Strp 1 strip by Misc.(Non-Drug; Combo Route) route 3 (three) times daily as needed. 100 each 11    blood-glucose meter (ONETOUCH ULTRA2 METER) Misc USE AS DIRECTED 1 each 0    cholecalciferol, vitamin D3, (VITAMIN D3) 2,000 unit Cap Take 1 capsule by mouth once daily.  3    cloNIDine (CATAPRES) 0.1 MG tablet Take 0.1 mg by mouth once daily.       fenofibrate  micronized (LOFIBRA) 200 MG Cap Take 1 capsule by mouth once daily 90 capsule 1    fluticasone propionate (FLONASE) 50 mcg/actuation nasal spray USE 2 SPRAY(S) IN EACH NOSTRIL TWICE DAILY 48 g 0    gabapentin (NEURONTIN) 100 MG capsule Take 100 mg by mouth 2 (two) times daily.  3    iron polysaccharides (FERREX 150) 150 mg iron Cap Take 150 mg by mouth 2 (two) times daily.       lancets Misc 1 lancet by Misc.(Non-Drug; Combo Route) route 3 (three) times daily as needed. 100 each 11    latanoprost 0.005 % dpem Place 2 drops into both eyes every evening.       levothyroxine (SYNTHROID) 75 MCG tablet Take 1 tablet (75 mcg total) by mouth once daily. 90 tablet 1    metFORMIN (GLUCOPHAGE-XR) 500 MG XR 24hr tablet Take 2 tablets (1,000 mg total) by mouth 2 (two) times daily with meals. 360 tablet 0    olmesartan (BENICAR) 40 MG tablet Take 40 mg by mouth once daily.      omeprazole (PRILOSEC) 40 MG capsule TAKE 1 CAPSULE TWICE DAILY 30 MINUTES PRIOR TO BREAKFAST AND DINNER  0    sertraline (ZOLOFT) 50 MG tablet Take 50 mg by mouth every evening.  2    simvastatin (ZOCOR) 80 MG tablet TAKE 1 TABLET BY MOUTH ONCE DAILY IN THE EVENING 90 tablet 0    clonazePAM (KLONOPIN) 0.5 MG tablet TAKE 1 TABLET (0.5 MG TOTAL) BY MOUTH 2 (TWO) TIMES DAILY AS NEEDED (Patient not taking: Reported on 6/15/2020) 30 tablet 0    furosemide (LASIX) 20 MG tablet Take 20 mg by mouth daily as needed.   4    orphenadrine (NORFLEX) 100 mg tablet Take 1 tablet (100 mg total) by mouth 2 (two) times daily as needed. (Patient not taking: Reported on 6/15/2020) 180 tablet 0    semaglutide (OZEMPIC) 0.25 mg or 0.5 mg(2 mg/1.5 mL) PnIj Inject 0.25 mg into the skin every 7 days. After 4 weeks, increase dose to 0.5 mg every 7 days (Patient not taking: Reported on 6/11/2020) 1 Syringe 11     Current Facility-Administered Medications on File Prior to Visit   Medication Dose Route Frequency Provider Last Rate Last Dose    0.9%  NaCl infusion    Intravenous Continuous Job Porter MD 10 mL/hr at 12/20/19 1245      barium sulfate 98 % SusR 120 mL  120 mL Oral ONCE PRN Carloz Bosch MD        ez paque ba sulfate 120 mL  120 mL Oral ONCE PRN Carloz Bosch MD        lactated ringers infusion   Intravenous Once PRN Job Porter MD        sodium bicarbonate-citric acid-simethicone (EZ GAS II) 2.21-1.53 gram/4 gram oral granules 4 g  1 packet Oral ONCE PRN Carloz Bosch MD           REVIEW OF SYSTEMS:  Review of Systems   Constitutional: Positive for diaphoresis. Negative for chills, fever, malaise/fatigue and weight loss.   HENT: Negative.    Eyes: Negative for pain and redness.   Respiratory: Positive for cough and shortness of breath. Negative for hemoptysis, sputum production and wheezing.    Cardiovascular: Positive for palpitations. Negative for chest pain, orthopnea, claudication, leg swelling and PND.   Gastrointestinal: Positive for diarrhea, nausea and vomiting. Negative for abdominal pain, blood in stool, constipation, heartburn and melena.   Genitourinary: Negative.    Musculoskeletal: Positive for back pain and myalgias. Negative for falls, joint pain and neck pain.   Skin: Negative.    Neurological: Positive for tingling and weakness. Negative for dizziness, tremors, sensory change, speech change, focal weakness, seizures, loss of consciousness and headaches.   Endo/Heme/Allergies: Positive for environmental allergies. Negative for polydipsia. Does not bruise/bleed easily.   Psychiatric/Behavioral: Negative for depression, hallucinations, memory loss, substance abuse and suicidal ideas. The patient is nervous/anxious. The patient does not have insomnia.        GENERAL PHYSICAL EXAM:   Temp 98.8 °F (37.1 °C) (Temporal)   Wt 83.7 kg (184 lb 8.4 oz)   LMP  (LMP Unknown)   BMI 30.71 kg/m²    GEN: well developed, well nourished, no acute distress   HENT: Normocephalic, atraumatic   EYES: No discharge, conjunctiva normal   NECK:  Supple, non-tender   PULM: No wheezing, no respiratory distress   CV: RRR   ABD: Soft, non-tender    ORTHO EXAM:   Examination of the left hand and wrist reveals that there is mild edema over the 1st extensor compartment.  There is no erythema.  Palpation produces tenderness over the 1st extensor compartment.  She does report paresthesias in the median distribution but intact radial and ulnar sensation.  She has a mildly positive Finkelstein's test.  She has a negative Tinel's but a positive Durkan's test at the wrist.  She has a 2+ radial pulse.    Examination the right hand and wrist reveals that there is no edema.  There are no skin changes.  Palpation produces tenderness over the A1 pulley.  Flexion and extension of the hand reveals that there is very mild active triggering noted.  She is neurovascularly intact.    RADIOLOGY:   X-rays of the left wrist were taken in clinic today.  There are no fractures dislocations or other significant pathology    ASSESSMENT:   Left carpal tunnel syndrome, left wrist de Quervain tenosynovitis, right middle finger triggering    PLAN:  1.  After informed consent was obtained injections were placed to the left wrist 1st extensor compartment as well as left carpal tunnel.  The patient tolerated these injections well    2.  Will continue to monitor the right middle finger for triggering    3.  Follow up with me on a p.r.n. basis      Answers for HPI/ROS submitted by the patient on 6/15/2020   Hand injury  unexpected weight change: No  appetite change : No  sleep disturbance: No  IMMUNOCOMPROMISED: Yes  dysphoric mood: Yes  visual disturbance: No  sinus pressure : Yes  food allergies: No  difficulty urinating: No  painful intercourse: No  numbness: Yes  joint swelling: No  Pain Chronicity: chronic  History of trauma: No  Onset: more than 1 month ago  Frequency: constantly  Progression since onset: unchanged  injury location: at home  pain- numeric: 10/10  pain location: left  shoulder, right shoulder, left wrist, right wrist, left hand, right hand, left fingers, right fingers, left hip, right hip, left ankle, right ankle, left foot, right foot  pain quality: aching, dull, sharp, tightness, burning, numbing, tingling, numb  Radiating Pain: Yes  Aggravating factors: activity, standing, walking, lifting  inability to bear weight: Yes  joint locking: Yes  limited range of motion: Yes  stiffness: Yes  Treatments tried: heat, NSAIDs, rest  physical therapy: not tried  Improvement on treatment: no relief

## 2020-06-15 NOTE — LETTER
Theodora 15, 2020      Gabi Case MD  [address]  [facility] [address]           [facility]  [address] [place] [address]  Phone: [phone]          Patient: Bing Kearns   MR Number: [id number]   YOB: 1955   Date of Visit: 6/15/2020       Dear Dr. Gabi Case:    Thank you for referring Bing Kearns to me for evaluation. Attached you will find relevant portions of my assessment and plan of care.    If you have questions, please do not hesitate to call me. I look forward to following Bing Kearns along with you.    Sincerely,    Pilo Paez MD    Enclosure  CC:  No Recipients    If you would like to receive this communication electronically, please contact [email or web address] or [phone] to request more information on [employer] Link access.    For providers and/or their staff who would like to refer a patient to [facility], please contact us through our one-stop-shop provider referral line, [facility], at [phone].    If you feel you have received this communication in error or would no longer like to receive these types of communications, please e-mail [email or web address]

## 2020-06-16 ENCOUNTER — PATIENT MESSAGE (OUTPATIENT)
Dept: ENDOCRINOLOGY | Facility: CLINIC | Age: 65
End: 2020-06-16

## 2020-06-16 NOTE — PROCEDURES
Tendon Sheath    Date/Time: 6/15/2020 1:00 PM  Performed by: Pilo Paez MD  Authorized by: Pilo Paez MD     Consent Done?:  Yes (Verbal)  Indications:  Pain  Site marked: the procedure site was marked    Timeout: prior to procedure the correct patient, procedure, and site was verified    Prep: patient was prepped and draped in usual sterile fashion      Local anesthesia used?: Yes    Local anesthetic:  Lidocaine 1% without epinephrine  Anesthetic total (ml):  0.5    Location:  Wrist  Site:  L first doral compartment  Medications:  40 mg triamcinolone acetonide 40 mg/mL  Patient tolerance:  Patient tolerated the procedure well with no immediate complications

## 2020-06-16 NOTE — PROCEDURES
Carpal Tunnel    Date/Time: 6/15/2020 1:00 PM  Performed by: Pilo Paez MD  Authorized by: Pilo Paez MD     Consent Done?:  Yes (Verbal)  Indications:  Pain  Site marked: the procedure site was marked    Timeout: prior to procedure the correct patient, procedure, and site was verified    Prep: patient was prepped and draped in usual sterile fashion      Local anesthesia used?: Yes    Local anesthetic:  Lidocaine 1% without epinephrine  Anesthetic total (ml):  0.5    Location: Left carpal tunnel.  Needle size:  25 G  Medications:  40 mg triamcinolone acetonide 40 mg/mL (20 mg injected)  Patient tolerance:  Patient tolerated the procedure well with no immediate complications

## 2020-06-23 NOTE — PROGRESS NOTES
Refill Routing Note    Medication(s) are not appropriate for processing by [facility]:       Non-participating provider           Medication reconciliation completed: No      Automatic Epic Protocol Generated Data:    Requested Prescriptions   Pending Prescriptions Disp Refills    orphenadrine (NORFLEX) 100 mg tablet [Pharmacy Med Name: Orphenadrine Citrate  MG Oral Tablet Extended Release 12 Hour] 180 tablet 0     Sig: Take 1 tablet by mouth twice daily as needed       There is no refill protocol information for this order           Appointments  past 12m or future 3m with PCP    Date Provider   Last Visit   6/11/2020 Gabi Case MD   Next Visit   9/17/2020 Gabi Case MD   ED visits in past 90 days: 0     Note composed:10:35 AM 06/23/2020

## 2020-06-24 RX ORDER — ORPHENADRINE CITRATE 100 MG/1
TABLET, EXTENDED RELEASE ORAL
Qty: 180 TABLET | Refills: 0 | Status: SHIPPED | OUTPATIENT
Start: 2020-06-24 | End: 2020-08-25 | Stop reason: SDUPTHER

## 2020-06-26 ENCOUNTER — PATIENT OUTREACH (OUTPATIENT)
Dept: ADMINISTRATIVE | Facility: HOSPITAL | Age: 65
End: 2020-06-26

## 2020-07-02 ENCOUNTER — PATIENT OUTREACH (OUTPATIENT)
Dept: ADMINISTRATIVE | Facility: HOSPITAL | Age: 65
End: 2020-07-02

## 2020-07-23 DIAGNOSIS — E03.9 ACQUIRED HYPOTHYROIDISM: ICD-10-CM

## 2020-07-23 NOTE — TELEPHONE ENCOUNTER
Care Due:                  Date            Visit Type   Department     Provider  --------------------------------------------------------------------------------    Last Visit: None Found      None         None Found  Next Visit: None Scheduled  None         None Found                                                            Last  Test          Frequency    Reason                     Performed    Due Date  --------------------------------------------------------------------------------    Office Visit  12 months..  levothyroxine, metFORMIN.  Not Found    Overdue    Powered by [employer]. Reference number: [id number]. 7/23/2020 9:54:36 AM CDT

## 2020-07-24 RX ORDER — LEVOTHYROXINE SODIUM 75 UG/1
TABLET ORAL
Qty: 90 TABLET | Refills: 1 | Status: SHIPPED | OUTPATIENT
Start: 2020-07-24 | End: 2020-07-28

## 2020-07-24 NOTE — PROGRESS NOTES
Refill Routing Note   Medication(s) are not appropriate for processing by [facility]:       - Non-participating provider           Medication reconciliation completed: No      Automatic Epic Generated Protocol Data:        Requested Prescriptions   Pending Prescriptions Disp Refills    EUTHYROX 75 mcg tablet [Pharmacy Med Name: Euthyrox 75 MCG Oral Tablet] 90 tablet 1     Sig: Take 1 tablet by mouth once daily       Endocrinology:  Hypothyroid Agents Failed - 7/23/2020  9:53 AM        Failed - Manual Review: FT4 is not required if last TSH is WNL.        Failed - T4 free within 1080 days     Free T4   Date Value Ref Range Status   04/04/2016 1.20 0.71 - 1.51 ng/dL Final   06/06/2012 1.32 0.71 - 1.51 ng/dl Final   08/20/2008 1.02 0.71 - 1.51 ng/dl Final              Passed - Patient is at least 18 years old        Passed - Office visit in past 12 months or future 90 days.     Recent Outpatient Visits            1 month ago Left carpal tunnel syndrome    [facility]    1 month ago Anxiety    [facility] Gabi Case MD    1 month ago Type 2 diabetes mellitus without complication, without long-term current use of insulin    [facility] LINDSEY Chawla,OMAR-C    4 months ago Type 2 diabetes mellitus without complication, without long-term current use of insulin    [facility] Gabi Case MD    6 months ago Leukoplakia oral mucosa    [facility] Dedra Khan MD          Future Appointments              In 2 weeks [address] [facility]    In 1 month [facility], [facility]    In 1 month LINDSEY Chawla,OMAR-[facility], [facility]    In 1 month Gabi Case MD [facility],  [facility]                Passed - TSH in normal range and within 360 days     TSH   Date Value Ref Range Status   02/04/2020 2.040 0.400 - 4.000 uIU/mL Final     Comment:     Warning:  Heterophilic antibodies in serum or plasma of   certain individuals are known to cause interference with   immunoassays. These antibodies may be present in blood samples   from individuals regularly exposed to animal or who have been   treated with animal products.   Patients taking very high Biotin doses of >300 mcg/day may   cause a negative bias in this assay.     11/13/2019 1.146 0.400 - 4.000 uIU/mL Final   01/29/2019 2.505 0.400 - 4.000 uIU/mL Final                    Appointments  past 12m or future 3m with PCP    Date Provider   Last Visit   6/11/2020 Gabi Case MD   Next Visit   9/17/2020 Gabi Case MD   ED visits in past 90 days: 0     Note composed:10:03 AM 07/24/2020      2

## 2020-07-28 ENCOUNTER — PATIENT MESSAGE (OUTPATIENT)
Dept: FAMILY MEDICINE | Facility: CLINIC | Age: 65
End: 2020-07-28

## 2020-07-28 RX ORDER — LEVOTHYROXINE SODIUM 75 UG/1
75 TABLET ORAL
Qty: 90 TABLET | Refills: 1 | Status: SHIPPED | OUTPATIENT
Start: 2020-07-28 | End: 2021-01-15

## 2020-07-28 RX ORDER — FENOFIBRATE 200 MG/1
200 CAPSULE ORAL DAILY
Qty: 90 CAPSULE | Refills: 1 | Status: SHIPPED | OUTPATIENT
Start: 2020-07-28 | End: 2021-01-04

## 2020-08-11 ENCOUNTER — PATIENT MESSAGE (OUTPATIENT)
Dept: ENDOCRINOLOGY | Facility: CLINIC | Age: 65
End: 2020-08-11

## 2020-08-17 ENCOUNTER — TELEPHONE (OUTPATIENT)
Dept: SPINE | Facility: CLINIC | Age: 65
End: 2020-08-17

## 2020-08-17 NOTE — TELEPHONE ENCOUNTER
----- Message from Stacey M Lefort sent at 8/17/2020  3:36 PM CDT -----  Pt asked to speak to you - she has some questions and is not sure if she needs an appointment or just a referral.  She can be reached at [phone]. Thank you.

## 2020-08-18 ENCOUNTER — OFFICE VISIT (OUTPATIENT)
Dept: ENDOCRINOLOGY | Facility: CLINIC | Age: 65
End: 2020-08-18
Payer: MEDICARE

## 2020-08-18 VITALS
HEIGHT: 65 IN | HEART RATE: 83 BPM | DIASTOLIC BLOOD PRESSURE: 70 MMHG | WEIGHT: 191.81 LBS | BODY MASS INDEX: 31.96 KG/M2 | SYSTOLIC BLOOD PRESSURE: 110 MMHG

## 2020-08-18 DIAGNOSIS — E11.9 TYPE 2 DIABETES MELLITUS WITHOUT COMPLICATION, WITHOUT LONG-TERM CURRENT USE OF INSULIN: Primary | ICD-10-CM

## 2020-08-18 DIAGNOSIS — I10 ESSENTIAL HYPERTENSION: ICD-10-CM

## 2020-08-18 DIAGNOSIS — E78.5 HYPERLIPIDEMIA, UNSPECIFIED HYPERLIPIDEMIA TYPE: ICD-10-CM

## 2020-08-18 DIAGNOSIS — E03.4 HYPOTHYROIDISM DUE TO ACQUIRED ATROPHY OF THYROID: ICD-10-CM

## 2020-08-18 PROCEDURE — 3288F FALL RISK ASSESSMENT DOCD: CPT | Mod: S$GLB,,, | Performed by: NURSE PRACTITIONER

## 2020-08-18 PROCEDURE — 99999 PR PBB SHADOW E&M-EST. PATIENT-LVL V: ICD-10-PCS | Mod: PBBFAC,,, | Performed by: NURSE PRACTITIONER

## 2020-08-18 PROCEDURE — 3074F PR MOST RECENT SYSTOLIC BLOOD PRESSURE < 130 MM HG: ICD-10-PCS | Mod: S$GLB,,, | Performed by: NURSE PRACTITIONER

## 2020-08-18 PROCEDURE — 99214 OFFICE O/P EST MOD 30 MIN: CPT | Mod: S$GLB,,, | Performed by: NURSE PRACTITIONER

## 2020-08-18 PROCEDURE — 99214 PR OFFICE/OUTPT VISIT, EST, LEVL IV, 30-39 MIN: ICD-10-PCS | Mod: S$GLB,,, | Performed by: NURSE PRACTITIONER

## 2020-08-18 PROCEDURE — 3008F BODY MASS INDEX DOCD: CPT | Mod: S$GLB,,, | Performed by: NURSE PRACTITIONER

## 2020-08-18 PROCEDURE — 3051F HG A1C>EQUAL 7.0%<8.0%: CPT | Mod: S$GLB,,, | Performed by: NURSE PRACTITIONER

## 2020-08-18 PROCEDURE — 99999 PR PBB SHADOW E&M-EST. PATIENT-LVL V: CPT | Mod: PBBFAC,,, | Performed by: NURSE PRACTITIONER

## 2020-08-18 PROCEDURE — 3078F PR MOST RECENT DIASTOLIC BLOOD PRESSURE < 80 MM HG: ICD-10-PCS | Mod: S$GLB,,, | Performed by: NURSE PRACTITIONER

## 2020-08-18 PROCEDURE — 1100F PR PT FALLS ASSESS DOC 2+ FALLS/FALL W/INJURY/YR: ICD-10-PCS | Mod: S$GLB,,, | Performed by: NURSE PRACTITIONER

## 2020-08-18 PROCEDURE — 3078F DIAST BP <80 MM HG: CPT | Mod: S$GLB,,, | Performed by: NURSE PRACTITIONER

## 2020-08-18 PROCEDURE — 3288F PR FALLS RISK ASSESSMENT DOCUMENTED: ICD-10-PCS | Mod: S$GLB,,, | Performed by: NURSE PRACTITIONER

## 2020-08-18 PROCEDURE — 1100F PTFALLS ASSESS-DOCD GE2>/YR: CPT | Mod: S$GLB,,, | Performed by: NURSE PRACTITIONER

## 2020-08-18 PROCEDURE — 3008F PR BODY MASS INDEX (BMI) DOCUMENTED: ICD-10-PCS | Mod: S$GLB,,, | Performed by: NURSE PRACTITIONER

## 2020-08-18 PROCEDURE — 3051F PR MOST RECENT HEMOGLOBIN A1C LEVEL 7.0 - < 8.0%: ICD-10-PCS | Mod: S$GLB,,, | Performed by: NURSE PRACTITIONER

## 2020-08-18 PROCEDURE — 3074F SYST BP LT 130 MM HG: CPT | Mod: S$GLB,,, | Performed by: NURSE PRACTITIONER

## 2020-08-18 RX ORDER — INSULIN GLARGINE 300 U/ML
16 INJECTION, SOLUTION SUBCUTANEOUS NIGHTLY
Qty: 4.5 ML | Refills: 6 | Status: SHIPPED | OUTPATIENT
Start: 2020-08-18 | End: 2020-10-06

## 2020-08-18 RX ORDER — PEN NEEDLE, DIABETIC 30 GX3/16"
NEEDLE, DISPOSABLE MISCELLANEOUS
Qty: 50 EACH | Refills: 6 | Status: SHIPPED | OUTPATIENT
Start: 2020-08-18 | End: 2021-05-11 | Stop reason: SDUPTHER

## 2020-08-18 RX ORDER — LANCETS 33 GAUGE
EACH MISCELLANEOUS
COMMUNITY
Start: 2020-07-10 | End: 2021-01-15

## 2020-08-18 NOTE — PATIENT INSTRUCTIONS
- Start Toujeo 16 units nightly.   - May increase this dose by 2 units every 4 days if fasting blood sugars remain > 150. Don't exceed 50 units without letting me know.   - Continue metformin and pioglitazone

## 2020-08-18 NOTE — PROGRESS NOTES
CC: Ms. Bing Kearns arrives today for management of Type 2 DM and review of chronic medical conditions, as listed in the Visit Diagnosis section of this encounter.       HPI: Ms. Bing Kearns was diagnosed with Type 2 DM in her late 50s. She was diagnosed based on lab work. Initial treatment consisted of metformin. Januvia was added in 2019. + FH of DM in sister, maternal GF. Denies hospitalizations due to DM.   Follows with Dr. Vanessa for CKD 2.     Patient was last seen by me in June. She presents today out of concern of recent elevated readings.     At her last visit, Ozempic was prescribed but it wasn't covered by her insurance and patient opted to use generic medications. Pioglitazone was ordered.    BG readings are checked 3x/day.                 Hypoglycemia: No but feels symptoms with glucose < 140  Hypoglycemic Symptoms: hunger and jitteriness  Hypoglycemia Treatment: juice    Missing Insulin/PO medication doses: No  Timing prandial insulin 5-15 minutes before meals: n/a    Dietary Habits: Eats 3 meals/day. Dinner is biggest meal. Snacking on watermelon, fruit, cheese, nuts. Drinks coffee with total of 1 Tbsp of sugar.    Last DM education appointment: not recently      CURRENT DIABETIC MEDS: metformin 1000 mg BID, pioglitazone 15 mg daily  Glucometer type: One Touch Ultra 2    Previous DM treatments:  Trulicity - panic attacks, blood sugars in the 70s  Januvia  Ozempic - not covered    Last Eye Exam: 1/2020, no DR. + glaucoma. Dr. Turcios  Last Podiatry Exam: 1/2020, Dr. Helms    REVIEW OF SYSTEMS  Constitutional: no c/o fatigue, weakness, or weight loss.   Eyes: denies visual disturbances.  Cardiac: no palpitations or chest pain.  Respiratory: no cough. + chronic dyspnea. She was recently referred to [facility] but has not had her appt yet.  GI: no c/o abdominal pain. Denies h/o pancreatitis. + nausea, diarrhea since colectomy for colon cancer.  : denies urinary  "frequency, burning, discharge, frequent UTIs, hematuria  Skin: no lesions or rashes.   Neuro: + numbness, tingling in feet, hands.   Endocrine: denies polyphagia, polydipsia, polyuria       Personally reviewed Past Medical, Surgical, Social History.    Vital Signs  /70   Pulse 83   Ht 5' 5" (1.651 m)   Wt 87 kg (191 lb 12.8 oz)   LMP  (LMP Unknown)   BMI 31.92 kg/m²     Personally reviewed the below labs:    Hemoglobin A1C   Date Value Ref Range Status   06/09/2020 7.6 (H) 4.0 - 5.6 % Final     Comment:     ADA Screening Guidelines:  5.7-6.4%  Consistent with prediabetes  >or=6.5%  Consistent with diabetes  High levels of fetal hemoglobin interfere with the HbA1C  assay. Heterozygous hemoglobin variants (HbS, HgC, etc)do  not significantly interfere with this assay.   However, presence of multiple variants may affect accuracy.     02/04/2020 7.4 (H) 0.0 - 5.6 % Final     Comment:     Reference Interval:  5.0 - 5.6 Normal   5.7 - 6.4 High Risk   > 6.5 Diabetic    Hgb A1c results are standardized based on the (NGSP) National   Glycohemoglobin Standardization Program.    Hemoglobin A1C levels are related to mean serum/plasma glucose   during the preceding 2-3 months.        11/13/2019 8.4 (H) 4.0 - 5.6 % Final     Comment:     ADA Screening Guidelines:  5.7-6.4%  Consistent with prediabetes  >or=6.5%  Consistent with diabetes  High levels of fetal hemoglobin interfere with the HbA1C  assay. Heterozygous hemoglobin variants (HbS, HgC, etc)do  not significantly interfere with this assay.   However, presence of multiple variants may affect accuracy.         Chemistry        Component Value Date/Time     (L) 07/13/2020 1131    K 4.9 07/13/2020 1131    CL 99 07/13/2020 1131    CO2 29 07/13/2020 1131    BUN 12 07/13/2020 1131    CREATININE 0.97 07/13/2020 1131     (H) 07/13/2020 1131        Component Value Date/Time    CALCIUM 9.3 07/13/2020 1131    ALKPHOS 99 02/04/2020 1302    AST 33 02/04/2020 1302 "    ALT 20 02/04/2020 1302    BILITOT 0.5 02/04/2020 1302    ESTGFRAFRICA >60 07/13/2020 1131    EGFRNONAA >60 07/13/2020 1131          Lab Results   Component Value Date    CHOL 138 02/04/2020    CHOL 159 01/29/2019    CHOL 141 04/02/2018     Lab Results   Component Value Date    HDL 40 02/04/2020    HDL 45 01/29/2019    HDL 39 (L) 04/02/2018     Lab Results   Component Value Date    LDLCALC 58.4 (L) 02/04/2020    LDLCALC 62.4 (L) 01/29/2019    LDLCALC 59.6 (L) 04/02/2018     Lab Results   Component Value Date    TRIG 198 (H) 02/04/2020    TRIG 258 (H) 01/29/2019    TRIG 212 (H) 04/02/2018     Lab Results   Component Value Date    CHOLHDL 29.0 02/04/2020    CHOLHDL 28.3 01/29/2019    CHOLHDL 27.7 04/02/2018       Lab Results   Component Value Date    MICALBCREAT Unable to calculate 02/04/2020     Lab Results   Component Value Date    TSH 2.040 02/04/2020       CrCl cannot be calculated (Patient's most recent lab result is older than the maximum 7 days allowed.).    Vit D, 25-Hydroxy   Date Value Ref Range Status   12/08/2016 36 30 - 96 ng/mL Final     Comment:     Vitamin D deficiency.........<10 ng/mL                              Vitamin D insufficiency......10-29 ng/mL       Vitamin D sufficiency........> or equal to 30 ng/mL  Vitamin D toxicity............>100 ng/mL           PHYSICAL EXAMINATION  Deferred     A1c target < 7%      Assessment/Plan  1. Type 2 diabetes mellitus without complication, without long-term current use of insulin  -- Worsening. Needs basal insulin.  -- start Toujeo 16 units QHS. Demonstrated pen usage. May increase dose by 2 units every 4 days until fasting BG < 150.  -- continue metformin, pioglitazone  -- BG monitoring 3x/day.    -- Discussed diagnosis of DM, A1c goals, progression of disease, long term complications and tx options.  -- Reviewed hypoglycemia management: treat with 4 oz of juice, 4 oz regular soda, or 4 glucose tablets. Monitor and repeat treatment every 15 minutes until  BG is >70 Then have a snack, which includes 15 grams of complex carbohydrates and protein.   Advised patient to check BG before activities, such as driving or exercise.    -- takes aspirin, ARB     2. Essential hypertension  -- controlled  -- continue current meds and monitor   3. Hyperlipidemia, unspecified hyperlipidemia type  -- uncontrolled with elevated TRG  -- will consider adding a different statin (other than simvastatin - takes amlodipine) in the future if LDL increases.    4. Hypothyroidism due to acquired atrophy of thyroid  -- stable  -- continue current levothyroxine dose.        FOLLOW UP  As scheduled in September  Patient instructed to bring BG logs to each follow up   Patient encouraged to call for any BG/medication issues, concerns, or questions.

## 2020-08-25 RX ORDER — ORPHENADRINE CITRATE 100 MG/1
100 TABLET, EXTENDED RELEASE ORAL 2 TIMES DAILY PRN
Qty: 180 TABLET | Refills: 0 | Status: SHIPPED | OUTPATIENT
Start: 2020-08-25 | End: 2020-12-12 | Stop reason: SDUPTHER

## 2020-08-25 NOTE — TELEPHONE ENCOUNTER
Last office visit 06/11/20 with a 3 mo follow up visit. Has a scheduled upcoming appointment on 09/17/20.

## 2020-09-08 ENCOUNTER — PATIENT MESSAGE (OUTPATIENT)
Dept: FAMILY MEDICINE | Facility: CLINIC | Age: 65
End: 2020-09-08

## 2020-09-08 DIAGNOSIS — J31.0 RHINITIS MEDICAMENTOSA: ICD-10-CM

## 2020-09-08 DIAGNOSIS — T48.5X5A RHINITIS MEDICAMENTOSA: ICD-10-CM

## 2020-09-08 DIAGNOSIS — R06.02 SHORTNESS OF BREATH: Primary | ICD-10-CM

## 2020-09-08 RX ORDER — FLUTICASONE PROPIONATE 50 MCG
1 SPRAY, SUSPENSION (ML) NASAL DAILY PRN
Qty: 48 G | Refills: 0 | Status: SHIPPED | OUTPATIENT
Start: 2020-09-08 | End: 2021-01-25 | Stop reason: SDUPTHER

## 2020-09-28 ENCOUNTER — LAB VISIT (OUTPATIENT)
Dept: LAB | Facility: HOSPITAL | Age: 65
End: 2020-09-28
Attending: NURSE PRACTITIONER
Payer: MEDICARE

## 2020-09-28 DIAGNOSIS — E11.9 TYPE 2 DIABETES MELLITUS WITHOUT COMPLICATION, WITHOUT LONG-TERM CURRENT USE OF INSULIN: ICD-10-CM

## 2020-09-28 LAB
ALBUMIN SERPL BCP-MCNC: 3.9 G/DL (ref 3.5–5.2)
ALP SERPL-CCNC: 95 U/L (ref 55–135)
ALT SERPL W/O P-5'-P-CCNC: 41 U/L (ref 10–44)
ANION GAP SERPL CALC-SCNC: 14 MMOL/L (ref 8–16)
AST SERPL-CCNC: 48 U/L (ref 10–40)
BILIRUB SERPL-MCNC: 0.3 MG/DL (ref 0.1–1)
BUN SERPL-MCNC: 11 MG/DL (ref 8–23)
CALCIUM SERPL-MCNC: 9.5 MG/DL (ref 8.7–10.5)
CHLORIDE SERPL-SCNC: 100 MMOL/L (ref 95–110)
CO2 SERPL-SCNC: 23 MMOL/L (ref 23–29)
CREAT SERPL-MCNC: 1.2 MG/DL (ref 0.5–1.4)
EST. GFR  (AFRICAN AMERICAN): 54.8 ML/MIN/1.73 M^2
EST. GFR  (NON AFRICAN AMERICAN): 47.5 ML/MIN/1.73 M^2
ESTIMATED AVG GLUCOSE: 192 MG/DL (ref 68–131)
GLUCOSE SERPL-MCNC: 252 MG/DL (ref 70–110)
HBA1C MFR BLD HPLC: 8.3 % (ref 4–5.6)
POTASSIUM SERPL-SCNC: 4.5 MMOL/L (ref 3.5–5.1)
PROT SERPL-MCNC: 7.5 G/DL (ref 6–8.4)
SODIUM SERPL-SCNC: 137 MMOL/L (ref 136–145)

## 2020-09-28 PROCEDURE — 36415 COLL VENOUS BLD VENIPUNCTURE: CPT | Mod: PN

## 2020-09-28 PROCEDURE — 80053 COMPREHEN METABOLIC PANEL: CPT

## 2020-09-28 PROCEDURE — 83036 HEMOGLOBIN GLYCOSYLATED A1C: CPT

## 2020-10-06 ENCOUNTER — OFFICE VISIT (OUTPATIENT)
Dept: ENDOCRINOLOGY | Facility: CLINIC | Age: 65
End: 2020-10-06
Payer: MEDICARE

## 2020-10-06 VITALS
WEIGHT: 194.31 LBS | SYSTOLIC BLOOD PRESSURE: 126 MMHG | BODY MASS INDEX: 32.37 KG/M2 | HEIGHT: 65 IN | HEART RATE: 84 BPM | DIASTOLIC BLOOD PRESSURE: 70 MMHG

## 2020-10-06 DIAGNOSIS — I10 ESSENTIAL HYPERTENSION: ICD-10-CM

## 2020-10-06 DIAGNOSIS — E03.4 HYPOTHYROIDISM DUE TO ACQUIRED ATROPHY OF THYROID: ICD-10-CM

## 2020-10-06 DIAGNOSIS — Z79.4 TYPE 2 DIABETES MELLITUS WITH DIABETIC NEUROPATHY, WITH LONG-TERM CURRENT USE OF INSULIN: Primary | ICD-10-CM

## 2020-10-06 DIAGNOSIS — N28.9 RENAL INSUFFICIENCY: ICD-10-CM

## 2020-10-06 DIAGNOSIS — E78.5 HYPERLIPIDEMIA, UNSPECIFIED HYPERLIPIDEMIA TYPE: ICD-10-CM

## 2020-10-06 DIAGNOSIS — E11.40 TYPE 2 DIABETES MELLITUS WITH DIABETIC NEUROPATHY, WITH LONG-TERM CURRENT USE OF INSULIN: Primary | ICD-10-CM

## 2020-10-06 DIAGNOSIS — E11.9 TYPE 2 DIABETES MELLITUS WITHOUT COMPLICATION, WITHOUT LONG-TERM CURRENT USE OF INSULIN: ICD-10-CM

## 2020-10-06 PROCEDURE — 1125F AMNT PAIN NOTED PAIN PRSNT: CPT | Mod: S$GLB,,, | Performed by: NURSE PRACTITIONER

## 2020-10-06 PROCEDURE — 99214 OFFICE O/P EST MOD 30 MIN: CPT | Mod: S$GLB,,, | Performed by: NURSE PRACTITIONER

## 2020-10-06 PROCEDURE — 1159F PR MEDICATION LIST DOCUMENTED IN MEDICAL RECORD: ICD-10-PCS | Mod: S$GLB,,, | Performed by: NURSE PRACTITIONER

## 2020-10-06 PROCEDURE — 3052F HG A1C>EQUAL 8.0%<EQUAL 9.0%: CPT | Mod: S$GLB,,, | Performed by: NURSE PRACTITIONER

## 2020-10-06 PROCEDURE — 99214 PR OFFICE/OUTPT VISIT, EST, LEVL IV, 30-39 MIN: ICD-10-PCS | Mod: S$GLB,,, | Performed by: NURSE PRACTITIONER

## 2020-10-06 PROCEDURE — 99999 PR PBB SHADOW E&M-EST. PATIENT-LVL V: ICD-10-PCS | Mod: PBBFAC,,, | Performed by: NURSE PRACTITIONER

## 2020-10-06 PROCEDURE — 3008F BODY MASS INDEX DOCD: CPT | Mod: S$GLB,,, | Performed by: NURSE PRACTITIONER

## 2020-10-06 PROCEDURE — 3052F PR MOST RECENT HEMOGLOBIN A1C LEVEL 8.0 - < 9.0%: ICD-10-PCS | Mod: S$GLB,,, | Performed by: NURSE PRACTITIONER

## 2020-10-06 PROCEDURE — 99999 PR PBB SHADOW E&M-EST. PATIENT-LVL V: CPT | Mod: PBBFAC,,, | Performed by: NURSE PRACTITIONER

## 2020-10-06 PROCEDURE — 3078F DIAST BP <80 MM HG: CPT | Mod: S$GLB,,, | Performed by: NURSE PRACTITIONER

## 2020-10-06 PROCEDURE — 1125F PR PAIN SEVERITY QUANTIFIED, PAIN PRESENT: ICD-10-PCS | Mod: S$GLB,,, | Performed by: NURSE PRACTITIONER

## 2020-10-06 PROCEDURE — 3074F SYST BP LT 130 MM HG: CPT | Mod: S$GLB,,, | Performed by: NURSE PRACTITIONER

## 2020-10-06 PROCEDURE — 1159F MED LIST DOCD IN RCRD: CPT | Mod: S$GLB,,, | Performed by: NURSE PRACTITIONER

## 2020-10-06 PROCEDURE — 1101F PR PT FALLS ASSESS DOC 0-1 FALLS W/OUT INJ PAST YR: ICD-10-PCS | Mod: S$GLB,,, | Performed by: NURSE PRACTITIONER

## 2020-10-06 PROCEDURE — 3074F PR MOST RECENT SYSTOLIC BLOOD PRESSURE < 130 MM HG: ICD-10-PCS | Mod: S$GLB,,, | Performed by: NURSE PRACTITIONER

## 2020-10-06 PROCEDURE — 3078F PR MOST RECENT DIASTOLIC BLOOD PRESSURE < 80 MM HG: ICD-10-PCS | Mod: S$GLB,,, | Performed by: NURSE PRACTITIONER

## 2020-10-06 PROCEDURE — 1101F PT FALLS ASSESS-DOCD LE1/YR: CPT | Mod: S$GLB,,, | Performed by: NURSE PRACTITIONER

## 2020-10-06 PROCEDURE — 3008F PR BODY MASS INDEX (BMI) DOCUMENTED: ICD-10-PCS | Mod: S$GLB,,, | Performed by: NURSE PRACTITIONER

## 2020-10-06 RX ORDER — GABAPENTIN 300 MG/1
300 CAPSULE ORAL 2 TIMES DAILY
Qty: 180 CAPSULE | Refills: 3 | Status: SHIPPED | OUTPATIENT
Start: 2020-10-06 | End: 2022-05-27 | Stop reason: SDUPTHER

## 2020-10-06 RX ORDER — AZELASTINE 1 MG/ML
SPRAY, METERED NASAL
COMMUNITY
Start: 2020-10-05 | End: 2021-02-26

## 2020-10-06 RX ORDER — A/SINGAPORE/GP1908/2015 IVR-180 (AN A/MICHIGAN/45/2015 (H1N1)PDM09-LIKE VIRUS, A/HONG KONG/4801/2014, NYMC X-263B (H3N2) (AN A/HONG KONG/4801/2014-LIKE VIRUS), AND B/BRISBANE/60/2008, WILD TYPE (A B/BRISBANE/60/2008-LIKE VIRUS) 15; 15; 15 UG/.5ML; UG/.5ML; UG/.5ML
INJECTION, SUSPENSION INTRAMUSCULAR
COMMUNITY
Start: 2020-09-04 | End: 2021-03-09 | Stop reason: ALTCHOICE

## 2020-10-06 RX ORDER — CELECOXIB 100 MG/1
CAPSULE ORAL
COMMUNITY
Start: 2020-09-08 | End: 2021-06-01 | Stop reason: SDUPTHER

## 2020-10-06 RX ORDER — REPAGLINIDE 1 MG/1
1 TABLET ORAL
Qty: 30 TABLET | Refills: 6 | Status: SHIPPED | OUTPATIENT
Start: 2020-10-06 | End: 2021-01-28 | Stop reason: SDUPTHER

## 2020-10-06 RX ORDER — INSULIN GLARGINE 300 U/ML
20 INJECTION, SOLUTION SUBCUTANEOUS NIGHTLY
Qty: 4.5 ML | Refills: 6
Start: 2020-10-06 | End: 2021-01-02 | Stop reason: SDUPTHER

## 2020-10-06 NOTE — PROGRESS NOTES
CC: Ms. Bing Kearns arrives today for management of Type 2 DM and review of chronic medical conditions, as listed in the Visit Diagnosis section of this encounter.       HPI: Ms. Bing Kearns was diagnosed with Type 2 DM in her late 50s. She was diagnosed based on lab work. Initial treatment consisted of metformin. Januvia was added in 2019. + FH of DM in sister, maternal GF. Denies hospitalizations due to DM.   Follows with Dr. Vanessa for CKD 2.     Patient was last seen by me in August. At this time, she was started on Toujeo.     BG readings are checked 3x/day.               Hypoglycemia: No but feels symptoms with glucose < 120. This may occur in the morning.  Hypoglycemic Symptoms: hunger and jitteriness  Hypoglycemia Treatment: juice    Missing Insulin/PO medication doses: No    Dietary Habits: Eats 2-3 meals/day. May skip lunch. Dinner is biggest meal. Snacking on salad or SF cookies. Avoids sugary beverages.   B: 2 slices toast, grits, sometimes eggs   L: chicken noodle soup with crackers  D: shrimp stir stroud with rice     Last DM education appointment: not recently      CURRENT DIABETIC MEDS: metformin 1000 mg BID, pioglitazone 15 mg daily, Toujeo 20 units QHS  Glucometer type: One Touch Ultra 2    Previous DM treatments:  Trulicity - panic attacks, blood sugars in the 70s  Januvia - $$  Ozempic - not covered    Last Eye Exam: 1/2020, no DR. + glaucoma. Dr. Turcios  Last Podiatry Exam: 1/2020, Dr. Helms    REVIEW OF SYSTEMS  Constitutional: no c/o weakness or weight loss. + 3# weight gain. + fatigue -- has sleep apnea and wears CPAP.  Eyes: denies visual disturbances.  Cardiac: no palpitations or chest pain.  Respiratory: + cough that she attributes to reflux. + chronic dyspnea. She was recently referred to [facility] and has appt this week.   GI: no c/o abdominal pain. Denies h/o pancreatitis. + nausea since colectomy for colon cancer.  : denies urinary frequency,  "burning, discharge, frequent UTIs, hematuria  Skin: no lesions or rashes. + delayed wound healing.   Neuro: + numbness, tingling in feet, hands that is constant. Takes gabapentin but this doesn't provide relief.   Endocrine: denies polyphagia, polydipsia, polyuria       Personally reviewed Past Medical, Surgical, Social History.    Vital Signs  /70   Pulse 84   Ht 5' 5" (1.651 m)   Wt 88.2 kg (194 lb 5.4 oz)   LMP  (LMP Unknown)   BMI 32.34 kg/m²     Personally reviewed the below labs:    Hemoglobin A1C   Date Value Ref Range Status   09/28/2020 8.3 (H) 4.0 - 5.6 % Final     Comment:     ADA Screening Guidelines:  5.7-6.4%  Consistent with prediabetes  >or=6.5%  Consistent with diabetes  High levels of fetal hemoglobin interfere with the HbA1C  assay. Heterozygous hemoglobin variants (HbS, HgC, etc)do  not significantly interfere with this assay.   However, presence of multiple variants may affect accuracy.     06/09/2020 7.6 (H) 4.0 - 5.6 % Final     Comment:     ADA Screening Guidelines:  5.7-6.4%  Consistent with prediabetes  >or=6.5%  Consistent with diabetes  High levels of fetal hemoglobin interfere with the HbA1C  assay. Heterozygous hemoglobin variants (HbS, HgC, etc)do  not significantly interfere with this assay.   However, presence of multiple variants may affect accuracy.     02/04/2020 7.4 (H) 0.0 - 5.6 % Final     Comment:     Reference Interval:  5.0 - 5.6 Normal   5.7 - 6.4 High Risk   > 6.5 Diabetic    Hgb A1c results are standardized based on the (NGSP) National   Glycohemoglobin Standardization Program.    Hemoglobin A1C levels are related to mean serum/plasma glucose   during the preceding 2-3 months.            Chemistry        Component Value Date/Time     09/28/2020 1232    K 4.5 09/28/2020 1232     09/28/2020 1232    CO2 23 09/28/2020 1232    BUN 11 09/28/2020 1232    CREATININE 1.2 09/28/2020 1232     (H) 09/28/2020 1232        Component Value Date/Time    " CALCIUM 9.5 09/28/2020 1232    ALKPHOS 95 09/28/2020 1232    AST 48 (H) 09/28/2020 1232    ALT 41 09/28/2020 1232    BILITOT 0.3 09/28/2020 1232    ESTGFRAFRICA 54.8 (A) 09/28/2020 1232    EGFRNONAA 47.5 (A) 09/28/2020 1232          Lab Results   Component Value Date    CHOL 138 02/04/2020    CHOL 159 01/29/2019    CHOL 141 04/02/2018     Lab Results   Component Value Date    HDL 40 02/04/2020    HDL 45 01/29/2019    HDL 39 (L) 04/02/2018     Lab Results   Component Value Date    LDLCALC 58.4 (L) 02/04/2020    LDLCALC 62.4 (L) 01/29/2019    LDLCALC 59.6 (L) 04/02/2018     Lab Results   Component Value Date    TRIG 198 (H) 02/04/2020    TRIG 258 (H) 01/29/2019    TRIG 212 (H) 04/02/2018     Lab Results   Component Value Date    CHOLHDL 29.0 02/04/2020    CHOLHDL 28.3 01/29/2019    CHOLHDL 27.7 04/02/2018       Lab Results   Component Value Date    MICALBCREAT Unable to calculate 02/04/2020     Lab Results   Component Value Date    TSH 2.040 02/04/2020       CrCl cannot be calculated (Patient's most recent lab result is older than the maximum 7 days allowed.).    Vit D, 25-Hydroxy   Date Value Ref Range Status   12/08/2016 36 30 - 96 ng/mL Final     Comment:     Vitamin D deficiency.........<10 ng/mL                              Vitamin D insufficiency......10-29 ng/mL       Vitamin D sufficiency........> or equal to 30 ng/mL  Vitamin D toxicity............>100 ng/mL           PHYSICAL EXAMINATION  Constitutional: Appears well, no distress  Neck: Supple, trachea midline; no thyromegaly or nodules.   Respiratory: CTA, even and unlabored.  Cardiovascular: RRR, no murmurs, no carotid bruits. DP pulses  2+ bilaterally; no edema.    GI: bowel sounds active, no hernia noted  Skin: warm and dry; no lipohypertrophy, or acanthosis nigracans observed.  Neuro: DTR 1+ BUE/diminished to BLE.  Feet: appropriate footwear.    A1c target < 7%      Assessment/Plan  1. Type 2 diabetes mellitus without complication, without long-term  current use of insulin  -- A1c has increased but FBG have improved. + prandial excursions, particularly after dinner (biggest meal). Of note, pt has sulfonylurea allergy.   -- start Prandin 1 mg with dinner only.   -- continue Toujeo 20 units QHS.  -- continue metformin, pioglitazone  -- advised pt to incorporate both CHO and protein as part of each meal.   -- BG monitoring 3x/day.    -- Discussed diagnosis of DM, A1c goals, progression of disease, long term complications and tx options.  -- Reviewed hypoglycemia management: treat with 4 oz of juice, 4 oz regular soda, or 4 glucose tablets. Monitor and repeat treatment every 15 minutes until BG is >70 Then have a snack, which includes 15 grams of complex carbohydrates and protein.   Advised patient to check BG before activities, such as driving or exercise.    -- takes aspirin, ARB     2. Essential hypertension  -- controlled  -- continue current meds and monitor   3. Hyperlipidemia, unspecified hyperlipidemia type  -- uncontrolled with elevated TRG  -- will consider adding a different statin (other than simvastatin - takes amlodipine) in the future if LDL increases.    4. Hypothyroidism due to acquired atrophy of thyroid  -- stable  -- continue current levothyroxine dose.    5. Renal insufficiency  -- continue to monitor renal fxn  -- follows with nephrology        FOLLOW UP  Follow up in about 4 months (around 2/6/2021).  Patient instructed to bring BG logs to each follow up   Patient encouraged to call for any BG/medication issues, concerns, or questions.      Orders Placed This Encounter   Procedures    Hemoglobin A1C    Lipid Panel    Comprehensive Metabolic Panel    Microalbumin/Creatinine Ratio, Urine    TSH

## 2020-10-08 ENCOUNTER — OFFICE VISIT (OUTPATIENT)
Dept: FAMILY MEDICINE | Facility: CLINIC | Age: 65
End: 2020-10-08
Payer: MEDICARE

## 2020-10-08 VITALS
TEMPERATURE: 97 F | DIASTOLIC BLOOD PRESSURE: 78 MMHG | BODY MASS INDEX: 32.27 KG/M2 | WEIGHT: 193.69 LBS | OXYGEN SATURATION: 97 % | HEIGHT: 65 IN | SYSTOLIC BLOOD PRESSURE: 118 MMHG | RESPIRATION RATE: 18 BRPM | HEART RATE: 78 BPM

## 2020-10-08 DIAGNOSIS — F41.9 ANXIETY: Primary | ICD-10-CM

## 2020-10-08 DIAGNOSIS — Z12.31 ENCOUNTER FOR SCREENING MAMMOGRAM FOR MALIGNANT NEOPLASM OF BREAST: ICD-10-CM

## 2020-10-08 DIAGNOSIS — M54.16 LUMBAR RADICULITIS: ICD-10-CM

## 2020-10-08 DIAGNOSIS — E78.2 MIXED HYPERLIPIDEMIA: ICD-10-CM

## 2020-10-08 PROCEDURE — 1101F PR PT FALLS ASSESS DOC 0-1 FALLS W/OUT INJ PAST YR: ICD-10-PCS | Mod: S$GLB,,, | Performed by: INTERNAL MEDICINE

## 2020-10-08 PROCEDURE — 3078F DIAST BP <80 MM HG: CPT | Mod: S$GLB,,, | Performed by: INTERNAL MEDICINE

## 2020-10-08 PROCEDURE — 1159F PR MEDICATION LIST DOCUMENTED IN MEDICAL RECORD: ICD-10-PCS | Mod: S$GLB,,, | Performed by: INTERNAL MEDICINE

## 2020-10-08 PROCEDURE — 99214 PR OFFICE/OUTPT VISIT, EST, LEVL IV, 30-39 MIN: ICD-10-PCS | Mod: S$GLB,,, | Performed by: INTERNAL MEDICINE

## 2020-10-08 PROCEDURE — 1159F MED LIST DOCD IN RCRD: CPT | Mod: S$GLB,,, | Performed by: INTERNAL MEDICINE

## 2020-10-08 PROCEDURE — 3074F PR MOST RECENT SYSTOLIC BLOOD PRESSURE < 130 MM HG: ICD-10-PCS | Mod: S$GLB,,, | Performed by: INTERNAL MEDICINE

## 2020-10-08 PROCEDURE — 1101F PT FALLS ASSESS-DOCD LE1/YR: CPT | Mod: S$GLB,,, | Performed by: INTERNAL MEDICINE

## 2020-10-08 PROCEDURE — 3074F SYST BP LT 130 MM HG: CPT | Mod: S$GLB,,, | Performed by: INTERNAL MEDICINE

## 2020-10-08 PROCEDURE — 1126F PR PAIN SEVERITY QUANTIFIED, NO PAIN PRESENT: ICD-10-PCS | Mod: S$GLB,,, | Performed by: INTERNAL MEDICINE

## 2020-10-08 PROCEDURE — 1126F AMNT PAIN NOTED NONE PRSNT: CPT | Mod: S$GLB,,, | Performed by: INTERNAL MEDICINE

## 2020-10-08 PROCEDURE — 3008F BODY MASS INDEX DOCD: CPT | Mod: S$GLB,,, | Performed by: INTERNAL MEDICINE

## 2020-10-08 PROCEDURE — 99999 PR PBB SHADOW E&M-EST. PATIENT-LVL V: CPT | Mod: PBBFAC,,, | Performed by: INTERNAL MEDICINE

## 2020-10-08 PROCEDURE — 99214 OFFICE O/P EST MOD 30 MIN: CPT | Mod: S$GLB,,, | Performed by: INTERNAL MEDICINE

## 2020-10-08 PROCEDURE — 99999 PR PBB SHADOW E&M-EST. PATIENT-LVL V: ICD-10-PCS | Mod: PBBFAC,,, | Performed by: INTERNAL MEDICINE

## 2020-10-08 PROCEDURE — 3078F PR MOST RECENT DIASTOLIC BLOOD PRESSURE < 80 MM HG: ICD-10-PCS | Mod: S$GLB,,, | Performed by: INTERNAL MEDICINE

## 2020-10-08 PROCEDURE — 3008F PR BODY MASS INDEX (BMI) DOCUMENTED: ICD-10-PCS | Mod: S$GLB,,, | Performed by: INTERNAL MEDICINE

## 2020-10-08 RX ORDER — ROSUVASTATIN CALCIUM 10 MG/1
10 TABLET, COATED ORAL DAILY
Qty: 90 TABLET | Refills: 3 | Status: SHIPPED | OUTPATIENT
Start: 2020-10-08 | End: 2021-01-25

## 2020-10-08 NOTE — PROGRESS NOTES
Assessment and Plan:    1. Anxiety  Doing well currently. Taking sertraline and needing the clonazepam only rarely. Follow up 6 months.    2. Mixed hyperlipidemia  Will try crestor instead, had been having muscle aches on simvastatin.  - rosuvastatin (CRESTOR) 10 MG tablet; Take 1 tablet (10 mg total) by mouth once daily.  Dispense: 90 tablet; Refill: 3    3. Lumbar radiculitis  Patient requested referral to [facility], had been seen in [place] but drive is too far.  - Ambulatory referral/consult to Pain Clinic; Future    4. Encounter for screening mammogram for malignant neoplasm of breast  Due in January  - Mammo Digital Screening Bilat; Future      ______________________________________________________________________  Subjective:    Chief Complaint:  Follow up chronic medical conditions.    HPI:  Bing is a 65 y.o. year old female here to follow up chronic medical conditions.     DM- Seeing Endocrine, started toujeo in August, continued metformin and pioglitazone. Started prandin with dinner.      Anxiety- Taking sertraline, has been clonazepam only PRN, thinks she has only used this twice in the last month. feels like she has been having less anxiety since she has not been going anywhere.     HTN- Taking olmesartan 40, lasix 20 mg daily PRN, atenolol 50 mg nightly, amlodipine 5 mg nightly. She sees Dr. Tao for Cardiology.     Medications:  Current Outpatient Medications on File Prior to Visit   Medication Sig Dispense Refill    (Magic mouthwash) 1:1:1 Benadryl 12.5mg/5ml liq, aluminum & magnesium hydroxide-simehticone (Maalox), lidocaine viscous 2% Swish and spit 15 mLs every 4 (four) hours as needed. for mouth sores 360 mL 0    amlodipine (NORVASC) 5 MG tablet Take 5 mg by mouth once daily.       aspirin (ECOTRIN) 81 MG EC tablet Take 81 mg by mouth once daily.        atenolol (TENORMIN) 50 MG tablet Take 1 tablet (50 mg total) by mouth once daily. 90 tablet 2    azelastine (ASTELIN) 137  mcg (0.1 %) nasal spray       blood sugar diagnostic Strp 1 strip by Misc.(Non-Drug; Combo Route) route 3 (three) times daily as needed. 100 each 11    blood-glucose meter (ONETOUCH ULTRA2 METER) Misc USE AS DIRECTED 1 each 0    celecoxib (CELEBREX) 100 MG capsule       cholecalciferol, vitamin D3, (VITAMIN D3) 2,000 unit Cap Take 1 capsule by mouth once daily.  3    clonazePAM (KLONOPIN) 0.5 MG tablet TAKE 1 TABLET (0.5 MG TOTAL) BY MOUTH 2 (TWO) TIMES DAILY AS NEEDED 30 tablet 0    cloNIDine (CATAPRES) 0.1 MG tablet Take 0.1 mg by mouth once daily.       fenofibrate micronized (LOFIBRA) 200 MG Cap Take 1 capsule (200 mg total) by mouth once daily. 90 capsule 1    FLUAD QUAD 2020-21,65Y UP,,PF, 60 mcg (15 mcg x 4)/0.5 mL Syrg       fluticasone propionate (FLONASE) 50 mcg/actuation nasal spray 1 spray (50 mcg total) by Each Nostril route daily as needed for Rhinitis. 48 g 0    furosemide (LASIX) 20 MG tablet Take 20 mg by mouth daily as needed.   4    gabapentin (NEURONTIN) 300 MG capsule Take 1 capsule (300 mg total) by mouth 2 (two) times daily. 180 capsule 3    insulin glargine, TOUJEO, (TOUJEO SOLOSTAR U-300 INSULIN) 300 unit/mL (1.5 mL) InPn pen Inject 20 Units into the skin every evening. 4.5 mL 6    iron polysaccharides (FERREX 150) 150 mg iron Cap Take 150 mg by mouth 2 (two) times daily.       lancets Misc 1 lancet by Misc.(Non-Drug; Combo Route) route 3 (three) times daily as needed. 100 each 11    latanoprost 0.005 % dpem Place 2 drops into both eyes every evening.       levothyroxine (SYNTHROID) 75 MCG tablet Take 1 tablet (75 mcg total) by mouth before breakfast. 90 tablet 1    metFORMIN (GLUCOPHAGE-XR) 500 MG XR 24hr tablet Take 2 tablets (1,000 mg total) by mouth 2 (two) times daily with meals. 360 tablet 3    olmesartan (BENICAR) 40 MG tablet Take 40 mg by mouth once daily.      omeprazole (PRILOSEC) 40 MG capsule TAKE 1 CAPSULE TWICE DAILY 30 MINUTES PRIOR TO BREAKFAST AND DINNER   "0    ONETOUCH DELICA PLUS LANCET 33 gauge Misc USE 1 TO CHECK GLUCOSE THREE TIMES DAILY AS NEEDED      orphenadrine (NORFLEX) 100 mg tablet Take 1 tablet (100 mg total) by mouth 2 (two) times daily as needed. 180 tablet 0    pen needle, diabetic (BD ULTRA-FINE DARIO PEN NEEDLE) 32 gauge x 5/32" Ndle Uses 1 daily with insulin 50 each 6    pioglitazone (ACTOS) 15 MG tablet Take 1 tablet (15 mg total) by mouth once daily. 90 tablet 3    repaglinide (PRANDIN) 1 MG tablet Take 1 tablet (1 mg total) by mouth daily with dinner or evening meal. 30 tablet 6    sertraline (ZOLOFT) 50 MG tablet Take 50 mg by mouth every evening.  2    simvastatin (ZOCOR) 80 MG tablet TAKE 1 TABLET BY MOUTH ONCE DAILY IN THE EVENING 90 tablet 0     Current Facility-Administered Medications on File Prior to Visit   Medication Dose Route Frequency Provider Last Rate Last Dose    0.9%  NaCl infusion   Intravenous Continuous Job Porter MD 10 mL/hr at 12/20/19 1245      barium sulfate 98 % SusR 120 mL  120 mL Oral ONCE PRN Carloz Bosch MD        ez paque ba sulfate 120 mL  120 mL Oral ONCE PRN Carloz Bosch MD        lactated ringers infusion   Intravenous Once PRN Job Porter MD        sodium bicarbonate-citric acid-simethicone (EZ GAS II) 2.21-1.53 gram/4 gram oral granules 4 g  1 packet Oral ONCE PRN Carloz Bosch MD           Review of Systems:  Review of Systems   Constitutional: Negative for chills and fever.   Respiratory: Negative for shortness of breath and wheezing.    Cardiovascular: Negative for chest pain, palpitations and leg swelling.   Gastrointestinal: Negative for diarrhea, nausea and vomiting.   Neurological: Negative for dizziness, syncope and light-headedness.   Psychiatric/Behavioral: Negative for dysphoric mood. The patient is not nervous/anxious.        Past Medical History:  Past Medical History:   Diagnosis Date    Anxiety     Cancer     colon    Chronic diarrhea     Chronic kidney disease " "    stage 3    Diabetes mellitus     GERD (gastroesophageal reflux disease)     Glaucoma     History of migraine headaches     Hyperlipemia     Hypertension     PONV (postoperative nausea and vomiting)     Sleep apnea with use of continuous positive airway pressure (CPAP)     waiting on mask to arrive    Thyroid disease     hypothyroid       Objective:    Vitals:  Vitals:    10/08/20 1124   BP: 118/78   Pulse: 78   Resp: 18   Temp: 97 °F (36.1 °C)   TempSrc: Temporal   SpO2: 97%   Weight: 87.9 kg (193 lb 10.8 oz)   Height: 5' 5" (1.651 m)   PainSc: 0-No pain       Physical Exam  Vitals signs reviewed.   Constitutional:       General: She is not in acute distress.     Appearance: She is well-developed.   Eyes:      General:         Right eye: No discharge.         Left eye: No discharge.      Conjunctiva/sclera: Conjunctivae normal.   Cardiovascular:      Rate and Rhythm: Normal rate and regular rhythm.   Pulmonary:      Effort: Pulmonary effort is normal. No respiratory distress.   Skin:     General: Skin is warm and dry.   Neurological:      Mental Status: She is alert and oriented to person, place, and time.   Psychiatric:         Behavior: Behavior normal.         Thought Content: Thought content normal.         Judgment: Judgment normal.         Data:  Previous labs reviewed and pertinent for A1c 8.3.      Gabi Case MD  Internal Medicine    "

## 2020-11-11 ENCOUNTER — PATIENT MESSAGE (OUTPATIENT)
Dept: FAMILY MEDICINE | Facility: CLINIC | Age: 65
End: 2020-11-11

## 2020-12-15 RX ORDER — ORPHENADRINE CITRATE 100 MG/1
100 TABLET, EXTENDED RELEASE ORAL 2 TIMES DAILY PRN
Qty: 180 TABLET | Refills: 0 | Status: SHIPPED | OUTPATIENT
Start: 2020-12-15 | End: 2021-04-27 | Stop reason: SDUPTHER

## 2021-01-02 DIAGNOSIS — E11.9 TYPE 2 DIABETES MELLITUS WITHOUT COMPLICATION, WITHOUT LONG-TERM CURRENT USE OF INSULIN: ICD-10-CM

## 2021-01-04 RX ORDER — INSULIN GLARGINE 300 U/ML
20 INJECTION, SOLUTION SUBCUTANEOUS NIGHTLY
Qty: 4.5 ML | Refills: 6
Start: 2021-01-04 | End: 2021-02-26

## 2021-01-15 RX ORDER — LANCETS 33 GAUGE
EACH MISCELLANEOUS
Qty: 90 EACH | Refills: 3 | Status: SHIPPED | OUTPATIENT
Start: 2021-01-15 | End: 2021-03-12 | Stop reason: SDUPTHER

## 2021-01-15 RX ORDER — LEVOTHYROXINE SODIUM 75 UG/1
TABLET ORAL
Qty: 90 TABLET | Refills: 1 | Status: SHIPPED | OUTPATIENT
Start: 2021-01-15 | End: 2021-07-15

## 2021-01-25 ENCOUNTER — OFFICE VISIT (OUTPATIENT)
Dept: FAMILY MEDICINE | Facility: CLINIC | Age: 66
End: 2021-01-25
Payer: MEDICARE

## 2021-01-25 VITALS — SYSTOLIC BLOOD PRESSURE: 138 MMHG | DIASTOLIC BLOOD PRESSURE: 86 MMHG | RESPIRATION RATE: 16 BRPM

## 2021-01-25 DIAGNOSIS — E78.2 MIXED HYPERLIPIDEMIA: Primary | ICD-10-CM

## 2021-01-25 DIAGNOSIS — T46.6X5A MYALGIA DUE TO STATIN: ICD-10-CM

## 2021-01-25 DIAGNOSIS — M79.10 MYALGIA DUE TO STATIN: ICD-10-CM

## 2021-01-25 DIAGNOSIS — E11.40 TYPE 2 DIABETES MELLITUS WITH DIABETIC NEUROPATHY, WITH LONG-TERM CURRENT USE OF INSULIN: ICD-10-CM

## 2021-01-25 DIAGNOSIS — J31.0 CHRONIC RHINITIS: ICD-10-CM

## 2021-01-25 DIAGNOSIS — Z79.4 TYPE 2 DIABETES MELLITUS WITH DIABETIC NEUROPATHY, WITH LONG-TERM CURRENT USE OF INSULIN: ICD-10-CM

## 2021-01-25 PROCEDURE — 99214 OFFICE O/P EST MOD 30 MIN: CPT | Mod: 95,,, | Performed by: INTERNAL MEDICINE

## 2021-01-25 PROCEDURE — 99214 PR OFFICE/OUTPT VISIT, EST, LEVL IV, 30-39 MIN: ICD-10-PCS | Mod: 95,,, | Performed by: INTERNAL MEDICINE

## 2021-01-25 RX ORDER — FLUTICASONE PROPIONATE 50 MCG
1 SPRAY, SUSPENSION (ML) NASAL DAILY PRN
Qty: 48 G | Refills: 1 | Status: SHIPPED | OUTPATIENT
Start: 2021-01-25 | End: 2021-06-01

## 2021-01-25 RX ORDER — METFORMIN HYDROCHLORIDE 1000 MG/1
1000 TABLET ORAL 2 TIMES DAILY WITH MEALS
Qty: 180 TABLET | Refills: 1 | Status: SHIPPED | OUTPATIENT
Start: 2021-01-25 | End: 2021-02-26

## 2021-01-25 RX ORDER — ROSUVASTATIN CALCIUM 5 MG/1
5 TABLET, COATED ORAL EVERY OTHER DAY
Qty: 45 TABLET | Refills: 1 | Status: SHIPPED | OUTPATIENT
Start: 2021-01-25 | End: 2021-07-12

## 2021-01-27 ENCOUNTER — OFFICE VISIT (OUTPATIENT)
Dept: ORTHOPEDICS | Facility: CLINIC | Age: 66
End: 2021-01-27
Payer: MEDICARE

## 2021-01-27 VITALS
HEIGHT: 65 IN | BODY MASS INDEX: 32.65 KG/M2 | RESPIRATION RATE: 17 BRPM | DIASTOLIC BLOOD PRESSURE: 80 MMHG | WEIGHT: 196 LBS | SYSTOLIC BLOOD PRESSURE: 129 MMHG | HEART RATE: 77 BPM

## 2021-01-27 DIAGNOSIS — M65.332 TRIGGER FINGER, LEFT MIDDLE FINGER: ICD-10-CM

## 2021-01-27 DIAGNOSIS — G56.03 CARPAL TUNNEL SYNDROME, BILATERAL: Primary | ICD-10-CM

## 2021-01-27 DIAGNOSIS — M65.331 TRIGGER FINGER, RIGHT MIDDLE FINGER: ICD-10-CM

## 2021-01-27 PROCEDURE — 99999 PR PBB SHADOW E&M-EST. PATIENT-LVL V: CPT | Mod: PBBFAC,,, | Performed by: ORTHOPAEDIC SURGERY

## 2021-01-27 PROCEDURE — 1125F AMNT PAIN NOTED PAIN PRSNT: CPT | Mod: S$GLB,,, | Performed by: ORTHOPAEDIC SURGERY

## 2021-01-27 PROCEDURE — 3079F DIAST BP 80-89 MM HG: CPT | Mod: S$GLB,,, | Performed by: ORTHOPAEDIC SURGERY

## 2021-01-27 PROCEDURE — 99999 PR PBB SHADOW E&M-EST. PATIENT-LVL V: ICD-10-PCS | Mod: PBBFAC,,, | Performed by: ORTHOPAEDIC SURGERY

## 2021-01-27 PROCEDURE — 3288F PR FALLS RISK ASSESSMENT DOCUMENTED: ICD-10-PCS | Mod: S$GLB,,, | Performed by: ORTHOPAEDIC SURGERY

## 2021-01-27 PROCEDURE — 3008F BODY MASS INDEX DOCD: CPT | Mod: S$GLB,,, | Performed by: ORTHOPAEDIC SURGERY

## 2021-01-27 PROCEDURE — 1159F PR MEDICATION LIST DOCUMENTED IN MEDICAL RECORD: ICD-10-PCS | Mod: S$GLB,,, | Performed by: ORTHOPAEDIC SURGERY

## 2021-01-27 PROCEDURE — 99213 PR OFFICE/OUTPT VISIT, EST, LEVL III, 20-29 MIN: ICD-10-PCS | Mod: 25,S$GLB,, | Performed by: ORTHOPAEDIC SURGERY

## 2021-01-27 PROCEDURE — 3074F PR MOST RECENT SYSTOLIC BLOOD PRESSURE < 130 MM HG: ICD-10-PCS | Mod: S$GLB,,, | Performed by: ORTHOPAEDIC SURGERY

## 2021-01-27 PROCEDURE — 99213 OFFICE O/P EST LOW 20 MIN: CPT | Mod: 25,S$GLB,, | Performed by: ORTHOPAEDIC SURGERY

## 2021-01-27 PROCEDURE — 3008F PR BODY MASS INDEX (BMI) DOCUMENTED: ICD-10-PCS | Mod: S$GLB,,, | Performed by: ORTHOPAEDIC SURGERY

## 2021-01-27 PROCEDURE — 1100F PR PT FALLS ASSESS DOC 2+ FALLS/FALL W/INJURY/YR: ICD-10-PCS | Mod: S$GLB,,, | Performed by: ORTHOPAEDIC SURGERY

## 2021-01-27 PROCEDURE — 1100F PTFALLS ASSESS-DOCD GE2>/YR: CPT | Mod: S$GLB,,, | Performed by: ORTHOPAEDIC SURGERY

## 2021-01-27 PROCEDURE — 1159F MED LIST DOCD IN RCRD: CPT | Mod: S$GLB,,, | Performed by: ORTHOPAEDIC SURGERY

## 2021-01-27 PROCEDURE — 20550 TENDON SHEATH: ICD-10-PCS | Mod: 50,S$GLB,, | Performed by: ORTHOPAEDIC SURGERY

## 2021-01-27 PROCEDURE — 3288F FALL RISK ASSESSMENT DOCD: CPT | Mod: S$GLB,,, | Performed by: ORTHOPAEDIC SURGERY

## 2021-01-27 PROCEDURE — 1125F PR PAIN SEVERITY QUANTIFIED, PAIN PRESENT: ICD-10-PCS | Mod: S$GLB,,, | Performed by: ORTHOPAEDIC SURGERY

## 2021-01-27 PROCEDURE — 3079F PR MOST RECENT DIASTOLIC BLOOD PRESSURE 80-89 MM HG: ICD-10-PCS | Mod: S$GLB,,, | Performed by: ORTHOPAEDIC SURGERY

## 2021-01-27 PROCEDURE — 3074F SYST BP LT 130 MM HG: CPT | Mod: S$GLB,,, | Performed by: ORTHOPAEDIC SURGERY

## 2021-01-27 PROCEDURE — 20550 NJX 1 TENDON SHEATH/LIGAMENT: CPT | Mod: 50,S$GLB,, | Performed by: ORTHOPAEDIC SURGERY

## 2021-01-27 RX ADMIN — TRIAMCINOLONE ACETONIDE 40 MG: 40 INJECTION, SUSPENSION INTRA-ARTICULAR; INTRAMUSCULAR at 01:01

## 2021-01-28 DIAGNOSIS — Z79.4 TYPE 2 DIABETES MELLITUS WITH DIABETIC NEUROPATHY, WITH LONG-TERM CURRENT USE OF INSULIN: ICD-10-CM

## 2021-01-28 DIAGNOSIS — E11.40 TYPE 2 DIABETES MELLITUS WITH DIABETIC NEUROPATHY, WITH LONG-TERM CURRENT USE OF INSULIN: ICD-10-CM

## 2021-01-28 RX ORDER — REPAGLINIDE 1 MG/1
1 TABLET ORAL
Qty: 90 TABLET | Refills: 3 | Status: SHIPPED | OUTPATIENT
Start: 2021-01-28 | End: 2021-02-26

## 2021-02-01 RX ORDER — TRIAMCINOLONE ACETONIDE 40 MG/ML
40 INJECTION, SUSPENSION INTRA-ARTICULAR; INTRAMUSCULAR
Status: DISCONTINUED | OUTPATIENT
Start: 2021-01-27 | End: 2021-02-01 | Stop reason: HOSPADM

## 2021-02-04 LAB
LEFT EYE DM RETINOPATHY: NEGATIVE
RIGHT EYE DM RETINOPATHY: NEGATIVE

## 2021-02-17 ENCOUNTER — PATIENT OUTREACH (OUTPATIENT)
Dept: ADMINISTRATIVE | Facility: HOSPITAL | Age: 66
End: 2021-02-17

## 2021-02-24 ENCOUNTER — LAB VISIT (OUTPATIENT)
Dept: LAB | Facility: HOSPITAL | Age: 66
End: 2021-02-24
Attending: NURSE PRACTITIONER
Payer: MEDICARE

## 2021-02-24 DIAGNOSIS — E11.40 TYPE 2 DIABETES MELLITUS WITH DIABETIC NEUROPATHY, WITH LONG-TERM CURRENT USE OF INSULIN: ICD-10-CM

## 2021-02-24 DIAGNOSIS — Z79.4 TYPE 2 DIABETES MELLITUS WITH DIABETIC NEUROPATHY, WITH LONG-TERM CURRENT USE OF INSULIN: ICD-10-CM

## 2021-02-24 PROCEDURE — 80061 LIPID PANEL: CPT | Performed by: NURSE PRACTITIONER

## 2021-02-24 PROCEDURE — 83036 HEMOGLOBIN GLYCOSYLATED A1C: CPT | Performed by: NURSE PRACTITIONER

## 2021-02-24 PROCEDURE — 80053 COMPREHEN METABOLIC PANEL: CPT | Performed by: NURSE PRACTITIONER

## 2021-02-24 PROCEDURE — 84443 ASSAY THYROID STIM HORMONE: CPT | Performed by: NURSE PRACTITIONER

## 2021-02-24 PROCEDURE — 36415 COLL VENOUS BLD VENIPUNCTURE: CPT | Mod: PN

## 2021-02-25 LAB
ALBUMIN SERPL BCP-MCNC: 4 G/DL (ref 3.5–5.2)
ALP SERPL-CCNC: 76 U/L (ref 55–135)
ALT SERPL W/O P-5'-P-CCNC: 43 U/L (ref 10–44)
ANION GAP SERPL CALC-SCNC: 13 MMOL/L (ref 8–16)
AST SERPL-CCNC: 52 U/L (ref 10–40)
BILIRUB SERPL-MCNC: 0.3 MG/DL (ref 0.1–1)
BUN SERPL-MCNC: 16 MG/DL (ref 8–23)
CALCIUM SERPL-MCNC: 9.2 MG/DL (ref 8.7–10.5)
CHLORIDE SERPL-SCNC: 105 MMOL/L (ref 95–110)
CHOLEST SERPL-MCNC: 162 MG/DL (ref 120–199)
CHOLEST/HDLC SERPL: 3 {RATIO} (ref 2–5)
CO2 SERPL-SCNC: 22 MMOL/L (ref 23–29)
CREAT SERPL-MCNC: 1.1 MG/DL (ref 0.5–1.4)
EST. GFR  (AFRICAN AMERICAN): >60 ML/MIN/1.73 M^2
EST. GFR  (NON AFRICAN AMERICAN): 52.4 ML/MIN/1.73 M^2
ESTIMATED AVG GLUCOSE: 154 MG/DL (ref 68–131)
GLUCOSE SERPL-MCNC: 117 MG/DL (ref 70–110)
HBA1C MFR BLD: 7 % (ref 4–5.6)
HDLC SERPL-MCNC: 54 MG/DL (ref 40–75)
HDLC SERPL: 33.3 % (ref 20–50)
LDLC SERPL CALC-MCNC: 83.2 MG/DL (ref 63–159)
NONHDLC SERPL-MCNC: 108 MG/DL
POTASSIUM SERPL-SCNC: 4.6 MMOL/L (ref 3.5–5.1)
PROT SERPL-MCNC: 7.3 G/DL (ref 6–8.4)
SODIUM SERPL-SCNC: 140 MMOL/L (ref 136–145)
TRIGL SERPL-MCNC: 124 MG/DL (ref 30–150)
TSH SERPL DL<=0.005 MIU/L-ACNC: 2.02 UIU/ML (ref 0.4–4)

## 2021-02-26 ENCOUNTER — OFFICE VISIT (OUTPATIENT)
Dept: ENDOCRINOLOGY | Facility: CLINIC | Age: 66
End: 2021-02-26
Payer: MEDICARE

## 2021-02-26 VITALS
BODY MASS INDEX: 33.88 KG/M2 | WEIGHT: 203.38 LBS | DIASTOLIC BLOOD PRESSURE: 80 MMHG | HEART RATE: 82 BPM | HEIGHT: 65 IN | SYSTOLIC BLOOD PRESSURE: 130 MMHG

## 2021-02-26 DIAGNOSIS — B37.31 VAGINAL YEAST INFECTION: ICD-10-CM

## 2021-02-26 DIAGNOSIS — E11.9 TYPE 2 DIABETES MELLITUS WITHOUT COMPLICATION, WITHOUT LONG-TERM CURRENT USE OF INSULIN: ICD-10-CM

## 2021-02-26 DIAGNOSIS — R07.9 CHEST PAIN, UNSPECIFIED TYPE: ICD-10-CM

## 2021-02-26 DIAGNOSIS — Z79.4 TYPE 2 DIABETES MELLITUS WITH STAGE 3A CHRONIC KIDNEY DISEASE, WITH LONG-TERM CURRENT USE OF INSULIN: Primary | ICD-10-CM

## 2021-02-26 DIAGNOSIS — Z79.4 TYPE 2 DIABETES MELLITUS WITH DIABETIC NEUROPATHY, WITH LONG-TERM CURRENT USE OF INSULIN: ICD-10-CM

## 2021-02-26 DIAGNOSIS — E78.5 HYPERLIPIDEMIA, UNSPECIFIED HYPERLIPIDEMIA TYPE: ICD-10-CM

## 2021-02-26 DIAGNOSIS — I51.89 DIASTOLIC DYSFUNCTION: ICD-10-CM

## 2021-02-26 DIAGNOSIS — I10 ESSENTIAL HYPERTENSION: ICD-10-CM

## 2021-02-26 DIAGNOSIS — E03.4 HYPOTHYROIDISM DUE TO ACQUIRED ATROPHY OF THYROID: ICD-10-CM

## 2021-02-26 DIAGNOSIS — E11.40 TYPE 2 DIABETES MELLITUS WITH DIABETIC NEUROPATHY, WITH LONG-TERM CURRENT USE OF INSULIN: ICD-10-CM

## 2021-02-26 DIAGNOSIS — N18.31 TYPE 2 DIABETES MELLITUS WITH STAGE 3A CHRONIC KIDNEY DISEASE, WITH LONG-TERM CURRENT USE OF INSULIN: Primary | ICD-10-CM

## 2021-02-26 DIAGNOSIS — E11.22 TYPE 2 DIABETES MELLITUS WITH STAGE 3A CHRONIC KIDNEY DISEASE, WITH LONG-TERM CURRENT USE OF INSULIN: Primary | ICD-10-CM

## 2021-02-26 PROCEDURE — 3008F PR BODY MASS INDEX (BMI) DOCUMENTED: ICD-10-PCS | Mod: S$GLB,,, | Performed by: NURSE PRACTITIONER

## 2021-02-26 PROCEDURE — 1159F PR MEDICATION LIST DOCUMENTED IN MEDICAL RECORD: ICD-10-PCS | Mod: S$GLB,,, | Performed by: NURSE PRACTITIONER

## 2021-02-26 PROCEDURE — 99999 PR PBB SHADOW E&M-EST. PATIENT-LVL V: ICD-10-PCS | Mod: PBBFAC,,, | Performed by: NURSE PRACTITIONER

## 2021-02-26 PROCEDURE — 1159F MED LIST DOCD IN RCRD: CPT | Mod: S$GLB,,, | Performed by: NURSE PRACTITIONER

## 2021-02-26 PROCEDURE — 99999 PR PBB SHADOW E&M-EST. PATIENT-LVL V: CPT | Mod: PBBFAC,,, | Performed by: NURSE PRACTITIONER

## 2021-02-26 PROCEDURE — 3051F PR MOST RECENT HEMOGLOBIN A1C LEVEL 7.0 - < 8.0%: ICD-10-PCS | Mod: S$GLB,,, | Performed by: NURSE PRACTITIONER

## 2021-02-26 PROCEDURE — 3008F BODY MASS INDEX DOCD: CPT | Mod: S$GLB,,, | Performed by: NURSE PRACTITIONER

## 2021-02-26 PROCEDURE — 99214 OFFICE O/P EST MOD 30 MIN: CPT | Mod: S$GLB,,, | Performed by: NURSE PRACTITIONER

## 2021-02-26 PROCEDURE — 3075F PR MOST RECENT SYSTOLIC BLOOD PRESS GE 130-139MM HG: ICD-10-PCS | Mod: S$GLB,,, | Performed by: NURSE PRACTITIONER

## 2021-02-26 PROCEDURE — 3288F FALL RISK ASSESSMENT DOCD: CPT | Mod: S$GLB,,, | Performed by: NURSE PRACTITIONER

## 2021-02-26 PROCEDURE — 1125F AMNT PAIN NOTED PAIN PRSNT: CPT | Mod: S$GLB,,, | Performed by: NURSE PRACTITIONER

## 2021-02-26 PROCEDURE — 1101F PT FALLS ASSESS-DOCD LE1/YR: CPT | Mod: S$GLB,,, | Performed by: NURSE PRACTITIONER

## 2021-02-26 PROCEDURE — 1125F PR PAIN SEVERITY QUANTIFIED, PAIN PRESENT: ICD-10-PCS | Mod: S$GLB,,, | Performed by: NURSE PRACTITIONER

## 2021-02-26 PROCEDURE — 99214 PR OFFICE/OUTPT VISIT, EST, LEVL IV, 30-39 MIN: ICD-10-PCS | Mod: S$GLB,,, | Performed by: NURSE PRACTITIONER

## 2021-02-26 PROCEDURE — 3288F PR FALLS RISK ASSESSMENT DOCUMENTED: ICD-10-PCS | Mod: S$GLB,,, | Performed by: NURSE PRACTITIONER

## 2021-02-26 PROCEDURE — 3079F DIAST BP 80-89 MM HG: CPT | Mod: S$GLB,,, | Performed by: NURSE PRACTITIONER

## 2021-02-26 PROCEDURE — 3079F PR MOST RECENT DIASTOLIC BLOOD PRESSURE 80-89 MM HG: ICD-10-PCS | Mod: S$GLB,,, | Performed by: NURSE PRACTITIONER

## 2021-02-26 PROCEDURE — 3075F SYST BP GE 130 - 139MM HG: CPT | Mod: S$GLB,,, | Performed by: NURSE PRACTITIONER

## 2021-02-26 PROCEDURE — 1101F PR PT FALLS ASSESS DOC 0-1 FALLS W/OUT INJ PAST YR: ICD-10-PCS | Mod: S$GLB,,, | Performed by: NURSE PRACTITIONER

## 2021-02-26 PROCEDURE — 3051F HG A1C>EQUAL 7.0%<8.0%: CPT | Mod: S$GLB,,, | Performed by: NURSE PRACTITIONER

## 2021-02-26 RX ORDER — LOPERAMIDE HCL 2 MG
2 TABLET ORAL
COMMUNITY
End: 2021-06-01

## 2021-02-26 RX ORDER — FLUCONAZOLE 150 MG/1
150 TABLET ORAL DAILY
Qty: 1 TABLET | Refills: 0 | Status: SHIPPED | OUTPATIENT
Start: 2021-02-26 | End: 2021-02-27

## 2021-02-26 RX ORDER — BISACODYL 5 MG
TABLET, DELAYED RELEASE (ENTERIC COATED) ORAL
COMMUNITY
Start: 2021-02-03 | End: 2021-06-01

## 2021-02-26 RX ORDER — INSULIN GLARGINE 300 U/ML
18 INJECTION, SOLUTION SUBCUTANEOUS NIGHTLY
Qty: 4.5 ML | Refills: 6
Start: 2021-02-26 | End: 2021-03-09 | Stop reason: SDUPTHER

## 2021-02-26 RX ORDER — POLYETHYLENE GLYCOL 3350 17 G/17G
POWDER, FOR SOLUTION ORAL
COMMUNITY
Start: 2021-02-03 | End: 2021-06-01

## 2021-02-26 RX ORDER — PROMETHAZINE HYDROCHLORIDE 25 MG/1
25 TABLET ORAL
COMMUNITY
Start: 2021-02-03 | End: 2021-06-01

## 2021-02-26 RX ORDER — METFORMIN HYDROCHLORIDE 500 MG/1
1000 TABLET, EXTENDED RELEASE ORAL
Qty: 360 TABLET | Refills: 1 | Status: SHIPPED | OUTPATIENT
Start: 2021-02-26 | End: 2021-06-01

## 2021-02-26 RX ORDER — REPAGLINIDE 2 MG/1
2 TABLET ORAL
Qty: 90 TABLET | Refills: 1 | Status: SHIPPED | OUTPATIENT
Start: 2021-02-26 | End: 2021-07-23

## 2021-03-03 ENCOUNTER — IMMUNIZATION (OUTPATIENT)
Dept: FAMILY MEDICINE | Facility: CLINIC | Age: 66
End: 2021-03-03
Payer: MEDICARE

## 2021-03-03 DIAGNOSIS — Z23 NEED FOR VACCINATION: Primary | ICD-10-CM

## 2021-03-03 PROCEDURE — 0031A COVID-19,VECTOR-NR,RS-AD26,PF,0.5 ML DOSE VACCINE (J&J): CPT | Mod: PBBFAC | Performed by: FAMILY MEDICINE

## 2021-03-09 DIAGNOSIS — E11.9 TYPE 2 DIABETES MELLITUS WITHOUT COMPLICATION, WITHOUT LONG-TERM CURRENT USE OF INSULIN: ICD-10-CM

## 2021-03-09 DIAGNOSIS — Z79.4 TYPE 2 DIABETES MELLITUS WITH STAGE 3A CHRONIC KIDNEY DISEASE, WITH LONG-TERM CURRENT USE OF INSULIN: ICD-10-CM

## 2021-03-09 DIAGNOSIS — Z78.0 MENOPAUSE: Primary | ICD-10-CM

## 2021-03-09 DIAGNOSIS — N18.31 TYPE 2 DIABETES MELLITUS WITH STAGE 3A CHRONIC KIDNEY DISEASE, WITH LONG-TERM CURRENT USE OF INSULIN: ICD-10-CM

## 2021-03-09 DIAGNOSIS — E11.22 TYPE 2 DIABETES MELLITUS WITH STAGE 3A CHRONIC KIDNEY DISEASE, WITH LONG-TERM CURRENT USE OF INSULIN: ICD-10-CM

## 2021-03-09 RX ORDER — INSULIN GLARGINE 300 U/ML
INJECTION, SOLUTION SUBCUTANEOUS
Qty: 4.5 ML | Refills: 6 | Status: SHIPPED | OUTPATIENT
Start: 2021-03-09 | End: 2022-02-25

## 2021-03-10 ENCOUNTER — PATIENT MESSAGE (OUTPATIENT)
Dept: ENDOCRINOLOGY | Facility: CLINIC | Age: 66
End: 2021-03-10

## 2021-03-12 RX ORDER — LANCETS 33 GAUGE
EACH MISCELLANEOUS
Qty: 300 EACH | Refills: 3 | Status: SHIPPED | OUTPATIENT
Start: 2021-03-12 | End: 2022-12-05 | Stop reason: SDUPTHER

## 2021-04-09 ENCOUNTER — TELEPHONE (OUTPATIENT)
Dept: ORTHOPEDICS | Facility: CLINIC | Age: 66
End: 2021-04-09

## 2021-04-12 ENCOUNTER — OFFICE VISIT (OUTPATIENT)
Dept: ORTHOPEDICS | Facility: CLINIC | Age: 66
End: 2021-04-12
Payer: MEDICARE

## 2021-04-12 VITALS — BODY MASS INDEX: 33.82 KG/M2 | WEIGHT: 203 LBS | HEIGHT: 65 IN

## 2021-04-12 DIAGNOSIS — G56.03 CARPAL TUNNEL SYNDROME, BILATERAL: Primary | ICD-10-CM

## 2021-04-12 DIAGNOSIS — M65.30 TRIGGER FINGER OF LEFT HAND, UNSPECIFIED FINGER: ICD-10-CM

## 2021-04-12 PROCEDURE — 3008F PR BODY MASS INDEX (BMI) DOCUMENTED: ICD-10-PCS | Mod: ,,, | Performed by: ORTHOPAEDIC SURGERY

## 2021-04-12 PROCEDURE — 1125F AMNT PAIN NOTED PAIN PRSNT: CPT | Mod: ,,, | Performed by: ORTHOPAEDIC SURGERY

## 2021-04-12 PROCEDURE — 1159F PR MEDICATION LIST DOCUMENTED IN MEDICAL RECORD: ICD-10-PCS | Mod: ,,, | Performed by: ORTHOPAEDIC SURGERY

## 2021-04-12 PROCEDURE — 1101F PR PT FALLS ASSESS DOC 0-1 FALLS W/OUT INJ PAST YR: ICD-10-PCS | Mod: ,,, | Performed by: ORTHOPAEDIC SURGERY

## 2021-04-12 PROCEDURE — 1101F PT FALLS ASSESS-DOCD LE1/YR: CPT | Mod: ,,, | Performed by: ORTHOPAEDIC SURGERY

## 2021-04-12 PROCEDURE — 1125F PR PAIN SEVERITY QUANTIFIED, PAIN PRESENT: ICD-10-PCS | Mod: ,,, | Performed by: ORTHOPAEDIC SURGERY

## 2021-04-12 PROCEDURE — 3008F BODY MASS INDEX DOCD: CPT | Mod: ,,, | Performed by: ORTHOPAEDIC SURGERY

## 2021-04-12 PROCEDURE — 3288F PR FALLS RISK ASSESSMENT DOCUMENTED: ICD-10-PCS | Mod: ,,, | Performed by: ORTHOPAEDIC SURGERY

## 2021-04-12 PROCEDURE — 3288F FALL RISK ASSESSMENT DOCD: CPT | Mod: ,,, | Performed by: ORTHOPAEDIC SURGERY

## 2021-04-12 PROCEDURE — 99213 PR OFFICE/OUTPT VISIT, EST, LEVL III, 20-29 MIN: ICD-10-PCS | Mod: 95,,, | Performed by: ORTHOPAEDIC SURGERY

## 2021-04-12 PROCEDURE — 1159F MED LIST DOCD IN RCRD: CPT | Mod: ,,, | Performed by: ORTHOPAEDIC SURGERY

## 2021-04-12 PROCEDURE — 99213 OFFICE O/P EST LOW 20 MIN: CPT | Mod: 95,,, | Performed by: ORTHOPAEDIC SURGERY

## 2021-04-13 ENCOUNTER — OFFICE VISIT (OUTPATIENT)
Dept: CARDIOLOGY | Facility: CLINIC | Age: 66
End: 2021-04-13
Payer: MEDICARE

## 2021-04-13 VITALS
WEIGHT: 206.13 LBS | HEART RATE: 79 BPM | DIASTOLIC BLOOD PRESSURE: 78 MMHG | HEIGHT: 65 IN | BODY MASS INDEX: 34.34 KG/M2 | SYSTOLIC BLOOD PRESSURE: 122 MMHG

## 2021-04-13 DIAGNOSIS — E78.2 MIXED HYPERLIPIDEMIA: ICD-10-CM

## 2021-04-13 DIAGNOSIS — F41.9 ANXIETY: ICD-10-CM

## 2021-04-13 DIAGNOSIS — N18.31 TYPE 2 DIABETES MELLITUS WITH STAGE 3A CHRONIC KIDNEY DISEASE, WITH LONG-TERM CURRENT USE OF INSULIN: ICD-10-CM

## 2021-04-13 DIAGNOSIS — Z79.4 TYPE 2 DIABETES MELLITUS WITH STAGE 3A CHRONIC KIDNEY DISEASE, WITH LONG-TERM CURRENT USE OF INSULIN: ICD-10-CM

## 2021-04-13 DIAGNOSIS — I20.9 ANGINA PECTORIS: ICD-10-CM

## 2021-04-13 DIAGNOSIS — I51.89 DIASTOLIC DYSFUNCTION: ICD-10-CM

## 2021-04-13 DIAGNOSIS — I10 ESSENTIAL HYPERTENSION: ICD-10-CM

## 2021-04-13 DIAGNOSIS — E11.22 TYPE 2 DIABETES MELLITUS WITH STAGE 3A CHRONIC KIDNEY DISEASE, WITH LONG-TERM CURRENT USE OF INSULIN: ICD-10-CM

## 2021-04-13 DIAGNOSIS — N18.2 CHRONIC RENAL INSUFFICIENCY, STAGE II (MILD): ICD-10-CM

## 2021-04-13 DIAGNOSIS — R06.09 DOE (DYSPNEA ON EXERTION): ICD-10-CM

## 2021-04-13 PROCEDURE — 3008F PR BODY MASS INDEX (BMI) DOCUMENTED: ICD-10-PCS | Mod: S$GLB,,, | Performed by: INTERNAL MEDICINE

## 2021-04-13 PROCEDURE — 3008F BODY MASS INDEX DOCD: CPT | Mod: S$GLB,,, | Performed by: INTERNAL MEDICINE

## 2021-04-13 PROCEDURE — 1125F AMNT PAIN NOTED PAIN PRSNT: CPT | Mod: S$GLB,,, | Performed by: INTERNAL MEDICINE

## 2021-04-13 PROCEDURE — 93000 EKG 12-LEAD: ICD-10-PCS | Mod: S$GLB,,, | Performed by: INTERNAL MEDICINE

## 2021-04-13 PROCEDURE — 93000 ELECTROCARDIOGRAM COMPLETE: CPT | Mod: S$GLB,,, | Performed by: INTERNAL MEDICINE

## 2021-04-13 PROCEDURE — 99204 OFFICE O/P NEW MOD 45 MIN: CPT | Mod: 25,S$GLB,, | Performed by: INTERNAL MEDICINE

## 2021-04-13 PROCEDURE — 99204 PR OFFICE/OUTPT VISIT, NEW, LEVL IV, 45-59 MIN: ICD-10-PCS | Mod: 25,S$GLB,, | Performed by: INTERNAL MEDICINE

## 2021-04-13 PROCEDURE — 99999 PR PBB SHADOW E&M-EST. PATIENT-LVL III: CPT | Mod: PBBFAC,,, | Performed by: INTERNAL MEDICINE

## 2021-04-13 PROCEDURE — 3288F PR FALLS RISK ASSESSMENT DOCUMENTED: ICD-10-PCS | Mod: S$GLB,,, | Performed by: INTERNAL MEDICINE

## 2021-04-13 PROCEDURE — 99999 PR PBB SHADOW E&M-EST. PATIENT-LVL III: ICD-10-PCS | Mod: PBBFAC,,, | Performed by: INTERNAL MEDICINE

## 2021-04-13 PROCEDURE — 1159F PR MEDICATION LIST DOCUMENTED IN MEDICAL RECORD: ICD-10-PCS | Mod: S$GLB,,, | Performed by: INTERNAL MEDICINE

## 2021-04-13 PROCEDURE — 1101F PR PT FALLS ASSESS DOC 0-1 FALLS W/OUT INJ PAST YR: ICD-10-PCS | Mod: S$GLB,,, | Performed by: INTERNAL MEDICINE

## 2021-04-13 PROCEDURE — 3051F HG A1C>EQUAL 7.0%<8.0%: CPT | Mod: S$GLB,,, | Performed by: INTERNAL MEDICINE

## 2021-04-13 PROCEDURE — 1159F MED LIST DOCD IN RCRD: CPT | Mod: S$GLB,,, | Performed by: INTERNAL MEDICINE

## 2021-04-13 PROCEDURE — 1101F PT FALLS ASSESS-DOCD LE1/YR: CPT | Mod: S$GLB,,, | Performed by: INTERNAL MEDICINE

## 2021-04-13 PROCEDURE — 3051F PR MOST RECENT HEMOGLOBIN A1C LEVEL 7.0 - < 8.0%: ICD-10-PCS | Mod: S$GLB,,, | Performed by: INTERNAL MEDICINE

## 2021-04-13 PROCEDURE — 1125F PR PAIN SEVERITY QUANTIFIED, PAIN PRESENT: ICD-10-PCS | Mod: S$GLB,,, | Performed by: INTERNAL MEDICINE

## 2021-04-13 PROCEDURE — 3288F FALL RISK ASSESSMENT DOCD: CPT | Mod: S$GLB,,, | Performed by: INTERNAL MEDICINE

## 2021-04-20 ENCOUNTER — PATIENT OUTREACH (OUTPATIENT)
Dept: ADMINISTRATIVE | Facility: HOSPITAL | Age: 66
End: 2021-04-20

## 2021-04-21 DIAGNOSIS — F41.9 ANXIETY: ICD-10-CM

## 2021-04-21 RX ORDER — CLONAZEPAM 0.5 MG/1
TABLET ORAL
Qty: 30 TABLET | Refills: 0 | Status: SHIPPED | OUTPATIENT
Start: 2021-04-21 | End: 2021-04-21 | Stop reason: SDUPTHER

## 2021-04-21 RX ORDER — CLONAZEPAM 0.5 MG/1
TABLET ORAL
Qty: 30 TABLET | Refills: 0 | Status: SHIPPED | OUTPATIENT
Start: 2021-04-21 | End: 2021-06-30

## 2021-04-23 ENCOUNTER — PROCEDURE VISIT (OUTPATIENT)
Dept: NEUROLOGY | Facility: CLINIC | Age: 66
End: 2021-04-23
Payer: MEDICARE

## 2021-04-23 DIAGNOSIS — M54.12 CERVICAL RADICULOPATHY: ICD-10-CM

## 2021-04-23 DIAGNOSIS — G56.03 CARPAL TUNNEL SYNDROME, BILATERAL: Primary | ICD-10-CM

## 2021-04-23 DIAGNOSIS — G60.9 IDIOPATHIC PERIPHERAL NEUROPATHY: ICD-10-CM

## 2021-04-23 PROCEDURE — 95912 NRV CNDJ TEST 11-12 STUDIES: CPT | Mod: S$GLB,,, | Performed by: PSYCHIATRY & NEUROLOGY

## 2021-04-23 PROCEDURE — 95886 PR EMG COMPLETE, W/ NERVE CONDUCTION STUDIES, 5+ MUSCLES: ICD-10-PCS | Mod: S$GLB,,, | Performed by: PSYCHIATRY & NEUROLOGY

## 2021-04-23 PROCEDURE — 95912 PR NERVE CONDUCTION STUDY; 11 -12 STUDIES: ICD-10-PCS | Mod: S$GLB,,, | Performed by: PSYCHIATRY & NEUROLOGY

## 2021-04-23 PROCEDURE — 95886 MUSC TEST DONE W/N TEST COMP: CPT | Mod: S$GLB,,, | Performed by: PSYCHIATRY & NEUROLOGY

## 2021-04-27 RX ORDER — ORPHENADRINE CITRATE 100 MG/1
100 TABLET, EXTENDED RELEASE ORAL 2 TIMES DAILY PRN
Qty: 90 TABLET | Refills: 0 | Status: SHIPPED | OUTPATIENT
Start: 2021-04-27 | End: 2021-06-30

## 2021-05-04 ENCOUNTER — TELEPHONE (OUTPATIENT)
Dept: ORTHOPEDICS | Facility: CLINIC | Age: 66
End: 2021-05-04

## 2021-05-11 ENCOUNTER — PATIENT MESSAGE (OUTPATIENT)
Dept: ENDOCRINOLOGY | Facility: CLINIC | Age: 66
End: 2021-05-11

## 2021-05-11 DIAGNOSIS — E11.9 TYPE 2 DIABETES MELLITUS WITHOUT COMPLICATION, WITHOUT LONG-TERM CURRENT USE OF INSULIN: ICD-10-CM

## 2021-05-11 RX ORDER — PEN NEEDLE, DIABETIC 30 GX3/16"
NEEDLE, DISPOSABLE MISCELLANEOUS
Qty: 50 EACH | Refills: 6 | Status: SHIPPED | OUTPATIENT
Start: 2021-05-11 | End: 2022-05-31

## 2021-05-17 ENCOUNTER — OFFICE VISIT (OUTPATIENT)
Dept: ORTHOPEDICS | Facility: CLINIC | Age: 66
End: 2021-05-17
Payer: MEDICARE

## 2021-05-17 ENCOUNTER — TELEPHONE (OUTPATIENT)
Dept: FAMILY MEDICINE | Facility: CLINIC | Age: 66
End: 2021-05-17

## 2021-05-17 VITALS
DIASTOLIC BLOOD PRESSURE: 89 MMHG | WEIGHT: 206 LBS | SYSTOLIC BLOOD PRESSURE: 162 MMHG | HEIGHT: 65 IN | HEART RATE: 77 BPM | BODY MASS INDEX: 34.32 KG/M2

## 2021-05-17 DIAGNOSIS — M65.312 TRIGGER FINGER OF LEFT THUMB: Primary | ICD-10-CM

## 2021-05-17 DIAGNOSIS — M65.332 TRIGGER FINGER, LEFT MIDDLE FINGER: ICD-10-CM

## 2021-05-17 DIAGNOSIS — G56.02 CARPAL TUNNEL SYNDROME ON LEFT: ICD-10-CM

## 2021-05-17 DIAGNOSIS — M65.342 TRIGGER FINGER, LEFT RING FINGER: ICD-10-CM

## 2021-05-17 PROCEDURE — 1101F PT FALLS ASSESS-DOCD LE1/YR: CPT | Mod: S$GLB,,, | Performed by: ORTHOPAEDIC SURGERY

## 2021-05-17 PROCEDURE — 3008F BODY MASS INDEX DOCD: CPT | Mod: S$GLB,,, | Performed by: ORTHOPAEDIC SURGERY

## 2021-05-17 PROCEDURE — 99999 PR PBB SHADOW E&M-EST. PATIENT-LVL V: CPT | Mod: PBBFAC,,, | Performed by: ORTHOPAEDIC SURGERY

## 2021-05-17 PROCEDURE — 3288F PR FALLS RISK ASSESSMENT DOCUMENTED: ICD-10-PCS | Mod: S$GLB,,, | Performed by: ORTHOPAEDIC SURGERY

## 2021-05-17 PROCEDURE — 3008F PR BODY MASS INDEX (BMI) DOCUMENTED: ICD-10-PCS | Mod: S$GLB,,, | Performed by: ORTHOPAEDIC SURGERY

## 2021-05-17 PROCEDURE — 3288F FALL RISK ASSESSMENT DOCD: CPT | Mod: S$GLB,,, | Performed by: ORTHOPAEDIC SURGERY

## 2021-05-17 PROCEDURE — 1159F MED LIST DOCD IN RCRD: CPT | Mod: S$GLB,,, | Performed by: ORTHOPAEDIC SURGERY

## 2021-05-17 PROCEDURE — 1101F PR PT FALLS ASSESS DOC 0-1 FALLS W/OUT INJ PAST YR: ICD-10-PCS | Mod: S$GLB,,, | Performed by: ORTHOPAEDIC SURGERY

## 2021-05-17 PROCEDURE — 99213 PR OFFICE/OUTPT VISIT, EST, LEVL III, 20-29 MIN: ICD-10-PCS | Mod: S$GLB,,, | Performed by: ORTHOPAEDIC SURGERY

## 2021-05-17 PROCEDURE — 1125F PR PAIN SEVERITY QUANTIFIED, PAIN PRESENT: ICD-10-PCS | Mod: S$GLB,,, | Performed by: ORTHOPAEDIC SURGERY

## 2021-05-17 PROCEDURE — 1159F PR MEDICATION LIST DOCUMENTED IN MEDICAL RECORD: ICD-10-PCS | Mod: S$GLB,,, | Performed by: ORTHOPAEDIC SURGERY

## 2021-05-17 PROCEDURE — 99213 OFFICE O/P EST LOW 20 MIN: CPT | Mod: S$GLB,,, | Performed by: ORTHOPAEDIC SURGERY

## 2021-05-17 PROCEDURE — 1125F AMNT PAIN NOTED PAIN PRSNT: CPT | Mod: S$GLB,,, | Performed by: ORTHOPAEDIC SURGERY

## 2021-05-17 PROCEDURE — 99999 PR PBB SHADOW E&M-EST. PATIENT-LVL V: ICD-10-PCS | Mod: PBBFAC,,, | Performed by: ORTHOPAEDIC SURGERY

## 2021-05-18 ENCOUNTER — TELEPHONE (OUTPATIENT)
Dept: ORTHOPEDICS | Facility: CLINIC | Age: 66
End: 2021-05-18

## 2021-05-19 ENCOUNTER — PATIENT MESSAGE (OUTPATIENT)
Dept: FAMILY MEDICINE | Facility: CLINIC | Age: 66
End: 2021-05-19

## 2021-05-19 ENCOUNTER — TELEPHONE (OUTPATIENT)
Dept: FAMILY MEDICINE | Facility: CLINIC | Age: 66
End: 2021-05-19

## 2021-05-25 ENCOUNTER — LAB VISIT (OUTPATIENT)
Dept: LAB | Facility: HOSPITAL | Age: 66
End: 2021-05-25
Payer: MEDICARE

## 2021-05-25 DIAGNOSIS — Z79.4 TYPE 2 DIABETES MELLITUS WITH STAGE 3A CHRONIC KIDNEY DISEASE, WITH LONG-TERM CURRENT USE OF INSULIN: ICD-10-CM

## 2021-05-25 DIAGNOSIS — E11.22 TYPE 2 DIABETES MELLITUS WITH STAGE 3A CHRONIC KIDNEY DISEASE, WITH LONG-TERM CURRENT USE OF INSULIN: ICD-10-CM

## 2021-05-25 DIAGNOSIS — N18.31 TYPE 2 DIABETES MELLITUS WITH STAGE 3A CHRONIC KIDNEY DISEASE, WITH LONG-TERM CURRENT USE OF INSULIN: ICD-10-CM

## 2021-05-25 LAB
ALBUMIN SERPL BCP-MCNC: 3.8 G/DL (ref 3.5–5.2)
ALP SERPL-CCNC: 94 U/L (ref 55–135)
ALT SERPL W/O P-5'-P-CCNC: 48 U/L (ref 10–44)
ANION GAP SERPL CALC-SCNC: 13 MMOL/L (ref 8–16)
AST SERPL-CCNC: 71 U/L (ref 10–40)
BILIRUB SERPL-MCNC: 0.4 MG/DL (ref 0.1–1)
BUN SERPL-MCNC: 12 MG/DL (ref 8–23)
CALCIUM SERPL-MCNC: 9.6 MG/DL (ref 8.7–10.5)
CHLORIDE SERPL-SCNC: 101 MMOL/L (ref 95–110)
CO2 SERPL-SCNC: 21 MMOL/L (ref 23–29)
CREAT SERPL-MCNC: 1.3 MG/DL (ref 0.5–1.4)
EST. GFR  (AFRICAN AMERICAN): 49.4 ML/MIN/1.73 M^2
EST. GFR  (NON AFRICAN AMERICAN): 42.9 ML/MIN/1.73 M^2
ESTIMATED AVG GLUCOSE: 189 MG/DL (ref 68–131)
GLUCOSE SERPL-MCNC: 302 MG/DL (ref 70–110)
HBA1C MFR BLD: 8.2 % (ref 4–5.6)
POTASSIUM SERPL-SCNC: 4.5 MMOL/L (ref 3.5–5.1)
PROT SERPL-MCNC: 7.5 G/DL (ref 6–8.4)
SODIUM SERPL-SCNC: 135 MMOL/L (ref 136–145)

## 2021-05-25 PROCEDURE — 80053 COMPREHEN METABOLIC PANEL: CPT | Performed by: NURSE PRACTITIONER

## 2021-05-25 PROCEDURE — 83036 HEMOGLOBIN GLYCOSYLATED A1C: CPT | Performed by: NURSE PRACTITIONER

## 2021-05-25 PROCEDURE — 36415 COLL VENOUS BLD VENIPUNCTURE: CPT | Mod: PN | Performed by: NURSE PRACTITIONER

## 2021-05-26 ENCOUNTER — PATIENT MESSAGE (OUTPATIENT)
Dept: ENDOCRINOLOGY | Facility: CLINIC | Age: 66
End: 2021-05-26

## 2021-06-01 ENCOUNTER — OFFICE VISIT (OUTPATIENT)
Dept: ENDOCRINOLOGY | Facility: CLINIC | Age: 66
End: 2021-06-01
Payer: MEDICARE

## 2021-06-01 VITALS
HEART RATE: 84 BPM | DIASTOLIC BLOOD PRESSURE: 60 MMHG | HEIGHT: 65 IN | BODY MASS INDEX: 34.12 KG/M2 | SYSTOLIC BLOOD PRESSURE: 110 MMHG | WEIGHT: 204.81 LBS

## 2021-06-01 DIAGNOSIS — E03.4 HYPOTHYROIDISM DUE TO ACQUIRED ATROPHY OF THYROID: ICD-10-CM

## 2021-06-01 DIAGNOSIS — Z79.4 TYPE 2 DIABETES MELLITUS WITH STAGE 3A CHRONIC KIDNEY DISEASE, WITH LONG-TERM CURRENT USE OF INSULIN: ICD-10-CM

## 2021-06-01 DIAGNOSIS — E11.22 TYPE 2 DIABETES MELLITUS WITH STAGE 3A CHRONIC KIDNEY DISEASE, WITH LONG-TERM CURRENT USE OF INSULIN: ICD-10-CM

## 2021-06-01 DIAGNOSIS — Z79.4 TYPE 2 DIABETES MELLITUS WITH STAGE 3B CHRONIC KIDNEY DISEASE, WITH LONG-TERM CURRENT USE OF INSULIN: Primary | ICD-10-CM

## 2021-06-01 DIAGNOSIS — R79.89 ELEVATED LFTS: ICD-10-CM

## 2021-06-01 DIAGNOSIS — N18.31 TYPE 2 DIABETES MELLITUS WITH STAGE 3A CHRONIC KIDNEY DISEASE, WITH LONG-TERM CURRENT USE OF INSULIN: ICD-10-CM

## 2021-06-01 DIAGNOSIS — N18.32 TYPE 2 DIABETES MELLITUS WITH STAGE 3B CHRONIC KIDNEY DISEASE, WITH LONG-TERM CURRENT USE OF INSULIN: Primary | ICD-10-CM

## 2021-06-01 DIAGNOSIS — R07.9 CHEST PAIN, UNSPECIFIED TYPE: ICD-10-CM

## 2021-06-01 DIAGNOSIS — I51.89 DIASTOLIC DYSFUNCTION: ICD-10-CM

## 2021-06-01 DIAGNOSIS — E11.22 TYPE 2 DIABETES MELLITUS WITH STAGE 3B CHRONIC KIDNEY DISEASE, WITH LONG-TERM CURRENT USE OF INSULIN: Primary | ICD-10-CM

## 2021-06-01 DIAGNOSIS — I10 ESSENTIAL HYPERTENSION: ICD-10-CM

## 2021-06-01 DIAGNOSIS — E78.5 HYPERLIPIDEMIA, UNSPECIFIED HYPERLIPIDEMIA TYPE: ICD-10-CM

## 2021-06-01 PROCEDURE — 3008F BODY MASS INDEX DOCD: CPT | Mod: S$GLB,,, | Performed by: NURSE PRACTITIONER

## 2021-06-01 PROCEDURE — 3052F HG A1C>EQUAL 8.0%<EQUAL 9.0%: CPT | Mod: S$GLB,,, | Performed by: NURSE PRACTITIONER

## 2021-06-01 PROCEDURE — 1159F PR MEDICATION LIST DOCUMENTED IN MEDICAL RECORD: ICD-10-PCS | Mod: S$GLB,,, | Performed by: NURSE PRACTITIONER

## 2021-06-01 PROCEDURE — 1101F PR PT FALLS ASSESS DOC 0-1 FALLS W/OUT INJ PAST YR: ICD-10-PCS | Mod: S$GLB,,, | Performed by: NURSE PRACTITIONER

## 2021-06-01 PROCEDURE — 99214 PR OFFICE/OUTPT VISIT, EST, LEVL IV, 30-39 MIN: ICD-10-PCS | Mod: S$GLB,,, | Performed by: NURSE PRACTITIONER

## 2021-06-01 PROCEDURE — 99999 PR PBB SHADOW E&M-EST. PATIENT-LVL III: CPT | Mod: PBBFAC,,, | Performed by: NURSE PRACTITIONER

## 2021-06-01 PROCEDURE — 99999 PR PBB SHADOW E&M-EST. PATIENT-LVL III: ICD-10-PCS | Mod: PBBFAC,,, | Performed by: NURSE PRACTITIONER

## 2021-06-01 PROCEDURE — 3008F PR BODY MASS INDEX (BMI) DOCUMENTED: ICD-10-PCS | Mod: S$GLB,,, | Performed by: NURSE PRACTITIONER

## 2021-06-01 PROCEDURE — 99214 OFFICE O/P EST MOD 30 MIN: CPT | Mod: S$GLB,,, | Performed by: NURSE PRACTITIONER

## 2021-06-01 PROCEDURE — 3288F FALL RISK ASSESSMENT DOCD: CPT | Mod: S$GLB,,, | Performed by: NURSE PRACTITIONER

## 2021-06-01 PROCEDURE — 1101F PT FALLS ASSESS-DOCD LE1/YR: CPT | Mod: S$GLB,,, | Performed by: NURSE PRACTITIONER

## 2021-06-01 PROCEDURE — 3052F PR MOST RECENT HEMOGLOBIN A1C LEVEL 8.0 - < 9.0%: ICD-10-PCS | Mod: S$GLB,,, | Performed by: NURSE PRACTITIONER

## 2021-06-01 PROCEDURE — 1159F MED LIST DOCD IN RCRD: CPT | Mod: S$GLB,,, | Performed by: NURSE PRACTITIONER

## 2021-06-01 PROCEDURE — 1125F AMNT PAIN NOTED PAIN PRSNT: CPT | Mod: S$GLB,,, | Performed by: NURSE PRACTITIONER

## 2021-06-01 PROCEDURE — 1125F PR PAIN SEVERITY QUANTIFIED, PAIN PRESENT: ICD-10-PCS | Mod: S$GLB,,, | Performed by: NURSE PRACTITIONER

## 2021-06-01 PROCEDURE — 3288F PR FALLS RISK ASSESSMENT DOCUMENTED: ICD-10-PCS | Mod: S$GLB,,, | Performed by: NURSE PRACTITIONER

## 2021-06-01 RX ORDER — CELECOXIB 100 MG/1
100 CAPSULE ORAL
COMMUNITY
Start: 2021-05-05 | End: 2021-06-04

## 2021-06-01 RX ORDER — SEMAGLUTIDE 1.34 MG/ML
0.5 INJECTION, SOLUTION SUBCUTANEOUS
Qty: 1 PEN | Refills: 3 | Status: SHIPPED | OUTPATIENT
Start: 2021-06-01 | End: 2021-08-23

## 2021-06-01 RX ORDER — METFORMIN HYDROCHLORIDE 500 MG/1
1000 TABLET, EXTENDED RELEASE ORAL
Qty: 180 TABLET | Refills: 1
Start: 2021-06-01 | End: 2022-04-05

## 2021-06-02 ENCOUNTER — OFFICE VISIT (OUTPATIENT)
Dept: FAMILY MEDICINE | Facility: CLINIC | Age: 66
End: 2021-06-02
Payer: MEDICARE

## 2021-06-02 ENCOUNTER — TELEPHONE (OUTPATIENT)
Dept: ORTHOPEDICS | Facility: CLINIC | Age: 66
End: 2021-06-02

## 2021-06-02 VITALS
BODY MASS INDEX: 35.19 KG/M2 | WEIGHT: 206.13 LBS | OXYGEN SATURATION: 96 % | SYSTOLIC BLOOD PRESSURE: 116 MMHG | HEIGHT: 64 IN | DIASTOLIC BLOOD PRESSURE: 70 MMHG | TEMPERATURE: 98 F | HEART RATE: 76 BPM

## 2021-06-02 DIAGNOSIS — R79.89 ELEVATED LFTS: Primary | ICD-10-CM

## 2021-06-02 DIAGNOSIS — I25.10 NONOBSTRUCTIVE ATHEROSCLEROSIS OF CORONARY ARTERY: ICD-10-CM

## 2021-06-02 DIAGNOSIS — Z79.4 TYPE 2 DIABETES MELLITUS WITH DIABETIC NEUROPATHY, WITH LONG-TERM CURRENT USE OF INSULIN: ICD-10-CM

## 2021-06-02 DIAGNOSIS — G47.33 OSA (OBSTRUCTIVE SLEEP APNEA): ICD-10-CM

## 2021-06-02 DIAGNOSIS — E11.40 TYPE 2 DIABETES MELLITUS WITH DIABETIC NEUROPATHY, WITH LONG-TERM CURRENT USE OF INSULIN: ICD-10-CM

## 2021-06-02 DIAGNOSIS — Z01.818 PREOPERATIVE EXAMINATION: ICD-10-CM

## 2021-06-02 DIAGNOSIS — I10 ESSENTIAL HYPERTENSION: ICD-10-CM

## 2021-06-02 DIAGNOSIS — E78.5 HYPERLIPIDEMIA, UNSPECIFIED HYPERLIPIDEMIA TYPE: ICD-10-CM

## 2021-06-02 DIAGNOSIS — R06.02 SHORTNESS OF BREATH: ICD-10-CM

## 2021-06-02 PROCEDURE — 99214 PR OFFICE/OUTPT VISIT, EST, LEVL IV, 30-39 MIN: ICD-10-PCS | Mod: S$GLB,,, | Performed by: INTERNAL MEDICINE

## 2021-06-02 PROCEDURE — 1101F PR PT FALLS ASSESS DOC 0-1 FALLS W/OUT INJ PAST YR: ICD-10-PCS | Mod: S$GLB,,, | Performed by: INTERNAL MEDICINE

## 2021-06-02 PROCEDURE — 1101F PT FALLS ASSESS-DOCD LE1/YR: CPT | Mod: S$GLB,,, | Performed by: INTERNAL MEDICINE

## 2021-06-02 PROCEDURE — 1126F PR PAIN SEVERITY QUANTIFIED, NO PAIN PRESENT: ICD-10-PCS | Mod: S$GLB,,, | Performed by: INTERNAL MEDICINE

## 2021-06-02 PROCEDURE — 99999 PR PBB SHADOW E&M-EST. PATIENT-LVL V: ICD-10-PCS | Mod: PBBFAC,,, | Performed by: INTERNAL MEDICINE

## 2021-06-02 PROCEDURE — 3052F PR MOST RECENT HEMOGLOBIN A1C LEVEL 8.0 - < 9.0%: ICD-10-PCS | Mod: S$GLB,,, | Performed by: INTERNAL MEDICINE

## 2021-06-02 PROCEDURE — 99999 PR PBB SHADOW E&M-EST. PATIENT-LVL V: CPT | Mod: PBBFAC,,, | Performed by: INTERNAL MEDICINE

## 2021-06-02 PROCEDURE — 1159F PR MEDICATION LIST DOCUMENTED IN MEDICAL RECORD: ICD-10-PCS | Mod: S$GLB,,, | Performed by: INTERNAL MEDICINE

## 2021-06-02 PROCEDURE — 3052F HG A1C>EQUAL 8.0%<EQUAL 9.0%: CPT | Mod: S$GLB,,, | Performed by: INTERNAL MEDICINE

## 2021-06-02 PROCEDURE — 1159F MED LIST DOCD IN RCRD: CPT | Mod: S$GLB,,, | Performed by: INTERNAL MEDICINE

## 2021-06-02 PROCEDURE — 99214 OFFICE O/P EST MOD 30 MIN: CPT | Mod: S$GLB,,, | Performed by: INTERNAL MEDICINE

## 2021-06-02 PROCEDURE — 3008F BODY MASS INDEX DOCD: CPT | Mod: S$GLB,,, | Performed by: INTERNAL MEDICINE

## 2021-06-02 PROCEDURE — 3008F PR BODY MASS INDEX (BMI) DOCUMENTED: ICD-10-PCS | Mod: S$GLB,,, | Performed by: INTERNAL MEDICINE

## 2021-06-02 PROCEDURE — 3288F PR FALLS RISK ASSESSMENT DOCUMENTED: ICD-10-PCS | Mod: S$GLB,,, | Performed by: INTERNAL MEDICINE

## 2021-06-02 PROCEDURE — 1126F AMNT PAIN NOTED NONE PRSNT: CPT | Mod: S$GLB,,, | Performed by: INTERNAL MEDICINE

## 2021-06-02 PROCEDURE — 3288F FALL RISK ASSESSMENT DOCD: CPT | Mod: S$GLB,,, | Performed by: INTERNAL MEDICINE

## 2021-06-23 ENCOUNTER — HOSPITAL ENCOUNTER (OUTPATIENT)
Dept: RADIOLOGY | Facility: HOSPITAL | Age: 66
Discharge: HOME | End: 2021-06-23
Attending: INTERNAL MEDICINE
Payer: MEDICARE

## 2021-06-23 ENCOUNTER — HOSPITAL ENCOUNTER (OUTPATIENT)
Dept: CARDIOLOGY | Facility: HOSPITAL | Age: 66
Discharge: HOME | End: 2021-06-23
Attending: INTERNAL MEDICINE
Payer: MEDICARE

## 2021-06-23 VITALS — BODY MASS INDEX: 35.17 KG/M2 | HEIGHT: 64 IN | WEIGHT: 206 LBS

## 2021-06-23 DIAGNOSIS — E11.22 TYPE 2 DIABETES MELLITUS WITH STAGE 3A CHRONIC KIDNEY DISEASE, WITH LONG-TERM CURRENT USE OF INSULIN: ICD-10-CM

## 2021-06-23 DIAGNOSIS — R06.09 DOE (DYSPNEA ON EXERTION): ICD-10-CM

## 2021-06-23 DIAGNOSIS — F41.9 ANXIETY: ICD-10-CM

## 2021-06-23 DIAGNOSIS — Z79.4 TYPE 2 DIABETES MELLITUS WITH STAGE 3A CHRONIC KIDNEY DISEASE, WITH LONG-TERM CURRENT USE OF INSULIN: ICD-10-CM

## 2021-06-23 DIAGNOSIS — N18.31 TYPE 2 DIABETES MELLITUS WITH STAGE 3A CHRONIC KIDNEY DISEASE, WITH LONG-TERM CURRENT USE OF INSULIN: ICD-10-CM

## 2021-06-23 DIAGNOSIS — I20.9 ANGINA PECTORIS: ICD-10-CM

## 2021-06-23 DIAGNOSIS — E78.2 MIXED HYPERLIPIDEMIA: ICD-10-CM

## 2021-06-23 DIAGNOSIS — I51.89 DIASTOLIC DYSFUNCTION: ICD-10-CM

## 2021-06-23 DIAGNOSIS — N18.2 CHRONIC RENAL INSUFFICIENCY, STAGE II (MILD): ICD-10-CM

## 2021-06-23 DIAGNOSIS — I10 ESSENTIAL HYPERTENSION: ICD-10-CM

## 2021-06-23 LAB
CV PHARM DOSE: 0.4 MG
CV STRESS BASE HR: 79 BPM
DIASTOLIC BLOOD PRESSURE: 71 MMHG
NUC REST DIASTOLIC VOLUME INDEX: 98
NUC REST EJECTION FRACTION: 56
NUC REST SYSTOLIC VOLUME INDEX: 43
OHS CV CPX 1 MINUTE RECOVERY HEART RATE: 98 BPM
OHS CV CPX 85 PERCENT MAX PREDICTED HEART RATE MALE: 126
OHS CV CPX MAX PREDICTED HEART RATE: 148
OHS CV CPX PATIENT IS FEMALE: 1
OHS CV CPX PATIENT IS MALE: 0
OHS CV CPX PEAK DIASTOLIC BLOOD PRESSURE: 71 MMHG
OHS CV CPX PEAK HEAR RATE: 108 BPM
OHS CV CPX PEAK RATE PRESSURE PRODUCT: NORMAL
OHS CV CPX PEAK SYSTOLIC BLOOD PRESSURE: 131 MMHG
OHS CV CPX PERCENT MAX PREDICTED HEART RATE ACHIEVED: 73
OHS CV CPX RATE PRESSURE PRODUCT PRESENTING: NORMAL
OHS CV PHARM TIME: 952 MIN
SYSTOLIC BLOOD PRESSURE: 131 MMHG

## 2021-06-23 PROCEDURE — 78452 HT MUSCLE IMAGE SPECT MULT: CPT | Mod: PO

## 2021-06-23 PROCEDURE — 93017 CV STRESS TEST TRACING ONLY: CPT | Mod: PO

## 2021-06-23 PROCEDURE — A9502 TC99M TETROFOSMIN: HCPCS | Mod: PO

## 2021-06-23 PROCEDURE — 93018 PR CARDIAC STRESS TST,INTERP/REPT ONLY: ICD-10-PCS | Mod: ,,, | Performed by: INTERNAL MEDICINE

## 2021-06-23 PROCEDURE — 93016 CV STRESS TEST SUPVJ ONLY: CPT | Mod: ,,, | Performed by: INTERNAL MEDICINE

## 2021-06-23 PROCEDURE — 63600175 PHARM REV CODE 636 W HCPCS: Mod: PO | Performed by: INTERNAL MEDICINE

## 2021-06-23 PROCEDURE — 93016 STRESS TEST WITH MYOCARDIAL PERFUSION (CUPID ONLY): ICD-10-PCS | Mod: ,,, | Performed by: INTERNAL MEDICINE

## 2021-06-23 PROCEDURE — 93018 CV STRESS TEST I&R ONLY: CPT | Mod: ,,, | Performed by: INTERNAL MEDICINE

## 2021-06-23 PROCEDURE — 78452 STRESS TEST WITH MYOCARDIAL PERFUSION (CUPID ONLY): ICD-10-PCS | Mod: 26,,, | Performed by: INTERNAL MEDICINE

## 2021-06-23 PROCEDURE — 78452 HT MUSCLE IMAGE SPECT MULT: CPT | Mod: 26,,, | Performed by: INTERNAL MEDICINE

## 2021-06-23 RX ORDER — REGADENOSON 0.08 MG/ML
0.4 INJECTION, SOLUTION INTRAVENOUS ONCE
Status: COMPLETED | OUTPATIENT
Start: 2021-06-23 | End: 2021-06-23

## 2021-06-23 RX ORDER — AMINOPHYLLINE 25 MG/ML
75 INJECTION, SOLUTION INTRAVENOUS
Status: COMPLETED | OUTPATIENT
Start: 2021-06-23 | End: 2021-06-23

## 2021-06-23 RX ADMIN — AMINOPHYLLINE 75 MG: 25 INJECTION, SOLUTION INTRAVENOUS at 09:06

## 2021-06-23 RX ADMIN — REGADENOSON 0.4 MG: 0.08 INJECTION, SOLUTION INTRAVENOUS at 09:06

## 2021-06-24 ENCOUNTER — TELEPHONE (OUTPATIENT)
Dept: CARDIOLOGY | Facility: CLINIC | Age: 66
End: 2021-06-24

## 2021-06-24 ENCOUNTER — PATIENT MESSAGE (OUTPATIENT)
Dept: CARDIOLOGY | Facility: CLINIC | Age: 66
End: 2021-06-24

## 2021-06-27 ENCOUNTER — PATIENT MESSAGE (OUTPATIENT)
Dept: FAMILY MEDICINE | Facility: CLINIC | Age: 66
End: 2021-06-27

## 2021-07-06 ENCOUNTER — PATIENT MESSAGE (OUTPATIENT)
Dept: ENDOCRINOLOGY | Facility: CLINIC | Age: 66
End: 2021-07-06

## 2021-07-16 DIAGNOSIS — G56.02 LEFT CARPAL TUNNEL SYNDROME: ICD-10-CM

## 2021-07-16 DIAGNOSIS — M65.342 TRIGGER FINGER, LEFT RING FINGER: ICD-10-CM

## 2021-07-16 DIAGNOSIS — G56.02 CARPAL TUNNEL SYNDROME ON LEFT: Primary | ICD-10-CM

## 2021-07-16 DIAGNOSIS — M65.332 TRIGGER FINGER, LEFT MIDDLE FINGER: ICD-10-CM

## 2021-07-16 DIAGNOSIS — M65.312 TRIGGER FINGER OF LEFT THUMB: ICD-10-CM

## 2021-07-19 ENCOUNTER — TELEPHONE (OUTPATIENT)
Dept: ORTHOPEDICS | Facility: CLINIC | Age: 66
End: 2021-07-19

## 2021-07-21 ENCOUNTER — PATIENT MESSAGE (OUTPATIENT)
Dept: ENDOCRINOLOGY | Facility: CLINIC | Age: 66
End: 2021-07-21

## 2021-07-21 ENCOUNTER — ANESTHESIA EVENT (OUTPATIENT)
Dept: SURGERY | Facility: HOSPITAL | Age: 66
End: 2021-07-21
Payer: MEDICARE

## 2021-07-21 DIAGNOSIS — Z79.4 TYPE 2 DIABETES MELLITUS WITH STAGE 3A CHRONIC KIDNEY DISEASE, WITH LONG-TERM CURRENT USE OF INSULIN: Primary | ICD-10-CM

## 2021-07-21 DIAGNOSIS — E11.22 TYPE 2 DIABETES MELLITUS WITH STAGE 3A CHRONIC KIDNEY DISEASE, WITH LONG-TERM CURRENT USE OF INSULIN: Primary | ICD-10-CM

## 2021-07-21 DIAGNOSIS — N18.31 TYPE 2 DIABETES MELLITUS WITH STAGE 3A CHRONIC KIDNEY DISEASE, WITH LONG-TERM CURRENT USE OF INSULIN: Primary | ICD-10-CM

## 2021-07-22 ENCOUNTER — ANESTHESIA (OUTPATIENT)
Dept: SURGERY | Facility: HOSPITAL | Age: 66
End: 2021-07-22
Payer: MEDICARE

## 2021-07-22 ENCOUNTER — HOSPITAL ENCOUNTER (OUTPATIENT)
Facility: HOSPITAL | Age: 66
Discharge: HOME | End: 2021-07-22
Attending: ORTHOPAEDIC SURGERY | Admitting: ORTHOPAEDIC SURGERY
Payer: MEDICARE

## 2021-07-22 ENCOUNTER — TELEPHONE (OUTPATIENT)
Dept: ORTHOPEDICS | Facility: CLINIC | Age: 66
End: 2021-07-22

## 2021-07-22 DIAGNOSIS — M65.312 TRIGGER FINGER OF LEFT THUMB: ICD-10-CM

## 2021-07-22 DIAGNOSIS — M65.332 TRIGGER FINGER, LEFT MIDDLE FINGER: ICD-10-CM

## 2021-07-22 DIAGNOSIS — G56.02 CARPAL TUNNEL SYNDROME ON LEFT: ICD-10-CM

## 2021-07-22 DIAGNOSIS — G56.02 LEFT CARPAL TUNNEL SYNDROME: ICD-10-CM

## 2021-07-22 DIAGNOSIS — M65.342 TRIGGER FINGER, LEFT RING FINGER: ICD-10-CM

## 2021-07-22 LAB — GLUCOSE SERPL-MCNC: 134 MG/DL (ref 70–110)

## 2021-07-22 PROCEDURE — 26055 PR INCISE FINGER TENDON SHEATH: ICD-10-PCS | Mod: 59,51,F3, | Performed by: ORTHOPAEDIC SURGERY

## 2021-07-22 PROCEDURE — D9220A PRA ANESTHESIA: ICD-10-PCS | Mod: CRNA,,, | Performed by: NURSE ANESTHETIST, CERTIFIED REGISTERED

## 2021-07-22 PROCEDURE — D9220A PRA ANESTHESIA: Mod: ANES,,, | Performed by: ANESTHESIOLOGY

## 2021-07-22 PROCEDURE — 64721 PR REVISE MEDIAN N/CARPAL TUNNEL SURG: ICD-10-PCS | Mod: LT,,, | Performed by: ORTHOPAEDIC SURGERY

## 2021-07-22 PROCEDURE — D9220A PRA ANESTHESIA: Mod: CRNA,,, | Performed by: NURSE ANESTHETIST, CERTIFIED REGISTERED

## 2021-07-22 PROCEDURE — 82962 GLUCOSE BLOOD TEST: CPT | Mod: PO | Performed by: ORTHOPAEDIC SURGERY

## 2021-07-22 PROCEDURE — 25000003 PHARM REV CODE 250: Mod: PO | Performed by: ORTHOPAEDIC SURGERY

## 2021-07-22 PROCEDURE — 27200651 HC AIRWAY, LMA: Mod: PO | Performed by: ANESTHESIOLOGY

## 2021-07-22 PROCEDURE — 26055 INCISE FINGER TENDON SHEATH: CPT | Mod: 51,FA,, | Performed by: ORTHOPAEDIC SURGERY

## 2021-07-22 PROCEDURE — 63600175 PHARM REV CODE 636 W HCPCS: Mod: PO | Performed by: ANESTHESIOLOGY

## 2021-07-22 PROCEDURE — 64721 CARPAL TUNNEL SURGERY: CPT | Mod: LT,,, | Performed by: ORTHOPAEDIC SURGERY

## 2021-07-22 PROCEDURE — 36000706: Mod: PO | Performed by: ORTHOPAEDIC SURGERY

## 2021-07-22 PROCEDURE — 37000008 HC ANESTHESIA 1ST 15 MINUTES: Mod: PO | Performed by: ORTHOPAEDIC SURGERY

## 2021-07-22 PROCEDURE — 25000003 PHARM REV CODE 250: Mod: PO | Performed by: NURSE ANESTHETIST, CERTIFIED REGISTERED

## 2021-07-22 PROCEDURE — D9220A PRA ANESTHESIA: ICD-10-PCS | Mod: ANES,,, | Performed by: ANESTHESIOLOGY

## 2021-07-22 PROCEDURE — 71000015 HC POSTOP RECOV 1ST HR: Mod: PO | Performed by: ORTHOPAEDIC SURGERY

## 2021-07-22 PROCEDURE — 71000033 HC RECOVERY, INTIAL HOUR: Mod: PO | Performed by: ORTHOPAEDIC SURGERY

## 2021-07-22 PROCEDURE — 37000009 HC ANESTHESIA EA ADD 15 MINS: Mod: PO | Performed by: ORTHOPAEDIC SURGERY

## 2021-07-22 PROCEDURE — 36000707: Mod: PO | Performed by: ORTHOPAEDIC SURGERY

## 2021-07-22 PROCEDURE — 63600175 PHARM REV CODE 636 W HCPCS: Mod: PO | Performed by: NURSE ANESTHETIST, CERTIFIED REGISTERED

## 2021-07-22 PROCEDURE — 25000003 PHARM REV CODE 250: Mod: PO | Performed by: ANESTHESIOLOGY

## 2021-07-22 RX ORDER — LIDOCAINE HYDROCHLORIDE 10 MG/ML
INJECTION, SOLUTION EPIDURAL; INFILTRATION; INTRACAUDAL; PERINEURAL
Status: DISCONTINUED | OUTPATIENT
Start: 2021-07-22 | End: 2021-07-22 | Stop reason: HOSPADM

## 2021-07-22 RX ORDER — HYDROCODONE BITARTRATE AND ACETAMINOPHEN 5; 325 MG/1; MG/1
1 TABLET ORAL EVERY 6 HOURS PRN
Qty: 6 TABLET | Refills: 0 | Status: SHIPPED | OUTPATIENT
Start: 2021-07-22 | End: 2021-09-17

## 2021-07-22 RX ORDER — CEFAZOLIN SODIUM 2 G/50ML
2 SOLUTION INTRAVENOUS
Status: DISCONTINUED | OUTPATIENT
Start: 2021-07-22 | End: 2021-07-22 | Stop reason: HOSPADM

## 2021-07-22 RX ORDER — CEFAZOLIN SODIUM 1 G/3ML
INJECTION, POWDER, FOR SOLUTION INTRAMUSCULAR; INTRAVENOUS
Status: DISCONTINUED | OUTPATIENT
Start: 2021-07-22 | End: 2021-07-22

## 2021-07-22 RX ORDER — HYDROMORPHONE HYDROCHLORIDE 2 MG/ML
0.2 INJECTION, SOLUTION INTRAMUSCULAR; INTRAVENOUS; SUBCUTANEOUS EVERY 5 MIN PRN
Status: DISCONTINUED | OUTPATIENT
Start: 2021-07-22 | End: 2021-07-22 | Stop reason: HOSPADM

## 2021-07-22 RX ORDER — ONDANSETRON 2 MG/ML
INJECTION INTRAMUSCULAR; INTRAVENOUS
Status: DISCONTINUED | OUTPATIENT
Start: 2021-07-22 | End: 2021-07-22

## 2021-07-22 RX ORDER — DEXAMETHASONE SODIUM PHOSPHATE 4 MG/ML
8 INJECTION, SOLUTION INTRA-ARTICULAR; INTRALESIONAL; INTRAMUSCULAR; INTRAVENOUS; SOFT TISSUE
Status: DISCONTINUED | OUTPATIENT
Start: 2021-07-22 | End: 2021-07-22

## 2021-07-22 RX ORDER — PROPOFOL 10 MG/ML
VIAL (ML) INTRAVENOUS
Status: DISCONTINUED | OUTPATIENT
Start: 2021-07-22 | End: 2021-07-22

## 2021-07-22 RX ORDER — LIDOCAINE HYDROCHLORIDE 20 MG/ML
INJECTION INTRAVENOUS
Status: DISCONTINUED | OUTPATIENT
Start: 2021-07-22 | End: 2021-07-22

## 2021-07-22 RX ORDER — FENTANYL CITRATE 50 UG/ML
25 INJECTION, SOLUTION INTRAMUSCULAR; INTRAVENOUS EVERY 5 MIN PRN
Status: DISCONTINUED | OUTPATIENT
Start: 2021-07-22 | End: 2021-07-22 | Stop reason: HOSPADM

## 2021-07-22 RX ORDER — OXYCODONE HYDROCHLORIDE 5 MG/1
5 TABLET ORAL
Status: DISCONTINUED | OUTPATIENT
Start: 2021-07-22 | End: 2021-07-22 | Stop reason: HOSPADM

## 2021-07-22 RX ORDER — LIDOCAINE HYDROCHLORIDE 10 MG/ML
1 INJECTION, SOLUTION EPIDURAL; INFILTRATION; INTRACAUDAL; PERINEURAL ONCE
Status: COMPLETED | OUTPATIENT
Start: 2021-07-22 | End: 2021-07-22

## 2021-07-22 RX ORDER — SCOLOPAMINE TRANSDERMAL SYSTEM 1 MG/1
1 PATCH, EXTENDED RELEASE TRANSDERMAL
Status: DISCONTINUED | OUTPATIENT
Start: 2021-07-22 | End: 2021-07-22 | Stop reason: HOSPADM

## 2021-07-22 RX ORDER — BUPIVACAINE HYDROCHLORIDE 2.5 MG/ML
INJECTION, SOLUTION EPIDURAL; INFILTRATION; INTRACAUDAL
Status: DISCONTINUED | OUTPATIENT
Start: 2021-07-22 | End: 2021-07-22 | Stop reason: HOSPADM

## 2021-07-22 RX ORDER — ONDANSETRON 4 MG/1
8 TABLET, ORALLY DISINTEGRATING ORAL EVERY 8 HOURS PRN
Qty: 3 TABLET | Refills: 0 | Status: SHIPPED | OUTPATIENT
Start: 2021-07-22 | End: 2021-09-17

## 2021-07-22 RX ORDER — SODIUM CHLORIDE, SODIUM LACTATE, POTASSIUM CHLORIDE, CALCIUM CHLORIDE 600; 310; 30; 20 MG/100ML; MG/100ML; MG/100ML; MG/100ML
INJECTION, SOLUTION INTRAVENOUS CONTINUOUS
Status: DISCONTINUED | OUTPATIENT
Start: 2021-07-22 | End: 2021-07-22 | Stop reason: HOSPADM

## 2021-07-22 RX ADMIN — SODIUM CHLORIDE, SODIUM LACTATE, POTASSIUM CHLORIDE, AND CALCIUM CHLORIDE: .6; .31; .03; .02 INJECTION, SOLUTION INTRAVENOUS at 09:07

## 2021-07-22 RX ADMIN — LIDOCAINE HYDROCHLORIDE 100 MG: 20 INJECTION, SOLUTION INTRAVENOUS at 10:07

## 2021-07-22 RX ADMIN — ONDANSETRON 4 MG: 2 INJECTION INTRAMUSCULAR; INTRAVENOUS at 10:07

## 2021-07-22 RX ADMIN — LIDOCAINE HYDROCHLORIDE 10 MG: 10 INJECTION, SOLUTION EPIDURAL; INFILTRATION; INTRACAUDAL; PERINEURAL at 09:07

## 2021-07-22 RX ADMIN — OXYCODONE 5 MG: 5 TABLET ORAL at 11:07

## 2021-07-22 RX ADMIN — KETAMINE HYDROCHLORIDE 10 MG: 100 INJECTION, SOLUTION, CONCENTRATE INTRAMUSCULAR; INTRAVENOUS at 10:07

## 2021-07-22 RX ADMIN — SCOPOLAMINE 1 PATCH: 1.5 PATCH, EXTENDED RELEASE TRANSDERMAL at 09:07

## 2021-07-22 RX ADMIN — PROPOFOL 150 MG: 10 INJECTION, EMULSION INTRAVENOUS at 10:07

## 2021-07-22 RX ADMIN — CEFAZOLIN 2 G: 330 INJECTION, POWDER, FOR SOLUTION INTRAMUSCULAR; INTRAVENOUS at 10:07

## 2021-07-22 RX ADMIN — DEXAMETHASONE SODIUM PHOSPHATE 8 MG: 4 INJECTION, SOLUTION INTRAMUSCULAR; INTRAVENOUS at 09:07

## 2021-07-23 ENCOUNTER — PATIENT MESSAGE (OUTPATIENT)
Dept: ENDOCRINOLOGY | Facility: CLINIC | Age: 66
End: 2021-07-23

## 2021-07-23 VITALS
HEIGHT: 65 IN | WEIGHT: 205 LBS | SYSTOLIC BLOOD PRESSURE: 156 MMHG | TEMPERATURE: 98 F | OXYGEN SATURATION: 95 % | DIASTOLIC BLOOD PRESSURE: 86 MMHG | HEART RATE: 80 BPM | BODY MASS INDEX: 34.16 KG/M2 | RESPIRATION RATE: 16 BRPM

## 2021-07-23 RX ORDER — GLIMEPIRIDE 2 MG/1
2 TABLET ORAL
Qty: 30 TABLET | Refills: 6 | Status: SHIPPED | OUTPATIENT
Start: 2021-07-23 | End: 2022-08-03

## 2021-08-05 ENCOUNTER — PATIENT MESSAGE (OUTPATIENT)
Dept: ADMINISTRATIVE | Facility: OTHER | Age: 66
End: 2021-08-05

## 2021-08-09 ENCOUNTER — OFFICE VISIT (OUTPATIENT)
Dept: ORTHOPEDICS | Facility: CLINIC | Age: 66
End: 2021-08-09
Payer: MEDICARE

## 2021-08-09 VITALS
HEIGHT: 65 IN | WEIGHT: 205 LBS | HEART RATE: 84 BPM | DIASTOLIC BLOOD PRESSURE: 76 MMHG | SYSTOLIC BLOOD PRESSURE: 119 MMHG | BODY MASS INDEX: 34.16 KG/M2

## 2021-08-09 DIAGNOSIS — M65.30 TRIGGER FINGER OF LEFT HAND, UNSPECIFIED FINGER: ICD-10-CM

## 2021-08-09 DIAGNOSIS — Z98.890 STATUS POST CARPAL TUNNEL RELEASE: Primary | ICD-10-CM

## 2021-08-09 PROCEDURE — 99999 PR PBB SHADOW E&M-EST. PATIENT-LVL V: CPT | Mod: PBBFAC,,, | Performed by: ORTHOPAEDIC SURGERY

## 2021-08-09 PROCEDURE — 1160F RVW MEDS BY RX/DR IN RCRD: CPT | Mod: S$GLB,,, | Performed by: ORTHOPAEDIC SURGERY

## 2021-08-09 PROCEDURE — 99024 POSTOP FOLLOW-UP VISIT: CPT | Mod: S$GLB,,, | Performed by: ORTHOPAEDIC SURGERY

## 2021-08-09 PROCEDURE — 1159F PR MEDICATION LIST DOCUMENTED IN MEDICAL RECORD: ICD-10-PCS | Mod: S$GLB,,, | Performed by: ORTHOPAEDIC SURGERY

## 2021-08-09 PROCEDURE — 1126F AMNT PAIN NOTED NONE PRSNT: CPT | Mod: S$GLB,,, | Performed by: ORTHOPAEDIC SURGERY

## 2021-08-09 PROCEDURE — 3074F PR MOST RECENT SYSTOLIC BLOOD PRESSURE < 130 MM HG: ICD-10-PCS | Mod: S$GLB,,, | Performed by: ORTHOPAEDIC SURGERY

## 2021-08-09 PROCEDURE — 1126F PR PAIN SEVERITY QUANTIFIED, NO PAIN PRESENT: ICD-10-PCS | Mod: S$GLB,,, | Performed by: ORTHOPAEDIC SURGERY

## 2021-08-09 PROCEDURE — 1159F MED LIST DOCD IN RCRD: CPT | Mod: S$GLB,,, | Performed by: ORTHOPAEDIC SURGERY

## 2021-08-09 PROCEDURE — 3052F PR MOST RECENT HEMOGLOBIN A1C LEVEL 8.0 - < 9.0%: ICD-10-PCS | Mod: S$GLB,,, | Performed by: ORTHOPAEDIC SURGERY

## 2021-08-09 PROCEDURE — 99999 PR PBB SHADOW E&M-EST. PATIENT-LVL V: ICD-10-PCS | Mod: PBBFAC,,, | Performed by: ORTHOPAEDIC SURGERY

## 2021-08-09 PROCEDURE — 3074F SYST BP LT 130 MM HG: CPT | Mod: S$GLB,,, | Performed by: ORTHOPAEDIC SURGERY

## 2021-08-09 PROCEDURE — 3008F BODY MASS INDEX DOCD: CPT | Mod: S$GLB,,, | Performed by: ORTHOPAEDIC SURGERY

## 2021-08-09 PROCEDURE — 1160F PR REVIEW ALL MEDS BY PRESCRIBER/CLIN PHARMACIST DOCUMENTED: ICD-10-PCS | Mod: S$GLB,,, | Performed by: ORTHOPAEDIC SURGERY

## 2021-08-09 PROCEDURE — 3008F PR BODY MASS INDEX (BMI) DOCUMENTED: ICD-10-PCS | Mod: S$GLB,,, | Performed by: ORTHOPAEDIC SURGERY

## 2021-08-09 PROCEDURE — 3078F PR MOST RECENT DIASTOLIC BLOOD PRESSURE < 80 MM HG: ICD-10-PCS | Mod: S$GLB,,, | Performed by: ORTHOPAEDIC SURGERY

## 2021-08-09 PROCEDURE — 1101F PT FALLS ASSESS-DOCD LE1/YR: CPT | Mod: S$GLB,,, | Performed by: ORTHOPAEDIC SURGERY

## 2021-08-09 PROCEDURE — 1101F PR PT FALLS ASSESS DOC 0-1 FALLS W/OUT INJ PAST YR: ICD-10-PCS | Mod: S$GLB,,, | Performed by: ORTHOPAEDIC SURGERY

## 2021-08-09 PROCEDURE — 3052F HG A1C>EQUAL 8.0%<EQUAL 9.0%: CPT | Mod: S$GLB,,, | Performed by: ORTHOPAEDIC SURGERY

## 2021-08-09 PROCEDURE — 3288F PR FALLS RISK ASSESSMENT DOCUMENTED: ICD-10-PCS | Mod: S$GLB,,, | Performed by: ORTHOPAEDIC SURGERY

## 2021-08-09 PROCEDURE — 3078F DIAST BP <80 MM HG: CPT | Mod: S$GLB,,, | Performed by: ORTHOPAEDIC SURGERY

## 2021-08-09 PROCEDURE — 99024 PR POST-OP FOLLOW-UP VISIT: ICD-10-PCS | Mod: S$GLB,,, | Performed by: ORTHOPAEDIC SURGERY

## 2021-08-09 PROCEDURE — 3288F FALL RISK ASSESSMENT DOCD: CPT | Mod: S$GLB,,, | Performed by: ORTHOPAEDIC SURGERY

## 2021-08-10 ENCOUNTER — CLINICAL SUPPORT (OUTPATIENT)
Dept: REHABILITATION | Facility: HOSPITAL | Age: 66
End: 2021-08-10
Attending: ORTHOPAEDIC SURGERY
Payer: MEDICARE

## 2021-08-10 DIAGNOSIS — Z98.890 STATUS POST CARPAL TUNNEL RELEASE: ICD-10-CM

## 2021-08-10 DIAGNOSIS — M65.30 TRIGGER FINGER OF LEFT HAND, UNSPECIFIED FINGER: ICD-10-CM

## 2021-08-10 DIAGNOSIS — M25.60 RANGE OF MOTION DEFICIT: ICD-10-CM

## 2021-08-10 PROCEDURE — 97165 OT EVAL LOW COMPLEX 30 MIN: CPT | Mod: PN

## 2021-08-10 PROCEDURE — 97110 THERAPEUTIC EXERCISES: CPT | Mod: PN

## 2021-08-12 ENCOUNTER — CLINICAL SUPPORT (OUTPATIENT)
Dept: REHABILITATION | Facility: HOSPITAL | Age: 66
End: 2021-08-12
Payer: MEDICARE

## 2021-08-12 DIAGNOSIS — M25.60 RANGE OF MOTION DEFICIT: ICD-10-CM

## 2021-08-12 PROCEDURE — 97110 THERAPEUTIC EXERCISES: CPT | Mod: PN

## 2021-08-12 PROCEDURE — 97022 WHIRLPOOL THERAPY: CPT | Mod: PN

## 2021-08-18 ENCOUNTER — PATIENT MESSAGE (OUTPATIENT)
Dept: ORTHOPEDICS | Facility: CLINIC | Age: 66
End: 2021-08-18

## 2021-09-17 ENCOUNTER — OFFICE VISIT (OUTPATIENT)
Dept: FAMILY MEDICINE | Facility: CLINIC | Age: 66
End: 2021-09-17
Payer: MEDICARE

## 2021-09-17 ENCOUNTER — TELEPHONE (OUTPATIENT)
Dept: NEUROLOGY | Facility: CLINIC | Age: 66
End: 2021-09-17
Payer: MEDICARE

## 2021-09-17 VITALS
BODY MASS INDEX: 32.95 KG/M2 | SYSTOLIC BLOOD PRESSURE: 130 MMHG | DIASTOLIC BLOOD PRESSURE: 77 MMHG | OXYGEN SATURATION: 93 % | WEIGHT: 198 LBS

## 2021-09-17 DIAGNOSIS — E78.5 HYPERLIPIDEMIA, UNSPECIFIED HYPERLIPIDEMIA TYPE: ICD-10-CM

## 2021-09-17 DIAGNOSIS — G47.33 OSA (OBSTRUCTIVE SLEEP APNEA): ICD-10-CM

## 2021-09-17 DIAGNOSIS — R41.3 COMPLAINTS OF MEMORY DISTURBANCE: ICD-10-CM

## 2021-09-17 DIAGNOSIS — Z79.4 TYPE 2 DIABETES MELLITUS WITH DIABETIC NEUROPATHY, WITH LONG-TERM CURRENT USE OF INSULIN: Primary | ICD-10-CM

## 2021-09-17 DIAGNOSIS — I10 ESSENTIAL HYPERTENSION: ICD-10-CM

## 2021-09-17 DIAGNOSIS — E11.40 TYPE 2 DIABETES MELLITUS WITH DIABETIC NEUROPATHY, WITH LONG-TERM CURRENT USE OF INSULIN: Primary | ICD-10-CM

## 2021-09-17 DIAGNOSIS — R25.1 TREMOR: ICD-10-CM

## 2021-09-17 PROCEDURE — 4010F PR ACE/ARB THEARPY RXD/TAKEN: ICD-10-PCS | Mod: 95,,, | Performed by: INTERNAL MEDICINE

## 2021-09-17 PROCEDURE — 1160F RVW MEDS BY RX/DR IN RCRD: CPT | Mod: 95,,, | Performed by: INTERNAL MEDICINE

## 2021-09-17 PROCEDURE — 3008F BODY MASS INDEX DOCD: CPT | Mod: 95,,, | Performed by: INTERNAL MEDICINE

## 2021-09-17 PROCEDURE — 3075F SYST BP GE 130 - 139MM HG: CPT | Mod: 95,,, | Performed by: INTERNAL MEDICINE

## 2021-09-17 PROCEDURE — 99214 PR OFFICE/OUTPT VISIT, EST, LEVL IV, 30-39 MIN: ICD-10-PCS | Mod: 95,,, | Performed by: INTERNAL MEDICINE

## 2021-09-17 PROCEDURE — 1160F PR REVIEW ALL MEDS BY PRESCRIBER/CLIN PHARMACIST DOCUMENTED: ICD-10-PCS | Mod: 95,,, | Performed by: INTERNAL MEDICINE

## 2021-09-17 PROCEDURE — 3066F PR DOCUMENTATION OF TREATMENT FOR NEPHROPATHY: ICD-10-PCS | Mod: 95,,, | Performed by: INTERNAL MEDICINE

## 2021-09-17 PROCEDURE — 99214 OFFICE O/P EST MOD 30 MIN: CPT | Mod: 95,,, | Performed by: INTERNAL MEDICINE

## 2021-09-17 PROCEDURE — 4010F ACE/ARB THERAPY RXD/TAKEN: CPT | Mod: 95,,, | Performed by: INTERNAL MEDICINE

## 2021-09-17 PROCEDURE — 3061F PR NEG MICROALBUMINURIA RESULT DOCUMENTED/REVIEW: ICD-10-PCS | Mod: 95,,, | Performed by: INTERNAL MEDICINE

## 2021-09-17 PROCEDURE — 3052F PR MOST RECENT HEMOGLOBIN A1C LEVEL 8.0 - < 9.0%: ICD-10-PCS | Mod: 95,,, | Performed by: INTERNAL MEDICINE

## 2021-09-17 PROCEDURE — 1159F PR MEDICATION LIST DOCUMENTED IN MEDICAL RECORD: ICD-10-PCS | Mod: 95,,, | Performed by: INTERNAL MEDICINE

## 2021-09-17 PROCEDURE — 1159F MED LIST DOCD IN RCRD: CPT | Mod: 95,,, | Performed by: INTERNAL MEDICINE

## 2021-09-17 PROCEDURE — 3008F PR BODY MASS INDEX (BMI) DOCUMENTED: ICD-10-PCS | Mod: 95,,, | Performed by: INTERNAL MEDICINE

## 2021-09-17 PROCEDURE — 3078F DIAST BP <80 MM HG: CPT | Mod: 95,,, | Performed by: INTERNAL MEDICINE

## 2021-09-17 PROCEDURE — 3052F HG A1C>EQUAL 8.0%<EQUAL 9.0%: CPT | Mod: 95,,, | Performed by: INTERNAL MEDICINE

## 2021-09-17 PROCEDURE — 3066F NEPHROPATHY DOC TX: CPT | Mod: 95,,, | Performed by: INTERNAL MEDICINE

## 2021-09-17 PROCEDURE — 3078F PR MOST RECENT DIASTOLIC BLOOD PRESSURE < 80 MM HG: ICD-10-PCS | Mod: 95,,, | Performed by: INTERNAL MEDICINE

## 2021-09-17 PROCEDURE — 3061F NEG MICROALBUMINURIA REV: CPT | Mod: 95,,, | Performed by: INTERNAL MEDICINE

## 2021-09-17 PROCEDURE — 3075F PR MOST RECENT SYSTOLIC BLOOD PRESS GE 130-139MM HG: ICD-10-PCS | Mod: 95,,, | Performed by: INTERNAL MEDICINE

## 2021-10-14 ENCOUNTER — LAB VISIT (OUTPATIENT)
Dept: LAB | Facility: HOSPITAL | Age: 66
End: 2021-10-14
Attending: NURSE PRACTITIONER
Payer: MEDICARE

## 2021-10-14 ENCOUNTER — OFFICE VISIT (OUTPATIENT)
Dept: CARDIOLOGY | Facility: CLINIC | Age: 66
End: 2021-10-14
Payer: MEDICARE

## 2021-10-14 VITALS
WEIGHT: 199.5 LBS | DIASTOLIC BLOOD PRESSURE: 86 MMHG | HEART RATE: 83 BPM | BODY MASS INDEX: 33.24 KG/M2 | HEIGHT: 65 IN | SYSTOLIC BLOOD PRESSURE: 145 MMHG

## 2021-10-14 DIAGNOSIS — I10 PRIMARY HYPERTENSION: ICD-10-CM

## 2021-10-14 DIAGNOSIS — Z79.4 TYPE 2 DIABETES MELLITUS WITH STAGE 3B CHRONIC KIDNEY DISEASE, WITH LONG-TERM CURRENT USE OF INSULIN: ICD-10-CM

## 2021-10-14 DIAGNOSIS — N18.32 TYPE 2 DIABETES MELLITUS WITH STAGE 3B CHRONIC KIDNEY DISEASE, WITH LONG-TERM CURRENT USE OF INSULIN: ICD-10-CM

## 2021-10-14 DIAGNOSIS — E11.22 TYPE 2 DIABETES MELLITUS WITH STAGE 3B CHRONIC KIDNEY DISEASE, WITH LONG-TERM CURRENT USE OF INSULIN: ICD-10-CM

## 2021-10-14 DIAGNOSIS — E78.2 MIXED HYPERLIPIDEMIA: Primary | ICD-10-CM

## 2021-10-14 DIAGNOSIS — G47.30 SLEEP APNEA WITH USE OF CONTINUOUS POSITIVE AIRWAY PRESSURE (CPAP): ICD-10-CM

## 2021-10-14 LAB
ALBUMIN SERPL BCP-MCNC: 4.1 G/DL (ref 3.5–5.2)
ALP SERPL-CCNC: 102 U/L (ref 55–135)
ALT SERPL W/O P-5'-P-CCNC: 41 U/L (ref 10–44)
ANION GAP SERPL CALC-SCNC: 13 MMOL/L (ref 8–16)
AST SERPL-CCNC: 74 U/L (ref 10–40)
BILIRUB SERPL-MCNC: 0.3 MG/DL (ref 0.1–1)
BUN SERPL-MCNC: 9 MG/DL (ref 8–23)
CALCIUM SERPL-MCNC: 10 MG/DL (ref 8.7–10.5)
CHLORIDE SERPL-SCNC: 104 MMOL/L (ref 95–110)
CO2 SERPL-SCNC: 22 MMOL/L (ref 23–29)
CREAT SERPL-MCNC: 1.1 MG/DL (ref 0.5–1.4)
EST. GFR  (AFRICAN AMERICAN): >60 ML/MIN/1.73 M^2
EST. GFR  (NON AFRICAN AMERICAN): 52.1 ML/MIN/1.73 M^2
ESTIMATED AVG GLUCOSE: 160 MG/DL (ref 68–131)
GLUCOSE SERPL-MCNC: 124 MG/DL (ref 70–110)
HBA1C MFR BLD: 7.2 % (ref 4–5.6)
POTASSIUM SERPL-SCNC: 4.2 MMOL/L (ref 3.5–5.1)
PROT SERPL-MCNC: 7.8 G/DL (ref 6–8.4)
SODIUM SERPL-SCNC: 139 MMOL/L (ref 136–145)

## 2021-10-14 PROCEDURE — 3079F DIAST BP 80-89 MM HG: CPT | Mod: S$GLB,,, | Performed by: INTERNAL MEDICINE

## 2021-10-14 PROCEDURE — 1101F PT FALLS ASSESS-DOCD LE1/YR: CPT | Mod: S$GLB,,, | Performed by: INTERNAL MEDICINE

## 2021-10-14 PROCEDURE — 4010F PR ACE/ARB THEARPY RXD/TAKEN: ICD-10-PCS | Mod: S$GLB,,, | Performed by: INTERNAL MEDICINE

## 2021-10-14 PROCEDURE — 3066F NEPHROPATHY DOC TX: CPT | Mod: S$GLB,,, | Performed by: INTERNAL MEDICINE

## 2021-10-14 PROCEDURE — 3008F PR BODY MASS INDEX (BMI) DOCUMENTED: ICD-10-PCS | Mod: S$GLB,,, | Performed by: INTERNAL MEDICINE

## 2021-10-14 PROCEDURE — 3052F PR MOST RECENT HEMOGLOBIN A1C LEVEL 8.0 - < 9.0%: ICD-10-PCS | Mod: S$GLB,,, | Performed by: INTERNAL MEDICINE

## 2021-10-14 PROCEDURE — 1126F AMNT PAIN NOTED NONE PRSNT: CPT | Mod: S$GLB,,, | Performed by: INTERNAL MEDICINE

## 2021-10-14 PROCEDURE — 99999 PR PBB SHADOW E&M-EST. PATIENT-LVL IV: CPT | Mod: PBBFAC,,, | Performed by: INTERNAL MEDICINE

## 2021-10-14 PROCEDURE — 3079F PR MOST RECENT DIASTOLIC BLOOD PRESSURE 80-89 MM HG: ICD-10-PCS | Mod: S$GLB,,, | Performed by: INTERNAL MEDICINE

## 2021-10-14 PROCEDURE — 99999 PR PBB SHADOW E&M-EST. PATIENT-LVL IV: ICD-10-PCS | Mod: PBBFAC,,, | Performed by: INTERNAL MEDICINE

## 2021-10-14 PROCEDURE — 80053 COMPREHEN METABOLIC PANEL: CPT | Performed by: NURSE PRACTITIONER

## 2021-10-14 PROCEDURE — 3061F NEG MICROALBUMINURIA REV: CPT | Mod: S$GLB,,, | Performed by: INTERNAL MEDICINE

## 2021-10-14 PROCEDURE — 3288F PR FALLS RISK ASSESSMENT DOCUMENTED: ICD-10-PCS | Mod: S$GLB,,, | Performed by: INTERNAL MEDICINE

## 2021-10-14 PROCEDURE — 3052F HG A1C>EQUAL 8.0%<EQUAL 9.0%: CPT | Mod: S$GLB,,, | Performed by: INTERNAL MEDICINE

## 2021-10-14 PROCEDURE — 36415 COLL VENOUS BLD VENIPUNCTURE: CPT | Mod: PO | Performed by: NURSE PRACTITIONER

## 2021-10-14 PROCEDURE — 3288F FALL RISK ASSESSMENT DOCD: CPT | Mod: S$GLB,,, | Performed by: INTERNAL MEDICINE

## 2021-10-14 PROCEDURE — 3066F PR DOCUMENTATION OF TREATMENT FOR NEPHROPATHY: ICD-10-PCS | Mod: S$GLB,,, | Performed by: INTERNAL MEDICINE

## 2021-10-14 PROCEDURE — 4010F ACE/ARB THERAPY RXD/TAKEN: CPT | Mod: S$GLB,,, | Performed by: INTERNAL MEDICINE

## 2021-10-14 PROCEDURE — 1159F PR MEDICATION LIST DOCUMENTED IN MEDICAL RECORD: ICD-10-PCS | Mod: S$GLB,,, | Performed by: INTERNAL MEDICINE

## 2021-10-14 PROCEDURE — 3077F PR MOST RECENT SYSTOLIC BLOOD PRESSURE >= 140 MM HG: ICD-10-PCS | Mod: S$GLB,,, | Performed by: INTERNAL MEDICINE

## 2021-10-14 PROCEDURE — 99214 PR OFFICE/OUTPT VISIT, EST, LEVL IV, 30-39 MIN: ICD-10-PCS | Mod: S$GLB,,, | Performed by: INTERNAL MEDICINE

## 2021-10-14 PROCEDURE — 1159F MED LIST DOCD IN RCRD: CPT | Mod: S$GLB,,, | Performed by: INTERNAL MEDICINE

## 2021-10-14 PROCEDURE — 1126F PR PAIN SEVERITY QUANTIFIED, NO PAIN PRESENT: ICD-10-PCS | Mod: S$GLB,,, | Performed by: INTERNAL MEDICINE

## 2021-10-14 PROCEDURE — 3008F BODY MASS INDEX DOCD: CPT | Mod: S$GLB,,, | Performed by: INTERNAL MEDICINE

## 2021-10-14 PROCEDURE — 99214 OFFICE O/P EST MOD 30 MIN: CPT | Mod: S$GLB,,, | Performed by: INTERNAL MEDICINE

## 2021-10-14 PROCEDURE — 1101F PR PT FALLS ASSESS DOC 0-1 FALLS W/OUT INJ PAST YR: ICD-10-PCS | Mod: S$GLB,,, | Performed by: INTERNAL MEDICINE

## 2021-10-14 PROCEDURE — 83036 HEMOGLOBIN GLYCOSYLATED A1C: CPT | Performed by: NURSE PRACTITIONER

## 2021-10-14 PROCEDURE — 3061F PR NEG MICROALBUMINURIA RESULT DOCUMENTED/REVIEW: ICD-10-PCS | Mod: S$GLB,,, | Performed by: INTERNAL MEDICINE

## 2021-10-14 PROCEDURE — 3077F SYST BP >= 140 MM HG: CPT | Mod: S$GLB,,, | Performed by: INTERNAL MEDICINE

## 2021-10-19 DIAGNOSIS — E11.22 TYPE 2 DIABETES MELLITUS WITH STAGE 3B CHRONIC KIDNEY DISEASE, WITH LONG-TERM CURRENT USE OF INSULIN: ICD-10-CM

## 2021-10-19 DIAGNOSIS — Z79.4 TYPE 2 DIABETES MELLITUS WITH STAGE 3B CHRONIC KIDNEY DISEASE, WITH LONG-TERM CURRENT USE OF INSULIN: ICD-10-CM

## 2021-10-19 DIAGNOSIS — N18.32 TYPE 2 DIABETES MELLITUS WITH STAGE 3B CHRONIC KIDNEY DISEASE, WITH LONG-TERM CURRENT USE OF INSULIN: ICD-10-CM

## 2021-10-19 RX ORDER — SEMAGLUTIDE 1.34 MG/ML
0.5 INJECTION, SOLUTION SUBCUTANEOUS
Qty: 1 PEN | Refills: 6 | Status: SHIPPED | OUTPATIENT
Start: 2021-10-19 | End: 2021-10-22 | Stop reason: SDUPTHER

## 2021-10-22 ENCOUNTER — OFFICE VISIT (OUTPATIENT)
Dept: ENDOCRINOLOGY | Facility: CLINIC | Age: 66
End: 2021-10-22
Payer: MEDICARE

## 2021-10-22 DIAGNOSIS — K76.0 FATTY LIVER: ICD-10-CM

## 2021-10-22 DIAGNOSIS — Z79.4 TYPE 2 DIABETES MELLITUS WITH STAGE 3A CHRONIC KIDNEY DISEASE, WITH LONG-TERM CURRENT USE OF INSULIN: Primary | ICD-10-CM

## 2021-10-22 DIAGNOSIS — N18.32 TYPE 2 DIABETES MELLITUS WITH STAGE 3B CHRONIC KIDNEY DISEASE, WITH LONG-TERM CURRENT USE OF INSULIN: ICD-10-CM

## 2021-10-22 DIAGNOSIS — Z79.4 TYPE 2 DIABETES MELLITUS WITH STAGE 3B CHRONIC KIDNEY DISEASE, WITH LONG-TERM CURRENT USE OF INSULIN: ICD-10-CM

## 2021-10-22 DIAGNOSIS — E11.22 TYPE 2 DIABETES MELLITUS WITH STAGE 3A CHRONIC KIDNEY DISEASE, WITH LONG-TERM CURRENT USE OF INSULIN: Primary | ICD-10-CM

## 2021-10-22 DIAGNOSIS — E78.5 HYPERLIPIDEMIA, UNSPECIFIED HYPERLIPIDEMIA TYPE: ICD-10-CM

## 2021-10-22 DIAGNOSIS — B37.31 VAGINAL YEAST INFECTION: ICD-10-CM

## 2021-10-22 DIAGNOSIS — E03.4 HYPOTHYROIDISM DUE TO ACQUIRED ATROPHY OF THYROID: ICD-10-CM

## 2021-10-22 DIAGNOSIS — N18.31 TYPE 2 DIABETES MELLITUS WITH STAGE 3A CHRONIC KIDNEY DISEASE, WITH LONG-TERM CURRENT USE OF INSULIN: Primary | ICD-10-CM

## 2021-10-22 DIAGNOSIS — I10 ESSENTIAL HYPERTENSION: ICD-10-CM

## 2021-10-22 DIAGNOSIS — I51.89 DIASTOLIC DYSFUNCTION: ICD-10-CM

## 2021-10-22 DIAGNOSIS — E11.22 TYPE 2 DIABETES MELLITUS WITH STAGE 3B CHRONIC KIDNEY DISEASE, WITH LONG-TERM CURRENT USE OF INSULIN: ICD-10-CM

## 2021-10-22 PROCEDURE — 3066F PR DOCUMENTATION OF TREATMENT FOR NEPHROPATHY: ICD-10-PCS | Mod: 95,,, | Performed by: NURSE PRACTITIONER

## 2021-10-22 PROCEDURE — 99214 PR OFFICE/OUTPT VISIT, EST, LEVL IV, 30-39 MIN: ICD-10-PCS | Mod: 95,,, | Performed by: NURSE PRACTITIONER

## 2021-10-22 PROCEDURE — 1159F PR MEDICATION LIST DOCUMENTED IN MEDICAL RECORD: ICD-10-PCS | Mod: 95,,, | Performed by: NURSE PRACTITIONER

## 2021-10-22 PROCEDURE — 3061F PR NEG MICROALBUMINURIA RESULT DOCUMENTED/REVIEW: ICD-10-PCS | Mod: 95,,, | Performed by: NURSE PRACTITIONER

## 2021-10-22 PROCEDURE — 4010F PR ACE/ARB THEARPY RXD/TAKEN: ICD-10-PCS | Mod: 95,,, | Performed by: NURSE PRACTITIONER

## 2021-10-22 PROCEDURE — 3051F HG A1C>EQUAL 7.0%<8.0%: CPT | Mod: 95,,, | Performed by: NURSE PRACTITIONER

## 2021-10-22 PROCEDURE — 3066F NEPHROPATHY DOC TX: CPT | Mod: 95,,, | Performed by: NURSE PRACTITIONER

## 2021-10-22 PROCEDURE — 99214 OFFICE O/P EST MOD 30 MIN: CPT | Mod: 95,,, | Performed by: NURSE PRACTITIONER

## 2021-10-22 PROCEDURE — 3051F PR MOST RECENT HEMOGLOBIN A1C LEVEL 7.0 - < 8.0%: ICD-10-PCS | Mod: 95,,, | Performed by: NURSE PRACTITIONER

## 2021-10-22 PROCEDURE — 1159F MED LIST DOCD IN RCRD: CPT | Mod: 95,,, | Performed by: NURSE PRACTITIONER

## 2021-10-22 PROCEDURE — 3061F NEG MICROALBUMINURIA REV: CPT | Mod: 95,,, | Performed by: NURSE PRACTITIONER

## 2021-10-22 PROCEDURE — 4010F ACE/ARB THERAPY RXD/TAKEN: CPT | Mod: 95,,, | Performed by: NURSE PRACTITIONER

## 2021-10-22 PROCEDURE — 1160F PR REVIEW ALL MEDS BY PRESCRIBER/CLIN PHARMACIST DOCUMENTED: ICD-10-PCS | Mod: 95,,, | Performed by: NURSE PRACTITIONER

## 2021-10-22 PROCEDURE — 1160F RVW MEDS BY RX/DR IN RCRD: CPT | Mod: 95,,, | Performed by: NURSE PRACTITIONER

## 2021-10-22 RX ORDER — SEMAGLUTIDE 1.34 MG/ML
0.5 INJECTION, SOLUTION SUBCUTANEOUS
Qty: 3 PEN | Refills: 3 | Status: SHIPPED | OUTPATIENT
Start: 2021-10-22 | End: 2022-08-03

## 2021-10-22 RX ORDER — FLUCONAZOLE 150 MG/1
150 TABLET ORAL DAILY
Qty: 1 TABLET | Refills: 0 | Status: SHIPPED | OUTPATIENT
Start: 2021-10-22 | End: 2021-10-23

## 2021-11-08 PROBLEM — K80.00 CALCULUS OF GALLBLADDER WITH ACUTE CHOLECYSTITIS WITHOUT OBSTRUCTION: Status: RESOLVED | Noted: 2021-11-08 | Resolved: 2021-11-08

## 2021-11-08 PROBLEM — K80.00 CALCULUS OF GALLBLADDER WITH ACUTE CHOLECYSTITIS WITHOUT OBSTRUCTION: Status: ACTIVE | Noted: 2021-11-08

## 2021-11-15 ENCOUNTER — TELEPHONE (OUTPATIENT)
Dept: ENDOCRINOLOGY | Facility: CLINIC | Age: 66
End: 2021-11-15
Payer: MEDICARE

## 2021-11-19 DIAGNOSIS — J31.0 CHRONIC RHINITIS: ICD-10-CM

## 2021-11-22 RX ORDER — FLUTICASONE PROPIONATE 50 MCG
SPRAY, SUSPENSION (ML) NASAL
Qty: 48 G | Refills: 0 | OUTPATIENT
Start: 2021-11-22

## 2022-01-07 ENCOUNTER — LAB VISIT (OUTPATIENT)
Dept: LAB | Facility: HOSPITAL | Age: 67
End: 2022-01-07
Payer: MEDICARE

## 2022-01-07 DIAGNOSIS — N18.31 TYPE 2 DIABETES MELLITUS WITH STAGE 3A CHRONIC KIDNEY DISEASE, WITH LONG-TERM CURRENT USE OF INSULIN: ICD-10-CM

## 2022-01-07 DIAGNOSIS — E03.4 HYPOTHYROIDISM DUE TO ACQUIRED ATROPHY OF THYROID: ICD-10-CM

## 2022-01-07 DIAGNOSIS — Z79.4 TYPE 2 DIABETES MELLITUS WITH STAGE 3A CHRONIC KIDNEY DISEASE, WITH LONG-TERM CURRENT USE OF INSULIN: ICD-10-CM

## 2022-01-07 DIAGNOSIS — E11.22 TYPE 2 DIABETES MELLITUS WITH STAGE 3A CHRONIC KIDNEY DISEASE, WITH LONG-TERM CURRENT USE OF INSULIN: ICD-10-CM

## 2022-01-07 DIAGNOSIS — E78.5 HYPERLIPEMIA: Primary | ICD-10-CM

## 2022-01-07 LAB
ALBUMIN SERPL BCP-MCNC: 3.9 G/DL (ref 3.5–5.2)
ALBUMIN SERPL BCP-MCNC: 3.9 G/DL (ref 3.5–5.2)
ALP SERPL-CCNC: 91 U/L (ref 55–135)
ALT SERPL W/O P-5'-P-CCNC: 29 U/L (ref 10–44)
ANION GAP SERPL CALC-SCNC: 10 MMOL/L (ref 8–16)
ANION GAP SERPL CALC-SCNC: 11 MMOL/L (ref 8–16)
AST SERPL-CCNC: 37 U/L (ref 10–40)
BILIRUB SERPL-MCNC: 0.4 MG/DL (ref 0.1–1)
BUN SERPL-MCNC: 11 MG/DL (ref 8–23)
BUN SERPL-MCNC: 11 MG/DL (ref 8–23)
CALCIUM SERPL-MCNC: 9.8 MG/DL (ref 8.7–10.5)
CALCIUM SERPL-MCNC: 9.8 MG/DL (ref 8.7–10.5)
CHLORIDE SERPL-SCNC: 103 MMOL/L (ref 95–110)
CHLORIDE SERPL-SCNC: 103 MMOL/L (ref 95–110)
CHOLEST SERPL-MCNC: 163 MG/DL (ref 120–199)
CHOLEST/HDLC SERPL: 3.5 {RATIO} (ref 2–5)
CO2 SERPL-SCNC: 25 MMOL/L (ref 23–29)
CO2 SERPL-SCNC: 26 MMOL/L (ref 23–29)
CREAT SERPL-MCNC: 1 MG/DL (ref 0.5–1.4)
CREAT SERPL-MCNC: 1 MG/DL (ref 0.5–1.4)
CREAT UR-MCNC: 80 MG/DL (ref 15–325)
EST. GFR  (AFRICAN AMERICAN): >60 ML/MIN/1.73 M^2
EST. GFR  (AFRICAN AMERICAN): >60 ML/MIN/1.73 M^2
EST. GFR  (NON AFRICAN AMERICAN): 58.4 ML/MIN/1.73 M^2
EST. GFR  (NON AFRICAN AMERICAN): 58.4 ML/MIN/1.73 M^2
ESTIMATED AVG GLUCOSE: 146 MG/DL (ref 68–131)
GLUCOSE SERPL-MCNC: 100 MG/DL (ref 70–110)
GLUCOSE SERPL-MCNC: 96 MG/DL (ref 70–110)
HBA1C MFR BLD: 6.7 % (ref 4–5.6)
HDLC SERPL-MCNC: 46 MG/DL (ref 40–75)
HDLC SERPL: 28.2 % (ref 20–50)
LDLC SERPL CALC-MCNC: 88.2 MG/DL (ref 63–159)
MAGNESIUM SERPL-MCNC: 2.2 MG/DL (ref 1.6–2.6)
NONHDLC SERPL-MCNC: 117 MG/DL
PHOSPHATE SERPL-MCNC: 4.1 MG/DL (ref 2.7–4.5)
POTASSIUM SERPL-SCNC: 4.6 MMOL/L (ref 3.5–5.1)
POTASSIUM SERPL-SCNC: 4.9 MMOL/L (ref 3.5–5.1)
PROT SERPL-MCNC: 7.4 G/DL (ref 6–8.4)
PROT UR-MCNC: <7 MG/DL (ref 0–15)
PROT/CREAT UR: NORMAL MG/G{CREAT} (ref 0–0.2)
PTH-INTACT SERPL-MCNC: 48.2 PG/ML (ref 9–77)
SODIUM SERPL-SCNC: 138 MMOL/L (ref 136–145)
SODIUM SERPL-SCNC: 140 MMOL/L (ref 136–145)
TRIGL SERPL-MCNC: 144 MG/DL (ref 30–150)
TSH SERPL DL<=0.005 MIU/L-ACNC: 3.58 UIU/ML (ref 0.4–4)
URATE SERPL-MCNC: 4.4 MG/DL (ref 2.4–5.7)

## 2022-01-07 PROCEDURE — 83970 ASSAY OF PARATHORMONE: CPT | Performed by: INTERNAL MEDICINE

## 2022-01-07 PROCEDURE — 84443 ASSAY THYROID STIM HORMONE: CPT | Performed by: NURSE PRACTITIONER

## 2022-01-07 PROCEDURE — 80061 LIPID PANEL: CPT | Performed by: NURSE PRACTITIONER

## 2022-01-07 PROCEDURE — 84550 ASSAY OF BLOOD/URIC ACID: CPT | Performed by: INTERNAL MEDICINE

## 2022-01-07 PROCEDURE — 85025 COMPLETE CBC W/AUTO DIFF WBC: CPT | Performed by: INTERNAL MEDICINE

## 2022-01-07 PROCEDURE — 83036 HEMOGLOBIN GLYCOSYLATED A1C: CPT | Performed by: NURSE PRACTITIONER

## 2022-01-07 PROCEDURE — 83735 ASSAY OF MAGNESIUM: CPT | Performed by: INTERNAL MEDICINE

## 2022-01-07 PROCEDURE — 84100 ASSAY OF PHOSPHORUS: CPT | Performed by: INTERNAL MEDICINE

## 2022-01-07 PROCEDURE — 80053 COMPREHEN METABOLIC PANEL: CPT | Performed by: NURSE PRACTITIONER

## 2022-01-07 PROCEDURE — 36415 COLL VENOUS BLD VENIPUNCTURE: CPT | Mod: PN | Performed by: NURSE PRACTITIONER

## 2022-01-07 PROCEDURE — 82570 ASSAY OF URINE CREATININE: CPT | Performed by: INTERNAL MEDICINE

## 2022-01-08 LAB
BASOPHILS # BLD AUTO: 0.09 K/UL (ref 0–0.2)
BASOPHILS NFR BLD: 1.2 % (ref 0–1.9)
DIFFERENTIAL METHOD: ABNORMAL
EOSINOPHIL # BLD AUTO: 0.2 K/UL (ref 0–0.5)
EOSINOPHIL NFR BLD: 2.2 % (ref 0–8)
ERYTHROCYTE [DISTWIDTH] IN BLOOD BY AUTOMATED COUNT: 14.2 % (ref 11.5–14.5)
HCT VFR BLD AUTO: 37.3 % (ref 37–48.5)
HGB BLD-MCNC: 11.1 G/DL (ref 12–16)
IMM GRANULOCYTES # BLD AUTO: 0.03 K/UL (ref 0–0.04)
IMM GRANULOCYTES NFR BLD AUTO: 0.4 % (ref 0–0.5)
LYMPHOCYTES # BLD AUTO: 1.9 K/UL (ref 1–4.8)
LYMPHOCYTES NFR BLD: 24.9 % (ref 18–48)
MCH RBC QN AUTO: 28 PG (ref 27–31)
MCHC RBC AUTO-ENTMCNC: 29.8 G/DL (ref 32–36)
MCV RBC AUTO: 94 FL (ref 82–98)
MONOCYTES # BLD AUTO: 0.8 K/UL (ref 0.3–1)
MONOCYTES NFR BLD: 10.9 % (ref 4–15)
NEUTROPHILS # BLD AUTO: 4.6 K/UL (ref 1.8–7.7)
NEUTROPHILS NFR BLD: 60.4 % (ref 38–73)
NRBC BLD-RTO: 0 /100 WBC
PLATELET # BLD AUTO: 392 K/UL (ref 150–450)
PMV BLD AUTO: 9.3 FL (ref 9.2–12.9)
RBC # BLD AUTO: 3.96 M/UL (ref 4–5.4)
WBC # BLD AUTO: 7.59 K/UL (ref 3.9–12.7)

## 2022-01-12 RX ORDER — ORPHENADRINE CITRATE 100 MG/1
TABLET, EXTENDED RELEASE ORAL
Qty: 90 TABLET | Refills: 0 | Status: SHIPPED | OUTPATIENT
Start: 2022-01-12 | End: 2022-06-28

## 2022-01-12 NOTE — TELEPHONE ENCOUNTER
No new care gaps identified.  Powered by [facility] by [employer]. Reference number: [id number].   1/12/2022 3:20:40 PM CST

## 2022-01-13 ENCOUNTER — PATIENT MESSAGE (OUTPATIENT)
Dept: ADMINISTRATIVE | Facility: OTHER | Age: 67
End: 2022-01-13
Payer: MEDICARE

## 2022-01-13 ENCOUNTER — PATIENT OUTREACH (OUTPATIENT)
Dept: ADMINISTRATIVE | Facility: OTHER | Age: 67
End: 2022-01-13
Payer: MEDICARE

## 2022-01-13 DIAGNOSIS — Z12.31 ENCOUNTER FOR SCREENING MAMMOGRAM FOR MALIGNANT NEOPLASM OF BREAST: Primary | ICD-10-CM

## 2022-01-13 NOTE — PROGRESS NOTES
Health Maintenance Due   Topic Date Due    Foot Exam  07/06/2021    Mammogram  01/04/2022    Eye Exam  02/04/2022    Diabetes Urine Screening  03/10/2022     Updates were requested from care everywhere.  Chart was reviewed for overdue Proactive [facility] Encounters (NABILA) topics (CRS, Breast Cancer Screening, Eye exam)  Health Maintenance has been updated.  LINKS immunization registry triggered.  Immunizations were reconciled.

## 2022-01-24 ENCOUNTER — PATIENT MESSAGE (OUTPATIENT)
Dept: ADMINISTRATIVE | Facility: OTHER | Age: 67
End: 2022-01-24
Payer: MEDICARE

## 2022-01-24 ENCOUNTER — OFFICE VISIT (OUTPATIENT)
Dept: ENDOCRINOLOGY | Facility: CLINIC | Age: 67
End: 2022-01-24
Payer: MEDICARE

## 2022-01-24 DIAGNOSIS — K76.0 FATTY LIVER: ICD-10-CM

## 2022-01-24 DIAGNOSIS — E11.22 TYPE 2 DIABETES MELLITUS WITH STAGE 3A CHRONIC KIDNEY DISEASE, WITH LONG-TERM CURRENT USE OF INSULIN: Primary | ICD-10-CM

## 2022-01-24 DIAGNOSIS — N18.31 TYPE 2 DIABETES MELLITUS WITH STAGE 3A CHRONIC KIDNEY DISEASE, WITH LONG-TERM CURRENT USE OF INSULIN: Primary | ICD-10-CM

## 2022-01-24 DIAGNOSIS — E78.5 HYPERLIPIDEMIA, UNSPECIFIED HYPERLIPIDEMIA TYPE: ICD-10-CM

## 2022-01-24 DIAGNOSIS — I10 ESSENTIAL HYPERTENSION: ICD-10-CM

## 2022-01-24 DIAGNOSIS — E03.4 HYPOTHYROIDISM DUE TO ACQUIRED ATROPHY OF THYROID: ICD-10-CM

## 2022-01-24 DIAGNOSIS — I51.89 DIASTOLIC DYSFUNCTION: ICD-10-CM

## 2022-01-24 DIAGNOSIS — Z79.4 TYPE 2 DIABETES MELLITUS WITH STAGE 3A CHRONIC KIDNEY DISEASE, WITH LONG-TERM CURRENT USE OF INSULIN: Primary | ICD-10-CM

## 2022-01-24 PROCEDURE — 1160F PR REVIEW ALL MEDS BY PRESCRIBER/CLIN PHARMACIST DOCUMENTED: ICD-10-PCS | Mod: CPTII,95,, | Performed by: NURSE PRACTITIONER

## 2022-01-24 PROCEDURE — 99214 OFFICE O/P EST MOD 30 MIN: CPT | Mod: 95,,, | Performed by: NURSE PRACTITIONER

## 2022-01-24 PROCEDURE — 4010F PR ACE/ARB THEARPY RXD/TAKEN: ICD-10-PCS | Mod: CPTII,95,, | Performed by: NURSE PRACTITIONER

## 2022-01-24 PROCEDURE — 3044F PR MOST RECENT HEMOGLOBIN A1C LEVEL <7.0%: ICD-10-PCS | Mod: CPTII,95,, | Performed by: NURSE PRACTITIONER

## 2022-01-24 PROCEDURE — 3044F HG A1C LEVEL LT 7.0%: CPT | Mod: CPTII,95,, | Performed by: NURSE PRACTITIONER

## 2022-01-24 PROCEDURE — 4010F ACE/ARB THERAPY RXD/TAKEN: CPT | Mod: CPTII,95,, | Performed by: NURSE PRACTITIONER

## 2022-01-24 PROCEDURE — 1159F PR MEDICATION LIST DOCUMENTED IN MEDICAL RECORD: ICD-10-PCS | Mod: CPTII,95,, | Performed by: NURSE PRACTITIONER

## 2022-01-24 PROCEDURE — 1159F MED LIST DOCD IN RCRD: CPT | Mod: CPTII,95,, | Performed by: NURSE PRACTITIONER

## 2022-01-24 PROCEDURE — 1160F RVW MEDS BY RX/DR IN RCRD: CPT | Mod: CPTII,95,, | Performed by: NURSE PRACTITIONER

## 2022-01-24 PROCEDURE — 99214 PR OFFICE/OUTPT VISIT, EST, LEVL IV, 30-39 MIN: ICD-10-PCS | Mod: 95,,, | Performed by: NURSE PRACTITIONER

## 2022-01-24 RX ORDER — ATENOLOL 100 MG/1
100 TABLET ORAL NIGHTLY
COMMUNITY

## 2022-01-24 NOTE — PROGRESS NOTES
The patient location is:  Patient Home   The chief complaint leading to consultation is: Type 2 DM  Visit type: Virtual visit with synchronous audio and video  Total time spent with patient: 15 min  Each patient to whom he or she provides medical services by telemedicine is:  (1) informed of the relationship between the physician and patient and the respective role of any other health care provider with respect to management of the patient; and (2) notified that he or she may decline to receive medical services by telemedicine and may withdraw from such care at any time.       CC: Ms. Bing Kearns arrives today for management of Type 2 DM and review of chronic medical conditions, as listed in the Visit Diagnosis section of this encounter.       HPI: Ms. Bing Kearns was diagnosed with Type 2 DM in her late 50s. She was diagnosed based on lab work. Initial treatment consisted of metformin. Januvia was added in . Toujeo was added in 2020. + FH of DM in sister, maternal GF. Denies hospitalizations due to DM.   Follows with Dr. Vanessa for CKD.     Patient was last seen by me in October.     She states that she was in the donut hole in October and paid out of pocket for her Ozempic until the start of this year.     BG readings are checked 2x/day.  She reports the following:  Fastin-135  Bedtime (3 hours after eating): 170s    Hypoglycemia: No  Hypoglycemic Symptoms: hunger and jitteriness  Hypoglycemia Treatment: juice or glucose tabs    Missing Insulin/PO medication doses: No    Dietary Habits: Eats 2-3 meals/day. May snack on frozen fruit bar or a few Cheetos. Avoids sugary beverages.     Last DM education appointment: not recently        CURRENT DIABETIC MEDS: metformin XR 1000 mg daily, pioglitazone 15 mg daily, glimepiride 2 mg daily, Ozempic 0.5 mg weekly, Toujeo 18 units QHS  Glucometer type: One Touch Ultra 2    Previous DM treatments:  Trulicity - panic attacks, blood sugars in the  70s  Januvia - $$  Ozempic - not covered  Prandin - discontinued since starting Ozempic    Last Eye Exam: 2/4/2021, no DRMell + glaucoma. Dr. Turcios  Last Podiatry Exam: 11/2021, Dr. Helms. She follows every 3 months.     REVIEW OF SYSTEMS  Constitutional: no c/o weakness, weight loss.  Eyes: denies visual disturbances.  Cardiac: no palpitations, chest pain   Respiratory: denies cough. + chronic dyspnea that has improved. Sees pulmonology.  GI: no c/o abdominal pain. Denies h/o pancreatitis. Chronic diarrhea since past colon resection. + mild nausea in the morning that is sporadic. She states that this is tolerable.   : c/o vaginal yeast injection.  Neuro: + numbness, tingling in feet  Endocrine: denies polyphagia, polydipsia, polyuria       Personally reviewed Past Medical, Surgical, Social History.    Vital Signs  LMP  (LMP Unknown)  -- virtual visit     Personally reviewed the below labs:    Hemoglobin A1C   Date Value Ref Range Status   01/07/2022 6.7 (H) 4.0 - 5.6 % Final     Comment:     ADA Screening Guidelines:  5.7-6.4%  Consistent with prediabetes  >or=6.5%  Consistent with diabetes    High levels of fetal hemoglobin interfere with the HbA1C  assay. Heterozygous hemoglobin variants (HbS, HgC, etc)do  not significantly interfere with this assay.   However, presence of multiple variants may affect accuracy.     10/14/2021 7.2 (H) 4.0 - 5.6 % Final     Comment:     ADA Screening Guidelines:  5.7-6.4%  Consistent with prediabetes  >or=6.5%  Consistent with diabetes    High levels of fetal hemoglobin interfere with the HbA1C  assay. Heterozygous hemoglobin variants (HbS, HgC, etc)do  not significantly interfere with this assay.   However, presence of multiple variants may affect accuracy.     05/25/2021 8.2 (H) 4.0 - 5.6 % Final     Comment:     ADA Screening Guidelines:  5.7-6.4%  Consistent with prediabetes  >or=6.5%  Consistent with diabetes    High levels of fetal hemoglobin interfere with the  HbA1C  assay. Heterozygous hemoglobin variants (HbS, HgC, etc)do  not significantly interfere with this assay.   However, presence of multiple variants may affect accuracy.         Chemistry        Component Value Date/Time     01/07/2022 0908     01/07/2022 0908    K 4.9 01/07/2022 0908    K 4.6 01/07/2022 0908     01/07/2022 0908     01/07/2022 0908    CO2 26 01/07/2022 0908    CO2 25 01/07/2022 0908    BUN 11 01/07/2022 0908    BUN 11 01/07/2022 0908    CREATININE 1.0 01/07/2022 0908    CREATININE 1.0 01/07/2022 0908     01/07/2022 0908    GLU 96 01/07/2022 0908        Component Value Date/Time    CALCIUM 9.8 01/07/2022 0908    CALCIUM 9.8 01/07/2022 0908    ALKPHOS 91 01/07/2022 0908    AST 37 01/07/2022 0908    ALT 29 01/07/2022 0908    BILITOT 0.4 01/07/2022 0908    ESTGFRAFRICA >60.0 01/07/2022 0908    ESTGFRAFRICA >60.0 01/07/2022 0908    EGFRNONAA 58.4 (A) 01/07/2022 0908    EGFRNONAA 58.4 (A) 01/07/2022 0908          Lab Results   Component Value Date    CHOL 163 01/07/2022    CHOL 162 02/24/2021    CHOL 138 02/04/2020     Lab Results   Component Value Date    HDL 46 01/07/2022    HDL 54 02/24/2021    HDL 40 02/04/2020     Lab Results   Component Value Date    LDLCALC 88.2 01/07/2022    LDLCALC 83.2 02/24/2021    LDLCALC 58.4 (L) 02/04/2020     Lab Results   Component Value Date    TRIG 144 01/07/2022    TRIG 124 02/24/2021    TRIG 198 (H) 02/04/2020     Lab Results   Component Value Date    CHOLHDL 28.2 01/07/2022    CHOLHDL 33.3 02/24/2021    CHOLHDL 29.0 02/04/2020       Lab Results   Component Value Date    MICALBCREAT Unable to calculate 03/10/2021     Lab Results   Component Value Date    TSH 3.580 01/07/2022       CrCl cannot be calculated (Patient's most recent lab result is older than the maximum 7 days allowed.).    Vit D, 25-Hydroxy   Date Value Ref Range Status   12/08/2016 36 30 - 96 ng/mL Final     Comment:     Vitamin D deficiency.........<10 ng/mL                               Vitamin D insufficiency......10-29 ng/mL       Vitamin D sufficiency........> or equal to 30 ng/mL  Vitamin D toxicity............>100 ng/mL           PHYSICAL EXAMINATION  Deferred -- video visit.     Goals      HEMOGLOBIN A1C < 7.5               Assessment/Plan  1. Type 2 diabetes mellitus with stage 3a chronic kidney disease, with long-term current use of insulin -- Controlled. At goal without hypoglycemia.  -- continue current medications.   -- recommended keeping snacks < 15g CHO  -- BG monitoring 2x/day.    -- Discussed diagnosis of DM, A1c goals, progression of disease, long term complications and tx options.  -- takes aspirin, ARB   2. Essential hypertension  -- managed by cardiology (Dr. Vanessa)  -- continue current meds and monitor   3. Hyperlipidemia, unspecified hyperlipidemia type  -- controlled  -- continue Crestor   4. Hypothyroidism due to acquired atrophy of thyroid  -- stable  -- continue current levothyroxine dose.    5. Diastolic dysfunction  -- following with cardiology. EF 60% on 1/2021 echo  -- will need to monitor status since patient is taking pioglitazone   6. Fatty liver -- maintain DM control       FOLLOW UP  Follow up in about 4 months (around 5/24/2022).  Patient instructed to bring BG logs to each follow up   Patient encouraged to call for any BG/medication issues, concerns, or questions.      Orders Placed This Encounter   Procedures    Hemoglobin A1C    Microalbumin/Creatinine Ratio, Urine    Basic Metabolic Panel

## 2022-02-08 LAB
LEFT EYE DM RETINOPATHY: NEGATIVE
RIGHT EYE DM RETINOPATHY: NEGATIVE

## 2022-02-16 ENCOUNTER — PATIENT OUTREACH (OUTPATIENT)
Dept: ADMINISTRATIVE | Facility: HOSPITAL | Age: 67
End: 2022-02-16
Payer: MEDICARE

## 2022-02-23 ENCOUNTER — PATIENT MESSAGE (OUTPATIENT)
Dept: FAMILY MEDICINE | Facility: CLINIC | Age: 67
End: 2022-02-23
Payer: MEDICARE

## 2022-02-23 DIAGNOSIS — Z78.0 ASYMPTOMATIC POSTMENOPAUSAL STATE: Primary | ICD-10-CM

## 2022-02-24 ENCOUNTER — PATIENT MESSAGE (OUTPATIENT)
Dept: FAMILY MEDICINE | Facility: CLINIC | Age: 67
End: 2022-02-24
Payer: MEDICARE

## 2022-02-25 ENCOUNTER — PATIENT OUTREACH (OUTPATIENT)
Dept: ADMINISTRATIVE | Facility: HOSPITAL | Age: 67
End: 2022-02-25
Payer: MEDICARE

## 2022-02-25 ENCOUNTER — PATIENT MESSAGE (OUTPATIENT)
Dept: ADMINISTRATIVE | Facility: HOSPITAL | Age: 67
End: 2022-02-25
Payer: MEDICARE

## 2022-03-03 ENCOUNTER — PATIENT OUTREACH (OUTPATIENT)
Dept: ADMINISTRATIVE | Facility: HOSPITAL | Age: 67
End: 2022-03-03
Payer: MEDICARE

## 2022-03-17 ENCOUNTER — PATIENT MESSAGE (OUTPATIENT)
Dept: FAMILY MEDICINE | Facility: CLINIC | Age: 67
End: 2022-03-17
Payer: MEDICARE

## 2022-03-23 ENCOUNTER — OFFICE VISIT (OUTPATIENT)
Dept: ORTHOPEDICS | Facility: CLINIC | Age: 67
End: 2022-03-23
Payer: MEDICARE

## 2022-03-23 VITALS — BODY MASS INDEX: 32.49 KG/M2 | WEIGHT: 195 LBS | HEIGHT: 65 IN

## 2022-03-23 DIAGNOSIS — G56.02 CARPAL TUNNEL SYNDROME ON LEFT: Primary | ICD-10-CM

## 2022-03-23 DIAGNOSIS — M65.331 TRIGGER FINGER, RIGHT MIDDLE FINGER: ICD-10-CM

## 2022-03-23 PROCEDURE — 1125F AMNT PAIN NOTED PAIN PRSNT: CPT | Mod: CPTII,S$GLB,, | Performed by: ORTHOPAEDIC SURGERY

## 2022-03-23 PROCEDURE — 3288F FALL RISK ASSESSMENT DOCD: CPT | Mod: CPTII,S$GLB,, | Performed by: ORTHOPAEDIC SURGERY

## 2022-03-23 PROCEDURE — 3044F HG A1C LEVEL LT 7.0%: CPT | Mod: CPTII,S$GLB,, | Performed by: ORTHOPAEDIC SURGERY

## 2022-03-23 PROCEDURE — 99999 PR PBB SHADOW E&M-EST. PATIENT-LVL IV: ICD-10-PCS | Mod: PBBFAC,,, | Performed by: ORTHOPAEDIC SURGERY

## 2022-03-23 PROCEDURE — 3044F PR MOST RECENT HEMOGLOBIN A1C LEVEL <7.0%: ICD-10-PCS | Mod: CPTII,S$GLB,, | Performed by: ORTHOPAEDIC SURGERY

## 2022-03-23 PROCEDURE — 1101F PR PT FALLS ASSESS DOC 0-1 FALLS W/OUT INJ PAST YR: ICD-10-PCS | Mod: CPTII,S$GLB,, | Performed by: ORTHOPAEDIC SURGERY

## 2022-03-23 PROCEDURE — 1159F MED LIST DOCD IN RCRD: CPT | Mod: CPTII,S$GLB,, | Performed by: ORTHOPAEDIC SURGERY

## 2022-03-23 PROCEDURE — 3288F PR FALLS RISK ASSESSMENT DOCUMENTED: ICD-10-PCS | Mod: CPTII,S$GLB,, | Performed by: ORTHOPAEDIC SURGERY

## 2022-03-23 PROCEDURE — 1101F PT FALLS ASSESS-DOCD LE1/YR: CPT | Mod: CPTII,S$GLB,, | Performed by: ORTHOPAEDIC SURGERY

## 2022-03-23 PROCEDURE — 99214 PR OFFICE/OUTPT VISIT, EST, LEVL IV, 30-39 MIN: ICD-10-PCS | Mod: S$GLB,,, | Performed by: ORTHOPAEDIC SURGERY

## 2022-03-23 PROCEDURE — 1159F PR MEDICATION LIST DOCUMENTED IN MEDICAL RECORD: ICD-10-PCS | Mod: CPTII,S$GLB,, | Performed by: ORTHOPAEDIC SURGERY

## 2022-03-23 PROCEDURE — 1125F PR PAIN SEVERITY QUANTIFIED, PAIN PRESENT: ICD-10-PCS | Mod: CPTII,S$GLB,, | Performed by: ORTHOPAEDIC SURGERY

## 2022-03-23 PROCEDURE — 1160F RVW MEDS BY RX/DR IN RCRD: CPT | Mod: CPTII,S$GLB,, | Performed by: ORTHOPAEDIC SURGERY

## 2022-03-23 PROCEDURE — 3008F BODY MASS INDEX DOCD: CPT | Mod: CPTII,S$GLB,, | Performed by: ORTHOPAEDIC SURGERY

## 2022-03-23 PROCEDURE — 1160F PR REVIEW ALL MEDS BY PRESCRIBER/CLIN PHARMACIST DOCUMENTED: ICD-10-PCS | Mod: CPTII,S$GLB,, | Performed by: ORTHOPAEDIC SURGERY

## 2022-03-23 PROCEDURE — 99214 OFFICE O/P EST MOD 30 MIN: CPT | Mod: S$GLB,,, | Performed by: ORTHOPAEDIC SURGERY

## 2022-03-23 PROCEDURE — 3008F PR BODY MASS INDEX (BMI) DOCUMENTED: ICD-10-PCS | Mod: CPTII,S$GLB,, | Performed by: ORTHOPAEDIC SURGERY

## 2022-03-23 PROCEDURE — 4010F ACE/ARB THERAPY RXD/TAKEN: CPT | Mod: CPTII,S$GLB,, | Performed by: ORTHOPAEDIC SURGERY

## 2022-03-23 PROCEDURE — 4010F PR ACE/ARB THEARPY RXD/TAKEN: ICD-10-PCS | Mod: CPTII,S$GLB,, | Performed by: ORTHOPAEDIC SURGERY

## 2022-03-23 PROCEDURE — 99999 PR PBB SHADOW E&M-EST. PATIENT-LVL IV: CPT | Mod: PBBFAC,,, | Performed by: ORTHOPAEDIC SURGERY

## 2022-03-23 NOTE — PROGRESS NOTES
3/23/2022    Chief Complaint:  Chief Complaint   Patient presents with    Right Wrist - Pain, Numbness       HPI:  Bing Kearns is a 66 y.o. female, who presents to clinic today she has a history of right carpal tunnel syndrome and she states that she has started having triggering of her right middle finger and possibly of her thumb.  She has tried conservative treatments which have not given her significant relief.  She is here today to discuss further treatment options.  She has had a left carpal tunnel and multiple trigger releases performed    PMHX:  Past Medical History:   Diagnosis Date    Anxiety     Cancer     colon    Chronic diarrhea     Chronic kidney disease     stage 3    Diabetes mellitus     Diastolic dysfunction     GERD (gastroesophageal reflux disease)     Glaucoma     History of migraine headaches     Hyperlipemia     Hypertension     PONV (postoperative nausea and vomiting)     Pulmonary hypertension     Sleep apnea with use of continuous positive airway pressure (CPAP)     waiting on mask to arrive    Thyroid disease     hypothyroid       PSHX:  Past Surgical History:   Procedure Laterality Date    APPENDECTOMY      done during colon resection    CARPAL TUNNEL RELEASE Left 2021    Procedure: Left carpal tunnel release, Left Thumb, Middle, and Ring Trigger Finger Release;  Surgeon: Pilo Paez MD;  Location: [facility];  Service: Orthopedics;  Laterality: Left;  Procedure: Left carpal tunnel release, Left Thumb, Middle, and Ring Trigger Finger Release    Position: Supine    Anesthesia: General    Equipment: Basic handset, carpal tunnel set    CATARACT EXTRACTION, BILATERAL Bilateral      SECTION      COLON SURGERY      COLONOSCOPY Left 2015    Procedure: COLONOSCOPY;  Surgeon: Chet Woodard Jr., MD;  Location: [facility];  Service: Endoscopy;  Laterality: Left;    EXCISION OF LESION OF BUCCAL MUCOSA Left 2019    Procedure: EXCISION,  LESION, BUCCAL MUCOSA;  Surgeon: Dedra Khan MD;  Location: [facility] OR;  Service: ENT;  Laterality: Left;    HERNIA REPAIR      repaired during colon resection    HYSTERECTOMY      Complete    INSERTION OF VENOUS ACCESS PORT      LAPAROSCOPIC CHOLECYSTECTOMY N/A 2021    Procedure: CHOLECYSTECTOMY, LAPAROSCOPIC;  Surgeon: Gab Pierre MD;  Location: [facility] OR;  Service: General;  Laterality: N/A;    OOPHORECTOMY Bilateral     PORTACATH PLACEMENT Left     and later removed     REPLACEMENT OF VASCULAR ACCESS PORT      SURGICAL REMOVAL OF NEOPLASM OF MOUTH Left 6/15/2018    Procedure: EXCISION, NEOPLASM, MOUTH;  Surgeon: Velasquez Juárez MD;  Location: [facility] OR 2ND FLR;  Service: ENT;  Laterality: Left;    TRANSFORAMINAL EPIDURAL INJECTION OF STEROID Left 2019    Procedure: Injection,steroid,epidural,transforaminal approach;  Surgeon: Job Porter MD;  Location: [facility] OR;  Service: Pain Management;  Laterality: Left;  L5-S1       FMHX:  Family History   Problem Relation Age of Onset    Cancer Mother     Breast cancer Mother 42    Heart disease Father     COPD Father     Hypertension Sister     Diabetes Sister     Anemia Sister     Cancer Sister        SOCHX:  Social History     Tobacco Use    Smoking status: Former Smoker     Quit date:      Years since quittin.2    Smokeless tobacco: Never Used   Substance Use Topics    Alcohol use: No       ALLERGIES:  Codeine, Sulfa (sulfonamide antibiotics), and Trulicity [dulaglutide]    CURRENT MEDICATIONS:  Current Outpatient Medications on File Prior to Visit   Medication Sig Dispense Refill    amlodipine (NORVASC) 5 MG tablet Take 5 mg by mouth once daily.       aspirin (ECOTRIN) 81 MG EC tablet Take 81 mg by mouth every evening.      atenoloL (TENORMIN) 100 MG tablet Take 100 mg by mouth every evening.      blood sugar diagnostic Strp 1 strip by Misc.(Non-Drug; Combo Route) route 3 (three) times daily. 300 each 3     "blood-glucose meter (ONETOUCH ULTRA2 METER) Misc USE AS DIRECTED 1 each 0    cholecalciferol, vitamin D3, (VITAMIN D3) 2,000 unit Cap Take 1 capsule by mouth 2 (two) times a day.  3    clonazePAM (KLONOPIN) 0.5 MG tablet Take 1 tablet (0.5 mg total) by mouth 2 (two) times daily as needed for Anxiety. 30 tablet 0    cloNIDine (CATAPRES) 0.1 MG tablet Take 0.1 mg by mouth every evening.      fenofibrate micronized (LOFIBRA) 200 MG Cap TAKE 1 CAPSULE DAILY 90 capsule 3    gabapentin (NEURONTIN) 300 MG capsule Take 1 capsule (300 mg total) by mouth 2 (two) times daily. 180 capsule 3    glimepiride (AMARYL) 2 MG tablet Take 1 tablet (2 mg total) by mouth before breakfast. 30 tablet 6    insulin glargine, TOUJEO, (TOUJEO SOLOSTAR U-300 INSULIN) 300 unit/mL (1.5 mL) InPn pen INJECT 18 UNITS UNDER THE SKIN EVERY EVENING 6 pen 3    levothyroxine (SYNTHROID) 75 MCG tablet TAKE 1 TABLET BEFORE BREAKFAST (Patient taking differently: Take 75 mcg by mouth before breakfast.) 90 tablet 3    metFORMIN (GLUCOPHAGE-XR) 500 MG ER 24hr tablet Take 2 tablets (1,000 mg total) by mouth daily with breakfast. 180 tablet 1    olmesartan (BENICAR) 40 MG tablet Take 40 mg by mouth once daily.      omeprazole (PRILOSEC) 40 MG capsule Take 40 mg by mouth 2 (two) times daily before meals.  0    ONETOUCH DELICA PLUS LANCET 33 gauge Misc USE 1 TO CHECK GLUCOSE THREE TIMES DAILY AS NEEDED 300 each 3    orphenadrine (NORFLEX) 100 mg tablet TAKE 1 TABLET BY MOUTH TWICE DAILY AS NEEDED FOR MUSCLE SPASM 90 tablet 0    pen needle, diabetic (BD ULTRA-FINE DARIO PEN NEEDLE) 32 gauge x 5/32" Ndle Uses 1 daily with insulin 50 each 6    pioglitazone (ACTOS) 15 MG tablet TAKE 1 TABLET DAILY (Patient taking differently: Take 15 mg by mouth once daily.) 90 tablet 3    rosuvastatin (CRESTOR) 5 MG tablet TAKE 1 TABLET EVERY OTHER DAY (Patient taking differently: Take 5 mg by mouth every other day.) 45 tablet 3    semaglutide (OZEMPIC) 0.25 mg or 0.5 " "mg(2 mg/1.5 mL) pen injector Inject 0.5 mg into the skin every 7 days. (Patient taking differently: Inject 0.5 mg into the skin every Wednesday.) 3 pen 3    sertraline (ZOLOFT) 50 MG tablet Take 50 mg by mouth every evening.  2     Current Facility-Administered Medications on File Prior to Visit   Medication Dose Route Frequency Provider Last Rate Last Admin    0.9%  NaCl infusion   Intravenous Continuous Job Porter MD 10 mL/hr at 12/20/19 1245 New Bag at 12/20/19 1245    barium sulfate 98 % SusR 120 mL  120 mL Oral ONCE PRN Carloz Bosch MD        ez paque ba sulfate 120 mL  120 mL Oral ONCE PRN Carloz Bosch MD        lactated ringers infusion   Intravenous Once PRN Job Porter MD        sodium bicarbonate-citric acid-simethicone (EZ GAS II) 2.21-1.53 gram/4 gram oral granules 4 g  1 packet Oral ONCE PRN Carloz Bosch MD           REVIEW OF SYSTEMS:  ROS    GENERAL PHYSICAL EXAM:   Ht 5' 5" (1.651 m)   Wt 88.5 kg (195 lb)   LMP  (LMP Unknown)   BMI 32.45 kg/m²    GEN: well developed, well nourished, no acute distress   HENT: Normocephalic, atraumatic   EYES: No discharge, conjunctiva normal   NECK: Supple, non-tender   PULM: No wheezing, no respiratory distress   CV: RRR   ABD: Soft, non-tender    ORTHO EXAM:   Examination of the right hand and wrist reveals that there is no edema.  There are no skin changes.  Palpation does produce tenderness over the A1 pulley of the middle finger.  Flexion and extension of the fingers reveals that there is mild active triggering of the middle finger.  There is no triggering of the thumb.  She does report decreased sensation in the median distribution.  Has 5/5 thenar muscular strength.  She has a positive Tinel's and a positive Durkan's test.    RADIOLOGY:   EMG nerve conduction study has been reviewed.  She is noted to have moderately severe right carpal tunnel syndrome with some superimposed polyneuropathy    ASSESSMENT:   Right carpal tunnel " syndrome, right middle finger triggering    PLAN:  1. I have discussed treatment options with the patient.  The patient has tried some conservative treatment without significant relief and she has expressed an interest in having carpal tunnel release and trigger finger release.  After discussion of the risks and benefits of the procedure informed consent was obtained    2. Will proceed with right carpal tunnel release and right middle finger trigger release under general anesthesia    3. Follow-up with me 2 weeks postoperatively

## 2022-03-29 ENCOUNTER — OFFICE VISIT (OUTPATIENT)
Dept: FAMILY MEDICINE | Facility: CLINIC | Age: 67
End: 2022-03-29
Payer: MEDICARE

## 2022-03-29 VITALS
OXYGEN SATURATION: 98 % | WEIGHT: 207.13 LBS | RESPIRATION RATE: 18 BRPM | BODY MASS INDEX: 34.51 KG/M2 | DIASTOLIC BLOOD PRESSURE: 74 MMHG | HEART RATE: 81 BPM | HEIGHT: 65 IN | SYSTOLIC BLOOD PRESSURE: 130 MMHG

## 2022-03-29 DIAGNOSIS — N18.31 STAGE 3A CHRONIC KIDNEY DISEASE: ICD-10-CM

## 2022-03-29 DIAGNOSIS — N18.31 TYPE 2 DIABETES MELLITUS WITH STAGE 3A CHRONIC KIDNEY DISEASE, WITH LONG-TERM CURRENT USE OF INSULIN: ICD-10-CM

## 2022-03-29 DIAGNOSIS — M54.9 CHRONIC UPPER BACK PAIN: ICD-10-CM

## 2022-03-29 DIAGNOSIS — G89.29 CHRONIC UPPER BACK PAIN: ICD-10-CM

## 2022-03-29 DIAGNOSIS — I10 PRIMARY HYPERTENSION: Primary | ICD-10-CM

## 2022-03-29 DIAGNOSIS — E11.22 TYPE 2 DIABETES MELLITUS WITH STAGE 3A CHRONIC KIDNEY DISEASE, WITH LONG-TERM CURRENT USE OF INSULIN: ICD-10-CM

## 2022-03-29 DIAGNOSIS — G47.30 SLEEP APNEA WITH USE OF CONTINUOUS POSITIVE AIRWAY PRESSURE (CPAP): ICD-10-CM

## 2022-03-29 DIAGNOSIS — Z79.4 TYPE 2 DIABETES MELLITUS WITH STAGE 3A CHRONIC KIDNEY DISEASE, WITH LONG-TERM CURRENT USE OF INSULIN: ICD-10-CM

## 2022-03-29 DIAGNOSIS — Z85.038 HISTORY OF COLON CANCER: ICD-10-CM

## 2022-03-29 DIAGNOSIS — E03.4 HYPOTHYROIDISM DUE TO ACQUIRED ATROPHY OF THYROID: ICD-10-CM

## 2022-03-29 PROCEDURE — 99214 OFFICE O/P EST MOD 30 MIN: CPT | Mod: S$GLB,,, | Performed by: INTERNAL MEDICINE

## 2022-03-29 PROCEDURE — 4010F PR ACE/ARB THEARPY RXD/TAKEN: ICD-10-PCS | Mod: CPTII,S$GLB,, | Performed by: INTERNAL MEDICINE

## 2022-03-29 PROCEDURE — 1101F PT FALLS ASSESS-DOCD LE1/YR: CPT | Mod: CPTII,S$GLB,, | Performed by: INTERNAL MEDICINE

## 2022-03-29 PROCEDURE — 99999 PR PBB SHADOW E&M-EST. PATIENT-LVL V: CPT | Mod: PBBFAC,,, | Performed by: INTERNAL MEDICINE

## 2022-03-29 PROCEDURE — 3008F BODY MASS INDEX DOCD: CPT | Mod: CPTII,S$GLB,, | Performed by: INTERNAL MEDICINE

## 2022-03-29 PROCEDURE — 1159F PR MEDICATION LIST DOCUMENTED IN MEDICAL RECORD: ICD-10-PCS | Mod: CPTII,S$GLB,, | Performed by: INTERNAL MEDICINE

## 2022-03-29 PROCEDURE — 3288F FALL RISK ASSESSMENT DOCD: CPT | Mod: CPTII,S$GLB,, | Performed by: INTERNAL MEDICINE

## 2022-03-29 PROCEDURE — 3078F PR MOST RECENT DIASTOLIC BLOOD PRESSURE < 80 MM HG: ICD-10-PCS | Mod: CPTII,S$GLB,, | Performed by: INTERNAL MEDICINE

## 2022-03-29 PROCEDURE — 1125F AMNT PAIN NOTED PAIN PRSNT: CPT | Mod: CPTII,S$GLB,, | Performed by: INTERNAL MEDICINE

## 2022-03-29 PROCEDURE — 1125F PR PAIN SEVERITY QUANTIFIED, PAIN PRESENT: ICD-10-PCS | Mod: CPTII,S$GLB,, | Performed by: INTERNAL MEDICINE

## 2022-03-29 PROCEDURE — 99214 PR OFFICE/OUTPT VISIT, EST, LEVL IV, 30-39 MIN: ICD-10-PCS | Mod: S$GLB,,, | Performed by: INTERNAL MEDICINE

## 2022-03-29 PROCEDURE — 3075F SYST BP GE 130 - 139MM HG: CPT | Mod: CPTII,S$GLB,, | Performed by: INTERNAL MEDICINE

## 2022-03-29 PROCEDURE — 3044F PR MOST RECENT HEMOGLOBIN A1C LEVEL <7.0%: ICD-10-PCS | Mod: CPTII,S$GLB,, | Performed by: INTERNAL MEDICINE

## 2022-03-29 PROCEDURE — 3044F HG A1C LEVEL LT 7.0%: CPT | Mod: CPTII,S$GLB,, | Performed by: INTERNAL MEDICINE

## 2022-03-29 PROCEDURE — 3078F DIAST BP <80 MM HG: CPT | Mod: CPTII,S$GLB,, | Performed by: INTERNAL MEDICINE

## 2022-03-29 PROCEDURE — 3008F PR BODY MASS INDEX (BMI) DOCUMENTED: ICD-10-PCS | Mod: CPTII,S$GLB,, | Performed by: INTERNAL MEDICINE

## 2022-03-29 PROCEDURE — 4010F ACE/ARB THERAPY RXD/TAKEN: CPT | Mod: CPTII,S$GLB,, | Performed by: INTERNAL MEDICINE

## 2022-03-29 PROCEDURE — 1159F MED LIST DOCD IN RCRD: CPT | Mod: CPTII,S$GLB,, | Performed by: INTERNAL MEDICINE

## 2022-03-29 PROCEDURE — 3075F PR MOST RECENT SYSTOLIC BLOOD PRESS GE 130-139MM HG: ICD-10-PCS | Mod: CPTII,S$GLB,, | Performed by: INTERNAL MEDICINE

## 2022-03-29 PROCEDURE — 99999 PR PBB SHADOW E&M-EST. PATIENT-LVL V: ICD-10-PCS | Mod: PBBFAC,,, | Performed by: INTERNAL MEDICINE

## 2022-03-29 PROCEDURE — 1160F PR REVIEW ALL MEDS BY PRESCRIBER/CLIN PHARMACIST DOCUMENTED: ICD-10-PCS | Mod: CPTII,S$GLB,, | Performed by: INTERNAL MEDICINE

## 2022-03-29 PROCEDURE — 1101F PR PT FALLS ASSESS DOC 0-1 FALLS W/OUT INJ PAST YR: ICD-10-PCS | Mod: CPTII,S$GLB,, | Performed by: INTERNAL MEDICINE

## 2022-03-29 PROCEDURE — 3288F PR FALLS RISK ASSESSMENT DOCUMENTED: ICD-10-PCS | Mod: CPTII,S$GLB,, | Performed by: INTERNAL MEDICINE

## 2022-03-29 PROCEDURE — 1160F RVW MEDS BY RX/DR IN RCRD: CPT | Mod: CPTII,S$GLB,, | Performed by: INTERNAL MEDICINE

## 2022-03-29 RX ORDER — TERAZOSIN 2 MG/1
2 CAPSULE ORAL NIGHTLY
COMMUNITY
Start: 2022-01-21 | End: 2022-05-27

## 2022-03-29 RX ORDER — LATANOPROST 50 UG/ML
1 SOLUTION/ DROPS OPHTHALMIC NIGHTLY
COMMUNITY
Start: 2022-03-16

## 2022-03-29 RX ORDER — CELECOXIB 100 MG/1
100 CAPSULE ORAL 2 TIMES DAILY
COMMUNITY
Start: 2022-03-14

## 2022-03-29 NOTE — PROGRESS NOTES
Assessment and Plan:    1. Primary hypertension  Discussed reducing salt, DASH diet. Discussed adding in daily cardio for 30 minutes. Discussed following up with Dr. Vanessa. Consider increasing amlodipine if still higher after lifestyle changes.     2. Stage 3a chronic kidney disease  Continue ARB, follow up with Dr. Vanessa.     3. Type 2 diabetes mellitus with stage 3a chronic kidney disease, with long-term current use of insulin  Continue follow up with Endocrine, agree with current management. Controlled.     4. Hypothyroidism due to acquired atrophy of thyroid  Continue current dose of levothyroxine.    5. Sleep apnea with use of continuous positive airway pressure (CPAP)  Continue use of CPAP.    6. Chronic upper back pain  Discussed in detail, I agree with recommendation for PT.    7. History of colon cancer  Requested result of more recent colonoscopy.    ______________________________________________________________________  Subjective:    Chief Complaint:  Follow up chronic medical conditions    HPI:  Bing is a 66 y.o. year old female here for follow up chronic medical conditions.    She reports that she has been having bilateral upper back pain for about 4 months. Started in the muscles in her upper back between the shoulder blades. She has been seeing Dr. López about this. She is planning an MRI of her neck and PT after that. She has been using norflex PRN about 3 times per week.     For HTN, she has been seeing Dr. Vanessa. Blood pressure has been high lately. She has been taking atenolol 100 mg daily, amlodipine 5 mg daily, clonidine 0.1 mg daily, olmesartan 40 BID. She had tried terazosin, but stopped this as it was causing shortness of breath so she stopped this. She has not been avoiding salt, and has not been getting any specific exercise. She has been using the CPAP every night.     She has been using the clonazepam for anxiety only about twice per month. She had this filled last in dec and does  not need a refill.     She has a history of colon cancer. Seeing Dr. Rodriguez for follow up colonoscopies.     Medications:  Current Outpatient Medications on File Prior to Visit   Medication Sig Dispense Refill    amlodipine (NORVASC) 5 MG tablet Take 5 mg by mouth once daily.       aspirin (ECOTRIN) 81 MG EC tablet Take 81 mg by mouth every evening.      atenoloL (TENORMIN) 100 MG tablet Take 100 mg by mouth every evening.      blood sugar diagnostic Strp 1 strip by Misc.(Non-Drug; Combo Route) route 3 (three) times daily. 300 each 3    blood-glucose meter (ONETOUCH ULTRA2 METER) Misc USE AS DIRECTED 1 each 0    celecoxib (CELEBREX) 100 MG capsule Take 100 mg by mouth 2 (two) times daily.      cholecalciferol, vitamin D3, (VITAMIN D3) 2,000 unit Cap Take 1 capsule by mouth 2 (two) times a day.  3    clonazePAM (KLONOPIN) 0.5 MG tablet Take 1 tablet (0.5 mg total) by mouth 2 (two) times daily as needed for Anxiety. 30 tablet 0    cloNIDine (CATAPRES) 0.1 MG tablet Take 0.1 mg by mouth every evening.      fenofibrate micronized (LOFIBRA) 200 MG Cap TAKE 1 CAPSULE DAILY 90 capsule 3    gabapentin (NEURONTIN) 300 MG capsule Take 1 capsule (300 mg total) by mouth 2 (two) times daily. 180 capsule 3    glimepiride (AMARYL) 2 MG tablet Take 1 tablet (2 mg total) by mouth before breakfast. 30 tablet 6    insulin glargine, TOUJEO, (TOUJEO SOLOSTAR U-300 INSULIN) 300 unit/mL (1.5 mL) InPn pen INJECT 18 UNITS UNDER THE SKIN EVERY EVENING 6 pen 3    latanoprost 0.005 % ophthalmic solution Place 1 drop into both eyes nightly.      levothyroxine (SYNTHROID) 75 MCG tablet TAKE 1 TABLET BEFORE BREAKFAST (Patient taking differently: Take 75 mcg by mouth before breakfast.) 90 tablet 3    metFORMIN (GLUCOPHAGE-XR) 500 MG ER 24hr tablet Take 2 tablets (1,000 mg total) by mouth daily with breakfast. 180 tablet 1    olmesartan (BENICAR) 40 MG tablet Take 40 mg by mouth once daily.      omeprazole (PRILOSEC) 40 MG  "capsule Take 40 mg by mouth 2 (two) times daily before meals.  0    ONETOUCH DELICA PLUS LANCET 33 gauge Misc USE 1 TO CHECK GLUCOSE THREE TIMES DAILY AS NEEDED 300 each 3    orphenadrine (NORFLEX) 100 mg tablet TAKE 1 TABLET BY MOUTH TWICE DAILY AS NEEDED FOR MUSCLE SPASM 90 tablet 0    pen needle, diabetic (BD ULTRA-FINE DARIO PEN NEEDLE) 32 gauge x 5/32" Ndle Uses 1 daily with insulin 50 each 6    pioglitazone (ACTOS) 15 MG tablet TAKE 1 TABLET DAILY (Patient taking differently: Take 15 mg by mouth once daily.) 90 tablet 3    rosuvastatin (CRESTOR) 5 MG tablet TAKE 1 TABLET EVERY OTHER DAY (Patient taking differently: Take 5 mg by mouth every other day.) 45 tablet 3    semaglutide (OZEMPIC) 0.25 mg or 0.5 mg(2 mg/1.5 mL) pen injector Inject 0.5 mg into the skin every 7 days. (Patient taking differently: Inject 0.5 mg into the skin every Wednesday.) 3 pen 3    sertraline (ZOLOFT) 50 MG tablet Take 50 mg by mouth every evening.  2    terazosin (HYTRIN) 2 MG capsule Take 2 mg by mouth nightly.       Current Facility-Administered Medications on File Prior to Visit   Medication Dose Route Frequency Provider Last Rate Last Admin    0.9%  NaCl infusion   Intravenous Continuous Job Porter MD 10 mL/hr at 12/20/19 1245 New Bag at 12/20/19 1245    barium sulfate 98 % SusR 120 mL  120 mL Oral ONCE PRN Carloz Bosch MD        ez paque ba sulfate 120 mL  120 mL Oral ONCE PRN Carloz Bosch MD        lactated ringers infusion   Intravenous Once PRN Job Porter MD        sodium bicarbonate-citric acid-simethicone (EZ GAS II) 2.21-1.53 gram/4 gram oral granules 4 g  1 packet Oral ONCE PRN Carloz Bosch MD           Review of Systems:  Review of Systems   Constitutional: Negative for chills and fever.   Respiratory: Positive for shortness of breath (chronic, unchanged). Negative for cough.    Cardiovascular: Negative for chest pain and leg swelling.   Musculoskeletal: Positive for back pain and neck " "pain.   Neurological: Positive for headaches. Negative for weakness.       Past Medical History:  Past Medical History:   Diagnosis Date    Anxiety     Cancer     colon    Chronic diarrhea     Chronic kidney disease     stage 3    Diabetes mellitus     Diastolic dysfunction     GERD (gastroesophageal reflux disease)     Glaucoma     History of migraine headaches     Hyperlipemia     Hypertension     PONV (postoperative nausea and vomiting)     Pulmonary hypertension     Sleep apnea with use of continuous positive airway pressure (CPAP)     waiting on mask to arrive    Thyroid disease     hypothyroid       Objective:    Vitals:  Vitals:    03/29/22 0937 03/29/22 1022   BP: (!) 142/76 130/74   Pulse: 81    Resp: 18    SpO2: 98%    Weight: 93.9 kg (207 lb 2 oz)    Height: 5' 5" (1.651 m)    PainSc:   3        Physical Exam  Vitals reviewed.   Constitutional:       General: She is not in acute distress.     Appearance: She is well-developed.   Eyes:      General:         Right eye: No discharge.         Left eye: No discharge.      Conjunctiva/sclera: Conjunctivae normal.   Cardiovascular:      Rate and Rhythm: Normal rate and regular rhythm.   Pulmonary:      Effort: Pulmonary effort is normal. No respiratory distress.   Skin:     General: Skin is warm and dry.   Neurological:      Mental Status: She is alert and oriented to person, place, and time.   Psychiatric:         Behavior: Behavior normal.         Thought Content: Thought content normal.         Judgment: Judgment normal.         Data:  Last TSH normal  A1c 6.7    Gabi Case MD  Internal Medicine    "

## 2022-04-06 ENCOUNTER — TELEPHONE (OUTPATIENT)
Dept: FAMILY MEDICINE | Facility: CLINIC | Age: 67
End: 2022-04-06

## 2022-04-06 ENCOUNTER — OFFICE VISIT (OUTPATIENT)
Dept: NEUROLOGY | Facility: CLINIC | Age: 67
End: 2022-04-06
Payer: MEDICARE

## 2022-04-06 ENCOUNTER — HOSPITAL ENCOUNTER (OUTPATIENT)
Dept: RADIOLOGY | Facility: HOSPITAL | Age: 67
Discharge: HOME | End: 2022-04-06
Attending: INTERNAL MEDICINE
Payer: MEDICARE

## 2022-04-06 VITALS
RESPIRATION RATE: 18 BRPM | HEART RATE: 85 BPM | WEIGHT: 208.56 LBS | SYSTOLIC BLOOD PRESSURE: 129 MMHG | HEIGHT: 65 IN | DIASTOLIC BLOOD PRESSURE: 78 MMHG | BODY MASS INDEX: 34.75 KG/M2

## 2022-04-06 DIAGNOSIS — R41.3 MEMORY LOSS: Primary | ICD-10-CM

## 2022-04-06 DIAGNOSIS — Z78.0 ASYMPTOMATIC POSTMENOPAUSAL STATE: ICD-10-CM

## 2022-04-06 DIAGNOSIS — R25.1 TREMOR: ICD-10-CM

## 2022-04-06 PROCEDURE — 1160F RVW MEDS BY RX/DR IN RCRD: CPT | Mod: CPTII,S$GLB,, | Performed by: NURSE PRACTITIONER

## 2022-04-06 PROCEDURE — 1159F MED LIST DOCD IN RCRD: CPT | Mod: CPTII,S$GLB,, | Performed by: NURSE PRACTITIONER

## 2022-04-06 PROCEDURE — 3288F FALL RISK ASSESSMENT DOCD: CPT | Mod: CPTII,S$GLB,, | Performed by: NURSE PRACTITIONER

## 2022-04-06 PROCEDURE — 3078F DIAST BP <80 MM HG: CPT | Mod: CPTII,S$GLB,, | Performed by: NURSE PRACTITIONER

## 2022-04-06 PROCEDURE — 3074F PR MOST RECENT SYSTOLIC BLOOD PRESSURE < 130 MM HG: ICD-10-PCS | Mod: CPTII,S$GLB,, | Performed by: NURSE PRACTITIONER

## 2022-04-06 PROCEDURE — 3044F PR MOST RECENT HEMOGLOBIN A1C LEVEL <7.0%: ICD-10-PCS | Mod: CPTII,S$GLB,, | Performed by: NURSE PRACTITIONER

## 2022-04-06 PROCEDURE — 1159F PR MEDICATION LIST DOCUMENTED IN MEDICAL RECORD: ICD-10-PCS | Mod: CPTII,S$GLB,, | Performed by: NURSE PRACTITIONER

## 2022-04-06 PROCEDURE — 3008F BODY MASS INDEX DOCD: CPT | Mod: CPTII,S$GLB,, | Performed by: NURSE PRACTITIONER

## 2022-04-06 PROCEDURE — 3074F SYST BP LT 130 MM HG: CPT | Mod: CPTII,S$GLB,, | Performed by: NURSE PRACTITIONER

## 2022-04-06 PROCEDURE — 1101F PR PT FALLS ASSESS DOC 0-1 FALLS W/OUT INJ PAST YR: ICD-10-PCS | Mod: CPTII,S$GLB,, | Performed by: NURSE PRACTITIONER

## 2022-04-06 PROCEDURE — 99999 PR PBB SHADOW E&M-EST. PATIENT-LVL V: CPT | Mod: PBBFAC,,, | Performed by: NURSE PRACTITIONER

## 2022-04-06 PROCEDURE — 3008F PR BODY MASS INDEX (BMI) DOCUMENTED: ICD-10-PCS | Mod: CPTII,S$GLB,, | Performed by: NURSE PRACTITIONER

## 2022-04-06 PROCEDURE — 99215 OFFICE O/P EST HI 40 MIN: CPT | Mod: S$GLB,,, | Performed by: NURSE PRACTITIONER

## 2022-04-06 PROCEDURE — 77080 DEXA BONE DENSITY SPINE HIP: ICD-10-PCS | Mod: 26,,, | Performed by: RADIOLOGY

## 2022-04-06 PROCEDURE — 1160F PR REVIEW ALL MEDS BY PRESCRIBER/CLIN PHARMACIST DOCUMENTED: ICD-10-PCS | Mod: CPTII,S$GLB,, | Performed by: NURSE PRACTITIONER

## 2022-04-06 PROCEDURE — 3078F PR MOST RECENT DIASTOLIC BLOOD PRESSURE < 80 MM HG: ICD-10-PCS | Mod: CPTII,S$GLB,, | Performed by: NURSE PRACTITIONER

## 2022-04-06 PROCEDURE — 77080 DXA BONE DENSITY AXIAL: CPT | Mod: TC,PO

## 2022-04-06 PROCEDURE — 3288F PR FALLS RISK ASSESSMENT DOCUMENTED: ICD-10-PCS | Mod: CPTII,S$GLB,, | Performed by: NURSE PRACTITIONER

## 2022-04-06 PROCEDURE — 3044F HG A1C LEVEL LT 7.0%: CPT | Mod: CPTII,S$GLB,, | Performed by: NURSE PRACTITIONER

## 2022-04-06 PROCEDURE — 4010F ACE/ARB THERAPY RXD/TAKEN: CPT | Mod: CPTII,S$GLB,, | Performed by: NURSE PRACTITIONER

## 2022-04-06 PROCEDURE — 99215 PR OFFICE/OUTPT VISIT, EST, LEVL V, 40-54 MIN: ICD-10-PCS | Mod: S$GLB,,, | Performed by: NURSE PRACTITIONER

## 2022-04-06 PROCEDURE — 99999 PR PBB SHADOW E&M-EST. PATIENT-LVL V: ICD-10-PCS | Mod: PBBFAC,,, | Performed by: NURSE PRACTITIONER

## 2022-04-06 PROCEDURE — 77080 DXA BONE DENSITY AXIAL: CPT | Mod: 26,,, | Performed by: RADIOLOGY

## 2022-04-06 PROCEDURE — 4010F PR ACE/ARB THEARPY RXD/TAKEN: ICD-10-PCS | Mod: CPTII,S$GLB,, | Performed by: NURSE PRACTITIONER

## 2022-04-06 PROCEDURE — 1101F PT FALLS ASSESS-DOCD LE1/YR: CPT | Mod: CPTII,S$GLB,, | Performed by: NURSE PRACTITIONER

## 2022-04-06 PROCEDURE — 1126F AMNT PAIN NOTED NONE PRSNT: CPT | Mod: CPTII,S$GLB,, | Performed by: NURSE PRACTITIONER

## 2022-04-06 PROCEDURE — 1126F PR PAIN SEVERITY QUANTIFIED, NO PAIN PRESENT: ICD-10-PCS | Mod: CPTII,S$GLB,, | Performed by: NURSE PRACTITIONER

## 2022-04-06 RX ORDER — DONEPEZIL HYDROCHLORIDE 5 MG/1
5 TABLET, FILM COATED ORAL NIGHTLY
Qty: 90 EACH | Refills: 3 | Status: SHIPPED | OUTPATIENT
Start: 2022-04-06 | End: 2022-10-18

## 2022-04-06 NOTE — ASSESSMENT & PLAN NOTE
Patient is a 67 y/o female that presents today for memory decline. She reports difficulty putting words together and formulating an answer.   She denies hallucinations, behavorial changes, sleep disturbances, recent falls or urinary incontinence. She does endorse a h/o depression and currently maintained on Zoloft.   MMSE today 29/30   - suspect normal aging process but cannot fully rule out a neurodegenerative process. Mood related?  Obtain MRI brain scan for baseline  Obtain serologies  Discussed role vs expectation of cognitive enhancing drugs at length. Pt is interested in medication.   - start Aricept 5 mg QHS. S/E discussed  Consider NP testing in the future. Pt wanted to hold on this for now  Discussed ways to promote brain stimulation.   There are no safety concerns.

## 2022-04-06 NOTE — PATIENT INSTRUCTIONS
To prevent further memory loss, some of the best preventative measures are following a healthy diet, getting regular exercise, and ensuring good sleep habits.  Approximately 30 to 45 minutes of brisk physical activity (brisk walking, swimming, stationary bicycle, etc.) 5 days a week has been shown to improve function in vascular dementias, and can lower the risk of stroke and slow progression of memory loss.

## 2022-04-06 NOTE — TELEPHONE ENCOUNTER
----- Message from Gabi Case MD sent at 3/29/2022 10:19 AM CDT -----  Please get most recent colonoscopy from Dr. Rodriguez

## 2022-04-06 NOTE — PROGRESS NOTES
NEUROLOGY  Outpatient CONSULT    [facility]  [address] [facility], [place] [address]  [phone] (office) / [phone] (fax)    Patient Name:  Bing Kearns  :  1955  MR #:  [id number]  Acct #:  [id number]    Date of Neurology Consult: 2022  Name of Provider: ISATU Escobedo    Other Physicians:  Gabi Case MD (Primary Care Physician); No ref. provider found (Referring)      Chief Complaint: Memory Loss and Tremors      History of Present Illness (HPI):  Bing Kearns is a right handed 66 y.o. female.    Patient is here today for memory loss and tremor. She is solo at today's appointment. She reports having a hard time putting word together. It may take her a long time to form an answer or following directions. She is concerned with simple forgetfulness. She lives at home with her son. She reports family has noticed little things over the last 1-2 years.     Patient's highest level of education completed was partial college. She was a  and now retired. The onset of memory decline is believed to have started mildly 1 year ago. She struggles more with short term memory loss. She denies behavioral changes but does notice she is calmer now. She reports history of depression and currently on Zoloft. She denies suicidal ideation. She denies hallucinations. She reports sleeping well and is compliant with her CPAP. She reports having trouble falling asleep at night on occasion.  She reports some difficulty with executive function. She finds that she has more on her plate now since the passing her spouse and this can overwhelming. She is managing the finances and her medications well and denies issues. She denies issues with hygiene and able to perform ADLs without assistance. She endorses word finding difficulty and may have trouble comprehending. She denies urinary incontinence or recent falls. She is still driving and denies recent MVAs or getting  lost in familiar areas. She likes to cook, house chores, and cares for her dogs.  She takes Klonopin about twice a month for anxiety.      She also reports a bilateral hand tremor that is mild. It started about 2 years ago. It is worse when performing an action like applying makeup. She also states writing is difficult. On average she drinks about 2 cups of coffee. She does not drink alcohol. There is no family hisotry of tremor.         Past Medical, Surgical, Family & Social History:   Past Medical History:   Diagnosis Date    Anxiety     Cancer     colon    Chronic diarrhea     Chronic kidney disease     stage 3    Diabetes mellitus     Diastolic dysfunction     GERD (gastroesophageal reflux disease)     Glaucoma     History of migraine headaches     Hyperlipemia     Hypertension     PONV (postoperative nausea and vomiting)     Pulmonary hypertension     Sleep apnea with use of continuous positive airway pressure (CPAP)     waiting on mask to arrive    Thyroid disease     hypothyroid     Past Surgical History:   Procedure Laterality Date    APPENDECTOMY      done during colon resection    CARPAL TUNNEL RELEASE Left 2021    Procedure: Left carpal tunnel release, Left Thumb, Middle, and Ring Trigger Finger Release;  Surgeon: Pilo Paez MD;  [facility];  Service: Orthopedics;  Laterality: Left;  Procedure: Left carpal tunnel release, Left Thumb, Middle, and Ring Trigger Finger Release    Position: Supine    Anesthesia: General    Equipment: Basic handset, carpal tunnel set    CATARACT EXTRACTION, BILATERAL Bilateral      SECTION      COLON SURGERY      COLONOSCOPY Left 2015    Procedure: COLONOSCOPY;  Surgeon: Chet Woodard Jr., MD;  Location: [facility];  Service: Endoscopy;  Laterality: Left;    EXCISION OF LESION OF BUCCAL MUCOSA Left 2019    Procedure: EXCISION, LESION, BUCCAL MUCOSA;  Surgeon: Dedra Khan MD;  Location: [facility] OR;  Service: ENT;   Laterality: Left;    HERNIA REPAIR      repaired during colon resection    HYSTERECTOMY  2010    Complete    INSERTION OF VENOUS ACCESS PORT      LAPAROSCOPIC CHOLECYSTECTOMY N/A 11/8/2021    Procedure: CHOLECYSTECTOMY, LAPAROSCOPIC;  Surgeon: Gab Pierre MD;  Location: [facility];  Service: General;  Laterality: N/A;    OOPHORECTOMY Bilateral 2010    PORTACATH PLACEMENT Left     and later removed     REPLACEMENT OF VASCULAR ACCESS PORT      SURGICAL REMOVAL OF NEOPLASM OF MOUTH Left 6/15/2018    Procedure: EXCISION, NEOPLASM, MOUTH;  Surgeon: Velasquez Juárez MD;  Location: [facility] OR [address] [place] [address];  Service: ENT;  Laterality: Left;    TRANSFORAMINAL EPIDURAL INJECTION OF STEROID Left 12/20/2019    Procedure: Injection,steroid,epidural,transforaminal approach;  Surgeon: Job Porter MD;  Location: [facility] OR;  Service: Pain Management;  Laterality: Left;  L5-S1     Family History   Problem Relation Age of Onset    Cancer Mother     Breast cancer Mother 42    Heart disease Father     COPD Father     Hypertension Sister     Diabetes Sister     Anemia Sister     Cancer Sister      Alcohol use:  reports no history of alcohol use.   (Of note, 0.6 oz = 1 beer or 6 oz = 10 beers).  Tobacco use:  reports that she quit smoking about 22 years ago. She has never used smokeless tobacco.  Street drug use:  reports no history of drug use.  Allergies: Codeine, Sulfa (sulfonamide antibiotics), and Trulicity [dulaglutide].    Home Medications:     Current Outpatient Medications:     amlodipine (NORVASC) 5 MG tablet, Take 5 mg by mouth once daily. , Disp: , Rfl:     aspirin (ECOTRIN) 81 MG EC tablet, Take 81 mg by mouth every evening., Disp: , Rfl:     atenoloL (TENORMIN) 100 MG tablet, Take 100 mg by mouth every evening., Disp: , Rfl:     blood sugar diagnostic Strp, 1 strip by Misc.(Non-Drug; Combo Route) route 3 (three) times daily., Disp: 300 each, Rfl: 3    blood-glucose meter (ONETOUCH ULTRA2 METER) Misc, USE AS  "DIRECTED, Disp: 1 each, Rfl: 0    celecoxib (CELEBREX) 100 MG capsule, Take 100 mg by mouth 2 (two) times daily., Disp: , Rfl:     cholecalciferol, vitamin D3, (VITAMIN D3) 2,000 unit Cap, Take 1 capsule by mouth 2 (two) times a day., Disp: , Rfl: 3    clonazePAM (KLONOPIN) 0.5 MG tablet, Take 1 tablet (0.5 mg total) by mouth 2 (two) times daily as needed for Anxiety., Disp: 30 tablet, Rfl: 0    cloNIDine (CATAPRES) 0.1 MG tablet, Take 0.1 mg by mouth every evening., Disp: , Rfl:     fenofibrate micronized (LOFIBRA) 200 MG Cap, TAKE 1 CAPSULE DAILY, Disp: 90 capsule, Rfl: 3    gabapentin (NEURONTIN) 300 MG capsule, Take 1 capsule (300 mg total) by mouth 2 (two) times daily., Disp: 180 capsule, Rfl: 3    glimepiride (AMARYL) 2 MG tablet, Take 1 tablet (2 mg total) by mouth before breakfast., Disp: 30 tablet, Rfl: 6    insulin glargine, TOUJEO, (TOUJEO SOLOSTAR U-300 INSULIN) 300 unit/mL (1.5 mL) InPn pen, INJECT 18 UNITS UNDER THE SKIN EVERY EVENING, Disp: 6 pen, Rfl: 3    latanoprost 0.005 % ophthalmic solution, Place 1 drop into both eyes nightly., Disp: , Rfl:     levothyroxine (SYNTHROID) 75 MCG tablet, TAKE 1 TABLET BEFORE BREAKFAST (Patient taking differently: Take 75 mcg by mouth before breakfast.), Disp: 90 tablet, Rfl: 3    metFORMIN (GLUCOPHAGE-XR) 500 MG ER 24hr tablet, TAKE 2 TABLETS DAILY WITH BREAKFAST, Disp: 180 tablet, Rfl: 3    olmesartan (BENICAR) 40 MG tablet, Take 40 mg by mouth once daily., Disp: , Rfl:     omeprazole (PRILOSEC) 40 MG capsule, Take 40 mg by mouth 2 (two) times daily before meals., Disp: , Rfl: 0    ONETOUCH DELICA PLUS LANCET 33 gauge Misc, USE 1 TO CHECK GLUCOSE THREE TIMES DAILY AS NEEDED, Disp: 300 each, Rfl: 3    orphenadrine (NORFLEX) 100 mg tablet, TAKE 1 TABLET BY MOUTH TWICE DAILY AS NEEDED FOR MUSCLE SPASM, Disp: 90 tablet, Rfl: 0    pen needle, diabetic (BD ULTRA-FINE DARIO PEN NEEDLE) 32 gauge x 5/32" Ndle, Uses 1 daily with insulin, Disp: 50 each, Rfl: " "6    pioglitazone (ACTOS) 15 MG tablet, TAKE 1 TABLET DAILY (Patient taking differently: Take 15 mg by mouth once daily.), Disp: 90 tablet, Rfl: 3    rosuvastatin (CRESTOR) 5 MG tablet, TAKE 1 TABLET EVERY OTHER DAY (Patient taking differently: Take 5 mg by mouth every other day.), Disp: 45 tablet, Rfl: 3    semaglutide (OZEMPIC) 0.25 mg or 0.5 mg(2 mg/1.5 mL) pen injector, Inject 0.5 mg into the skin every 7 days. (Patient taking differently: Inject 0.5 mg into the skin every Wednesday.), Disp: 3 pen, Rfl: 3    sertraline (ZOLOFT) 50 MG tablet, Take 50 mg by mouth every evening., Disp: , Rfl: 2    terazosin (HYTRIN) 2 MG capsule, Take 2 mg by mouth nightly., Disp: , Rfl:     donepeziL (ARICEPT) 5 MG tablet, Take 1 tablet (5 mg total) by mouth every evening., Disp: 90 each, Rfl: 3  No current facility-administered medications for this visit.    Facility-Administered Medications Ordered in Other Visits:     0.9%  NaCl infusion, , Intravenous, Continuous, Job Porter MD, Last Rate: 10 mL/hr at 12/20/19 1245, New Bag at 12/20/19 1245    barium sulfate 98 % SusR 120 mL, 120 mL, Oral, ONCE PRN, Carloz Bosch MD    ez paque ba sulfate 120 mL, 120 mL, Oral, ONCE PRN, Carloz Bosch MD    lactated ringers infusion, , Intravenous, Once PRN, Job Porter MD    sodium bicarbonate-citric acid-simethicone (EZ GAS II) 2.21-1.53 gram/4 gram oral granules 4 g, 1 packet, Oral, ONCE PRN, Carloz Bosch MD    Physical Examination:  /78 (BP Location: Left arm, Patient Position: Sitting, BP Method: Medium (Automatic))   Pulse 85   Resp 18   Ht 5' 5" (1.651 m)   Wt 94.6 kg (208 lb 8.9 oz)   LMP  (LMP Unknown)   BMI 34.71 kg/m²   MMSE 4/6/2022   What is the (year), (season), (date), (day), (month)? 5   Where are we (state), (country), (town or city), (hospital), (floor)? 5   Name 3 common objects (eg. "apple", "table", "jimmy"). Take 1 second to say each. Then ask the patient to repeat all 3. Give 1 " "point for each correct answer. Then repeat them until he/she learns all 3. Count trials and record. 3   Serial 7's backwards. Stop after 5 answers. (100,93,86,79,72) or alternatively  spell "WORLD" backwards. (D..L..R..O..W). The score is the number of letters in correct order. 5   Ask for the 3 common objects named earlier in the exam. Give 1 point for each correct answer. 2   Name a "pencil" and "watch." 2   Repeat the following: "No ifs, ands, or buts." 1   Follow a 3-stage command: "Take a paper in your right hand, fold it in half, & put it on the floor." 3   Read and obey the following: (see paper exam) 1   Write a sentence. 1   Copy the following design: (see paper exam) 1   Total MMSE Score 29   Some recent data might be hidden   \        GENERAL:  General appearance: Well, non-toxic appearing.  No apparent distress.  Neck: supple.  .    MENTAL STATUS:  Alertness, attention span & concentration: normal.  Language: normal.  Orientation to self, place & time:  normal.  Memory, recent & remote: normal.  Fund of knowledge: normal.  MMSE:29/30    SPEECH:  Clear and fluent.  Follows complex commands.    CRANIAL NERVES:  Cranial Nerves II-XII were examined.  II - Visual fields: normal.  III, IV, VI: PERRL, EOMI, No ptosis, No nystagmus.  V - Facial sensation: normal.  VII - Face symmetry & mobility: Due to Covid-19 recommended precautions, patient wearing facial mask; mouth and nose not examined.  VIII - Hearing: normal.  IX, X - Palate: Due to Covid-19 recommended precautions, patient wearing facial mask; mouth and nose not examined.  XI - Shoulder shrug: normal.  XII - Tongue protrusion: Due to Covid-19 recommended precautions, patient wearing facial mask; mouth and nose not examined.    GROSS MOTOR:  Gait & station: non focal  Tone: normal.  Abnormal movements: none.  Arms extended:no tremor  Arms to core: no tremor  Finger-nose: normal.  Rapid alternating movements: slowed finger taps bilaterally.   Pronator " drift: normal      MUSCLE STRENGTH:     Fascics Atrophy RIGHT    LEFT Atrophy Fascics     5 Biceps 5       5 Triceps 5       5 Forearm.Pr. 5                5 Iliopsoas flex    5       5 Hip Abduct 5       5 Hip Adduct 5       5 Quads 5       5 Hams 5       5 Dorsiflex 5       5 Plantar Flex 5       REFLEXES:    RIGHT Reflex   LEFT   2 Biceps 2   2 Brachiorad. 2        2 Patellar 2     SENSORY:  Light touch: Normal throughout.            Diagnostic Data Reviewed:     Component      Latest Ref Rng & Units 1/7/2022   WBC      3.90 - 12.70 K/uL 7.59   RBC      4.00 - 5.40 M/uL 3.96 (L)   Hemoglobin      12.0 - 16.0 g/dL 11.1 (L)   Hematocrit      37.0 - 48.5 % 37.3   MCV      82 - 98 fL 94   MCH      27.0 - 31.0 pg 28.0   MCHC      32.0 - 36.0 g/dL 29.8 (L)   RDW      11.5 - 14.5 % 14.2   Platelets      150 - 450 K/uL 392   MPV      9.2 - 12.9 fL 9.3   Immature Granulocytes      0.0 - 0.5 % 0.4   Gran # (ANC)      1.8 - 7.7 K/uL 4.6   Immature Grans (Abs)      0.00 - 0.04 K/uL 0.03   Lymph #      1.0 - 4.8 K/uL 1.9   Mono #      0.3 - 1.0 K/uL 0.8   Eos #      0.0 - 0.5 K/uL 0.2   Baso #      0.00 - 0.20 K/uL 0.09   nRBC      0 /100 WBC 0   Gran %      38.0 - 73.0 % 60.4   Lymph %      18.0 - 48.0 % 24.9   Mono %      4.0 - 15.0 % 10.9   Eosinophil %      0.0 - 8.0 % 2.2   Basophil %      0.0 - 1.9 % 1.2   Differential Method       Automated   Glucose      70 - 110 mg/dL 96   Sodium      136 - 145 mmol/L 138   Potassium      3.5 - 5.1 mmol/L 4.6   Chloride      95 - 110 mmol/L 103   CO2      23 - 29 mmol/L 25   BUN      8 - 23 mg/dL 11   Calcium      8.7 - 10.5 mg/dL 9.8   Creatinine      0.5 - 1.4 mg/dL 1.0   Albumin      3.5 - 5.2 g/dL 3.9   Phosphorus      2.7 - 4.5 mg/dL 4.1   eGFR if African American      >60 mL/min/1.73 m:2 >60.0   eGFR if non African American      >60 mL/min/1.73 m:2 58.4 (A)   Anion Gap      8 - 16 mmol/L 10   Cholesterol      120 - 199 mg/dL 163   Triglycerides      30 - 150 mg/dL 144   HDL       40 - 75 mg/dL 46   LDL Cholesterol External      63.0 - 159.0 mg/dL 88.2   HDL/Cholesterol Ratio      20.0 - 50.0 % 28.2   Total Cholesterol/HDL Ratio      2.0 - 5.0 3.5   Non-HDL Cholesterol      mg/dL 117   TSH      0.400 - 4.000 uIU/mL 3.580   PTH      9.0 - 77.0 pg/mL 48.2   Magnesium      1.6 - 2.6 mg/dL 2.2   Uric Acid      2.4 - 5.7 mg/dL 4.4               Assessment and Plan:  Bing Kearns is a 66 y.o. female.      Problem List Items Addressed This Visit        Neuro    Memory loss - Primary    Current Assessment & Plan     Patient is a 65 y/o female that presents today for memory decline. She reports difficulty putting words together and formulating an answer.   She denies hallucinations, behavorial changes, sleep disturbances, recent falls or urinary incontinence. She does endorse a h/o depression and currently maintained on Zoloft.   MMSE today 29/30   - suspect normal aging process but cannot fully rule out a neurodegenerative process. Mood related?  Obtain MRI brain scan for baseline  Obtain serologies  Discussed role vs expectation of cognitive enhancing drugs at length. Pt is interested in medication.   - start Aricept 5 mg QHS. S/E discussed  Consider NP testing in the future. Pt wanted to hold on this for now  Discussed ways to promote brain stimulation.   There are no safety concerns.            Tremor    Current Assessment & Plan     Reports of tremor in the last 1-2 years  Pt has trouble writing, hold items and may drop items at times  Likely ET  No tremor noted on Neuro exam  Discussed tremor triggers including caffeine and medication S/E.  Pt would like to hold on starting a new medication at this time  Reassess in 6 mths.                                Important to note, also  has a past medical history of Anxiety, Cancer, Chronic diarrhea, Chronic kidney disease, Diabetes mellitus, Diastolic dysfunction, GERD (gastroesophageal reflux disease), Glaucoma, History of migraine  headaches, Hyperlipemia, Hypertension, PONV (postoperative nausea and vomiting), Pulmonary hypertension, Sleep apnea with use of continuous positive airway pressure (CPAP), and Thyroid disease.            The patient will return to clinic in 6 months.        All questions were answered and patient is comfortable with the plan.       Thank you very much for the opportunity to assist in this patient's care.    If you have any questions or concerns, please do not hesitate to contact me at any time.    Sincerely,     ISATU Escobedo  [facility] [place]         LINDA spent a total of 62 minutes on the day of the visit.This includes face to face time and non-face to face time preparing to see the patient (eg, review of tests), Obtaining and/or reviewing separately obtained history, Documenting clinical information in the electronic or other health record, Independently interpreting resultsand communicating results to the patient/family/caregiver, or Care coordination.

## 2022-04-06 NOTE — ASSESSMENT & PLAN NOTE
Reports of tremor in the last 1-2 years  Pt has trouble writing, hold items and may drop items at times  Likely ET  No tremor noted on Neuro exam  Discussed tremor triggers including caffeine and medication S/E.  Pt would like to hold on starting a new medication at this time  Reassess in 6 mths.

## 2022-04-14 ENCOUNTER — PATIENT MESSAGE (OUTPATIENT)
Dept: ENDOCRINOLOGY | Facility: CLINIC | Age: 67
End: 2022-04-14
Payer: MEDICARE

## 2022-04-19 ENCOUNTER — PATIENT MESSAGE (OUTPATIENT)
Dept: ENDOCRINOLOGY | Facility: CLINIC | Age: 67
End: 2022-04-19
Payer: MEDICARE

## 2022-04-19 NOTE — TELEPHONE ENCOUNTER
See Malou's prev. msg   Warm compress, soak in a tub.      Bactrim DS twice a day for 7 days     Follow up if symptoms persist or increase - red, painful, swollen, fever, etc-

## 2022-04-19 NOTE — TELEPHONE ENCOUNTER
Please call patient. Please let her know that we received a notification from the patient assistance team:    All NovoNordisk products are unable to be shipped to [facility] and the  doesn't ship to patients' homes. I wish there was another way around this, but it's actually out of my hands at the moment.     Another option is changing to Trulicity, which we can get. this is the same medication class as Ozempic. However, she reported having panic attacks with this in the past.

## 2022-04-19 NOTE — TELEPHONE ENCOUNTER
Pt notified we got the pt assistant program form for her ozempic. Just waiting for Thais to sign it and we will either notify pt to pick it up ,orwe  will fax it to Malou    Pt verb understanding

## 2022-04-19 NOTE — TELEPHONE ENCOUNTER
Iglesia dropped it off here the week Thais was off in [place]. I'm waiting for Thais to sign it and I asked Malou to give me her fax number to fax it to her

## 2022-04-19 NOTE — TELEPHONE ENCOUNTER
Nina Westfall we have the form. I'm waiting for Thais to sign it. Do you want me to fax it to you when done?    What is your fax number?

## 2022-04-19 NOTE — TELEPHONE ENCOUNTER
Malou Owusu. She cannot tolerate Trulicity. She already tried that one and Ozempic is working very well for her. Can she  from either [place] or [place]?     [place], if Malou says that she can  from one of these locations, please call patient and ask if she'd prefer [place] or . The address on the PA form will need to be changed to reflect that location.

## 2022-05-20 ENCOUNTER — LAB VISIT (OUTPATIENT)
Dept: LAB | Facility: HOSPITAL | Age: 67
End: 2022-05-20
Payer: MEDICARE

## 2022-05-20 DIAGNOSIS — N18.31 TYPE 2 DIABETES MELLITUS WITH STAGE 3A CHRONIC KIDNEY DISEASE, WITH LONG-TERM CURRENT USE OF INSULIN: ICD-10-CM

## 2022-05-20 DIAGNOSIS — Z79.4 TYPE 2 DIABETES MELLITUS WITH STAGE 3A CHRONIC KIDNEY DISEASE, WITH LONG-TERM CURRENT USE OF INSULIN: ICD-10-CM

## 2022-05-20 DIAGNOSIS — E11.22 TYPE 2 DIABETES MELLITUS WITH STAGE 3A CHRONIC KIDNEY DISEASE, WITH LONG-TERM CURRENT USE OF INSULIN: ICD-10-CM

## 2022-05-20 LAB
ANION GAP SERPL CALC-SCNC: 12 MMOL/L (ref 8–16)
BUN SERPL-MCNC: 12 MG/DL (ref 8–23)
CALCIUM SERPL-MCNC: 9.5 MG/DL (ref 8.7–10.5)
CHLORIDE SERPL-SCNC: 105 MMOL/L (ref 95–110)
CO2 SERPL-SCNC: 22 MMOL/L (ref 23–29)
CREAT SERPL-MCNC: 1.1 MG/DL (ref 0.5–1.4)
EST. GFR  (AFRICAN AMERICAN): >60 ML/MIN/1.73 M^2
EST. GFR  (NON AFRICAN AMERICAN): 52.1 ML/MIN/1.73 M^2
ESTIMATED AVG GLUCOSE: 166 MG/DL (ref 68–131)
GLUCOSE SERPL-MCNC: 168 MG/DL (ref 70–110)
HBA1C MFR BLD: 7.4 % (ref 4–5.6)
POTASSIUM SERPL-SCNC: 4.4 MMOL/L (ref 3.5–5.1)
SODIUM SERPL-SCNC: 139 MMOL/L (ref 136–145)

## 2022-05-20 PROCEDURE — 36415 COLL VENOUS BLD VENIPUNCTURE: CPT | Mod: PN | Performed by: NURSE PRACTITIONER

## 2022-05-20 PROCEDURE — 83036 HEMOGLOBIN GLYCOSYLATED A1C: CPT | Performed by: NURSE PRACTITIONER

## 2022-05-20 PROCEDURE — 80048 BASIC METABOLIC PNL TOTAL CA: CPT | Performed by: NURSE PRACTITIONER

## 2022-05-27 ENCOUNTER — OFFICE VISIT (OUTPATIENT)
Dept: ENDOCRINOLOGY | Facility: CLINIC | Age: 67
End: 2022-05-27
Payer: MEDICARE

## 2022-05-27 DIAGNOSIS — K76.0 FATTY LIVER: ICD-10-CM

## 2022-05-27 DIAGNOSIS — I10 ESSENTIAL HYPERTENSION: ICD-10-CM

## 2022-05-27 DIAGNOSIS — I51.89 DIASTOLIC DYSFUNCTION: ICD-10-CM

## 2022-05-27 DIAGNOSIS — N18.31 TYPE 2 DIABETES MELLITUS WITH STAGE 3A CHRONIC KIDNEY DISEASE, WITH LONG-TERM CURRENT USE OF INSULIN: Primary | ICD-10-CM

## 2022-05-27 DIAGNOSIS — E03.4 HYPOTHYROIDISM DUE TO ACQUIRED ATROPHY OF THYROID: ICD-10-CM

## 2022-05-27 DIAGNOSIS — E11.40 TYPE 2 DIABETES MELLITUS WITH DIABETIC NEUROPATHY, WITH LONG-TERM CURRENT USE OF INSULIN: ICD-10-CM

## 2022-05-27 DIAGNOSIS — Z79.4 TYPE 2 DIABETES MELLITUS WITH DIABETIC NEUROPATHY, WITH LONG-TERM CURRENT USE OF INSULIN: ICD-10-CM

## 2022-05-27 DIAGNOSIS — Z79.4 TYPE 2 DIABETES MELLITUS WITH STAGE 3A CHRONIC KIDNEY DISEASE, WITH LONG-TERM CURRENT USE OF INSULIN: Primary | ICD-10-CM

## 2022-05-27 DIAGNOSIS — E11.22 TYPE 2 DIABETES MELLITUS WITH STAGE 3A CHRONIC KIDNEY DISEASE, WITH LONG-TERM CURRENT USE OF INSULIN: Primary | ICD-10-CM

## 2022-05-27 DIAGNOSIS — E78.5 HYPERLIPIDEMIA, UNSPECIFIED HYPERLIPIDEMIA TYPE: ICD-10-CM

## 2022-05-27 PROCEDURE — 1159F PR MEDICATION LIST DOCUMENTED IN MEDICAL RECORD: ICD-10-PCS | Mod: CPTII,95,, | Performed by: NURSE PRACTITIONER

## 2022-05-27 PROCEDURE — 3061F NEG MICROALBUMINURIA REV: CPT | Mod: CPTII,95,, | Performed by: NURSE PRACTITIONER

## 2022-05-27 PROCEDURE — 4010F ACE/ARB THERAPY RXD/TAKEN: CPT | Mod: CPTII,95,, | Performed by: NURSE PRACTITIONER

## 2022-05-27 PROCEDURE — 3066F NEPHROPATHY DOC TX: CPT | Mod: CPTII,95,, | Performed by: NURSE PRACTITIONER

## 2022-05-27 PROCEDURE — 3061F PR NEG MICROALBUMINURIA RESULT DOCUMENTED/REVIEW: ICD-10-PCS | Mod: CPTII,95,, | Performed by: NURSE PRACTITIONER

## 2022-05-27 PROCEDURE — 1159F MED LIST DOCD IN RCRD: CPT | Mod: CPTII,95,, | Performed by: NURSE PRACTITIONER

## 2022-05-27 PROCEDURE — 4010F PR ACE/ARB THEARPY RXD/TAKEN: ICD-10-PCS | Mod: CPTII,95,, | Performed by: NURSE PRACTITIONER

## 2022-05-27 PROCEDURE — 3051F PR MOST RECENT HEMOGLOBIN A1C LEVEL 7.0 - < 8.0%: ICD-10-PCS | Mod: CPTII,95,, | Performed by: NURSE PRACTITIONER

## 2022-05-27 PROCEDURE — 99214 OFFICE O/P EST MOD 30 MIN: CPT | Mod: 95,,, | Performed by: NURSE PRACTITIONER

## 2022-05-27 PROCEDURE — 3051F HG A1C>EQUAL 7.0%<8.0%: CPT | Mod: CPTII,95,, | Performed by: NURSE PRACTITIONER

## 2022-05-27 PROCEDURE — 99214 PR OFFICE/OUTPT VISIT, EST, LEVL IV, 30-39 MIN: ICD-10-PCS | Mod: 95,,, | Performed by: NURSE PRACTITIONER

## 2022-05-27 PROCEDURE — 1160F PR REVIEW ALL MEDS BY PRESCRIBER/CLIN PHARMACIST DOCUMENTED: ICD-10-PCS | Mod: CPTII,95,, | Performed by: NURSE PRACTITIONER

## 2022-05-27 PROCEDURE — 3066F PR DOCUMENTATION OF TREATMENT FOR NEPHROPATHY: ICD-10-PCS | Mod: CPTII,95,, | Performed by: NURSE PRACTITIONER

## 2022-05-27 PROCEDURE — 1160F RVW MEDS BY RX/DR IN RCRD: CPT | Mod: CPTII,95,, | Performed by: NURSE PRACTITIONER

## 2022-05-27 RX ORDER — GABAPENTIN 300 MG/1
300 CAPSULE ORAL 3 TIMES DAILY
Qty: 270 CAPSULE | Refills: 3 | Status: SHIPPED | OUTPATIENT
Start: 2022-05-27 | End: 2023-04-21

## 2022-05-27 RX ORDER — INSULIN GLARGINE 300 U/ML
INJECTION, SOLUTION SUBCUTANEOUS
Qty: 6 PEN | Refills: 3
Start: 2022-05-27 | End: 2022-10-12

## 2022-05-27 NOTE — PROGRESS NOTES
The patient location is:  Patient Home   The chief complaint leading to consultation is: Type 2 DM  Visit type: Virtual visit with synchronous audio and video  Total time spent with patient: 20 min  Each patient to whom he or she provides medical services by telemedicine is:  (1) informed of the relationship between the physician and patient and the respective role of any other health care provider with respect to management of the patient; and (2) notified that he or she may decline to receive medical services by telemedicine and may withdraw from such care at any time.       CC: Ms. Bing Kearns arrives today for management of Type 2 DM and review of chronic medical conditions, as listed in the Visit Diagnosis section of this encounter.       HPI: Ms. Bing Kearns was diagnosed with Type 2 DM in her late 50s. She was diagnosed based on lab work. Initial treatment consisted of metformin. Januvia was added in . Toujeo was added in 2020. + FH of DM in sister, maternal GF. Denies hospitalizations due to DM.   Follows with Dr. Vanessa for CKD.     Patient was last seen by me in January.     [facility]. Will likely need to stop. She currently has 2 months remaining.     BG readings are checked 1x/day.  She reports the following:  Fastin-135    Hypoglycemia: Not recently  Hypoglycemic Symptoms: hunger and jitteriness  Hypoglycemia Treatment: juice or glucose tabs    Missing Insulin/PO medication doses: No    Dietary Habits: Eats 2-3 meals/day. Trying to eat 3 meals. Occasional snacking. Admits some dietary indiscretion with her 3 sisters who have been getting together more often recently. Avoids sugary beverages.     Last DM education appointment: not recently        CURRENT DIABETIC MEDS: metformin XR 1000 mg daily, pioglitazone 15 mg daily, glimepiride 2 mg daily, Ozempic 0.5 mg weekly, Toujeo 18 units QHS  Glucometer type: One Touch Ultra  2    Previous DM treatments:  Trulicity - panic attacks, blood sugars in the 70s  Januvia - $$  Prandin - discontinued since starting Ozempic    Last Eye Exam: 2/8/2022, no DR. + glaucoma. Dr. Turcios  Last Podiatry Exam: 2/2022, Dr. Helms. She follows every 3 months.     REVIEW OF SYSTEMS  Constitutional: no c/o weakness, weight loss.  Eyes: denies visual disturbances.  Cardiac: no palpitations, chest pain   Respiratory: denies cough. + chronic dyspnea. Needs to re-establish with pulmonology.  GI: no c/o abdominal pain. Denies h/o pancreatitis. + mild nausea in the morning that is usually 2-3 days after Ozempic dose, however, not weekly. + mild constipation. She states that this is tolerable.  Neuro: + numbness, tingling in feet, worse in L foot. Taking gabapentin 3x/day  Endocrine: denies polyphagia, polydipsia, polyuria       Personally reviewed Past Medical, Surgical, Social History.    Vital Signs  LMP  (LMP Unknown)  -- virtual visit     Personally reviewed the below labs:    Hemoglobin A1C   Date Value Ref Range Status   05/20/2022 7.4 (H) 4.0 - 5.6 % Final     Comment:     ADA Screening Guidelines:  5.7-6.4%  Consistent with prediabetes  >or=6.5%  Consistent with diabetes    High levels of fetal hemoglobin interfere with the HbA1C  assay. Heterozygous hemoglobin variants (HbS, HgC, etc)do  not significantly interfere with this assay.   However, presence of multiple variants may affect accuracy.     01/07/2022 6.7 (H) 4.0 - 5.6 % Final     Comment:     ADA Screening Guidelines:  5.7-6.4%  Consistent with prediabetes  >or=6.5%  Consistent with diabetes    High levels of fetal hemoglobin interfere with the HbA1C  assay. Heterozygous hemoglobin variants (HbS, HgC, etc)do  not significantly interfere with this assay.   However, presence of multiple variants may affect accuracy.     10/14/2021 7.2 (H) 4.0 - 5.6 % Final     Comment:     ADA Screening Guidelines:  5.7-6.4%  Consistent with prediabetes  >or=6.5%   Consistent with diabetes    High levels of fetal hemoglobin interfere with the HbA1C  assay. Heterozygous hemoglobin variants (HbS, HgC, etc)do  not significantly interfere with this assay.   However, presence of multiple variants may affect accuracy.         Chemistry        Component Value Date/Time     05/20/2022 0935    K 4.4 05/20/2022 0935     05/20/2022 0935    CO2 22 (L) 05/20/2022 0935    BUN 12 05/20/2022 0935    CREATININE 1.1 05/20/2022 0935     (H) 05/20/2022 0935        Component Value Date/Time    CALCIUM 9.5 05/20/2022 0935    ALKPHOS 91 01/07/2022 0908    AST 37 01/07/2022 0908    ALT 29 01/07/2022 0908    BILITOT 0.4 01/07/2022 0908    ESTGFRAFRICA >60.0 05/20/2022 0935    EGFRNONAA 52.1 (A) 05/20/2022 0935          Lab Results   Component Value Date    CHOL 163 01/07/2022    CHOL 162 02/24/2021    CHOL 138 02/04/2020     Lab Results   Component Value Date    HDL 46 01/07/2022    HDL 54 02/24/2021    HDL 40 02/04/2020     Lab Results   Component Value Date    LDLCALC 88.2 01/07/2022    LDLCALC 83.2 02/24/2021    LDLCALC 58.4 (L) 02/04/2020     Lab Results   Component Value Date    TRIG 144 01/07/2022    TRIG 124 02/24/2021    TRIG 198 (H) 02/04/2020     Lab Results   Component Value Date    CHOLHDL 28.2 01/07/2022    CHOLHDL 33.3 02/24/2021    CHOLHDL 29.0 02/04/2020       Lab Results   Component Value Date    MICALBCREAT Unable to calculate 05/20/2022     Lab Results   Component Value Date    TSH 3.580 01/07/2022       CrCl cannot be calculated (Patient's most recent lab result is older than the maximum 7 days allowed.).    Vit D, 25-Hydroxy   Date Value Ref Range Status   12/08/2016 36 30 - 96 ng/mL Final     Comment:     Vitamin D deficiency.........<10 ng/mL                              Vitamin D insufficiency......10-29 ng/mL       Vitamin D sufficiency........> or equal to 30 ng/mL  Vitamin D toxicity............>100 ng/mL           PHYSICAL EXAMINATION  Deferred -- video  visit.      Goals      HEMOGLOBIN A1C < 7.5               Assessment/Plan  1. Type 2 diabetes mellitus with stage 3a chronic kidney disease, with long-term current use of insulin -- A1c has increased but is acceptable. Advised pt to notify me once she reaches end of Ozempic supply. Will likely increase glimepiride dose.   -- increase Toujeo to 20 units.  -- continue all other medications.   -- BG monitoring 2x/day.    -- Discussed diagnosis of DM, A1c goals, progression of disease, long term complications and tx options.  -- takes aspirin, ARB   2. Type 2 diabetes mellitus with diabetic neuropathy, with long-term current use of insulin -- optimize DM control  -- gabapentin refilled.    3. Essential hypertension  -- managed by cardiology  -- continue current meds and monitor   4. Hyperlipidemia, unspecified hyperlipidemia type  -- controlled  -- continue Crestor   5. Hypothyroidism due to acquired atrophy of thyroid  -- stable  -- continue current levothyroxine dose.    6. Diastolic dysfunction  -- following with cardiology. EF 60% on 1/2021 echo  -- will need to monitor status since patient is taking pioglitazone   7. Fatty liver -- maintain DM control       FOLLOW UP  Follow up in about 3 months (around 8/27/2022).  Patient instructed to bring BG logs to each follow up   Patient encouraged to call for any BG/medication issues, concerns, or questions.      Orders Placed This Encounter   Procedures    Hemoglobin A1C

## 2022-08-03 ENCOUNTER — PATIENT MESSAGE (OUTPATIENT)
Dept: ENDOCRINOLOGY | Facility: CLINIC | Age: 67
End: 2022-08-03
Payer: MEDICARE

## 2022-08-03 DIAGNOSIS — Z79.4 TYPE 2 DIABETES MELLITUS WITH STAGE 3A CHRONIC KIDNEY DISEASE, WITH LONG-TERM CURRENT USE OF INSULIN: ICD-10-CM

## 2022-08-03 DIAGNOSIS — N18.31 TYPE 2 DIABETES MELLITUS WITH STAGE 3A CHRONIC KIDNEY DISEASE, WITH LONG-TERM CURRENT USE OF INSULIN: ICD-10-CM

## 2022-08-03 DIAGNOSIS — E11.22 TYPE 2 DIABETES MELLITUS WITH STAGE 3A CHRONIC KIDNEY DISEASE, WITH LONG-TERM CURRENT USE OF INSULIN: ICD-10-CM

## 2022-08-03 RX ORDER — GLIMEPIRIDE 4 MG/1
4 TABLET ORAL
Qty: 30 TABLET | Refills: 6 | Status: SHIPPED | OUTPATIENT
Start: 2022-08-03 | End: 2022-09-01 | Stop reason: SDUPTHER

## 2022-08-11 RX ORDER — LEVOTHYROXINE SODIUM 75 UG/1
TABLET ORAL
Qty: 90 TABLET | Refills: 1 | Status: SHIPPED | OUTPATIENT
Start: 2022-08-11 | End: 2023-02-07

## 2022-08-11 NOTE — TELEPHONE ENCOUNTER
Refill Decision Note   Bing Kearns  is requesting a refill authorization.  Brief Assessment and Rationale for Refill:  Approve     Medication Therapy Plan:       Medication Reconciliation Completed: No   Comments:     No Care Gaps recommended.     Note composed:11:06 AM 08/11/2022

## 2022-08-18 ENCOUNTER — PATIENT MESSAGE (OUTPATIENT)
Dept: CARDIOLOGY | Facility: CLINIC | Age: 67
End: 2022-08-18
Payer: MEDICARE

## 2022-08-22 ENCOUNTER — TELEPHONE (OUTPATIENT)
Dept: CARDIOLOGY | Facility: CLINIC | Age: 67
End: 2022-08-22
Payer: MEDICARE

## 2022-08-22 NOTE — TELEPHONE ENCOUNTER
Please advise: is pt acceptable risk for cervical VITOR     Last OV 10/14    Pt taking aspirin

## 2022-08-23 NOTE — TELEPHONE ENCOUNTER
There are no absolute contraindications to planned surgery from a cardiac standpoint. Will defer non-cardiac medical issues to patient's other physicians. Cardiac and hemodynamic monitoring during surgery and post operative period consistent with regions standard of care should be initiated. Patient made aware that all surgeries carry risk and unpredictable cardiac events may still occur.   May hold ASA

## 2022-08-26 ENCOUNTER — PATIENT MESSAGE (OUTPATIENT)
Dept: CARDIOLOGY | Facility: CLINIC | Age: 67
End: 2022-08-26
Payer: MEDICARE

## 2022-08-26 ENCOUNTER — LAB VISIT (OUTPATIENT)
Dept: LAB | Facility: HOSPITAL | Age: 67
End: 2022-08-26
Attending: NURSE PRACTITIONER
Payer: MEDICARE

## 2022-08-26 DIAGNOSIS — Z79.4 TYPE 2 DIABETES MELLITUS WITH STAGE 3A CHRONIC KIDNEY DISEASE, WITH LONG-TERM CURRENT USE OF INSULIN: ICD-10-CM

## 2022-08-26 DIAGNOSIS — E11.22 TYPE 2 DIABETES MELLITUS WITH STAGE 3A CHRONIC KIDNEY DISEASE, WITH LONG-TERM CURRENT USE OF INSULIN: ICD-10-CM

## 2022-08-26 DIAGNOSIS — N18.31 TYPE 2 DIABETES MELLITUS WITH STAGE 3A CHRONIC KIDNEY DISEASE, WITH LONG-TERM CURRENT USE OF INSULIN: ICD-10-CM

## 2022-08-26 LAB
ESTIMATED AVG GLUCOSE: 160 MG/DL (ref 68–131)
HBA1C MFR BLD: 7.2 % (ref 4–5.6)

## 2022-08-26 PROCEDURE — 83036 HEMOGLOBIN GLYCOSYLATED A1C: CPT | Performed by: NURSE PRACTITIONER

## 2022-08-26 PROCEDURE — 36415 COLL VENOUS BLD VENIPUNCTURE: CPT | Mod: PN | Performed by: NURSE PRACTITIONER

## 2022-08-28 ENCOUNTER — PATIENT MESSAGE (OUTPATIENT)
Dept: FAMILY MEDICINE | Facility: CLINIC | Age: 67
End: 2022-08-28
Payer: MEDICARE

## 2022-08-29 ENCOUNTER — PATIENT MESSAGE (OUTPATIENT)
Dept: FAMILY MEDICINE | Facility: CLINIC | Age: 67
End: 2022-08-29
Payer: MEDICARE

## 2022-08-29 ENCOUNTER — OFFICE VISIT (OUTPATIENT)
Dept: FAMILY MEDICINE | Facility: CLINIC | Age: 67
End: 2022-08-29
Payer: MEDICARE

## 2022-08-29 DIAGNOSIS — U07.1 COVID-19 VIRUS INFECTION: Primary | ICD-10-CM

## 2022-08-29 PROCEDURE — 3066F PR DOCUMENTATION OF TREATMENT FOR NEPHROPATHY: ICD-10-PCS | Mod: CPTII,95,, | Performed by: PHYSICIAN ASSISTANT

## 2022-08-29 PROCEDURE — 99213 OFFICE O/P EST LOW 20 MIN: CPT | Mod: 95,,, | Performed by: PHYSICIAN ASSISTANT

## 2022-08-29 PROCEDURE — 3051F PR MOST RECENT HEMOGLOBIN A1C LEVEL 7.0 - < 8.0%: ICD-10-PCS | Mod: CPTII,95,, | Performed by: PHYSICIAN ASSISTANT

## 2022-08-29 PROCEDURE — 3066F NEPHROPATHY DOC TX: CPT | Mod: CPTII,95,, | Performed by: PHYSICIAN ASSISTANT

## 2022-08-29 PROCEDURE — 3061F PR NEG MICROALBUMINURIA RESULT DOCUMENTED/REVIEW: ICD-10-PCS | Mod: CPTII,95,, | Performed by: PHYSICIAN ASSISTANT

## 2022-08-29 PROCEDURE — 99213 PR OFFICE/OUTPT VISIT, EST, LEVL III, 20-29 MIN: ICD-10-PCS | Mod: 95,,, | Performed by: PHYSICIAN ASSISTANT

## 2022-08-29 PROCEDURE — 4010F PR ACE/ARB THEARPY RXD/TAKEN: ICD-10-PCS | Mod: CPTII,95,, | Performed by: PHYSICIAN ASSISTANT

## 2022-08-29 PROCEDURE — 3051F HG A1C>EQUAL 7.0%<8.0%: CPT | Mod: CPTII,95,, | Performed by: PHYSICIAN ASSISTANT

## 2022-08-29 PROCEDURE — 3061F NEG MICROALBUMINURIA REV: CPT | Mod: CPTII,95,, | Performed by: PHYSICIAN ASSISTANT

## 2022-08-29 PROCEDURE — 4010F ACE/ARB THERAPY RXD/TAKEN: CPT | Mod: CPTII,95,, | Performed by: PHYSICIAN ASSISTANT

## 2022-08-29 NOTE — PROGRESS NOTES
Subjective:      Patient ID: Bing Kearns is a 66 y.o. female.    Chief Complaint: COVID-19 Concerns    Patient is new to me.    Patient has PMH of HTN, hyperlipemia, CKD3a, hypothyroidism, Type 2 DM, fatty liver, and FRANCISCO.    Cough  This is a new problem. Associated symptoms include chills, a fever, headaches, myalgias and shortness of breath (worse than baseline). Pertinent negatives include no chest pain or hemoptysis.     Patient reports Covid positive since Friday (Day 4).  She reports fever, cough, shortness of breath, diarrhea, nausea, myalgias, and headaches.  Taking Emergen-C, Robitussin DM, motrin, and Tylenol without resolution of symptoms.  Patient denies chest pain.    Blood sugar has been 160 in the mornings.  Lab Results   Component Value Date    HGBA1C 7.2 (H) 08/26/2022      Review of Systems   Constitutional:  Positive for chills and fever.   Eyes:  Positive for pain.   Respiratory:  Positive for cough and shortness of breath (worse than baseline). Negative for hemoptysis.    Cardiovascular:  Negative for chest pain.   Gastrointestinal:  Positive for diarrhea and nausea. Negative for abdominal pain, constipation and vomiting.   Musculoskeletal:  Positive for myalgias.   Neurological:  Positive for light-headedness (during cough) and headaches.       Objective:   LMP  (LMP Unknown)     Patient reported SpO2 94%.    Physical Exam  Constitutional:       Appearance: Normal appearance.   HENT:      Head: Normocephalic and atraumatic.      Right Ear: External ear normal.      Left Ear: External ear normal.      Nose: Nose normal.   Eyes:      Conjunctiva/sclera: Conjunctivae normal.   Pulmonary:      Effort: No respiratory distress.      Comments: Able to talk without labored breathing.  Skin:     Coloration: Skin is not jaundiced or pale.   Neurological:      General: No focal deficit present.      Mental Status: She is alert and oriented to person, place, and time.   Psychiatric:         Mood  and Affect: Mood normal.         Behavior: Behavior normal.         Judgment: Judgment normal.      Assessment:      1. COVID-19 virus infection       Plan:   1. COVID-19 virus infection  Patient agrees that benefits outweigh risks for infusion.  Gave strict ER precautions for worsening shortness of breath or chest pain.  Instructed to monitor oxygen with pulse oximeter and go to ER with consistent sats below 94%.  - COVID-19 Home Symptom Monitoring  - Duration (days): 14  - Ambulatory referral/consult to [facility]; Future    Covid Risk Score:  4     Follow up as needed.  Patient agreed with plan and expressed understanding.  The patient location is:  Patient Home   The chief complaint leading to consultation is: Covid-19 Concerns  Visit type: Virtual visit with synchronous audio and video  Total time spent with patient: 22 minutes  Each patient to whom he or she provides medical services by telemedicine is:  (1) informed of the relationship between the physician and patient and the respective role of any other health care provider with respect to management of the patient; and (2) notified that he or she may decline to receive medical services by telemedicine and may withdraw from such care at any time.

## 2022-08-29 NOTE — TELEPHONE ENCOUNTER
Called pt to let her know that she has a virtual visit with Ally Diasx at 10:20 today.  Pt verbalized understanding

## 2022-08-30 ENCOUNTER — TELEPHONE (OUTPATIENT)
Dept: INFECTIOUS DISEASES | Facility: HOSPITAL | Age: 67
End: 2022-08-30
Payer: MEDICARE

## 2022-08-30 ENCOUNTER — PATIENT MESSAGE (OUTPATIENT)
Dept: FAMILY MEDICINE | Facility: CLINIC | Age: 67
End: 2022-08-30
Payer: MEDICARE

## 2022-08-30 DIAGNOSIS — U07.1 COVID-19: Primary | ICD-10-CM

## 2022-08-30 NOTE — TELEPHONE ENCOUNTER
Called pt about covid treatment  Symptoms onset 8/26/22; aches, congestion, low grade fever   +8/28/22    Paxlovid eligible; advised patient to hold Crestor and Clonazepam while on Paxlovid treatment, monitor her BP with amlodipine and watch for signs of hypotension; Pt verbalized understanding    Pt will take it if symptoms worsen; Explained to patient that she has until 9/1/22 to take it to be within her treatment window; Pt verbalized understanding    Will send script to her pharmacy; [facility] on [address]

## 2022-09-01 ENCOUNTER — OFFICE VISIT (OUTPATIENT)
Dept: ENDOCRINOLOGY | Facility: CLINIC | Age: 67
End: 2022-09-01
Payer: MEDICARE

## 2022-09-01 DIAGNOSIS — I51.89 DIASTOLIC DYSFUNCTION: ICD-10-CM

## 2022-09-01 DIAGNOSIS — Z79.4 TYPE 2 DIABETES MELLITUS WITH STAGE 3A CHRONIC KIDNEY DISEASE, WITH LONG-TERM CURRENT USE OF INSULIN: ICD-10-CM

## 2022-09-01 DIAGNOSIS — E78.5 HYPERLIPIDEMIA, UNSPECIFIED HYPERLIPIDEMIA TYPE: ICD-10-CM

## 2022-09-01 DIAGNOSIS — N18.31 TYPE 2 DIABETES MELLITUS WITH STAGE 3A CHRONIC KIDNEY DISEASE, WITH LONG-TERM CURRENT USE OF INSULIN: ICD-10-CM

## 2022-09-01 DIAGNOSIS — I10 ESSENTIAL HYPERTENSION: ICD-10-CM

## 2022-09-01 DIAGNOSIS — E11.40 TYPE 2 DIABETES MELLITUS WITH DIABETIC NEUROPATHY, WITH LONG-TERM CURRENT USE OF INSULIN: Primary | ICD-10-CM

## 2022-09-01 DIAGNOSIS — Z79.4 TYPE 2 DIABETES MELLITUS WITH DIABETIC NEUROPATHY, WITH LONG-TERM CURRENT USE OF INSULIN: Primary | ICD-10-CM

## 2022-09-01 DIAGNOSIS — E03.4 HYPOTHYROIDISM DUE TO ACQUIRED ATROPHY OF THYROID: ICD-10-CM

## 2022-09-01 DIAGNOSIS — K76.0 FATTY LIVER: ICD-10-CM

## 2022-09-01 DIAGNOSIS — E11.22 TYPE 2 DIABETES MELLITUS WITH STAGE 3A CHRONIC KIDNEY DISEASE, WITH LONG-TERM CURRENT USE OF INSULIN: ICD-10-CM

## 2022-09-01 PROCEDURE — 3061F PR NEG MICROALBUMINURIA RESULT DOCUMENTED/REVIEW: ICD-10-PCS | Mod: CPTII,95,, | Performed by: NURSE PRACTITIONER

## 2022-09-01 PROCEDURE — 1160F RVW MEDS BY RX/DR IN RCRD: CPT | Mod: CPTII,95,, | Performed by: NURSE PRACTITIONER

## 2022-09-01 PROCEDURE — 3061F NEG MICROALBUMINURIA REV: CPT | Mod: CPTII,95,, | Performed by: NURSE PRACTITIONER

## 2022-09-01 PROCEDURE — 3051F PR MOST RECENT HEMOGLOBIN A1C LEVEL 7.0 - < 8.0%: ICD-10-PCS | Mod: CPTII,95,, | Performed by: NURSE PRACTITIONER

## 2022-09-01 PROCEDURE — 1159F PR MEDICATION LIST DOCUMENTED IN MEDICAL RECORD: ICD-10-PCS | Mod: CPTII,95,, | Performed by: NURSE PRACTITIONER

## 2022-09-01 PROCEDURE — 4010F ACE/ARB THERAPY RXD/TAKEN: CPT | Mod: CPTII,95,, | Performed by: NURSE PRACTITIONER

## 2022-09-01 PROCEDURE — 99214 OFFICE O/P EST MOD 30 MIN: CPT | Mod: 95,,, | Performed by: NURSE PRACTITIONER

## 2022-09-01 PROCEDURE — 1160F PR REVIEW ALL MEDS BY PRESCRIBER/CLIN PHARMACIST DOCUMENTED: ICD-10-PCS | Mod: CPTII,95,, | Performed by: NURSE PRACTITIONER

## 2022-09-01 PROCEDURE — 3051F HG A1C>EQUAL 7.0%<8.0%: CPT | Mod: CPTII,95,, | Performed by: NURSE PRACTITIONER

## 2022-09-01 PROCEDURE — 4010F PR ACE/ARB THEARPY RXD/TAKEN: ICD-10-PCS | Mod: CPTII,95,, | Performed by: NURSE PRACTITIONER

## 2022-09-01 PROCEDURE — 3066F PR DOCUMENTATION OF TREATMENT FOR NEPHROPATHY: ICD-10-PCS | Mod: CPTII,95,, | Performed by: NURSE PRACTITIONER

## 2022-09-01 PROCEDURE — 99214 PR OFFICE/OUTPT VISIT, EST, LEVL IV, 30-39 MIN: ICD-10-PCS | Mod: 95,,, | Performed by: NURSE PRACTITIONER

## 2022-09-01 PROCEDURE — 1159F MED LIST DOCD IN RCRD: CPT | Mod: CPTII,95,, | Performed by: NURSE PRACTITIONER

## 2022-09-01 PROCEDURE — 3066F NEPHROPATHY DOC TX: CPT | Mod: CPTII,95,, | Performed by: NURSE PRACTITIONER

## 2022-09-01 RX ORDER — GLIMEPIRIDE 4 MG/1
4 TABLET ORAL
Qty: 90 TABLET | Refills: 3 | Status: SHIPPED | OUTPATIENT
Start: 2022-09-01 | End: 2022-12-05 | Stop reason: SDUPTHER

## 2022-09-01 NOTE — PROGRESS NOTES
The patient location is:  Patient Home   The chief complaint leading to consultation is: Type 2 DM  Visit type: Virtual visit with synchronous audio and video  Total time spent with patient: 20 min  Each patient to whom he or she provides medical services by telemedicine is:  (1) informed of the relationship between the physician and patient and the respective role of any other health care provider with respect to management of the patient; and (2) notified that he or she may decline to receive medical services by telemedicine and may withdraw from such care at any time.       CC: Ms. Bing Kearns arrives today for management of Type 2 DM and review of chronic medical conditions, as listed in the Visit Diagnosis section of this encounter.       HPI: Ms. Bing Kearns was diagnosed with Type 2 DM in her late 50s. She was diagnosed based on lab work. Initial treatment consisted of metformin. Januvia was added in . Toujeo was added in 2020. + FH of DM in sister, maternal GF. Denies hospitalizations due to DM.   Follows with Dr. Vanessa for CKD.     Patient was last seen by me in May. Insulin dose was increased at this time.     She is currently ill with COVID. She was told that she didn't qualify for monoclonal antibody. She was prescribed Paxlovid but decided not to take due to concern of rebound infection.    She had to stop Ozempic due to cost/donut hole. Glimepiride dose was then increased.     BG readings are checked 2x/day.  She reports the following:  Fastin-135  After dinner: 160-170, a few outliers in 200s.     Hypoglycemia: No  Hypoglycemic Symptoms: hunger and jitteriness  Hypoglycemia Treatment: juice or glucose tabs    Missing Insulin/PO medication doses: No    Dietary Habits: Eats 3 meals/day. Occasional snacking. Avoids sugary beverages.     Last DM education appointment: not recently        CURRENT DIABETIC MEDS: metformin XR 1000 mg daily, pioglitazone 15 mg daily,  glimepiride 4 mg daily, Toujeo 20 units QHS  Glucometer type: One Touch Ultra 2    Previous DM treatments:  Trulicity - panic attacks, blood sugars in the 70s  Januvia - $$  Prandin - discontinued since starting Ozempic  Ozempic - $$    Last Eye Exam: 2/8/2022, no DR. + glaucoma. Dr. Turcios  Last Podiatry Exam: 2022, Dr. Helms. She follows every 3 months.     REVIEW OF SYSTEMS  Constitutional: no c/o weakness, weight loss. + fever, fatigue related to COVID. Taking Tylenol.   Cardiac: no palpitations, chest pain   Respiratory: + chronic dyspnea. + productive cough  GI: no c/o abdominal pain, nausea. Denies h/o pancreatitis. + diarrhea with COVID  Neuro: + numbness, tingling in feet  Endocrine: denies polyphagia, polydipsia, polyuria       Personally reviewed Past Medical, Surgical, Social History.    Vital Signs  LMP  (LMP Unknown)  -- virtual visit     Personally reviewed the below labs:    Hemoglobin A1C   Date Value Ref Range Status   08/26/2022 7.2 (H) 4.0 - 5.6 % Final     Comment:     ADA Screening Guidelines:  5.7-6.4%  Consistent with prediabetes  >or=6.5%  Consistent with diabetes    High levels of fetal hemoglobin interfere with the HbA1C  assay. Heterozygous hemoglobin variants (HbS, HgC, etc)do  not significantly interfere with this assay.   However, presence of multiple variants may affect accuracy.     05/20/2022 7.4 (H) 4.0 - 5.6 % Final     Comment:     ADA Screening Guidelines:  5.7-6.4%  Consistent with prediabetes  >or=6.5%  Consistent with diabetes    High levels of fetal hemoglobin interfere with the HbA1C  assay. Heterozygous hemoglobin variants (HbS, HgC, etc)do  not significantly interfere with this assay.   However, presence of multiple variants may affect accuracy.     01/07/2022 6.7 (H) 4.0 - 5.6 % Final     Comment:     ADA Screening Guidelines:  5.7-6.4%  Consistent with prediabetes  >or=6.5%  Consistent with diabetes    High levels of fetal hemoglobin interfere with the  HbA1C  assay. Heterozygous hemoglobin variants (HbS, HgC, etc)do  not significantly interfere with this assay.   However, presence of multiple variants may affect accuracy.         Chemistry        Component Value Date/Time     05/20/2022 0935    K 4.4 05/20/2022 0935     05/20/2022 0935    CO2 22 (L) 05/20/2022 0935    BUN 12 05/20/2022 0935    CREATININE 1.1 05/20/2022 0935     (H) 05/20/2022 0935        Component Value Date/Time    CALCIUM 9.5 05/20/2022 0935    ALKPHOS 91 01/07/2022 0908    AST 37 01/07/2022 0908    ALT 29 01/07/2022 0908    BILITOT 0.4 01/07/2022 0908    ESTGFRAFRICA >60.0 05/20/2022 0935    EGFRNONAA 52.1 (A) 05/20/2022 0935          Lab Results   Component Value Date    CHOL 163 01/07/2022    CHOL 162 02/24/2021    CHOL 138 02/04/2020     Lab Results   Component Value Date    HDL 46 01/07/2022    HDL 54 02/24/2021    HDL 40 02/04/2020     Lab Results   Component Value Date    LDLCALC 88.2 01/07/2022    LDLCALC 83.2 02/24/2021    LDLCALC 58.4 (L) 02/04/2020     Lab Results   Component Value Date    TRIG 144 01/07/2022    TRIG 124 02/24/2021    TRIG 198 (H) 02/04/2020     Lab Results   Component Value Date    CHOLHDL 28.2 01/07/2022    CHOLHDL 33.3 02/24/2021    CHOLHDL 29.0 02/04/2020       Lab Results   Component Value Date    MICALBCREAT Unable to calculate 05/20/2022     Lab Results   Component Value Date    TSH 3.580 01/07/2022       CrCl cannot be calculated (Patient's most recent lab result is older than the maximum 7 days allowed.).    Vit D, 25-Hydroxy   Date Value Ref Range Status   12/08/2016 36 30 - 96 ng/mL Final     Comment:     Vitamin D deficiency.........<10 ng/mL                              Vitamin D insufficiency......10-29 ng/mL       Vitamin D sufficiency........> or equal to 30 ng/mL  Vitamin D toxicity............>100 ng/mL           PHYSICAL EXAMINATION  Deferred -- video visit.      Goals        HEMOGLOBIN A1C < 7.5                  Assessment/Plan  1. Type 2 diabetes mellitus with stage 3a chronic kidney disease, with long-term current use of insulin -- A1c is acceptable. I suspect occasionl PP hyperglycemia is related to food choices.  -- continue current medication  -- BG monitoring 2x/day.    -- Discussed diagnosis of DM, A1c goals, progression of disease, long term complications and tx options.  -- takes aspirin, ARB   2. Type 2 diabetes mellitus with diabetic neuropathy, with long-term current use of insulin -- optimize DM control   3. Essential hypertension  -- managed by cardiology  -- continue current meds and monitor   4. Hyperlipidemia, unspecified hyperlipidemia type  -- controlled  -- continue Crestor  -- lipid panel with RTC   5. Hypothyroidism due to acquired atrophy of thyroid  -- stable  -- continue current levothyroxine dose.   -- TSH with RTC   6. Diastolic dysfunction  -- following with cardiology. EF 60% on 1/2021 echo  -- will need to monitor status since patient is taking pioglitazone   7. Fatty liver -- maintain DM control       FOLLOW UP  Follow up in about 4 months (around 1/1/2023).  Patient instructed to bring BG logs to each follow up   Patient encouraged to call for any BG/medication issues, concerns, or questions.      Orders Placed This Encounter   Procedures    Hemoglobin A1C    Lipid Panel    Comprehensive Metabolic Panel    TSH

## 2022-10-11 ENCOUNTER — PATIENT MESSAGE (OUTPATIENT)
Dept: FAMILY MEDICINE | Facility: CLINIC | Age: 67
End: 2022-10-11
Payer: MEDICARE

## 2022-10-12 ENCOUNTER — PATIENT MESSAGE (OUTPATIENT)
Dept: ENDOCRINOLOGY | Facility: CLINIC | Age: 67
End: 2022-10-12
Payer: MEDICARE

## 2022-10-12 DIAGNOSIS — N18.31 TYPE 2 DIABETES MELLITUS WITH STAGE 3A CHRONIC KIDNEY DISEASE, WITH LONG-TERM CURRENT USE OF INSULIN: ICD-10-CM

## 2022-10-12 DIAGNOSIS — E11.9 TYPE 2 DIABETES MELLITUS WITHOUT COMPLICATION, WITHOUT LONG-TERM CURRENT USE OF INSULIN: ICD-10-CM

## 2022-10-12 DIAGNOSIS — E11.22 TYPE 2 DIABETES MELLITUS WITH STAGE 3A CHRONIC KIDNEY DISEASE, WITH LONG-TERM CURRENT USE OF INSULIN: ICD-10-CM

## 2022-10-12 DIAGNOSIS — Z79.4 TYPE 2 DIABETES MELLITUS WITH STAGE 3A CHRONIC KIDNEY DISEASE, WITH LONG-TERM CURRENT USE OF INSULIN: ICD-10-CM

## 2022-10-12 RX ORDER — INSULIN ASPART 100 [IU]/ML
INJECTION, SOLUTION INTRAVENOUS; SUBCUTANEOUS
Qty: 15 ML | Refills: 1 | Status: SHIPPED | OUTPATIENT
Start: 2022-10-12 | End: 2022-10-14

## 2022-10-12 RX ORDER — INSULIN GLARGINE 300 U/ML
INJECTION, SOLUTION SUBCUTANEOUS
Qty: 6 PEN | Refills: 3
Start: 2022-10-12 | End: 2022-10-14 | Stop reason: SDUPTHER

## 2022-10-12 RX ORDER — PEN NEEDLE, DIABETIC 30 GX3/16"
NEEDLE, DISPOSABLE MISCELLANEOUS
Qty: 150 EACH | Refills: 3 | Status: SHIPPED | OUTPATIENT
Start: 2022-10-12 | End: 2022-10-17

## 2022-10-13 DIAGNOSIS — F41.9 ANXIETY: ICD-10-CM

## 2022-10-13 RX ORDER — CLONAZEPAM 0.5 MG/1
TABLET ORAL
Qty: 30 TABLET | Refills: 0 | OUTPATIENT
Start: 2022-10-13

## 2022-10-13 NOTE — TELEPHONE ENCOUNTER
No new care gaps identified.  [facility] Embedded Care Gaps. Reference number: [id number]. 10/13/2022   11:37:08 AM PRASADT

## 2022-10-14 ENCOUNTER — PATIENT MESSAGE (OUTPATIENT)
Dept: ENDOCRINOLOGY | Facility: CLINIC | Age: 67
End: 2022-10-14
Payer: MEDICARE

## 2022-10-14 DIAGNOSIS — Z79.4 TYPE 2 DIABETES MELLITUS WITH STAGE 3A CHRONIC KIDNEY DISEASE, WITH LONG-TERM CURRENT USE OF INSULIN: ICD-10-CM

## 2022-10-14 DIAGNOSIS — N18.31 TYPE 2 DIABETES MELLITUS WITH STAGE 3A CHRONIC KIDNEY DISEASE, WITH LONG-TERM CURRENT USE OF INSULIN: ICD-10-CM

## 2022-10-14 DIAGNOSIS — E11.22 TYPE 2 DIABETES MELLITUS WITH STAGE 3A CHRONIC KIDNEY DISEASE, WITH LONG-TERM CURRENT USE OF INSULIN: ICD-10-CM

## 2022-10-14 RX ORDER — INSULIN GLARGINE 300 U/ML
INJECTION, SOLUTION SUBCUTANEOUS
Qty: 6 PEN | Refills: 3 | Status: SHIPPED | OUTPATIENT
Start: 2022-10-14 | End: 2022-12-05 | Stop reason: SDUPTHER

## 2022-10-14 RX ORDER — INSULIN LISPRO 100 [IU]/ML
INJECTION, SOLUTION INTRAVENOUS; SUBCUTANEOUS
Qty: 15 ML | Refills: 2 | Status: SHIPPED | OUTPATIENT
Start: 2022-10-14 | End: 2022-10-17 | Stop reason: SDUPTHER

## 2022-10-14 NOTE — TELEPHONE ENCOUNTER
[facility] pharmacy saying that the novolog is not covered but Humalog and Toujeo are. You sent toujeo but as print and never got to pharmacy. I told the pharmacist that the toujeo is only used once a day and novolog 3x day w/meals and how is toujeo under the formulary for novolog. He said that is what it said. Please advise

## 2022-10-14 NOTE — TELEPHONE ENCOUNTER
Please notify pt. Novolog was changed to Humalog (works the same) and Toujeo was re-sent to [facility]

## 2022-10-17 RX ORDER — INSULIN LISPRO 100 [IU]/ML
INJECTION, SOLUTION INTRAVENOUS; SUBCUTANEOUS
Qty: 15 ML | Refills: 2 | Status: SHIPPED | OUTPATIENT
Start: 2022-10-17 | End: 2022-10-17 | Stop reason: SDUPTHER

## 2022-10-17 RX ORDER — INSULIN LISPRO 100 [IU]/ML
INJECTION, SOLUTION INTRAVENOUS; SUBCUTANEOUS
Qty: 15 ML | Refills: 2 | Status: SHIPPED | OUTPATIENT
Start: 2022-10-17 | End: 2023-05-18

## 2022-10-17 NOTE — TELEPHONE ENCOUNTER
Please call her pharmacy to inquire what they need, since I already listed the max TDD on prescription. I re-sent it in case. Please also let pt know (after speaking with pharmacy) whether she can  now. Please remind her to use the sliding scale that I provided her in the previous message. Thank you

## 2022-10-25 ENCOUNTER — PATIENT MESSAGE (OUTPATIENT)
Dept: ENDOCRINOLOGY | Facility: CLINIC | Age: 67
End: 2022-10-25
Payer: MEDICARE

## 2022-11-03 ENCOUNTER — PATIENT OUTREACH (OUTPATIENT)
Dept: ADMINISTRATIVE | Facility: HOSPITAL | Age: 67
End: 2022-11-03
Payer: MEDICARE

## 2022-11-03 ENCOUNTER — TELEPHONE (OUTPATIENT)
Dept: PAIN MEDICINE | Facility: CLINIC | Age: 67
End: 2022-11-03
Payer: MEDICARE

## 2022-11-03 ENCOUNTER — OFFICE VISIT (OUTPATIENT)
Dept: FAMILY MEDICINE | Facility: CLINIC | Age: 67
End: 2022-11-03
Payer: MEDICARE

## 2022-11-03 VITALS
WEIGHT: 206.56 LBS | BODY MASS INDEX: 34.41 KG/M2 | RESPIRATION RATE: 18 BRPM | HEIGHT: 65 IN | DIASTOLIC BLOOD PRESSURE: 70 MMHG | SYSTOLIC BLOOD PRESSURE: 128 MMHG

## 2022-11-03 DIAGNOSIS — I10 PRIMARY HYPERTENSION: ICD-10-CM

## 2022-11-03 DIAGNOSIS — E03.4 HYPOTHYROIDISM DUE TO ACQUIRED ATROPHY OF THYROID: ICD-10-CM

## 2022-11-03 DIAGNOSIS — E11.22 TYPE 2 DIABETES MELLITUS WITH STAGE 3A CHRONIC KIDNEY DISEASE, WITH LONG-TERM CURRENT USE OF INSULIN: ICD-10-CM

## 2022-11-03 DIAGNOSIS — F41.9 ANXIETY: ICD-10-CM

## 2022-11-03 DIAGNOSIS — N18.31 STAGE 3A CHRONIC KIDNEY DISEASE: ICD-10-CM

## 2022-11-03 DIAGNOSIS — E78.2 MIXED HYPERLIPIDEMIA: ICD-10-CM

## 2022-11-03 DIAGNOSIS — N18.31 TYPE 2 DIABETES MELLITUS WITH STAGE 3A CHRONIC KIDNEY DISEASE, WITH LONG-TERM CURRENT USE OF INSULIN: ICD-10-CM

## 2022-11-03 DIAGNOSIS — Z79.4 TYPE 2 DIABETES MELLITUS WITH STAGE 3A CHRONIC KIDNEY DISEASE, WITH LONG-TERM CURRENT USE OF INSULIN: ICD-10-CM

## 2022-11-03 DIAGNOSIS — G47.30 SLEEP APNEA WITH USE OF CONTINUOUS POSITIVE AIRWAY PRESSURE (CPAP): ICD-10-CM

## 2022-11-03 DIAGNOSIS — Z00.01 ENCOUNTER FOR GENERAL ADULT MEDICAL EXAMINATION WITH ABNORMAL FINDINGS: Primary | ICD-10-CM

## 2022-11-03 PROCEDURE — 3066F NEPHROPATHY DOC TX: CPT | Mod: CPTII,S$GLB,, | Performed by: INTERNAL MEDICINE

## 2022-11-03 PROCEDURE — 3008F BODY MASS INDEX DOCD: CPT | Mod: CPTII,S$GLB,, | Performed by: INTERNAL MEDICINE

## 2022-11-03 PROCEDURE — 1160F RVW MEDS BY RX/DR IN RCRD: CPT | Mod: CPTII,S$GLB,, | Performed by: INTERNAL MEDICINE

## 2022-11-03 PROCEDURE — 99999 PR PBB SHADOW E&M-EST. PATIENT-LVL V: CPT | Mod: PBBFAC,,, | Performed by: INTERNAL MEDICINE

## 2022-11-03 PROCEDURE — 99213 PR OFFICE/OUTPT VISIT, EST, LEVL III, 20-29 MIN: ICD-10-PCS | Mod: S$GLB,,, | Performed by: INTERNAL MEDICINE

## 2022-11-03 PROCEDURE — 3008F PR BODY MASS INDEX (BMI) DOCUMENTED: ICD-10-PCS | Mod: CPTII,S$GLB,, | Performed by: INTERNAL MEDICINE

## 2022-11-03 PROCEDURE — 3066F PR DOCUMENTATION OF TREATMENT FOR NEPHROPATHY: ICD-10-PCS | Mod: CPTII,S$GLB,, | Performed by: INTERNAL MEDICINE

## 2022-11-03 PROCEDURE — 1101F PT FALLS ASSESS-DOCD LE1/YR: CPT | Mod: CPTII,S$GLB,, | Performed by: INTERNAL MEDICINE

## 2022-11-03 PROCEDURE — 99999 PR PBB SHADOW E&M-EST. PATIENT-LVL V: ICD-10-PCS | Mod: PBBFAC,,, | Performed by: INTERNAL MEDICINE

## 2022-11-03 PROCEDURE — 1159F MED LIST DOCD IN RCRD: CPT | Mod: CPTII,S$GLB,, | Performed by: INTERNAL MEDICINE

## 2022-11-03 PROCEDURE — 3288F PR FALLS RISK ASSESSMENT DOCUMENTED: ICD-10-PCS | Mod: CPTII,S$GLB,, | Performed by: INTERNAL MEDICINE

## 2022-11-03 PROCEDURE — 1159F PR MEDICATION LIST DOCUMENTED IN MEDICAL RECORD: ICD-10-PCS | Mod: CPTII,S$GLB,, | Performed by: INTERNAL MEDICINE

## 2022-11-03 PROCEDURE — 3051F PR MOST RECENT HEMOGLOBIN A1C LEVEL 7.0 - < 8.0%: ICD-10-PCS | Mod: CPTII,S$GLB,, | Performed by: INTERNAL MEDICINE

## 2022-11-03 PROCEDURE — 3061F PR NEG MICROALBUMINURIA RESULT DOCUMENTED/REVIEW: ICD-10-PCS | Mod: CPTII,S$GLB,, | Performed by: INTERNAL MEDICINE

## 2022-11-03 PROCEDURE — 3288F FALL RISK ASSESSMENT DOCD: CPT | Mod: CPTII,S$GLB,, | Performed by: INTERNAL MEDICINE

## 2022-11-03 PROCEDURE — 3074F SYST BP LT 130 MM HG: CPT | Mod: CPTII,S$GLB,, | Performed by: INTERNAL MEDICINE

## 2022-11-03 PROCEDURE — 1160F PR REVIEW ALL MEDS BY PRESCRIBER/CLIN PHARMACIST DOCUMENTED: ICD-10-PCS | Mod: CPTII,S$GLB,, | Performed by: INTERNAL MEDICINE

## 2022-11-03 PROCEDURE — 1126F PR PAIN SEVERITY QUANTIFIED, NO PAIN PRESENT: ICD-10-PCS | Mod: CPTII,S$GLB,, | Performed by: INTERNAL MEDICINE

## 2022-11-03 PROCEDURE — 3074F PR MOST RECENT SYSTOLIC BLOOD PRESSURE < 130 MM HG: ICD-10-PCS | Mod: CPTII,S$GLB,, | Performed by: INTERNAL MEDICINE

## 2022-11-03 PROCEDURE — 3061F NEG MICROALBUMINURIA REV: CPT | Mod: CPTII,S$GLB,, | Performed by: INTERNAL MEDICINE

## 2022-11-03 PROCEDURE — 1101F PR PT FALLS ASSESS DOC 0-1 FALLS W/OUT INJ PAST YR: ICD-10-PCS | Mod: CPTII,S$GLB,, | Performed by: INTERNAL MEDICINE

## 2022-11-03 PROCEDURE — 4010F ACE/ARB THERAPY RXD/TAKEN: CPT | Mod: CPTII,S$GLB,, | Performed by: INTERNAL MEDICINE

## 2022-11-03 PROCEDURE — 1126F AMNT PAIN NOTED NONE PRSNT: CPT | Mod: CPTII,S$GLB,, | Performed by: INTERNAL MEDICINE

## 2022-11-03 PROCEDURE — 99213 OFFICE O/P EST LOW 20 MIN: CPT | Mod: S$GLB,,, | Performed by: INTERNAL MEDICINE

## 2022-11-03 PROCEDURE — 3051F HG A1C>EQUAL 7.0%<8.0%: CPT | Mod: CPTII,S$GLB,, | Performed by: INTERNAL MEDICINE

## 2022-11-03 PROCEDURE — 4010F PR ACE/ARB THEARPY RXD/TAKEN: ICD-10-PCS | Mod: CPTII,S$GLB,, | Performed by: INTERNAL MEDICINE

## 2022-11-03 PROCEDURE — 3078F PR MOST RECENT DIASTOLIC BLOOD PRESSURE < 80 MM HG: ICD-10-PCS | Mod: CPTII,S$GLB,, | Performed by: INTERNAL MEDICINE

## 2022-11-03 PROCEDURE — 3078F DIAST BP <80 MM HG: CPT | Mod: CPTII,S$GLB,, | Performed by: INTERNAL MEDICINE

## 2022-11-03 RX ORDER — CLONAZEPAM 0.5 MG/1
0.5 TABLET ORAL 2 TIMES DAILY PRN
Qty: 30 TABLET | Refills: 0 | Status: SHIPPED | OUTPATIENT
Start: 2022-11-03

## 2022-11-03 NOTE — TELEPHONE ENCOUNTER
Pt called wanted to know if Dr Vaughan will do a second opinion after going to neurosurgeon. She has already seen pain management. I explained to her that if she is looking for a second opinion on something a neurosurgeon advised then she would need to seek out another neurosurgeon. Dr Vaughan will not go behind a neurosurgeon and make recommendations. Pt voiced understanding she will get info for a different neurosurgeon and get another opinion

## 2022-11-03 NOTE — TELEPHONE ENCOUNTER
----- Message from Stacey M Lefort sent at 11/3/2022 10:01 AM CDT -----  Pt is requesting a nurse callback at [phone]. She has some questions. Thank you.

## 2022-11-03 NOTE — PATIENT INSTRUCTIONS
I think that your symptoms are from allergies. These can be seasonal, or you can be allergic to things in your home and have allergies all year long. There are many treatments available without a prescription to help with these symptoms.     Nasal rinses can help rinse out mucus, allergens, and irritants. You can buy a kit over the counter (I recommend [employer]) that allows you to spray salt water up into your nose and sinuses. Follow the instructions on whichever kit you buy, paying attention to using distilled, boiled, or bottled water. Tap water can contain a parasite that it dangerous to spray up into the nose. You can try using this twice a day.     Nasal steroid sprays are available over the counter and can be used longer term, but they can take about a week to get the full effect. Flonase (fluticasone), Rhinocort (budesonide), or Nasacort (triamcinolone) are the most common examples. You should start using 2 sprays in each nostril for 1 week, then using 1 spray in each nostril each night. To correctly use the spray, lean forward and aim the spray toward the ear on that side. It can be helpful to use the opposite arm for each side to aim correctly. The main side effect of these sprays is drying out of the nose.     You can also try using an over the counter allergy pill such as Zyrtec (cetirizine), Allegra (fexofenadine), or Claritin (loratadine).     If you also have itchy eyes, some good over the counter eye drops are called Alaway and Zaditor. These can be slightly more expensive than other over the counter eye drops, but they work well and a small bottle can last a long time.

## 2022-11-03 NOTE — LETTER
AUTHORIZATION FOR RELEASE OF   CONFIDENTIAL INFORMATION    Dear Yohannes Helms IV, DPM,    We are seeing Bing Kearns, date of birth 1955, in the clinic at [facility]. Gabi Case MD is the patient's PCP. Bing Kearns has an outstanding lab/procedure at the time we reviewed her chart. In order to help keep her health information updated, she has authorized us to request the following medical record(s):        (  )  MAMMOGRAM                                      (  )  COLONOSCOPY      (  )  PAP SMEAR                                          (  )  OUTSIDE LAB RESULTS     (  )  DEXA SCAN                                          (  )  EYE EXAM            (X)  FOOT EXAM                                          (  )  ENTIRE RECORD     (  )  OUTSIDE IMMUNIZATIONS                 (  )  _______________         Please fax records to [facility], Erin M Conlin, MD, [phone]    If you have any questions, please contact Kumar Contreras LPN Care Coordinator  at [phone].            Patient Name: Bing Kearns  : 1955  Patient Phone #: [phone]

## 2022-11-03 NOTE — PROGRESS NOTES
Assessment and Plan:    1. Encounter for general adult medical examination with abnormal findings  Form completed, all questions answered.    2. Primary hypertension  Controlled, continue current medications.    3. Mixed hyperlipidemia  Controlled, continue current medications.    4. Stage 3a chronic kidney disease  Continue ARB, discussed HTN and DM control, avoid NSAIDs.    5. Anxiety  Continue sertraline  - clonazePAM (KLONOPIN) 0.5 MG tablet; Take 1 tablet (0.5 mg total) by mouth 2 (two) times daily as needed for Anxiety.  Dispense: 30 tablet; Refill: 0    6. Hypothyroidism due to acquired atrophy of thyroid  Continue levothyroxine    7. Type 2 diabetes mellitus with stage 3a chronic kidney disease, with long-term current use of insulin  Continue current medications and follow up with Endocrine.    8. Sleep apnea with use of continuous positive airway pressure (CPAP)  Continue CPAP.    ______________________________________________________________________  Subjective:    Chief Complaint:  Follow up chronic medical conditions.    HPI:  Bing is a 67 y.o. year old female here to follow up chronic medical conditions.     DM- Seeing Endocrine. Taking glimepiride 4 mg daily, metformin 500 mg BID, piglitazone 15 mg daily, toujeo 30 units nightly, lispro SSI. On gabapentin 300 mg TID for diabetic neuropathy. Scheduled for the next A1c in Jan, has been 7.2 on the last labs.    HTN and CKD- Seeing Nephrology. On amlodipine 5 mg daily, atenolol 100 mg nightly, olmesartan 40 mg daily. She is notably on celebrex PRN.    Hypothyroidism- Taking levothyroxine 75 mcg daily.    HLD- On crestor 5 mg every other day and fenofibrate.    Anxiety- Taking zoloft 50 mg daily (per Dr. Vanessa). Using clonazepam rarely PRN.    H/o colon cancer- Seeing GI.    Memory loss- Has seen Neurology. Taking donepezil 5 mg nightly.    FRANCISCO- Using CPAP.    Medications:  Current Outpatient Medications on File Prior to Visit   Medication Sig Dispense  Refill    amlodipine (NORVASC) 5 MG tablet Take 5 mg by mouth once daily.       aspirin (ECOTRIN) 81 MG EC tablet Take 81 mg by mouth every evening.      atenoloL (TENORMIN) 100 MG tablet Take 100 mg by mouth every evening.      blood sugar diagnostic Strp 1 strip by Misc.(Non-Drug; Combo Route) route 3 (three) times daily. 300 each 3    blood-glucose meter (ONETOUCH ULTRA2 METER) Misc USE AS DIRECTED 1 each 0    celecoxib (CELEBREX) 100 MG capsule Take 100 mg by mouth 2 (two) times daily.      cholecalciferol, vitamin D3, (VITAMIN D3) 2,000 unit Cap Take 1 capsule by mouth 2 (two) times a day.  3    clonazePAM (KLONOPIN) 0.5 MG tablet Take 1 tablet (0.5 mg total) by mouth 2 (two) times daily as needed for Anxiety. 30 tablet 0    cloNIDine (CATAPRES) 0.1 MG tablet Take 0.1 mg by mouth every evening.      donepeziL (ARICEPT) 5 MG tablet Take 1 tablet (5 mg total) by mouth every evening. 30 tablet 5    fenofibrate micronized (LOFIBRA) 200 MG Cap TAKE 1 CAPSULE DAILY 90 capsule 3    gabapentin (NEURONTIN) 300 MG capsule Take 1 capsule (300 mg total) by mouth 3 (three) times daily. 270 capsule 3    glimepiride (AMARYL) 4 MG tablet Take 1 tablet (4 mg total) by mouth before breakfast. 90 tablet 3    insulin glargine, TOUJEO, (TOUJEO SOLOSTAR U-300 INSULIN) 300 unit/mL (1.5 mL) InPn pen INJECT 30 UNITS UNDER THE SKIN EVERY EVENING 6 pen 3    insulin lispro (HUMALOG KWIKPEN INSULIN) 100 unit/mL pen Inject before meals 3x/day, per sliding scale. Max TDD 30u 15 mL 2    latanoprost 0.005 % ophthalmic solution Place 1 drop into both eyes nightly.      levothyroxine (SYNTHROID) 75 MCG tablet TAKE 1 TABLET BEFORE BREAKFAST 90 tablet 1    metFORMIN (GLUCOPHAGE-XR) 500 MG ER 24hr tablet TAKE 2 TABLETS DAILY WITH BREAKFAST 180 tablet 3    olmesartan (BENICAR) 40 MG tablet Take 40 mg by mouth once daily.      omeprazole (PRILOSEC) 40 MG capsule Take 40 mg by mouth 2 (two) times daily before meals.  0    ONETOUCH DELICA PLUS LANCET  "33 gauge Misc USE 1 TO CHECK GLUCOSE THREE TIMES DAILY AS NEEDED 300 each 3    orphenadrine (NORFLEX) 100 mg tablet TAKE 1 TABLET BY MOUTH TWICE DAILY AS NEEDED FOR MUSCLE SPASM 90 tablet 0    pen needle, diabetic (BD ULTRA-FINE DARIO PEN NEEDLE) 32 gauge x 5/32" Ndle USE 4X DAILY WITH INSULIN 150 each 5    pioglitazone (ACTOS) 15 MG tablet Take 1 tablet (15 mg total) by mouth once daily. 90 tablet 4    rosuvastatin (CRESTOR) 5 MG tablet TAKE 1 TABLET EVERY OTHER DAY (Patient taking differently: Take 5 mg by mouth every other day.) 45 tablet 3    sertraline (ZOLOFT) 50 MG tablet Take 50 mg by mouth every evening.  2     Current Facility-Administered Medications on File Prior to Visit   Medication Dose Route Frequency Provider Last Rate Last Admin    0.9%  NaCl infusion   Intravenous Continuous Job Porter MD 10 mL/hr at 12/20/19 1245 New Bag at 12/20/19 1245    barium sulfate 98 % SusR 120 mL  120 mL Oral ONCE PRN Carloz Bosch MD        ez paque ba sulfate 120 mL  120 mL Oral ONCE PRN Carloz Bosch MD        lactated ringers infusion   Intravenous Once PRN Job Porter MD        sodium bicarbonate-citric acid-simethicone (EZ GAS II) 2.21-1.53 gram/4 gram oral granules 4 g  1 packet Oral ONCE PRN Carloz Bosch MD           Review of Systems:  Review of Systems   Constitutional:  Negative for chills and fever.   Respiratory:  Negative for chest tightness and wheezing.    Cardiovascular:  Negative for chest pain and leg swelling.   Neurological:  Positive for tremors. Negative for dizziness and weakness.   Psychiatric/Behavioral:  Positive for dysphoric mood. The patient is nervous/anxious.      Past Medical History:  Past Medical History:   Diagnosis Date    Anxiety     Cancer     colon    Chronic diarrhea     Chronic kidney disease     stage 3    Diabetes mellitus     Diastolic dysfunction     GERD (gastroesophageal reflux disease)     Glaucoma     History of migraine headaches     Hyperlipemia     " "Hypertension     PONV (postoperative nausea and vomiting)     Pulmonary hypertension     Sleep apnea with use of continuous positive airway pressure (CPAP)     waiting on mask to arrive    Thyroid disease     hypothyroid       Objective:    Vitals:  Vitals:    11/03/22 1059   Resp: 18   Weight: 93.7 kg (206 lb 9.1 oz)   Height: 5' 5" (1.651 m)   PainSc: 0-No pain       Physical Exam  Vitals reviewed.   Constitutional:       General: She is not in acute distress.     Appearance: She is well-developed.   Eyes:      General: No scleral icterus.        Right eye: No discharge.         Left eye: No discharge.      Conjunctiva/sclera: Conjunctivae normal.   Cardiovascular:      Rate and Rhythm: Normal rate and regular rhythm.      Heart sounds: No murmur heard.  Pulmonary:      Effort: Pulmonary effort is normal. No respiratory distress.      Breath sounds: Normal breath sounds. No wheezing, rhonchi or rales.   Musculoskeletal:      Right lower leg: No edema.      Left lower leg: No edema.   Skin:     General: Skin is warm and dry.   Neurological:      Mental Status: She is alert and oriented to person, place, and time.      Comments: faint intention tremor bilateral hands, no resting tremor noted until patient mentioned it   Psychiatric:         Behavior: Behavior normal.         Thought Content: Thought content normal.         Judgment: Judgment normal.       Data:  Last TSH normal      Gabi Case MD  Internal Medicine      "

## 2022-11-11 ENCOUNTER — TELEPHONE (OUTPATIENT)
Dept: NEUROLOGY | Facility: CLINIC | Age: 67
End: 2022-11-11
Payer: MEDICARE

## 2022-11-14 ENCOUNTER — TELEPHONE (OUTPATIENT)
Dept: NEUROLOGY | Facility: CLINIC | Age: 67
End: 2022-11-14
Payer: MEDICARE

## 2022-11-14 ENCOUNTER — PATIENT OUTREACH (OUTPATIENT)
Dept: ADMINISTRATIVE | Facility: HOSPITAL | Age: 67
End: 2022-11-14
Payer: MEDICARE

## 2022-11-14 NOTE — TELEPHONE ENCOUNTER
Spoke with patient regarding scheduling an appointment. Patient r.s to 11-28-22 at 1pm with Margaret Wang NP

## 2022-11-14 NOTE — TELEPHONE ENCOUNTER
----- Message from Landen Heath sent at 11/14/2022 10:17 AM CST -----  Regarding: reschedule  Contact: Bing [phone]  Type:  Sooner Appointment Request    Caller is requesting a sooner appointment.      Name of Caller:  Bing    When is the first available appointment?  Dept book    Symptoms:  reschedule    Best Call Back Number:  [phone]     Additional Information:  Pt was  rescheduled by office to time that does not work for her. Please call to coordinate time with pt.

## 2022-11-28 ENCOUNTER — OFFICE VISIT (OUTPATIENT)
Dept: NEUROLOGY | Facility: CLINIC | Age: 67
End: 2022-11-28
Payer: MEDICARE

## 2022-11-28 VITALS
HEIGHT: 65 IN | HEART RATE: 75 BPM | SYSTOLIC BLOOD PRESSURE: 122 MMHG | DIASTOLIC BLOOD PRESSURE: 68 MMHG | WEIGHT: 209.44 LBS | BODY MASS INDEX: 34.89 KG/M2

## 2022-11-28 DIAGNOSIS — R25.1 TREMOR: ICD-10-CM

## 2022-11-28 DIAGNOSIS — R41.3 MEMORY LOSS: ICD-10-CM

## 2022-11-28 DIAGNOSIS — R45.89 FIDGETING: ICD-10-CM

## 2022-11-28 PROCEDURE — 99999 PR PBB SHADOW E&M-EST. PATIENT-LVL V: ICD-10-PCS | Mod: PBBFAC,,, | Performed by: NURSE PRACTITIONER

## 2022-11-28 PROCEDURE — 3051F HG A1C>EQUAL 7.0%<8.0%: CPT | Mod: CPTII,S$GLB,, | Performed by: NURSE PRACTITIONER

## 2022-11-28 PROCEDURE — 3078F PR MOST RECENT DIASTOLIC BLOOD PRESSURE < 80 MM HG: ICD-10-PCS | Mod: CPTII,S$GLB,, | Performed by: NURSE PRACTITIONER

## 2022-11-28 PROCEDURE — 3008F PR BODY MASS INDEX (BMI) DOCUMENTED: ICD-10-PCS | Mod: CPTII,S$GLB,, | Performed by: NURSE PRACTITIONER

## 2022-11-28 PROCEDURE — 99483 ASSMT & CARE PLN PT COG IMP: CPT | Mod: S$GLB,,, | Performed by: NURSE PRACTITIONER

## 2022-11-28 PROCEDURE — 4010F PR ACE/ARB THEARPY RXD/TAKEN: ICD-10-PCS | Mod: CPTII,S$GLB,, | Performed by: NURSE PRACTITIONER

## 2022-11-28 PROCEDURE — 1101F PR PT FALLS ASSESS DOC 0-1 FALLS W/OUT INJ PAST YR: ICD-10-PCS | Mod: CPTII,S$GLB,, | Performed by: NURSE PRACTITIONER

## 2022-11-28 PROCEDURE — 1160F RVW MEDS BY RX/DR IN RCRD: CPT | Mod: CPTII,S$GLB,, | Performed by: NURSE PRACTITIONER

## 2022-11-28 PROCEDURE — 3051F PR MOST RECENT HEMOGLOBIN A1C LEVEL 7.0 - < 8.0%: ICD-10-PCS | Mod: CPTII,S$GLB,, | Performed by: NURSE PRACTITIONER

## 2022-11-28 PROCEDURE — 1126F AMNT PAIN NOTED NONE PRSNT: CPT | Mod: CPTII,S$GLB,, | Performed by: NURSE PRACTITIONER

## 2022-11-28 PROCEDURE — 1101F PT FALLS ASSESS-DOCD LE1/YR: CPT | Mod: CPTII,S$GLB,, | Performed by: NURSE PRACTITIONER

## 2022-11-28 PROCEDURE — 99499 UNLISTED E&M SERVICE: CPT | Mod: S$GLB,,, | Performed by: NURSE PRACTITIONER

## 2022-11-28 PROCEDURE — 3008F BODY MASS INDEX DOCD: CPT | Mod: CPTII,S$GLB,, | Performed by: NURSE PRACTITIONER

## 2022-11-28 PROCEDURE — 4010F ACE/ARB THERAPY RXD/TAKEN: CPT | Mod: CPTII,S$GLB,, | Performed by: NURSE PRACTITIONER

## 2022-11-28 PROCEDURE — 1160F PR REVIEW ALL MEDS BY PRESCRIBER/CLIN PHARMACIST DOCUMENTED: ICD-10-PCS | Mod: CPTII,S$GLB,, | Performed by: NURSE PRACTITIONER

## 2022-11-28 PROCEDURE — 3061F PR NEG MICROALBUMINURIA RESULT DOCUMENTED/REVIEW: ICD-10-PCS | Mod: CPTII,S$GLB,, | Performed by: NURSE PRACTITIONER

## 2022-11-28 PROCEDURE — 3074F PR MOST RECENT SYSTOLIC BLOOD PRESSURE < 130 MM HG: ICD-10-PCS | Mod: CPTII,S$GLB,, | Performed by: NURSE PRACTITIONER

## 2022-11-28 PROCEDURE — 1159F MED LIST DOCD IN RCRD: CPT | Mod: CPTII,S$GLB,, | Performed by: NURSE PRACTITIONER

## 2022-11-28 PROCEDURE — 1159F PR MEDICATION LIST DOCUMENTED IN MEDICAL RECORD: ICD-10-PCS | Mod: CPTII,S$GLB,, | Performed by: NURSE PRACTITIONER

## 2022-11-28 PROCEDURE — 3066F PR DOCUMENTATION OF TREATMENT FOR NEPHROPATHY: ICD-10-PCS | Mod: CPTII,S$GLB,, | Performed by: NURSE PRACTITIONER

## 2022-11-28 PROCEDURE — 1126F PR PAIN SEVERITY QUANTIFIED, NO PAIN PRESENT: ICD-10-PCS | Mod: CPTII,S$GLB,, | Performed by: NURSE PRACTITIONER

## 2022-11-28 PROCEDURE — 3288F FALL RISK ASSESSMENT DOCD: CPT | Mod: CPTII,S$GLB,, | Performed by: NURSE PRACTITIONER

## 2022-11-28 PROCEDURE — 99999 PR PBB SHADOW E&M-EST. PATIENT-LVL V: CPT | Mod: PBBFAC,,, | Performed by: NURSE PRACTITIONER

## 2022-11-28 PROCEDURE — 3078F DIAST BP <80 MM HG: CPT | Mod: CPTII,S$GLB,, | Performed by: NURSE PRACTITIONER

## 2022-11-28 PROCEDURE — 3066F NEPHROPATHY DOC TX: CPT | Mod: CPTII,S$GLB,, | Performed by: NURSE PRACTITIONER

## 2022-11-28 PROCEDURE — 99499 NO LOS: ICD-10-PCS | Mod: S$GLB,,, | Performed by: NURSE PRACTITIONER

## 2022-11-28 PROCEDURE — 99483 PR ASSMT/CARE PLANNING, PT W/COGN IMPAIRMENT: ICD-10-PCS | Mod: S$GLB,,, | Performed by: NURSE PRACTITIONER

## 2022-11-28 PROCEDURE — 3061F NEG MICROALBUMINURIA REV: CPT | Mod: CPTII,S$GLB,, | Performed by: NURSE PRACTITIONER

## 2022-11-28 PROCEDURE — 3074F SYST BP LT 130 MM HG: CPT | Mod: CPTII,S$GLB,, | Performed by: NURSE PRACTITIONER

## 2022-11-28 PROCEDURE — 3288F PR FALLS RISK ASSESSMENT DOCUMENTED: ICD-10-PCS | Mod: CPTII,S$GLB,, | Performed by: NURSE PRACTITIONER

## 2022-11-28 RX ORDER — INSULIN ASPART 100 [IU]/ML
INJECTION, SOLUTION INTRAVENOUS; SUBCUTANEOUS 3 TIMES DAILY
COMMUNITY
Start: 2022-10-18 | End: 2023-05-18

## 2022-11-28 RX ORDER — LIDOCAINE 50 MG/G
1 PATCH TOPICAL DAILY PRN
COMMUNITY
Start: 2022-10-17

## 2022-11-28 NOTE — PROGRESS NOTES
Caregiver name: n/a  Relationship to the patient: n/  Does the patient have a living will? no  Does the patient have a designated healthcare POA? yes  Does the patient have a designated general POA? yes    Have educational materials/resources been provided? yes      Activities of Daily Living    Bathing: Independent  Dressing: Independent  Grooming: Independent  Mouth Care: Independent  Toileting: Independent  Transferring Bed/Chair: Independent  Walking: Independent  Climbing: Cannot Do  Eating: Independent      Instrumental Activities of Daily Living    Shopping: Independent  Cooking: Independent  Managing Medications: Independent  Using the phone and looking up numbers: Independent  Doing Housework: Independent  Doing Laundry: Independent  Driving or using public transportation: Independent  Managing finances: Independent    Functional Assessment Staging  1   No difficulty, either subjectively or objectively.         Depression Patient Health Questionnaire 11/28/2022 3/29/2022 11/11/2020 10/4/2020 8/23/2018 7/19/2018 5/24/2018   Over the last two weeks how often have you been bothered by little interest or pleasure in doing things Not at all Not at all Not at all Not at all Not at all Not at all Not at all   Over the last two weeks how often have you been bothered by feeling down, depressed or hopeless Not at all Not at all Not at all Not at all Not at all Not at all Not at all   PHQ-2 Total Score 0 0 0 0 0 0 0   Over the last two weeks how often have you been bothered by trouble falling or staying asleep, or sleeping too much - - More than half the days Nearly every day - - -   Over the last two weeks how often have you been bothered by feeling tired or having little energy - - Several days Several days - - -   Over the last two weeks how often have you been bothered by a poor appetite or overeating - - More than half the days More than half the days - - -   Over the last two weeks how often have you been  bothered by feeling bad about yourself - or that you are a failure or have let yourself or your family down - - Not at all Not at all - - -   Over the last two weeks how often have you been bothered by trouble concentrating on things, such as reading the newspaper or watching television - - Not at all Not at all - - -   Over the last two weeks how often have you been bothered by moving or speaking so slowly that other people could have noticed. Or the opposite - being so fidgety or restless that you have been moving around a lot more than usual. - - Not at all Not at all - - -   Over the last two weeks how often have you been bothered by thoughts that you would be better off dead, or of hurting yourself - - Not at all Not at all - - -   If you checked off any problems, how difficult have these problems made it for you to do your work, take care of things at home or get along with other people? - - Not difficult at all Not difficult at all - - -   Total Score - - 5 6 - - -

## 2022-11-28 NOTE — ASSESSMENT & PLAN NOTE
Reports of tremor in the last 1-2 years  Pt has trouble writing, hold items and may drop items at times  Likely ET  No tremor noted on Neuro exam  Discussed tremor triggers including caffeine and medication S/E.  Pt would like to hold on starting a new medication at this time   - can consider propranolol as this may aid with anxiety as well. Would have to clarify with cardiology as pt is already on Atenolol

## 2022-11-28 NOTE — ASSESSMENT & PLAN NOTE
Patient is a 66 y/o female that presents today for memory decline. She reports difficulty putting words together and formulating an answer.   She denies hallucinations, behavorial changes, sleep disturbances, recent falls or urinary incontinence. She does endorse a h/o depression and currently maintained on Zoloft.   MMSE today 30/30   - suspect normal aging process but cannot fully rule out a neurodegenerative process. Mood related?  MRI brain scan noted with moderate volume loss and ischemic vessel changes  Serologies noted  Pt did not previously start Aricept in fear of not noticing side effects as her diabetes medications were also being adjusted.    - OK to start now; discussed S/E of medication  Consider NP testing in the future. Pt wanted to hold on this for now  Discussed ways to promote brain stimulation.   There are no safety concerns.

## 2022-11-28 NOTE — PROGRESS NOTES
NEUROLOGY  Outpatient Follow Up    [facility]  [address] [facility], [place] [address]  [phone] (office) / [phone] (fax)    Patient Name:  Bing Kearns  :  1955  MR #:  [id number]  Acct #:  [id number]    Date of Neurology Visit: 2022  Name of Provider: ISATU Escobedo    Other Physicians:  Gabi Case MD (Primary Care Physician); No ref. provider found (Referring)      Chief Complaint: No chief complaint on file.      History of Present Illness (HPI):  Bing Kearns is a right handed 66 y.o. female.    Patient is here today for memory loss and tremor. She is solo at today's appointment. She reports having a hard time putting word together. It may take her a long time to form an answer or following directions. She is concerned with simple forgetfulness. She lives at home with her son. She reports family has noticed little things over the last 1-2 years.      Patient's highest level of education completed was partial college. She was a  and now retired. The onset of memory decline is believed to have started mildly 1 year ago. She struggles more with short term memory loss. She denies behavioral changes but does notice she is calmer now. She reports history of depression and currently on Zoloft. She denies suicidal ideation. She denies hallucinations. She reports sleeping well and is compliant with her CPAP. She reports having trouble falling asleep at night on occasion.  She reports some difficulty with executive function. She finds that she has more on her plate now since the passing her spouse and this can overwhelming. She is managing the finances and her medications well and denies issues. She denies issues with hygiene and able to perform ADLs without assistance. She endorses word finding difficulty and may have trouble comprehending. She denies urinary incontinence or recent falls. She is still driving and denies recent MVAs or  getting lost in familiar areas. She likes to cook, house chores, and cares for her dogs.  She takes Klonopin about twice a month for anxiety.      She also reports a bilateral hand tremor that is mild. It started about 2 years ago. It is worse when performing an action like applying makeup. She also states writing is difficult. On average she drinks about 2 cups of coffee. She does not drink alcohol. There is no family hisotry of tremor.         Interval Hx 11/28/2022:   Patient is here today for follow up on memory loss and tremor. She reports her memory has been stable overall since last seen. She did not start the Aricept after her last viist as her diabetes medications were being adjusted and feared she would not recognize the side effects.   She denies behavioral changes. She believes her depression is controlled on Zoloft. She denies hallucinations. She sleeps OK at night and compliant with her CPAP. She is managing the finances and her medications well and denies issues. She denies issues with hygiene and able to perform ADLs without assistance. She endorses word finding difficulty and may have trouble comprehending. She admits to having trouble writing her words at times. 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 She is still driving and denies recent MVAs or getting lost in familiar areas.     Overall her tremor is stable. She has good days and bad days. She continues to report a tremor when performing an action.     She now reports feeling fidgety throughout her body. This started over the summer. She states she will often fidget with things unknowingly. She realizes she will be chewing on her tongue at times. She denies starting new medication but admits to having nervous energy.              Past Medical, Surgical, Family & Social History:   Reviewed and updated.    Home Medications:     Current Outpatient Medications:     amlodipine (NORVASC) 5 MG tablet, Take 5 mg by mouth once daily. ,  Disp: , Rfl:     aspirin (ECOTRIN) 81 MG EC tablet, Take 81 mg by mouth every evening., Disp: , Rfl:     atenoloL (TENORMIN) 100 MG tablet, Take 100 mg by mouth every evening., Disp: , Rfl:     blood sugar diagnostic Strp, 1 strip by Misc.(Non-Drug; Combo Route) route 3 (three) times daily., Disp: 300 each, Rfl: 3    blood-glucose meter (ONETOUCH ULTRA2 METER) Misc, USE AS DIRECTED, Disp: 1 each, Rfl: 0    celecoxib (CELEBREX) 100 MG capsule, Take 100 mg by mouth 2 (two) times daily., Disp: , Rfl:     cholecalciferol, vitamin D3, (VITAMIN D3) 2,000 unit Cap, Take 1 capsule by mouth 2 (two) times a day., Disp: , Rfl: 3    clonazePAM (KLONOPIN) 0.5 MG tablet, Take 1 tablet (0.5 mg total) by mouth 2 (two) times daily as needed for Anxiety., Disp: 30 tablet, Rfl: 0    cloNIDine (CATAPRES) 0.1 MG tablet, Take 0.1 mg by mouth every evening., Disp: , Rfl:     donepeziL (ARICEPT) 5 MG tablet, Take 1 tablet (5 mg total) by mouth every evening., Disp: 30 tablet, Rfl: 5    fenofibrate micronized (LOFIBRA) 200 MG Cap, TAKE 1 CAPSULE DAILY, Disp: 90 capsule, Rfl: 3    gabapentin (NEURONTIN) 300 MG capsule, Take 1 capsule (300 mg total) by mouth 3 (three) times daily., Disp: 270 capsule, Rfl: 3    glimepiride (AMARYL) 4 MG tablet, Take 1 tablet (4 mg total) by mouth before breakfast., Disp: 90 tablet, Rfl: 3    insulin glargine, TOUJEO, (TOUJEO SOLOSTAR U-300 INSULIN) 300 unit/mL (1.5 mL) InPn pen, INJECT 30 UNITS UNDER THE SKIN EVERY EVENING, Disp: 6 pen, Rfl: 3    insulin lispro (HUMALOG KWIKPEN INSULIN) 100 unit/mL pen, Inject before meals 3x/day, per sliding scale. Max TDD 30u, Disp: 15 mL, Rfl: 2    latanoprost 0.005 % ophthalmic solution, Place 1 drop into both eyes nightly., Disp: , Rfl:     levothyroxine (SYNTHROID) 75 MCG tablet, TAKE 1 TABLET BEFORE BREAKFAST, Disp: 90 tablet, Rfl: 1    LIDOcaine (LIDODERM) 5 %, , Disp: , Rfl:     metFORMIN (GLUCOPHAGE-XR) 500 MG ER 24hr tablet, TAKE 2 TABLETS DAILY WITH BREAKFAST,  "Disp: 180 tablet, Rfl: 3    NOVOLOG FLEXPEN U-100 INSULIN 100 unit/mL (3 mL) InPn pen, Inject into the skin 3 (three) times daily., Disp: , Rfl:     olmesartan (BENICAR) 40 MG tablet, Take 40 mg by mouth once daily., Disp: , Rfl:     omeprazole (PRILOSEC) 40 MG capsule, Take 40 mg by mouth 2 (two) times daily before meals., Disp: , Rfl: 0    ONETOUCH DELICA PLUS LANCET 33 gauge Misc, USE 1 TO CHECK GLUCOSE THREE TIMES DAILY AS NEEDED, Disp: 300 each, Rfl: 3    orphenadrine (NORFLEX) 100 mg tablet, TAKE 1 TABLET BY MOUTH TWICE DAILY AS NEEDED FOR MUSCLE SPASM, Disp: 90 tablet, Rfl: 0    pen needle, diabetic (BD ULTRA-FINE DARIO PEN NEEDLE) 32 gauge x 5/32" Ndle, USE 4X DAILY WITH INSULIN, Disp: 150 each, Rfl: 5    pioglitazone (ACTOS) 15 MG tablet, Take 1 tablet (15 mg total) by mouth once daily., Disp: 90 tablet, Rfl: 4    rosuvastatin (CRESTOR) 5 MG tablet, TAKE 1 TABLET EVERY OTHER DAY (Patient taking differently: Take 5 mg by mouth every other day.), Disp: 45 tablet, Rfl: 3    sertraline (ZOLOFT) 50 MG tablet, Take 50 mg by mouth every evening., Disp: , Rfl: 2  No current facility-administered medications for this visit.    Facility-Administered Medications Ordered in Other Visits:     0.9%  NaCl infusion, , Intravenous, Continuous, Job Porter MD, Last Rate: 10 mL/hr at 12/20/19 1245, New Bag at 12/20/19 1245    barium sulfate 98 % SusR 120 mL, 120 mL, Oral, ONCE PRN, Carloz Bosch MD    ez paque ba sulfate 120 mL, 120 mL, Oral, ONCE PRN, Carloz Bosch MD    lactated ringers infusion, , Intravenous, Once PRN, Job Porter MD    sodium bicarbonate-citric acid-simethicone (EZ GAS II) 2.21-1.53 gram/4 gram oral granules 4 g, 1 packet, Oral, ONCE PRN, Carloz Bosch MD    Physical Examination:  /68 (BP Location: Right arm, Patient Position: Sitting, BP Method: Large (Automatic))   Pulse 75   Ht 5' 5" (1.651 m)   Wt 95 kg (209 lb 7 oz)   LMP  (LMP Unknown)   BMI 34.85 kg/m²         MMSE " "11/28/2022   What is the (year), (season), (date), (day), (month)? 5   Where are we (state), (country), (town or city), (hospital), (floor)? 5   Name 3 common objects (eg. "apple", "table", "jimmy"). Take 1 second to say each. Then ask the patient to repeat all 3. Give 1 point for each correct answer. Then repeat them until he/she learns all 3. Count trials and record. 3   Serial 7's backwards. Stop after 5 answers. (100,93,86,79,72) or alternatively  spell "WORLD" backwards. (D..L..R..O..W). The score is the number of letters in correct order. 5   Ask for the 3 common objects named earlier in the exam. Give 1 point for each correct answer. 3   Name a "pencil" and "watch." 2   Repeat the following: "No ifs, ands, or buts." 1   Follow a 3-stage command: "Take a paper in your right hand, fold it in half, & put it on the floor." 3   Read and obey the following: (see paper exam) 1   Write a sentence. 1   Copy the following design: (see paper exam) 1   Total MMSE Score 30   Some recent data might be hidden          GENERAL:  General appearance: Well, non-toxic appearing.  No apparent distress.  Neck: supple.  .     MENTAL STATUS:  Alertness, attention span & concentration: normal.  Language: normal.  Orientation to self, place & time:  normal.  Memory, recent & remote: normal.  Fund of knowledge: normal.  MMSE: 30/30  29/30 (4/2022)     SPEECH:  Clear and fluent.  Follows complex commands.     CRANIAL NERVES:  Cranial Nerves II-XII were examined.  II - Visual fields: normal.  III, IV, VI: PERRL, EOMI, No ptosis, No nystagmus.  V - Facial sensation: normal.  VII - Face symmetry & mobility: symmetric  VIII - Hearing: normal.  IX, X - Palate: normal  XI - Shoulder shrug: normal.  XII - Tongue protrusion: midline       GROSS MOTOR:  Gait & station: non focal; good arm swing and step height; mildly antalgic to start   Tone: normal.  Abnormal movements: none.  Arms extended:no tremor  Arms to core: no tremor  Finger-nose: " normal.  Rapid alternating movements: slowed finger taps bilaterally.   Pronator drift: normal        MUSCLE STRENGTH:      Fascics Atrophy RIGHT      LEFT Atrophy Fascics       5 Biceps 5           5 Triceps 5           5 Forearm.Pr. 5                           5 Iliopsoas flex    5           5 Hip Abduct 5           5 Hip Adduct 5           5 Quads 5           5 Hams 5           5 Dorsiflex 5           5 Plantar Flex 5          REFLEXES:     RIGHT Reflex    LEFT   2 Biceps 2   2 Brachiorad. 2           2 Patellar 2      SENSORY:  Light touch: Normal throughout.               Diagnostic Data Reviewed:   Component      Latest Ref Rng & Units 11/15/2022 4/6/2022   WBC      3.90 - 12.70 K/uL 9.48    RBC      4.00 - 5.40 M/uL 4.17    Hemoglobin      12.0 - 16.0 g/dL 11.0 (L)    Hematocrit      37.0 - 48.5 % 35.8 (L)    MCV      82 - 98 fL 86    MCH      27.0 - 31.0 pg 26.4 (L)    MCHC      32.0 - 36.0 g/dL 30.7 (L)    RDW      11.5 - 14.5 % 13.3    Platelets      150 - 450 K/uL 398    MPV      9.2 - 12.9 fL 9.3    Immature Granulocytes      0.0 - 0.5 % 0.5    Gran # (ANC)      1.8 - 7.7 K/uL 5.4    Immature Grans (Abs)      0.00 - 0.04 K/uL 0.05 (H)    Lymph #      1.0 - 4.8 K/uL 2.6    Mono #      0.3 - 1.0 K/uL 1.1 (H)    Eos #      0.0 - 0.5 K/uL 0.2    Baso #      0.00 - 0.20 K/uL 0.12    nRBC      0 /100 WBC 0    Gran %      38.0 - 73.0 % 56.9    Lymph %      18.0 - 48.0 % 27.1    Mono %      4.0 - 15.0 % 12.0    Eosinophil %      0.0 - 8.0 % 2.2    Basophil %      0.0 - 1.9 % 1.3    Differential Method       Automated    Sodium      136 - 145 mmol/L 139    Potassium      3.5 - 5.1 mmol/L 4.3    Chloride      95 - 110 mmol/L 101    CO2      22 - 31 mmol/L 26    Glucose      70 - 110 mg/dL 170 (H)    BUN      7 - 18 mg/dL 13    Creatinine      0.50 - 1.40 mg/dL 1.20    Calcium      8.4 - 10.2 mg/dL 9.7    PROTEIN TOTAL      6.0 - 8.4 g/dL 7.4    Albumin      3.5 - 5.2 g/dL 4.5    BILIRUBIN TOTAL      0.2 - 1.3  mg/dL 0.3    Alkaline Phosphatase      38 - 145 U/L 112    AST      14 - 36 U/L 48 (H)    ALT      0 - 35 U/L 27    Anion Gap      8 - 16 mmol/L 12    eGFR      >60 mL/min/1.73 m:2 49 (A)    Vitamin B-12      210 - 950 pg/mL  394   Folate      4.0 - 24.0 ng/mL  11.6   RPR      Non-reactive  Non-reactive         MRI brain 4/2022:  FINDINGS:  No gross soft tissue defect or significant susceptibility artifact is appreciated.  There is a symmetric appearance of the orbital soft tissues demonstrated.  No significant interval paranasal sinus mucosal thickening or layering fluid levels are appreciated.  There are moderate volume loss and periventricular white matter chronic changes bilaterally.  No mass effect, layering hemorrhage or hydrocephalus is appreciated.  There is slight hyperostosis frontalis predominance.  There are some scattered falx, dural calcifications present.  There is a symmetric appearance of the internal auditory canal structures demonstrated.  There is motion limiting artifact for fine detail evaluation.  The diffusion-weighted images are negative for acute ischemia currently.  The central vascular flow voids are demonstrated.  There are some minimal central lacunar changes present.  There is some developmental contribution of the posterior cerebral vessels from the communicating vessels similar overall.   No significant interval changes are appreciated.     Impression:     No interval mass-effect, hydrocephalus or evidence of acute ischemia is appreciated.  No significant interval intracranial changes are appreciated.                  Assessment and Plan:    Problem List Items Addressed This Visit          Neuro    Memory loss    Current Assessment & Plan     Patient is a 66 y/o female that presents today for memory decline. She reports difficulty putting words together and formulating an answer.   She denies hallucinations, behavorial changes, sleep disturbances, recent falls or urinary  incontinence. She does endorse a h/o depression and currently maintained on Zoloft.   MMSE today 30/30   - suspect normal aging process but cannot fully rule out a neurodegenerative process. Mood related?  MRI brain scan noted with moderate volume loss and ischemic vessel changes  Serologies noted  Pt did not previously start Aricept in fear of not noticing side effects as her diabetes medications were also being adjusted.    - OK to start now; discussed S/E of medication  Consider NP testing in the future. Pt wanted to hold on this for now  Discussed ways to promote brain stimulation.   There are no safety concerns.          Tremor    Current Assessment & Plan     Reports of tremor in the last 1-2 years  Pt has trouble writing, hold items and may drop items at times  Likely ET  No tremor noted on Neuro exam  Discussed tremor triggers including caffeine and medication S/E.  Pt would like to hold on starting a new medication at this time   - can consider propranolol as this may aid with anxiety as well. Would have to clarify with cardiology as pt is already on Atenolol                 Psychiatric    Fidgeting    Current Assessment & Plan     Pt reports feeling fidgety/restless throughout her body. She reports unknowingly fidgeting with items that are in reach and may also feel it in her tongue.   Onset ~ 5-6 months ago  While this typically can result from certain neuroleptic medications, the patient is not on any that may cause this.   Suspect symptoms may be due to increased stress, nervousness or restlessness.  Can try increasing her Zoloft or trying alternative all together   Consider referral to movement clinic for co-management                   Important to note, also  has a past medical history of Anxiety, Cancer, Chronic diarrhea, Chronic kidney disease, Diabetes mellitus, Diastolic dysfunction, GERD (gastroesophageal reflux disease), Glaucoma, History of migraine headaches, Hyperlipemia, Hypertension, PONV  (postoperative nausea and vomiting), Pulmonary hypertension, Sleep apnea with use of continuous positive airway pressure (CPAP), and Thyroid disease.          The patient will return to clinic in 6 months.    All questions were answered and patient is comfortable with the plan.         Thank you very much for the opportunity to assist in this patient's care.    If you have any questions or concerns, please do not hesitate to contact me at any time.      Sincerely,     ISATU Escobedo  [facility] [place]

## 2022-11-28 NOTE — ASSESSMENT & PLAN NOTE
Pt reports feeling fidgety/restless throughout her body. She reports unknowingly fidgeting with items that are in reach and may also feel it in her tongue.   Onset ~ 5-6 months ago  While this typically can result from certain neuroleptic medications, the patient is not on any that may cause this.   Suspect symptoms may be due to increased stress, nervousness or restlessness.  Can try increasing her Zoloft or trying alternative all together   Consider referral to movement clinic for co-management

## 2022-12-05 ENCOUNTER — PATIENT MESSAGE (OUTPATIENT)
Dept: ENDOCRINOLOGY | Facility: CLINIC | Age: 67
End: 2022-12-05
Payer: MEDICARE

## 2022-12-05 DIAGNOSIS — Z79.4 TYPE 2 DIABETES MELLITUS WITH DIABETIC NEUROPATHY, WITH LONG-TERM CURRENT USE OF INSULIN: ICD-10-CM

## 2022-12-05 DIAGNOSIS — N18.31 TYPE 2 DIABETES MELLITUS WITH STAGE 3A CHRONIC KIDNEY DISEASE, WITH LONG-TERM CURRENT USE OF INSULIN: ICD-10-CM

## 2022-12-05 DIAGNOSIS — E11.9 TYPE 2 DIABETES MELLITUS WITHOUT COMPLICATION, WITHOUT LONG-TERM CURRENT USE OF INSULIN: ICD-10-CM

## 2022-12-05 DIAGNOSIS — E11.40 TYPE 2 DIABETES MELLITUS WITH DIABETIC NEUROPATHY, WITH LONG-TERM CURRENT USE OF INSULIN: ICD-10-CM

## 2022-12-05 DIAGNOSIS — Z79.4 TYPE 2 DIABETES MELLITUS WITH STAGE 3A CHRONIC KIDNEY DISEASE, WITH LONG-TERM CURRENT USE OF INSULIN: ICD-10-CM

## 2022-12-05 DIAGNOSIS — E11.22 TYPE 2 DIABETES MELLITUS WITH STAGE 3A CHRONIC KIDNEY DISEASE, WITH LONG-TERM CURRENT USE OF INSULIN: ICD-10-CM

## 2022-12-05 RX ORDER — INSULIN GLARGINE 300 U/ML
INJECTION, SOLUTION SUBCUTANEOUS
Qty: 6 PEN | Refills: 3 | Status: SHIPPED | OUTPATIENT
Start: 2022-12-05 | End: 2023-01-12

## 2022-12-05 RX ORDER — GLIMEPIRIDE 4 MG/1
4 TABLET ORAL
Qty: 90 TABLET | Refills: 3 | Status: SHIPPED | OUTPATIENT
Start: 2022-12-05 | End: 2023-01-12

## 2022-12-05 RX ORDER — LANCETS 33 GAUGE
EACH MISCELLANEOUS
Qty: 300 EACH | Refills: 3 | Status: SHIPPED | OUTPATIENT
Start: 2022-12-05

## 2022-12-06 ENCOUNTER — TELEPHONE (OUTPATIENT)
Dept: ENDOCRINOLOGY | Facility: CLINIC | Age: 67
End: 2022-12-06
Payer: MEDICARE

## 2022-12-06 NOTE — TELEPHONE ENCOUNTER
This request has been approved using information available on the patient's profile. CaseId:[id number];Status:Approved;Review Type:Prior Auth;Coverage Start Date:11/06/2022;Coverage End Date:12/06/2023;

## 2022-12-07 RX ORDER — LANCETS 33 GAUGE
EACH MISCELLANEOUS
Qty: 300 EACH | Refills: 3 | Status: SHIPPED | OUTPATIENT
Start: 2022-12-07 | End: 2023-11-09

## 2022-12-15 ENCOUNTER — TELEPHONE (OUTPATIENT)
Dept: ENDOCRINOLOGY | Facility: CLINIC | Age: 67
End: 2022-12-15
Payer: MEDICARE

## 2022-12-15 NOTE — TELEPHONE ENCOUNTER
----- Message from Ananya Singh sent at 12/15/2022  2:32 PM CST -----  Contact: Alexandra  Type: Needs Medical Advice  Who Called: Alexandra with [employer]   Symptoms (please be specific):   How long has patient had these symptoms:    Pharmacy name and phone #:    Best Call Back Number: [phone]  Additional Information: Alexandra requesting a call back concerning the pts medication annual review

## 2022-12-15 NOTE — TELEPHONE ENCOUNTER
S/w rep of pharmacy [facility] from pts' insurance. They wanted to verify what meds pt was taking since she has a Dx of memory loss and they can assist pt w/rx. Meds pt taken given to Ty

## 2023-01-02 ENCOUNTER — PATIENT MESSAGE (OUTPATIENT)
Dept: NEUROLOGY | Facility: CLINIC | Age: 68
End: 2023-01-02
Payer: MEDICARE

## 2023-01-04 ENCOUNTER — LAB VISIT (OUTPATIENT)
Dept: LAB | Facility: HOSPITAL | Age: 68
End: 2023-01-04
Attending: NURSE PRACTITIONER
Payer: MEDICARE

## 2023-01-04 DIAGNOSIS — Z79.4 TYPE 2 DIABETES MELLITUS WITH STAGE 3A CHRONIC KIDNEY DISEASE, WITH LONG-TERM CURRENT USE OF INSULIN: ICD-10-CM

## 2023-01-04 DIAGNOSIS — E11.22 TYPE 2 DIABETES MELLITUS WITH STAGE 3A CHRONIC KIDNEY DISEASE, WITH LONG-TERM CURRENT USE OF INSULIN: ICD-10-CM

## 2023-01-04 DIAGNOSIS — N18.31 TYPE 2 DIABETES MELLITUS WITH STAGE 3A CHRONIC KIDNEY DISEASE, WITH LONG-TERM CURRENT USE OF INSULIN: ICD-10-CM

## 2023-01-04 LAB
ALBUMIN SERPL BCP-MCNC: 3.8 G/DL (ref 3.5–5.2)
ALP SERPL-CCNC: 106 U/L (ref 55–135)
ALT SERPL W/O P-5'-P-CCNC: 29 U/L (ref 10–44)
ANION GAP SERPL CALC-SCNC: 11 MMOL/L (ref 8–16)
AST SERPL-CCNC: 54 U/L (ref 10–40)
BILIRUB SERPL-MCNC: 0.4 MG/DL (ref 0.1–1)
BUN SERPL-MCNC: 11 MG/DL (ref 8–23)
CALCIUM SERPL-MCNC: 10 MG/DL (ref 8.7–10.5)
CHLORIDE SERPL-SCNC: 104 MMOL/L (ref 95–110)
CHOLEST SERPL-MCNC: 167 MG/DL (ref 120–199)
CHOLEST/HDLC SERPL: 3.9 {RATIO} (ref 2–5)
CO2 SERPL-SCNC: 23 MMOL/L (ref 23–29)
CREAT SERPL-MCNC: 1.1 MG/DL (ref 0.5–1.4)
EST. GFR  (NO RACE VARIABLE): 54.7 ML/MIN/1.73 M^2
ESTIMATED AVG GLUCOSE: 206 MG/DL (ref 68–131)
GLUCOSE SERPL-MCNC: 139 MG/DL (ref 70–110)
HBA1C MFR BLD: 8.8 % (ref 4–5.6)
HDLC SERPL-MCNC: 43 MG/DL (ref 40–75)
HDLC SERPL: 25.7 % (ref 20–50)
LDLC SERPL CALC-MCNC: 90.8 MG/DL (ref 63–159)
NONHDLC SERPL-MCNC: 124 MG/DL
POTASSIUM SERPL-SCNC: 4.5 MMOL/L (ref 3.5–5.1)
PROT SERPL-MCNC: 7.4 G/DL (ref 6–8.4)
SODIUM SERPL-SCNC: 138 MMOL/L (ref 136–145)
TRIGL SERPL-MCNC: 166 MG/DL (ref 30–150)
TSH SERPL DL<=0.005 MIU/L-ACNC: 3.05 UIU/ML (ref 0.4–4)

## 2023-01-04 PROCEDURE — 36415 COLL VENOUS BLD VENIPUNCTURE: CPT | Mod: PN | Performed by: NURSE PRACTITIONER

## 2023-01-04 PROCEDURE — 80053 COMPREHEN METABOLIC PANEL: CPT | Performed by: NURSE PRACTITIONER

## 2023-01-04 PROCEDURE — 83036 HEMOGLOBIN GLYCOSYLATED A1C: CPT | Performed by: NURSE PRACTITIONER

## 2023-01-04 PROCEDURE — 80061 LIPID PANEL: CPT | Performed by: NURSE PRACTITIONER

## 2023-01-04 PROCEDURE — 84443 ASSAY THYROID STIM HORMONE: CPT | Performed by: NURSE PRACTITIONER

## 2023-01-10 ENCOUNTER — OFFICE VISIT (OUTPATIENT)
Dept: ENDOCRINOLOGY | Facility: CLINIC | Age: 68
End: 2023-01-10
Payer: MEDICARE

## 2023-01-10 DIAGNOSIS — Z79.4 TYPE 2 DIABETES MELLITUS WITH DIABETIC NEUROPATHY, WITH LONG-TERM CURRENT USE OF INSULIN: ICD-10-CM

## 2023-01-10 DIAGNOSIS — I10 ESSENTIAL HYPERTENSION: ICD-10-CM

## 2023-01-10 DIAGNOSIS — E11.22 TYPE 2 DIABETES MELLITUS WITH STAGE 3A CHRONIC KIDNEY DISEASE, WITH LONG-TERM CURRENT USE OF INSULIN: Primary | ICD-10-CM

## 2023-01-10 DIAGNOSIS — E03.4 HYPOTHYROIDISM DUE TO ACQUIRED ATROPHY OF THYROID: ICD-10-CM

## 2023-01-10 DIAGNOSIS — Z79.4 TYPE 2 DIABETES MELLITUS WITH STAGE 3A CHRONIC KIDNEY DISEASE, WITH LONG-TERM CURRENT USE OF INSULIN: Primary | ICD-10-CM

## 2023-01-10 DIAGNOSIS — I51.89 DIASTOLIC DYSFUNCTION: ICD-10-CM

## 2023-01-10 DIAGNOSIS — E78.5 HYPERLIPIDEMIA, UNSPECIFIED HYPERLIPIDEMIA TYPE: ICD-10-CM

## 2023-01-10 DIAGNOSIS — E11.40 TYPE 2 DIABETES MELLITUS WITH DIABETIC NEUROPATHY, WITH LONG-TERM CURRENT USE OF INSULIN: ICD-10-CM

## 2023-01-10 DIAGNOSIS — N18.31 TYPE 2 DIABETES MELLITUS WITH STAGE 3A CHRONIC KIDNEY DISEASE, WITH LONG-TERM CURRENT USE OF INSULIN: Primary | ICD-10-CM

## 2023-01-10 DIAGNOSIS — K76.0 FATTY LIVER: ICD-10-CM

## 2023-01-10 PROCEDURE — 99214 OFFICE O/P EST MOD 30 MIN: CPT | Mod: 95,,, | Performed by: NURSE PRACTITIONER

## 2023-01-10 PROCEDURE — 1159F PR MEDICATION LIST DOCUMENTED IN MEDICAL RECORD: ICD-10-PCS | Mod: CPTII,95,, | Performed by: NURSE PRACTITIONER

## 2023-01-10 PROCEDURE — 99214 PR OFFICE/OUTPT VISIT, EST, LEVL IV, 30-39 MIN: ICD-10-PCS | Mod: 95,,, | Performed by: NURSE PRACTITIONER

## 2023-01-10 PROCEDURE — 3052F PR MOST RECENT HEMOGLOBIN A1C LEVEL 8.0 - < 9.0%: ICD-10-PCS | Mod: CPTII,95,, | Performed by: NURSE PRACTITIONER

## 2023-01-10 PROCEDURE — 1160F RVW MEDS BY RX/DR IN RCRD: CPT | Mod: CPTII,95,, | Performed by: NURSE PRACTITIONER

## 2023-01-10 PROCEDURE — 1160F PR REVIEW ALL MEDS BY PRESCRIBER/CLIN PHARMACIST DOCUMENTED: ICD-10-PCS | Mod: CPTII,95,, | Performed by: NURSE PRACTITIONER

## 2023-01-10 PROCEDURE — 1159F MED LIST DOCD IN RCRD: CPT | Mod: CPTII,95,, | Performed by: NURSE PRACTITIONER

## 2023-01-10 PROCEDURE — 3052F HG A1C>EQUAL 8.0%<EQUAL 9.0%: CPT | Mod: CPTII,95,, | Performed by: NURSE PRACTITIONER

## 2023-01-10 RX ORDER — SEMAGLUTIDE 1.34 MG/ML
0.25 INJECTION, SOLUTION SUBCUTANEOUS
Qty: 1 PEN | Refills: 6 | Status: SHIPPED | OUTPATIENT
Start: 2023-01-10 | End: 2023-01-12

## 2023-01-10 NOTE — PROGRESS NOTES
The patient location is:  Patient Home   The chief complaint leading to consultation is: Type 2 DM  Visit type: Virtual visit with synchronous audio and video  Total time spent with patient: 20 min  Each patient to whom he or she provides medical services by telemedicine is:  (1) informed of the relationship between the physician and patient and the respective role of any other health care provider with respect to management of the patient; and (2) notified that he or she may decline to receive medical services by telemedicine and may withdraw from such care at any time.       CC: Ms. Bing Kearns arrives today for management of Type 2 DM and review of chronic medical conditions, as listed in the Visit Diagnosis section of this encounter.       HPI: Ms. Bing Kearns was diagnosed with Type 2 DM in her late 50s. She was diagnosed based on lab work. Initial treatment consisted of metformin. Januvia was added in . Toujeo was added in 2020. + FH of DM in sister, maternal GF. Denies hospitalizations due to DM.   Follows with Dr. Vanessa for CKD.     Patient was last seen by me in September.    She had steroid injection at end of September. She felt that BG increased for the next 2 months. Started using Humalog correction scale at that time.     Patient is asking to resume Ozempic, as this previously worked for her but she had to stop due to cost. With Medicare changes this year, she would like to try again to see if more cost effective.      BG readings are checked 2x/day.  She reads the following from her log:  Fastin-140  Lunch: 150-180  Dinner: 140-200    Hypoglycemia: No but feels symptomatic when BG < 130.   Hypoglycemic Symptoms: hunger and jitteriness  Hypoglycemia Treatment: juice or glucose tabs    Missing Insulin/PO medication doses: No    Dietary Habits: Eats 3 meals/day. Occasional snacking, trying to cut down on after dinner snack. Avoids sugary beverages.     Last DM education  appointment: not recently    She states that her cardiologist moved to [place] and she is requesting to establish with another locally.       CURRENT DIABETIC MEDS: metformin XR 1000 mg daily, pioglitazone 15 mg daily, glimepiride 4 mg daily, Toujeo 30 units QHS, Humalog sliding scale, target 150, ISF 25  Glucometer type: One Touch Ultra 2    Previous DM treatments:  Trulicity - panic attacks, blood sugars in the 70s  Januvia - $$  Prandin - discontinued since starting Ozempic  Ozempic - $$    Last Eye Exam: 2/8/2022, no DR. + glaucoma. Dr. Turcios  Last Podiatry Exam: 12/28/2022, Dr. Helms. She follows every 3 months.     REVIEW OF SYSTEMS  Constitutional: no c/o fatigue, weakness, weight loss.  Cardiac: no palpitations, chest pain   Respiratory: no cough. + chronic dyspnea.  GI: no c/o abdominal pain, nausea. Denies h/o pancreatitis.   Neuro: + numbness, tingling in feet  Endocrine: denies polyphagia, polydipsia, polyuria       Personally reviewed Past Medical, Surgical, Social History.    Vital Signs  LMP  (LMP Unknown)  -- virtual visit     Personally reviewed the below labs:    Hemoglobin A1C   Date Value Ref Range Status   01/04/2023 8.8 (H) 4.0 - 5.6 % Final     Comment:     ADA Screening Guidelines:  5.7-6.4%  Consistent with prediabetes  >or=6.5%  Consistent with diabetes    High levels of fetal hemoglobin interfere with the HbA1C  assay. Heterozygous hemoglobin variants (HbS, HgC, etc)do  not significantly interfere with this assay.   However, presence of multiple variants may affect accuracy.     08/26/2022 7.2 (H) 4.0 - 5.6 % Final     Comment:     ADA Screening Guidelines:  5.7-6.4%  Consistent with prediabetes  >or=6.5%  Consistent with diabetes    High levels of fetal hemoglobin interfere with the HbA1C  assay. Heterozygous hemoglobin variants (HbS, HgC, etc)do  not significantly interfere with this assay.   However, presence of multiple variants may affect accuracy.     05/20/2022 7.4 (H) 4.0 -  5.6 % Final     Comment:     ADA Screening Guidelines:  5.7-6.4%  Consistent with prediabetes  >or=6.5%  Consistent with diabetes    High levels of fetal hemoglobin interfere with the HbA1C  assay. Heterozygous hemoglobin variants (HbS, HgC, etc)do  not significantly interfere with this assay.   However, presence of multiple variants may affect accuracy.         Chemistry        Component Value Date/Time     01/04/2023 0942    K 4.5 01/04/2023 0942     01/04/2023 0942    CO2 23 01/04/2023 0942    BUN 11 01/04/2023 0942    CREATININE 1.1 01/04/2023 0942     (H) 01/04/2023 0942        Component Value Date/Time    CALCIUM 10.0 01/04/2023 0942    ALKPHOS 106 01/04/2023 0942    AST 54 (H) 01/04/2023 0942    ALT 29 01/04/2023 0942    BILITOT 0.4 01/04/2023 0942    ESTGFRAFRICA >60.0 05/20/2022 0935    EGFRNONAA 52.1 (A) 05/20/2022 0935          Lab Results   Component Value Date    CHOL 167 01/04/2023    CHOL 163 01/07/2022    CHOL 162 02/24/2021     Lab Results   Component Value Date    HDL 43 01/04/2023    HDL 46 01/07/2022    HDL 54 02/24/2021     Lab Results   Component Value Date    LDLCALC 90.8 01/04/2023    LDLCALC 88.2 01/07/2022    LDLCALC 83.2 02/24/2021     Lab Results   Component Value Date    TRIG 166 (H) 01/04/2023    TRIG 144 01/07/2022    TRIG 124 02/24/2021     Lab Results   Component Value Date    CHOLHDL 25.7 01/04/2023    CHOLHDL 28.2 01/07/2022    CHOLHDL 33.3 02/24/2021       Lab Results   Component Value Date    MICALBCREAT 14.1 10/11/2022     Lab Results   Component Value Date    TSH 3.052 01/04/2023       CrCl cannot be calculated (Unknown ideal weight.).    Vit D, 25-Hydroxy   Date Value Ref Range Status   10/11/2022 86 30 - 96 ng/mL Final     Comment:     Vitamin D deficiency.........<10 ng/mL                              Vitamin D insufficiency......10-29 ng/mL       Vitamin D sufficiency........> or equal to 30 ng/mL  Vitamin D toxicity............>100 ng/mL            PHYSICAL EXAMINATION  Deferred -- video visit.      Goals        HEMOGLOBIN A1C < 7.5                 Assessment/Plan  1. Type 2 diabetes mellitus with stage 3a chronic kidney disease, with long-term current use of insulin -- Uncontrolled. Will resume Ozempic, which was previously effective. Warned pt of supply issues.    -- start Ozempic 0.25 mg weekly. After 4 weeks, increase dose to 0.5 mg weekly. Discussed medication's mechanism of action, side effects, and contraindications. Advised pt to notify me for extreme nausea, abdominal pain, etc.  -- hold Humalog  -- continue pioglitazone, metformin XR, glimepiride, Toujeo at current doses for now  -- BG monitoring 2x/day. Notify me if BG < 80 with any pattern.     -- Discussed diagnosis of DM, A1c goals, progression of disease, long term complications and tx options.  -- takes aspirin, ARB   2. Type 2 diabetes mellitus with diabetic neuropathy, with long-term current use of insulin -- optimize DM control   3. Essential hypertension  -- managed by cardiology  -- continue current meds and monitor   4. Hyperlipidemia, unspecified hyperlipidemia type  -- uncontrolled with elevated TRG  -- continue Crestor  -- optimize DM control   5. Hypothyroidism due to acquired atrophy of thyroid  -- stable  -- continue current levothyroxine dose.   -- TSH with RTC   6. Diastolic dysfunction  -- Referral to cardiology.   -- will need to monitor status since patient is taking pioglitazone   7. Fatty liver -- maintain DM control  -- Ozempic may add benefit       FOLLOW UP  Follow up in about 3 months (around 4/10/2023).  Patient instructed to bring BG logs to each follow up   Patient encouraged to call for any BG/medication issues, concerns, or questions.      Orders Placed This Encounter   Procedures    Hemoglobin A1C    Comprehensive Metabolic Panel    Ambulatory referral/consult to Cardiology

## 2023-01-11 ENCOUNTER — PATIENT MESSAGE (OUTPATIENT)
Dept: FAMILY MEDICINE | Facility: CLINIC | Age: 68
End: 2023-01-11
Payer: MEDICARE

## 2023-02-08 ENCOUNTER — OFFICE VISIT (OUTPATIENT)
Dept: ORTHOPEDICS | Facility: CLINIC | Age: 68
End: 2023-02-08
Payer: MEDICARE

## 2023-02-08 VITALS — WEIGHT: 207 LBS | BODY MASS INDEX: 34.49 KG/M2 | HEIGHT: 65 IN

## 2023-02-08 DIAGNOSIS — M25.461 PAIN AND SWELLING OF RIGHT KNEE: ICD-10-CM

## 2023-02-08 DIAGNOSIS — S89.91XA RIGHT KNEE INJURY, INITIAL ENCOUNTER: Primary | ICD-10-CM

## 2023-02-08 DIAGNOSIS — R26.9 ABNORMAL GAIT: ICD-10-CM

## 2023-02-08 DIAGNOSIS — M25.561 PAIN AND SWELLING OF RIGHT KNEE: ICD-10-CM

## 2023-02-08 PROCEDURE — 4010F PR ACE/ARB THEARPY RXD/TAKEN: ICD-10-PCS | Mod: CPTII,S$GLB,, | Performed by: NURSE PRACTITIONER

## 2023-02-08 PROCEDURE — 1159F PR MEDICATION LIST DOCUMENTED IN MEDICAL RECORD: ICD-10-PCS | Mod: CPTII,S$GLB,, | Performed by: NURSE PRACTITIONER

## 2023-02-08 PROCEDURE — 99213 OFFICE O/P EST LOW 20 MIN: CPT | Mod: S$GLB,,, | Performed by: NURSE PRACTITIONER

## 2023-02-08 PROCEDURE — 3066F PR DOCUMENTATION OF TREATMENT FOR NEPHROPATHY: ICD-10-PCS | Mod: CPTII,S$GLB,, | Performed by: NURSE PRACTITIONER

## 2023-02-08 PROCEDURE — 1125F AMNT PAIN NOTED PAIN PRSNT: CPT | Mod: CPTII,S$GLB,, | Performed by: NURSE PRACTITIONER

## 2023-02-08 PROCEDURE — 1160F PR REVIEW ALL MEDS BY PRESCRIBER/CLIN PHARMACIST DOCUMENTED: ICD-10-PCS | Mod: CPTII,S$GLB,, | Performed by: NURSE PRACTITIONER

## 2023-02-08 PROCEDURE — 1159F MED LIST DOCD IN RCRD: CPT | Mod: CPTII,S$GLB,, | Performed by: NURSE PRACTITIONER

## 2023-02-08 PROCEDURE — 3008F BODY MASS INDEX DOCD: CPT | Mod: CPTII,S$GLB,, | Performed by: NURSE PRACTITIONER

## 2023-02-08 PROCEDURE — 99999 PR PBB SHADOW E&M-EST. PATIENT-LVL IV: CPT | Mod: PBBFAC,,, | Performed by: NURSE PRACTITIONER

## 2023-02-08 PROCEDURE — 3052F PR MOST RECENT HEMOGLOBIN A1C LEVEL 8.0 - < 9.0%: ICD-10-PCS | Mod: CPTII,S$GLB,, | Performed by: NURSE PRACTITIONER

## 2023-02-08 PROCEDURE — 3061F NEG MICROALBUMINURIA REV: CPT | Mod: CPTII,S$GLB,, | Performed by: NURSE PRACTITIONER

## 2023-02-08 PROCEDURE — 4010F ACE/ARB THERAPY RXD/TAKEN: CPT | Mod: CPTII,S$GLB,, | Performed by: NURSE PRACTITIONER

## 2023-02-08 PROCEDURE — 99999 PR PBB SHADOW E&M-EST. PATIENT-LVL IV: ICD-10-PCS | Mod: PBBFAC,,, | Performed by: NURSE PRACTITIONER

## 2023-02-08 PROCEDURE — 1160F RVW MEDS BY RX/DR IN RCRD: CPT | Mod: CPTII,S$GLB,, | Performed by: NURSE PRACTITIONER

## 2023-02-08 PROCEDURE — 3061F PR NEG MICROALBUMINURIA RESULT DOCUMENTED/REVIEW: ICD-10-PCS | Mod: CPTII,S$GLB,, | Performed by: NURSE PRACTITIONER

## 2023-02-08 PROCEDURE — 3008F PR BODY MASS INDEX (BMI) DOCUMENTED: ICD-10-PCS | Mod: CPTII,S$GLB,, | Performed by: NURSE PRACTITIONER

## 2023-02-08 PROCEDURE — 99213 PR OFFICE/OUTPT VISIT, EST, LEVL III, 20-29 MIN: ICD-10-PCS | Mod: S$GLB,,, | Performed by: NURSE PRACTITIONER

## 2023-02-08 PROCEDURE — 3066F NEPHROPATHY DOC TX: CPT | Mod: CPTII,S$GLB,, | Performed by: NURSE PRACTITIONER

## 2023-02-08 PROCEDURE — 1125F PR PAIN SEVERITY QUANTIFIED, PAIN PRESENT: ICD-10-PCS | Mod: CPTII,S$GLB,, | Performed by: NURSE PRACTITIONER

## 2023-02-08 PROCEDURE — 3052F HG A1C>EQUAL 8.0%<EQUAL 9.0%: CPT | Mod: CPTII,S$GLB,, | Performed by: NURSE PRACTITIONER

## 2023-02-08 RX ORDER — METHYLPREDNISOLONE 4 MG/1
TABLET ORAL
Qty: 21 EACH | Refills: 0 | Status: SHIPPED | OUTPATIENT
Start: 2023-02-08 | End: 2023-03-01

## 2023-02-08 RX ORDER — HYDROCODONE BITARTRATE AND ACETAMINOPHEN 5; 325 MG/1; MG/1
1 TABLET ORAL EVERY 6 HOURS PRN
Qty: 20 TABLET | Refills: 0 | Status: SHIPPED | OUTPATIENT
Start: 2023-02-08 | End: 2023-02-15

## 2023-02-08 RX ORDER — METHOCARBAMOL 750 MG/1
750 TABLET, FILM COATED ORAL 4 TIMES DAILY PRN
Qty: 80 TABLET | Refills: 2 | Status: SHIPPED | OUTPATIENT
Start: 2023-02-08 | End: 2023-03-02

## 2023-02-08 NOTE — PROGRESS NOTES
Chief Complaint   Patient presents with    Right Knee - Pain       HPI:    This is a 67 y.o. who presents today complaining of right knee pain for 5 days after twisting injury. Pain is aching, dull, and throbbing. No numbness or tingling. She presented to ED 5 days ago and x-rays were done. She was placed in a knee immobilizer and is walking with walker. States pain is bad especially with bending the knee. She has tramadol at home which normally doesn't help her however she states she hasn't tried taking any. She has taken tylenol and rested the knee and is using the knee immobilizer.      Past Medical History:   Diagnosis Date    Anxiety     Cancer     colon    Chronic diarrhea     Chronic kidney disease     stage 3    Diabetes mellitus     Diastolic dysfunction     GERD (gastroesophageal reflux disease)     Glaucoma     History of migraine headaches     Hyperlipemia     Hypertension     PONV (postoperative nausea and vomiting)     Pulmonary hypertension     Sleep apnea with use of continuous positive airway pressure (CPAP)     waiting on mask to arrive    Thyroid disease     hypothyroid      Past Surgical History:   Procedure Laterality Date    APPENDECTOMY      done during colon resection    CARPAL TUNNEL RELEASE Left 2021    Procedure: Left carpal tunnel release, Left Thumb, Middle, and Ring Trigger Finger Release;  Surgeon: Pilo Paez MD;  Location: [facility];  Service: Orthopedics;  Laterality: Left;  Procedure: Left carpal tunnel release, Left Thumb, Middle, and Ring Trigger Finger Release    Position: Supine    Anesthesia: General    Equipment: Basic handset, carpal tunnel set    CATARACT EXTRACTION, BILATERAL Bilateral      SECTION      COLON SURGERY      COLONOSCOPY Left 2015    Procedure: COLONOSCOPY;  Surgeon: Chet Woodard Jr., MD;  Location: [facility];  Service: Endoscopy;  Laterality: Left;    EXCISION OF LESION OF BUCCAL MUCOSA Left 2019    Procedure: EXCISION,  LESION, BUCCAL MUCOSA;  Surgeon: Dedra Khan MD;  Location: [facility] OR;  Service: ENT;  Laterality: Left;    HERNIA REPAIR      repaired during colon resection    HYSTERECTOMY  2010    Complete    INSERTION OF VENOUS ACCESS PORT      LAPAROSCOPIC CHOLECYSTECTOMY N/A 11/8/2021    Procedure: CHOLECYSTECTOMY, LAPAROSCOPIC;  Surgeon: Gab Pierre MD;  Location: [facility] OR;  Service: General;  Laterality: N/A;    OOPHORECTOMY Bilateral 2010    PORTACATH PLACEMENT Left     and later removed     REPLACEMENT OF VASCULAR ACCESS PORT      SURGICAL REMOVAL OF NEOPLASM OF MOUTH Left 6/15/2018    Procedure: EXCISION, NEOPLASM, MOUTH;  Surgeon: Velasquez Juárez MD;  Location: [facility] OR 2ND FLR;  Service: ENT;  Laterality: Left;    TRANSFORAMINAL EPIDURAL INJECTION OF STEROID Left 12/20/2019    Procedure: Injection,steroid,epidural,transforaminal approach;  Surgeon: Job Porter MD;  [facility];  Service: Pain Management;  Laterality: Left;  L5-S1      Current Outpatient Medications on File Prior to Visit   Medication Sig Dispense Refill    amlodipine (NORVASC) 5 MG tablet Take 5 mg by mouth once daily.       aspirin (ECOTRIN) 81 MG EC tablet Take 81 mg by mouth every evening.      atenoloL (TENORMIN) 100 MG tablet Take 100 mg by mouth every evening.      blood sugar diagnostic Strp 1 strip by Misc.(Non-Drug; Combo Route) route 3 (three) times daily. 300 each 3    blood-glucose meter (ONETOUCH ULTRA2 METER) Misc USE AS DIRECTED 1 each 0    celecoxib (CELEBREX) 100 MG capsule Take 100 mg by mouth 2 (two) times daily.      cholecalciferol, vitamin D3, (VITAMIN D3) 2,000 unit Cap Take 1 capsule by mouth 2 (two) times a day.  3    clonazePAM (KLONOPIN) 0.5 MG tablet Take 1 tablet (0.5 mg total) by mouth 2 (two) times daily as needed for Anxiety. 30 tablet 0    cloNIDine (CATAPRES) 0.1 MG tablet Take 0.1 mg by mouth every evening.      donepeziL (ARICEPT) 5 MG tablet Take 1 tablet (5 mg total) by mouth every evening. 30 tablet 5  "   fenofibrate micronized (LOFIBRA) 200 MG Cap TAKE 1 CAPSULE DAILY 90 capsule 0    gabapentin (NEURONTIN) 300 MG capsule Take 1 capsule (300 mg total) by mouth 3 (three) times daily. 270 capsule 3    glimepiride (AMARYL) 4 MG tablet Take 1 tablet (4 mg total) by mouth before breakfast 90 tablet 1    insulin glargine, TOUJEO, (TOUJEO SOLOSTAR U-300 INSULIN) 300 unit/mL (1.5 mL) InPn pen INJECT 30 UNITS UNDER THE SKIN EVERY EVENING 6 pen 1    insulin lispro (HUMALOG KWIKPEN INSULIN) 100 unit/mL pen Inject before meals 3x/day, per sliding scale. Max TDD 30u 15 mL 2    lancets 33 gauge Misc Test blood sugar 3x/day. Dx code E11.22 300 each 3    latanoprost 0.005 % ophthalmic solution Place 1 drop into both eyes nightly.      levothyroxine (SYNTHROID) 75 MCG tablet TAKE 1 TABLET BEFORE BREAKFAST 90 tablet 3    LIDOcaine (LIDODERM) 5 %       metFORMIN (GLUCOPHAGE-XR) 500 MG ER 24hr tablet TAKE 2 TABLETS DAILY WITH BREAKFAST 180 tablet 3    NOVOLOG FLEXPEN U-100 INSULIN 100 unit/mL (3 mL) InPn pen Inject into the skin 3 (three) times daily.      olmesartan (BENICAR) 40 MG tablet Take 40 mg by mouth once daily.      omeprazole (PRILOSEC) 40 MG capsule Take 40 mg by mouth 2 (two) times daily before meals.  0    ONETOUCH DELICA PLUS LANCET 33 gauge Misc USE 1 TO CHECK GLUCOSE THREE TIMES DAILY AS NEEDED 300 each 3    pen needle, diabetic (BD ULTRA-FINE DARIO PEN NEEDLE) 32 gauge x 5/32" Ndle USE 4X DAILY WITH INSULIN 150 each 5    pioglitazone (ACTOS) 15 MG tablet Take 1 tablet (15 mg total) by mouth once daily. 90 tablet 4    rosuvastatin (CRESTOR) 5 MG tablet TAKE 1 TABLET EVERY OTHER DAY (Patient taking differently: Take 5 mg by mouth every other day.) 45 tablet 3    semaglutide (OZEMPIC) 0.25 mg or 0.5 mg(2 mg/1.5 mL) pen injector Inject 0.25 mg into the skin every 7 days. After 4 weeks, increase dose to 0.5 mg every 7 days. 3 pen 1    sertraline (ZOLOFT) 50 MG tablet Take 50 mg by mouth every evening.  2    [DISCONTINUED] " orphenadrine (NORFLEX) 100 mg tablet TAKE 1 TABLET BY MOUTH TWICE DAILY AS NEEDED FOR MUSCLE SPASM 90 tablet 0    [DISCONTINUED] tiZANidine (ZANAFLEX) 2 MG tablet Take 1 tablet (2 mg total) by mouth every 8 (eight) hours as needed (Right thigh and calf pain). 30 tablet 0     Current Facility-Administered Medications on File Prior to Visit   Medication Dose Route Frequency Provider Last Rate Last Admin    0.9%  NaCl infusion   Intravenous Continuous Job Porter MD 10 mL/hr at 19 1245 New Bag at 19 1245    barium sulfate 98 % SusR 120 mL  120 mL Oral ONCE PRN Carloz Bosch MD        ez paque ba sulfate 120 mL  120 mL Oral ONCE PRN Carloz Bosch MD        lactated ringers infusion   Intravenous Once PRN Job Porter MD        sodium bicarbonate-citric acid-simethicone (EZ GAS II) 2.21-1.53 gram/4 gram oral granules 4 g  1 packet Oral ONCE PRN Carloz Bosch MD          Review of patient's allergies indicates:   Allergen Reactions    Codeine      shaking    Sulfa (sulfonamide antibiotics)      Unknown  Unknown    Trulicity [dulaglutide]      Profuse Sweats, anxiety/panic attack, nausea      Family History not pertinent   Social History     Socioeconomic History    Marital status:    Tobacco Use    Smoking status: Former     Types: Cigarettes     Quit date:      Years since quittin.1    Smokeless tobacco: Never   Substance and Sexual Activity    Alcohol use: No    Drug use: No     Social Determinants of Health     Financial Resource Strain: Unknown    Difficulty of Paying Living Expenses: Patient refused   Food Insecurity: Unknown    Worried About Running Out of Food in the Last Year: Patient refused    Ran Out of Food in the Last Year: Patient refused   Transportation Needs: Unknown    Lack of Transportation (Medical): Patient refused    Lack of Transportation (Non-Medical): Patient refused   Physical Activity: Unknown    Days of Exercise per Week: Patient refused    Minutes  of Exercise per Session: Patient refused   Stress: Unknown    Feeling of Stress : Patient refused   Social Connections: Unknown    Frequency of Communication with Friends and Family: Patient refused    Frequency of Social Gatherings with Friends and Family: Patient refused    Active Member of Clubs or Organizations: Patient refused    Attends Club or Organization Meetings: Patient refused    Marital Status: Patient refused   Housing Stability: Unknown    Unable to Pay for Housing in the Last Year: Patient refused    Unstable Housing in the Last Year: Patient refused         Review of Systems:   Constitutional:  Denies fever or chills    Eyes:  Denies change in visual acuity    HENT:  Denies nasal congestion or sore throat    Respiratory:  Denies cough or shortness of breath    Cardiovascular:  Denies chest pain or edema    GI:  Denies abdominal pain, nausea, vomiting, bloody stools or diarrhea    :  Denies dysuria    Integument:  Denies rash    Neurologic:  Denies headache, focal weakness or sensory changes    Endocrine:  Denies polyuria or polydipsia    Lymphatic:  Denies swollen glands    Psychiatric:  Denies depression or anxiety       Physical Exam:    Constitutional:  Well developed, well nourished, no acute distress, non-toxic appearance    Integument:  Well hydrated, no rash    Lymphatic:  No lymphadenopathy noted    Neurologic:  Alert & oriented x 3,     Psychiatric:  Speech and behavior appropriate      Bilateral Knee Exam    Right Knee Exam   Tenderness   The patient is experiencing tenderness in the medial and posterior joint line.    Range of Motion   Flexion: abnormal     Muscle Strength   The patient has normal left knee strength.    Tests   Raúl:  Medial - positive   Lachman:  Anterior - negative      Varus: negative  Valgus: negative  Patellar Apprehension: negative      Other   Erythema: absent  Sensation: normal  Pulse: present  Swelling: mild    Left Knee exam   Knee exam performed same as  contralateral side and is normal               X-rays were performed in ER on 2/3/23, personally reviewed by me and findings discussed with the patient.   3 views of the right knee show no acute abnormalities.               Right knee injury, initial encounter  -     MRI Knee Without Contrast Right; Future; Expected date: 02/08/2023  -     HYDROcodone-acetaminophen (NORCO) 5-325 mg per tablet; Take 1 tablet by mouth every 6 (six) hours as needed for Pain.  Dispense: 20 tablet; Refill: 0  -     methylPREDNISolone (MEDROL DOSEPACK) 4 mg tablet; use as directed  Dispense: 21 each; Refill: 0    Abnormal gait  -     MRI Knee Without Contrast Right; Future; Expected date: 02/08/2023    Pain and swelling of right knee  -     MRI Knee Without Contrast Right; Future; Expected date: 02/08/2023  -     HYDROcodone-acetaminophen (NORCO) 5-325 mg per tablet; Take 1 tablet by mouth every 6 (six) hours as needed for Pain.  Dispense: 20 tablet; Refill: 0  -     methylPREDNISolone (MEDROL DOSEPACK) 4 mg tablet; use as directed  Dispense: 21 each; Refill: 0  -     methocarbamoL (ROBAXIN) 750 MG Tab; Take 1 tablet (750 mg total) by mouth 4 (four) times daily as needed (muscle spasms).  Dispense: 80 tablet; Refill: 2      Since she has already been resting the knee and it's been 5 days and she is still having significant difficulty walking, will obtain MRI and place her in a knee brace to help with stabilization since she is complaining of popping and knee instability.     Further plan depending on MRI results.   Take medications as prescribed.         Answers submitted by the patient for this visit:  Orthopedics Questionnaire (Submitted on 2/7/2023)  unexpected weight change: No  appetite change : No  sleep disturbance: Yes  IMMUNOCOMPROMISED: Yes  nervous/ anxious: Yes  dysphoric mood: Yes  rash: Yes  visual disturbance: No  eye redness: No  eye pain: No  ear pain: No  tinnitus: No  hearing loss: No  sinus pressure : Yes  nosebleeds:  No  enviro allergies: Yes  food allergies: No  cough: Yes  shortness of breath: Yes  sweating: Yes  dysuria: No  frequency: No  difficulty urinating: Yes  hematuria: No  painful intercourse: No  chest pain: Yes  palpitations: Yes  nausea: Yes  vomiting: No  diarrhea: Yes  blood in stool: No  constipation: Yes  headaches: Yes  dizziness: No  numbness: Yes  seizures: No  joint swelling: No  myalgia: Yes  weakness: Yes  back pain: Yes   (Submitted on 2/7/2023)  Chief Complaint: Leg pain  Pain Chronicity: new  History of trauma: No  Onset: in the past 7 days  Frequency: constantly  Progression since onset: unchanged  injury location: at home  pain- numeric: 8/10  pain location: right knee, other  pain quality: aching, sharp, tightness, numbing, shooting, throbbing, tingling  Radiating Pain: Yes  If your pain is radiating, to what part of the body?: right foot, right knee, right thigh, lower back  Aggravating factors: activity, bearing weight, extension, standing, flexion, twisting, walking, lying down, rotation, sitting  fever: No  inability to bear weight: Yes  itching: Yes  joint locking: Yes  limited range of motion: Yes  stiffness: Yes  tingling: Yes  Treatments tried: brace/corset, cold, movement, NSAIDs, oral narcotics, rest  physical therapy: not tried  Improvement on treatment: mild

## 2023-02-14 ENCOUNTER — PATIENT MESSAGE (OUTPATIENT)
Dept: RADIOLOGY | Facility: HOSPITAL | Age: 68
End: 2023-02-14
Payer: MEDICARE

## 2023-02-17 ENCOUNTER — OFFICE VISIT (OUTPATIENT)
Dept: CARDIOLOGY | Facility: CLINIC | Age: 68
End: 2023-02-17
Payer: MEDICARE

## 2023-02-17 VITALS
SYSTOLIC BLOOD PRESSURE: 126 MMHG | HEIGHT: 65 IN | DIASTOLIC BLOOD PRESSURE: 78 MMHG | WEIGHT: 205 LBS | BODY MASS INDEX: 34.16 KG/M2

## 2023-02-17 DIAGNOSIS — R00.2 PALPITATIONS: ICD-10-CM

## 2023-02-17 DIAGNOSIS — E78.5 DYSLIPIDEMIA ASSOCIATED WITH TYPE 2 DIABETES MELLITUS: ICD-10-CM

## 2023-02-17 DIAGNOSIS — R06.09 DYSPNEA ON EXERTION: Primary | ICD-10-CM

## 2023-02-17 DIAGNOSIS — I27.20 PULMONARY HTN: ICD-10-CM

## 2023-02-17 DIAGNOSIS — I10 PRIMARY HYPERTENSION: ICD-10-CM

## 2023-02-17 DIAGNOSIS — G47.30 SLEEP APNEA WITH USE OF CONTINUOUS POSITIVE AIRWAY PRESSURE (CPAP): ICD-10-CM

## 2023-02-17 DIAGNOSIS — N18.31 STAGE 3A CHRONIC KIDNEY DISEASE: ICD-10-CM

## 2023-02-17 DIAGNOSIS — E11.69 DYSLIPIDEMIA ASSOCIATED WITH TYPE 2 DIABETES MELLITUS: ICD-10-CM

## 2023-02-17 DIAGNOSIS — I51.89 DIASTOLIC DYSFUNCTION: ICD-10-CM

## 2023-02-17 PROCEDURE — 3066F PR DOCUMENTATION OF TREATMENT FOR NEPHROPATHY: ICD-10-PCS | Mod: CPTII,95,, | Performed by: PHYSICIAN ASSISTANT

## 2023-02-17 PROCEDURE — 3008F PR BODY MASS INDEX (BMI) DOCUMENTED: ICD-10-PCS | Mod: CPTII,95,, | Performed by: PHYSICIAN ASSISTANT

## 2023-02-17 PROCEDURE — 3052F PR MOST RECENT HEMOGLOBIN A1C LEVEL 8.0 - < 9.0%: ICD-10-PCS | Mod: CPTII,95,, | Performed by: PHYSICIAN ASSISTANT

## 2023-02-17 PROCEDURE — 4010F ACE/ARB THERAPY RXD/TAKEN: CPT | Mod: CPTII,95,, | Performed by: PHYSICIAN ASSISTANT

## 2023-02-17 PROCEDURE — 3061F PR NEG MICROALBUMINURIA RESULT DOCUMENTED/REVIEW: ICD-10-PCS | Mod: CPTII,95,, | Performed by: PHYSICIAN ASSISTANT

## 2023-02-17 PROCEDURE — 3066F NEPHROPATHY DOC TX: CPT | Mod: CPTII,95,, | Performed by: PHYSICIAN ASSISTANT

## 2023-02-17 PROCEDURE — 3078F DIAST BP <80 MM HG: CPT | Mod: CPTII,95,, | Performed by: PHYSICIAN ASSISTANT

## 2023-02-17 PROCEDURE — 4010F PR ACE/ARB THEARPY RXD/TAKEN: ICD-10-PCS | Mod: CPTII,95,, | Performed by: PHYSICIAN ASSISTANT

## 2023-02-17 PROCEDURE — 99214 PR OFFICE/OUTPT VISIT, EST, LEVL IV, 30-39 MIN: ICD-10-PCS | Mod: 95,,, | Performed by: PHYSICIAN ASSISTANT

## 2023-02-17 PROCEDURE — 99214 OFFICE O/P EST MOD 30 MIN: CPT | Mod: 95,,, | Performed by: PHYSICIAN ASSISTANT

## 2023-02-17 PROCEDURE — 3078F PR MOST RECENT DIASTOLIC BLOOD PRESSURE < 80 MM HG: ICD-10-PCS | Mod: CPTII,95,, | Performed by: PHYSICIAN ASSISTANT

## 2023-02-17 PROCEDURE — 3061F NEG MICROALBUMINURIA REV: CPT | Mod: CPTII,95,, | Performed by: PHYSICIAN ASSISTANT

## 2023-02-17 PROCEDURE — 3074F PR MOST RECENT SYSTOLIC BLOOD PRESSURE < 130 MM HG: ICD-10-PCS | Mod: CPTII,95,, | Performed by: PHYSICIAN ASSISTANT

## 2023-02-17 PROCEDURE — 3074F SYST BP LT 130 MM HG: CPT | Mod: CPTII,95,, | Performed by: PHYSICIAN ASSISTANT

## 2023-02-17 PROCEDURE — 3052F HG A1C>EQUAL 8.0%<EQUAL 9.0%: CPT | Mod: CPTII,95,, | Performed by: PHYSICIAN ASSISTANT

## 2023-02-17 PROCEDURE — 3008F BODY MASS INDEX DOCD: CPT | Mod: CPTII,95,, | Performed by: PHYSICIAN ASSISTANT

## 2023-02-17 NOTE — PROGRESS NOTES
The patient location is: [place]  The chief complaint leading to consultation is: Palpitations and shortness of breath     Visit type: audiovisual    Face to Face time with patient: 45 minutes of total time spent on the encounter, which includes face to face time and non-face to face time preparing to see the patient (eg, review of tests), Obtaining and/or reviewing separately obtained history, Documenting clinical information in the electronic or other health record, Independently interpreting results (not separately reported) and communicating results to the patient/family/caregiver, or Care coordination (not separately reported).     Each patient to whom he or she provides medical services by telemedicine is:  (1) informed of the relationship between the physician and patient and the respective role of any other health care provider with respect to management of the patient; and (2) notified that he or she may decline to receive medical services by telemedicine and may withdraw from such care at any time.    Notes:         Cardiology Clinic Note  Reason for Visit: Palpitations and shortness of breath     HPI:     Problem List:  Pulmonary HTN  Diastolic dysfunction   HTN  HLD  Type 2 DM  CKD3a  FRANCISCO- uses CPAP      Bing Kearns is a 67 y.o. female, who presents with complaint of palpitations and shortness of breath. She reports feeling palpitations several times per day for the past 6 weeks. The duration is a few seconds. She drinks one cup of coffee per day. Her TSH was normal in early January. She is compliant with her CPAP. The episodes are not associated with dizziness/light headedness. She does not think this symptom is related to underlying anxiety. She has a long history of shortness of breath, however recently she states it has worsened and she is unable to tolerate walking even short distances inside her home. She sustained a knee injury earlier this month, but states her SOB was worsening  even prior to that. She has previously been evaluated by pulmonology, and states she was told her SOB was not related to lung function at that time. She has had a few episodes of chest pressure with activity. Denies chest pain, radiation of symptoms, nausea or diaphoresis. She denies peripheral edema, PND or orthopnea.       ROS:    Pertinent ROS included in HPI and below.  PMH:     Past Medical History:   Diagnosis Date    Anxiety     Cancer     colon    Chronic diarrhea     Chronic kidney disease     stage 3    Diabetes mellitus     Diastolic dysfunction     GERD (gastroesophageal reflux disease)     Glaucoma     History of migraine headaches     Hyperlipemia     Hypertension     PONV (postoperative nausea and vomiting)     Pulmonary hypertension     Sleep apnea with use of continuous positive airway pressure (CPAP)     waiting on mask to arrive    Thyroid disease     hypothyroid     Past Surgical History:   Procedure Laterality Date    APPENDECTOMY      done during colon resection    CARPAL TUNNEL RELEASE Left 2021    Procedure: Left carpal tunnel release, Left Thumb, Middle, and Ring Trigger Finger Release;  Surgeon: Pilo Paez MD;  Location: [facility] OR;  Service: Orthopedics;  Laterality: Left;  Procedure: Left carpal tunnel release, Left Thumb, Middle, and Ring Trigger Finger Release    Position: Supine    Anesthesia: General    Equipment: Basic handset, carpal tunnel set    CATARACT EXTRACTION, BILATERAL Bilateral      SECTION      COLON SURGERY  2005    COLONOSCOPY Left 2015    Procedure: COLONOSCOPY;  Surgeon: Chet Woodard Jr., MD;  Location: [facility] ENDO;  Service: Endoscopy;  Laterality: Left;    EXCISION OF LESION OF BUCCAL MUCOSA Left 2019    Procedure: EXCISION, LESION, BUCCAL MUCOSA;  Surgeon: Dedra Khan MD;  Location: [facility] OR;  Service: ENT;  Laterality: Left;    HERNIA REPAIR      repaired during colon resection    HYSTERECTOMY      Complete    INSERTION OF VENOUS  ACCESS PORT      LAPAROSCOPIC CHOLECYSTECTOMY N/A 11/8/2021    Procedure: CHOLECYSTECTOMY, LAPAROSCOPIC;  Surgeon: Gab Pierre MD;  Location: [facility] OR;  Service: General;  Laterality: N/A;    OOPHORECTOMY Bilateral 2010    PORTACATH PLACEMENT Left     and later removed     REPLACEMENT OF VASCULAR ACCESS PORT      SURGICAL REMOVAL OF NEOPLASM OF MOUTH Left 6/15/2018    Procedure: EXCISION, NEOPLASM, MOUTH;  Surgeon: Velasquez Juárez MD;  [facility];  Service: ENT;  Laterality: Left;    TRANSFORAMINAL EPIDURAL INJECTION OF STEROID Left 12/20/2019    Procedure: Injection,steroid,epidural,transforaminal approach;  Surgeon: Job Porter MD;  Location: [facility] OR;  Service: Pain Management;  Laterality: Left;  L5-S1     Allergies:     Review of patient's allergies indicates:   Allergen Reactions    Codeine      shaking    Sulfa (sulfonamide antibiotics)      Unknown  Unknown    Trulicity [dulaglutide]      Profuse Sweats, anxiety/panic attack, nausea     Medications:     Current Outpatient Medications:     amlodipine (NORVASC) 5 MG tablet, Take 5 mg by mouth once daily. , Disp: , Rfl:     aspirin (ECOTRIN) 81 MG EC tablet, Take 81 mg by mouth every evening., Disp: , Rfl:     atenoloL (TENORMIN) 100 MG tablet, Take 100 mg by mouth every evening., Disp: , Rfl:     blood sugar diagnostic Strp, 1 strip by Misc.(Non-Drug; Combo Route) route 3 (three) times daily., Disp: 300 each, Rfl: 3    blood-glucose meter (ONETOUCH ULTRA2 METER) Misc, USE AS DIRECTED, Disp: 1 each, Rfl: 0    celecoxib (CELEBREX) 100 MG capsule, Take 100 mg by mouth 2 (two) times daily., Disp: , Rfl:     cholecalciferol, vitamin D3, (VITAMIN D3) 2,000 unit Cap, Take 1 capsule by mouth 2 (two) times a day., Disp: , Rfl: 3    clonazePAM (KLONOPIN) 0.5 MG tablet, Take 1 tablet (0.5 mg total) by mouth 2 (two) times daily as needed for Anxiety., Disp: 30 tablet, Rfl: 0    cloNIDine (CATAPRES) 0.1 MG tablet, Take 0.1 mg by mouth every evening.,  Disp: , Rfl:     donepeziL (ARICEPT) 5 MG tablet, Take 1 tablet (5 mg total) by mouth every evening., Disp: 30 tablet, Rfl: 5    fenofibrate micronized (LOFIBRA) 200 MG Cap, TAKE 1 CAPSULE DAILY, Disp: 90 capsule, Rfl: 0    gabapentin (NEURONTIN) 300 MG capsule, Take 1 capsule (300 mg total) by mouth 3 (three) times daily., Disp: 270 capsule, Rfl: 3    glimepiride (AMARYL) 4 MG tablet, Take 1 tablet (4 mg total) by mouth before breakfast, Disp: 90 tablet, Rfl: 1    insulin glargine, TOUJEO, (TOUJEO SOLOSTAR U-300 INSULIN) 300 unit/mL (1.5 mL) InPn pen, INJECT 30 UNITS UNDER THE SKIN EVERY EVENING, Disp: 6 pen, Rfl: 1    insulin lispro (HUMALOG KWIKPEN INSULIN) 100 unit/mL pen, Inject before meals 3x/day, per sliding scale. Max TDD 30u, Disp: 15 mL, Rfl: 2    lancets 33 gauge Misc, Test blood sugar 3x/day. Dx code E11.22, Disp: 300 each, Rfl: 3    latanoprost 0.005 % ophthalmic solution, Place 1 drop into both eyes nightly., Disp: , Rfl:     levothyroxine (SYNTHROID) 75 MCG tablet, TAKE 1 TABLET BEFORE BREAKFAST, Disp: 90 tablet, Rfl: 3    LIDOcaine (LIDODERM) 5 %, , Disp: , Rfl:     metFORMIN (GLUCOPHAGE-XR) 500 MG ER 24hr tablet, TAKE 2 TABLETS DAILY WITH BREAKFAST, Disp: 180 tablet, Rfl: 3    methocarbamoL (ROBAXIN) 750 MG Tab, Take 1 tablet (750 mg total) by mouth 4 (four) times daily as needed (muscle spasms)., Disp: 80 tablet, Rfl: 2    methylPREDNISolone (MEDROL DOSEPACK) 4 mg tablet, use as directed, Disp: 21 each, Rfl: 0    NOVOLOG FLEXPEN U-100 INSULIN 100 unit/mL (3 mL) InPn pen, Inject into the skin 3 (three) times daily., Disp: , Rfl:     olmesartan (BENICAR) 40 MG tablet, Take 40 mg by mouth once daily., Disp: , Rfl:     omeprazole (PRILOSEC) 40 MG capsule, Take 40 mg by mouth 2 (two) times daily before meals., Disp: , Rfl: 0    ONETOUCH DELICA PLUS LANCET 33 gauge Misc, USE 1 TO CHECK GLUCOSE THREE TIMES DAILY AS NEEDED, Disp: 300 each, Rfl: 3    pen needle, diabetic (BD ULTRA-FINE DARIO PEN NEEDLE) 32  "gauge x 5/32" Ndle, USE 4X DAILY WITH INSULIN, Disp: 150 each, Rfl: 5    pioglitazone (ACTOS) 15 MG tablet, Take 1 tablet (15 mg total) by mouth once daily., Disp: 90 tablet, Rfl: 4    rosuvastatin (CRESTOR) 5 MG tablet, TAKE 1 TABLET EVERY OTHER DAY (Patient taking differently: Take 5 mg by mouth every other day.), Disp: 45 tablet, Rfl: 3    semaglutide (OZEMPIC) 0.25 mg or 0.5 mg(2 mg/1.5 mL) pen injector, Inject 0.25 mg into the skin every 7 days. After 4 weeks, increase dose to 0.5 mg every 7 days., Disp: 3 pen, Rfl: 1    sertraline (ZOLOFT) 50 MG tablet, Take 50 mg by mouth every evening., Disp: , Rfl: 2  No current facility-administered medications for this visit.    Facility-Administered Medications Ordered in Other Visits:     0.9%  NaCl infusion, , Intravenous, Continuous, Job Porter MD, Last Rate: 10 mL/hr at 19 1245, New Bag at 19 1245    barium sulfate 98 % SusR 120 mL, 120 mL, Oral, ONCE PRN, Carloz Bosch MD    ez paque ba sulfate 120 mL, 120 mL, Oral, ONCE PRN, Carloz Bosch MD    lactated ringers infusion, , Intravenous, Once PRN, Job Porter MD    sodium bicarbonate-citric acid-simethicone (EZ GAS II) 2.21-1.53 gram/4 gram oral granules 4 g, 1 packet, Oral, ONCE PRN, Carloz Bosch MD   Social History:     Social History     Tobacco Use    Smoking status: Former     Types: Cigarettes     Quit date:      Years since quittin.1    Smokeless tobacco: Never   Substance Use Topics    Alcohol use: No     Family History:     Family History   Problem Relation Age of Onset    Cancer Mother     Breast cancer Mother 42    Heart disease Father     COPD Father     Hypertension Sister     Diabetes Sister     Anemia Sister     Cancer Sister      Physical Exam:     Physical not exam was not performed due this encounter being a virtual visit.    Labs:     Blood Tests:  Lab Results   Component Value Date     2023    K 3.9 2023     2023    CO2 23 " 2023    BUN 14 2023    CREATININE 1.14 2023     (H) 2023    HGBA1C 8.4 (H) 2023    MG 2.1 2023    AST 78 (H) 2023    ALT 41 (H) 2023    ALBUMIN 4.9 2023    PROT 8.5 (H) 2023    BILITOT 0.4 2023    WBC 10.15 2023    HGB 10.9 (L) 2023    HCT 34.3 (L) 2023    MCV 83 2023     2023    TSH 3.052 2023       Lab Results   Component Value Date    CHOL 167 2023    HDL 43 2023    TRIG 166 (H) 2023       Lab Results   Component Value Date    LDLCALC 90.8 2023       Urine Tests:  Lab Results   Component Value Date    COLORU Yellow 2023    APPEARANCEUA Clear 2023    PHUR 6.0 2023    SPECGRAV 1.010 2023    PROTEINUA Negative 2023    GLUCUA Negative 2023    KETONESU Negative 2023    BILIRUBINUA Negative 2023    OCCULTUA Negative 2023    NITRITE Negative 2023    UROBILINOGEN 0.2 2023    LEUKOCYTESUR Negative 2023    PROTEINURINE 9 2023    CREATRANDUR 86.5 2023    CREATRANDUR 86.5 2023    UTPCR 104.0 2023       Imaging:     Echocardiogram  2021  Concentric hypertrophy and normal systolic function. The estimated ejection fraction is 60%  Grade I left ventricular diastolic dysfunction.  Normal right ventricular size with normal right ventricular systolic function.  Mild tricuspid regurgitation.  Normal central venous pressure (3 mmHg).  The estimated PA systolic pressure is 35 mmHg.    Stress testing  Nuclear Stress Test 2021    Normal myocardial perfusion scan. There is no evidence of myocardial ischemia or infarction.    The gated perfusion images showed an ejection fraction of 56% at rest.    The EKG portion of this study is negative for ischemia.    Cath Lab  None    Other  None    EK2023  Normal sinus rhythm   Prolonged QT   Abnormal ECG   When compared with ECG of 2021  03:01,   No significant change was found    Assessment:     1. Dyspnea on exertion    2. Palpitations    3. Diastolic dysfunction    4. Primary hypertension    5. Stage 3a chronic kidney disease    6. Sleep apnea with use of continuous positive airway pressure (CPAP)    7. Dyslipidemia associated with type 2 diabetes mellitus    8. Pulmonary HTN        Plan:     1. Dyspnea on exertion  - Nuclear Stress - Cardiology Interpreted; Future    Advised patient to present to the ER if she experiences an episode of chest pain. It's reasonable to update stress testing based on the reported progression of her symptoms. Last stress testing in 2021 did not show significant ischemia.     The 10-year ASCVD risk score (Mina SARGENT, et al., 2019) is: 18.3%    Values used to calculate the score:      Age: 67 years      Sex: Female      Is Non- : No      Diabetic: Yes      Tobacco smoker: No      Systolic Blood Pressure: 126 mmHg      Is BP treated: Yes      HDL Cholesterol: 43 mg/dL      Total Cholesterol: 167 mg/dL     2. Palpitations  - Holter monitor - 48 hour; Future  Advised to limit caffeine consumption and mitigate stress as much as possible. Not currently on beta blocker.     3. Diastolic dysfunction  - Ambulatory referral/consult to Cardiology  Other than SOB, patient denies overt symptoms of volume overload. Condition should be monitored by updating echo.     4. Primary hypertension  Patient states that her nephrologist currently manages her BP medications, and that she has previously tried to discontinue nighttime clonidine, but her blood pressure would then be elevated in the AM. She is well controled at this time on current medications.     5. Stage 3a chronic kidney disease  Follows with [facility].     6. Sleep apnea with use of continuous positive airway pressure (CPAP)  Encouraged continued compliance with CPAP.     7. Dyslipidemia associated with type 2 diabetes  mellitus  Currently taking rosuvastain 5 mg every other night. Simvastatin and rosuvastatin qD caused leg cramps in the past. LDL currently at goal of < 100. Recommended mediterranean diet.     8. Pulmonary HTN  - Echo; Future         Signed:  Aniya Gao PA-C  Cardiology     2/17/2023 10:42 AM    Follow-up:     Future Appointments   Date Time Provider Department Center   4/4/2023 10:30 AM [facility] LABORATORY [facility] LAB [facility]   4/11/2023 10:30 AM LINDSEY Chawla,FNP-C [facility] ENDOCRI [facility]

## 2023-02-17 NOTE — PROGRESS NOTES
Cardiology Clinic Note  Reason for Visit: ***    HPI:     Bing Kearns is a 67 y.o. , who presents for ***    Referred by endo for diastolic dysfunction assessment     Medical: ***  Surgical: Reviewed, as below.  Family: Reviewed, as below. No premature CAD, HF, SCD.  Social: Reviewed, as below.    ROS:    Pertinent ROS included in HPI and below.  PMH:     Past Medical History:   Diagnosis Date    Anxiety     Cancer     colon    Chronic diarrhea     Chronic kidney disease     stage 3    Diabetes mellitus     Diastolic dysfunction     GERD (gastroesophageal reflux disease)     Glaucoma     History of migraine headaches     Hyperlipemia     Hypertension     PONV (postoperative nausea and vomiting)     Pulmonary hypertension     Sleep apnea with use of continuous positive airway pressure (CPAP)     waiting on mask to arrive    Thyroid disease     hypothyroid     Past Surgical History:   Procedure Laterality Date    APPENDECTOMY      done during colon resection    CARPAL TUNNEL RELEASE Left 2021    Procedure: Left carpal tunnel release, Left Thumb, Middle, and Ring Trigger Finger Release;  Surgeon: Pilo Paez MD;  Location: [facility] OR;  Service: Orthopedics;  Laterality: Left;  Procedure: Left carpal tunnel release, Left Thumb, Middle, and Ring Trigger Finger Release    Position: Supine    Anesthesia: General    Equipment: Basic handset, carpal tunnel set    CATARACT EXTRACTION, BILATERAL Bilateral      SECTION      COLON SURGERY      COLONOSCOPY Left 2015    Procedure: COLONOSCOPY;  Surgeon: Chet Woodard Jr., MD;  Location: [facility];  Service: Endoscopy;  Laterality: Left;    EXCISION OF LESION OF BUCCAL MUCOSA Left 2019    Procedure: EXCISION, LESION, BUCCAL MUCOSA;  Surgeon: Dedra Khan MD;  Location: [facility] OR;  Service: ENT;  Laterality: Left;    HERNIA REPAIR      repaired during colon resection    HYSTERECTOMY      Complete    INSERTION OF VENOUS ACCESS PORT       LAPAROSCOPIC CHOLECYSTECTOMY N/A 11/8/2021    Procedure: CHOLECYSTECTOMY, LAPAROSCOPIC;  Surgeon: Gab Pierre MD;  Location: [facility] OR;  Service: General;  Laterality: N/A;    OOPHORECTOMY Bilateral 2010    PORTACATH PLACEMENT Left     and later removed     REPLACEMENT OF VASCULAR ACCESS PORT      SURGICAL REMOVAL OF NEOPLASM OF MOUTH Left 6/15/2018    Procedure: EXCISION, NEOPLASM, MOUTH;  Surgeon: Velasquez Juárez MD;  [facility];  Service: ENT;  Laterality: Left;    TRANSFORAMINAL EPIDURAL INJECTION OF STEROID Left 12/20/2019    Procedure: Injection,steroid,epidural,transforaminal approach;  Surgeon: Job Porter MD;  Location: [facility] OR;  Service: Pain Management;  Laterality: Left;  L5-S1     Allergies:     Review of patient's allergies indicates:   Allergen Reactions    Codeine      shaking    Sulfa (sulfonamide antibiotics)      Unknown  Unknown    Trulicity [dulaglutide]      Profuse Sweats, anxiety/panic attack, nausea     Medications:     Current Outpatient Medications on File Prior to Visit   Medication Sig Dispense Refill    amlodipine (NORVASC) 5 MG tablet Take 5 mg by mouth once daily.       aspirin (ECOTRIN) 81 MG EC tablet Take 81 mg by mouth every evening.      atenoloL (TENORMIN) 100 MG tablet Take 100 mg by mouth every evening.      blood sugar diagnostic Strp 1 strip by Misc.(Non-Drug; Combo Route) route 3 (three) times daily. 300 each 3    blood-glucose meter (ONETOUCH ULTRA2 METER) Misc USE AS DIRECTED 1 each 0    celecoxib (CELEBREX) 100 MG capsule Take 100 mg by mouth 2 (two) times daily.      cholecalciferol, vitamin D3, (VITAMIN D3) 2,000 unit Cap Take 1 capsule by mouth 2 (two) times a day.  3    clonazePAM (KLONOPIN) 0.5 MG tablet Take 1 tablet (0.5 mg total) by mouth 2 (two) times daily as needed for Anxiety. 30 tablet 0    cloNIDine (CATAPRES) 0.1 MG tablet Take 0.1 mg by mouth every evening.      donepeziL (ARICEPT) 5 MG tablet Take 1 tablet (5 mg total) by mouth  "every evening. 30 tablet 5    fenofibrate micronized (LOFIBRA) 200 MG Cap TAKE 1 CAPSULE DAILY 90 capsule 0    gabapentin (NEURONTIN) 300 MG capsule Take 1 capsule (300 mg total) by mouth 3 (three) times daily. 270 capsule 3    glimepiride (AMARYL) 4 MG tablet Take 1 tablet (4 mg total) by mouth before breakfast 90 tablet 1    insulin glargine, TOUJEO, (TOUJEO SOLOSTAR U-300 INSULIN) 300 unit/mL (1.5 mL) InPn pen INJECT 30 UNITS UNDER THE SKIN EVERY EVENING 6 pen 1    insulin lispro (HUMALOG KWIKPEN INSULIN) 100 unit/mL pen Inject before meals 3x/day, per sliding scale. Max TDD 30u 15 mL 2    lancets 33 gauge Misc Test blood sugar 3x/day. Dx code E11.22 300 each 3    latanoprost 0.005 % ophthalmic solution Place 1 drop into both eyes nightly.      levothyroxine (SYNTHROID) 75 MCG tablet TAKE 1 TABLET BEFORE BREAKFAST 90 tablet 3    LIDOcaine (LIDODERM) 5 %       metFORMIN (GLUCOPHAGE-XR) 500 MG ER 24hr tablet TAKE 2 TABLETS DAILY WITH BREAKFAST 180 tablet 3    methocarbamoL (ROBAXIN) 750 MG Tab Take 1 tablet (750 mg total) by mouth 4 (four) times daily as needed (muscle spasms). 80 tablet 2    methylPREDNISolone (MEDROL DOSEPACK) 4 mg tablet use as directed 21 each 0    NOVOLOG FLEXPEN U-100 INSULIN 100 unit/mL (3 mL) InPn pen Inject into the skin 3 (three) times daily.      olmesartan (BENICAR) 40 MG tablet Take 40 mg by mouth once daily.      omeprazole (PRILOSEC) 40 MG capsule Take 40 mg by mouth 2 (two) times daily before meals.  0    ONETOUCH DELICA PLUS LANCET 33 gauge Misc USE 1 TO CHECK GLUCOSE THREE TIMES DAILY AS NEEDED 300 each 3    pen needle, diabetic (BD ULTRA-FINE DARIO PEN NEEDLE) 32 gauge x 5/32" Ndle USE 4X DAILY WITH INSULIN 150 each 5    pioglitazone (ACTOS) 15 MG tablet Take 1 tablet (15 mg total) by mouth once daily. 90 tablet 4    rosuvastatin (CRESTOR) 5 MG tablet TAKE 1 TABLET EVERY OTHER DAY (Patient taking differently: Take 5 mg by mouth every other day.) 45 tablet 3    semaglutide " (OZEMPIC) 0.25 mg or 0.5 mg(2 mg/1.5 mL) pen injector Inject 0.25 mg into the skin every 7 days. After 4 weeks, increase dose to 0.5 mg every 7 days. 3 pen 1    sertraline (ZOLOFT) 50 MG tablet Take 50 mg by mouth every evening.  2     Current Facility-Administered Medications on File Prior to Visit   Medication Dose Route Frequency Provider Last Rate Last Admin    0.9%  NaCl infusion   Intravenous Continuous Job Porter MD 10 mL/hr at 19 1245 New Bag at 19 1245    barium sulfate 98 % SusR 120 mL  120 mL Oral ONCE PRN Carloz Bosch MD        ez paque ba sulfate 120 mL  120 mL Oral ONCE PRN Carloz Bosch MD        lactated ringers infusion   Intravenous Once PRN Job Porter MD        sodium bicarbonate-citric acid-simethicone (EZ GAS II) 2.21-1.53 gram/4 gram oral granules 4 g  1 packet Oral ONCE PRN Carloz Bosch MD         Social History:     Social History     Tobacco Use    Smoking status: Former     Types: Cigarettes     Quit date:      Years since quittin.    Smokeless tobacco: Never   Substance Use Topics    Alcohol use: No     Family History:     Family History   Problem Relation Age of Onset    Cancer Mother     Breast cancer Mother 42    Heart disease Father     COPD Father     Hypertension Sister     Diabetes Sister     Anemia Sister     Cancer Sister      Physical Exam:   LMP  (LMP Unknown)      Physical Exam     Labs:     Blood Tests:  Lab Results   Component Value Date     2023    K 3.9 2023     2023    CO2 23 2023    BUN 14 2023    CREATININE 1.14 2023     (H) 2023    HGBA1C 8.4 (H) 2023    MG 2.1 2023    AST 78 (H) 2023    ALT 41 (H) 2023    ALBUMIN 4.9 2023    PROT 8.5 (H) 2023    BILITOT 0.4 2023    WBC 10.15 2023    HGB 10.9 (L) 2023    HCT 34.3 (L) 2023    MCV 83 2023     2023    TSH 3.052 2023       Lab Results    Component Value Date    CHOL 167 01/04/2023    HDL 43 01/04/2023    TRIG 166 (H) 01/04/2023       Lab Results   Component Value Date    LDLCALC 90.8 01/04/2023         Imaging:     Echocardiogram  None    Stress testing  None    Cath Lab  None    Other  None    EKG:   ***    Assessment:     No diagnosis found.    Plan:     There are no diagnoses linked to this encounter.    Signed:  Aniya Gao PA-C  Cardiology     2/17/2023 8:54 AM    Follow-up:     Future Appointments   Date Time Provider Department Center   2/17/2023  9:30 AM Aniya Gao PA-C [facility] CARDIO [address]   4/4/2023 10:30 AM [facility] LABORATORY [facility] LAB [facility]   4/11/2023 10:30 AM LINDSEY Chawla,FNP-C [facility] ENDOCRI [facility]

## 2023-02-28 ENCOUNTER — PATIENT MESSAGE (OUTPATIENT)
Dept: FAMILY MEDICINE | Facility: CLINIC | Age: 68
End: 2023-02-28
Payer: MEDICARE

## 2023-02-28 ENCOUNTER — TELEPHONE (OUTPATIENT)
Dept: ORTHOPEDICS | Facility: CLINIC | Age: 68
End: 2023-02-28
Payer: MEDICARE

## 2023-02-28 DIAGNOSIS — G89.29 CHRONIC UPPER BACK PAIN: Primary | ICD-10-CM

## 2023-02-28 DIAGNOSIS — M54.9 CHRONIC UPPER BACK PAIN: Primary | ICD-10-CM

## 2023-02-28 DIAGNOSIS — M17.11 PRIMARY OSTEOARTHRITIS OF RIGHT KNEE: Primary | ICD-10-CM

## 2023-02-28 NOTE — TELEPHONE ENCOUNTER
Please see request. According to OV note by Ortho on 2/8/23, this was d/c'd, tizanadine was d/c'd and robaxin was rx'd.    Last refill date for this 10/11/22.     If Ok, please add back and refill.

## 2023-02-28 NOTE — TELEPHONE ENCOUNTER
----- Message from Job Henderson sent at 2/28/2023 12:44 PM CST -----  Regarding: returning your call try again   Contact: pt   returning your call try again

## 2023-03-01 RX ORDER — ORPHENADRINE CITRATE 100 MG/1
100 TABLET, EXTENDED RELEASE ORAL 2 TIMES DAILY PRN
Qty: 90 TABLET | Refills: 0 | Status: SHIPPED | OUTPATIENT
Start: 2023-03-01 | End: 2023-03-02 | Stop reason: SDUPTHER

## 2023-03-02 ENCOUNTER — OFFICE VISIT (OUTPATIENT)
Dept: CARDIOLOGY | Facility: CLINIC | Age: 68
End: 2023-03-02
Payer: MEDICARE

## 2023-03-02 VITALS
HEART RATE: 81 BPM | HEIGHT: 65 IN | SYSTOLIC BLOOD PRESSURE: 129 MMHG | DIASTOLIC BLOOD PRESSURE: 73 MMHG | WEIGHT: 209.44 LBS | BODY MASS INDEX: 34.89 KG/M2

## 2023-03-02 DIAGNOSIS — E78.5 DYSLIPIDEMIA ASSOCIATED WITH TYPE 2 DIABETES MELLITUS: ICD-10-CM

## 2023-03-02 DIAGNOSIS — R07.89 OTHER CHEST PAIN: ICD-10-CM

## 2023-03-02 DIAGNOSIS — R00.2 PALPITATIONS: ICD-10-CM

## 2023-03-02 DIAGNOSIS — E11.69 DYSLIPIDEMIA ASSOCIATED WITH TYPE 2 DIABETES MELLITUS: ICD-10-CM

## 2023-03-02 DIAGNOSIS — G47.30 SLEEP APNEA WITH USE OF CONTINUOUS POSITIVE AIRWAY PRESSURE (CPAP): Primary | ICD-10-CM

## 2023-03-02 DIAGNOSIS — I10 PRIMARY HYPERTENSION: ICD-10-CM

## 2023-03-02 DIAGNOSIS — I51.89 DIASTOLIC DYSFUNCTION: ICD-10-CM

## 2023-03-02 PROCEDURE — 4010F ACE/ARB THERAPY RXD/TAKEN: CPT | Mod: CPTII,S$GLB,,

## 2023-03-02 PROCEDURE — 3074F PR MOST RECENT SYSTOLIC BLOOD PRESSURE < 130 MM HG: ICD-10-PCS | Mod: CPTII,S$GLB,,

## 2023-03-02 PROCEDURE — 1101F PR PT FALLS ASSESS DOC 0-1 FALLS W/OUT INJ PAST YR: ICD-10-PCS | Mod: CPTII,S$GLB,,

## 2023-03-02 PROCEDURE — 3066F PR DOCUMENTATION OF TREATMENT FOR NEPHROPATHY: ICD-10-PCS | Mod: CPTII,S$GLB,,

## 2023-03-02 PROCEDURE — 3052F HG A1C>EQUAL 8.0%<EQUAL 9.0%: CPT | Mod: CPTII,S$GLB,,

## 2023-03-02 PROCEDURE — 3074F SYST BP LT 130 MM HG: CPT | Mod: CPTII,S$GLB,,

## 2023-03-02 PROCEDURE — 3052F PR MOST RECENT HEMOGLOBIN A1C LEVEL 8.0 - < 9.0%: ICD-10-PCS | Mod: CPTII,S$GLB,,

## 2023-03-02 PROCEDURE — 3008F PR BODY MASS INDEX (BMI) DOCUMENTED: ICD-10-PCS | Mod: CPTII,S$GLB,,

## 2023-03-02 PROCEDURE — 1159F MED LIST DOCD IN RCRD: CPT | Mod: CPTII,S$GLB,,

## 2023-03-02 PROCEDURE — 3078F PR MOST RECENT DIASTOLIC BLOOD PRESSURE < 80 MM HG: ICD-10-PCS | Mod: CPTII,S$GLB,,

## 2023-03-02 PROCEDURE — 3288F PR FALLS RISK ASSESSMENT DOCUMENTED: ICD-10-PCS | Mod: CPTII,S$GLB,,

## 2023-03-02 PROCEDURE — 1101F PT FALLS ASSESS-DOCD LE1/YR: CPT | Mod: CPTII,S$GLB,,

## 2023-03-02 PROCEDURE — 3078F DIAST BP <80 MM HG: CPT | Mod: CPTII,S$GLB,,

## 2023-03-02 PROCEDURE — 1159F PR MEDICATION LIST DOCUMENTED IN MEDICAL RECORD: ICD-10-PCS | Mod: CPTII,S$GLB,,

## 2023-03-02 PROCEDURE — 3066F NEPHROPATHY DOC TX: CPT | Mod: CPTII,S$GLB,,

## 2023-03-02 PROCEDURE — 99214 OFFICE O/P EST MOD 30 MIN: CPT | Mod: S$GLB,,,

## 2023-03-02 PROCEDURE — 3061F NEG MICROALBUMINURIA REV: CPT | Mod: CPTII,S$GLB,,

## 2023-03-02 PROCEDURE — 3008F BODY MASS INDEX DOCD: CPT | Mod: CPTII,S$GLB,,

## 2023-03-02 PROCEDURE — 4010F PR ACE/ARB THEARPY RXD/TAKEN: ICD-10-PCS | Mod: CPTII,S$GLB,,

## 2023-03-02 PROCEDURE — 99214 PR OFFICE/OUTPT VISIT, EST, LEVL IV, 30-39 MIN: ICD-10-PCS | Mod: S$GLB,,,

## 2023-03-02 PROCEDURE — 99999 PR PBB SHADOW E&M-EST. PATIENT-LVL IV: ICD-10-PCS | Mod: PBBFAC,,,

## 2023-03-02 PROCEDURE — 3061F PR NEG MICROALBUMINURIA RESULT DOCUMENTED/REVIEW: ICD-10-PCS | Mod: CPTII,S$GLB,,

## 2023-03-02 PROCEDURE — 99999 PR PBB SHADOW E&M-EST. PATIENT-LVL IV: CPT | Mod: PBBFAC,,,

## 2023-03-02 PROCEDURE — 1125F AMNT PAIN NOTED PAIN PRSNT: CPT | Mod: CPTII,S$GLB,,

## 2023-03-02 PROCEDURE — 1125F PR PAIN SEVERITY QUANTIFIED, PAIN PRESENT: ICD-10-PCS | Mod: CPTII,S$GLB,,

## 2023-03-02 PROCEDURE — 3288F FALL RISK ASSESSMENT DOCD: CPT | Mod: CPTII,S$GLB,,

## 2023-03-02 RX ORDER — ORPHENADRINE CITRATE 100 MG/1
100 TABLET, EXTENDED RELEASE ORAL 2 TIMES DAILY PRN
Qty: 90 TABLET | Refills: 0 | Status: SHIPPED | OUTPATIENT
Start: 2023-03-02 | End: 2023-08-08

## 2023-03-02 NOTE — ASSESSMENT & PLAN NOTE
On magox at home   Continue atenolol   [place]udle holter placed by  [facility]

## 2023-03-02 NOTE — PROGRESS NOTES
" Subjective:    Patient ID:  Bing Kearns is a 67 y.o. female patient here for evaluation No chief complaint on file.      History of Present Illness:    Mrs. Kearns is a pleasnt 67 year old woman who used to follow with Dr. Schmid and now wants to establish with Dr. Henson. She was seen by an NP for the same complaints below on 2/17 by an DAVID at [facility] who ordered stress, echo, holter. Mrs. Kearns would like to do those tests here in our clinic.     Chest pain "all the time" doesn't know what helps it goes away, sporadic. Heaviness. In between shoulder blades. And down arm.   Palps, recorded irregular beats on apple watch   Fatigue   6 weeks     She is currently dealing with a meniscus tear and is having injections and possibly therapy instead of surgery.     Nuclear stress 6/2021 negative for RI   Echo 1/2021 EF 60% grade I DD mild TR     Review of patient's allergies indicates:   Allergen Reactions    Codeine      shaking    Sulfa (sulfonamide antibiotics)      Unknown  Unknown    Trulicity [dulaglutide]      Profuse Sweats, anxiety/panic attack, nausea       Past Medical History:   Diagnosis Date    Anxiety     Cancer     colon    Chronic diarrhea     Chronic kidney disease     stage 3    Diabetes mellitus     Diastolic dysfunction     GERD (gastroesophageal reflux disease)     Glaucoma     History of migraine headaches     Hyperlipemia     Hypertension     PONV (postoperative nausea and vomiting)     Pulmonary hypertension     Sleep apnea with use of continuous positive airway pressure (CPAP)     waiting on mask to arrive    Thyroid disease     hypothyroid     Past Surgical History:   Procedure Laterality Date    APPENDECTOMY      done during colon resection    CARPAL TUNNEL RELEASE Left 7/22/2021    Procedure: Left carpal tunnel release, Left Thumb, Middle, and Ring Trigger Finger Release;  Surgeon: Pilo Paez MD;  Location: [facility];  Service: Orthopedics;  Laterality: Left;  Procedure: " Left carpal tunnel release, Left Thumb, Middle, and Ring Trigger Finger Release    Position: Supine    Anesthesia: General    Equipment: Basic handset, carpal tunnel set    CATARACT EXTRACTION, BILATERAL Bilateral      SECTION      COLON SURGERY      COLONOSCOPY Left 2015    Procedure: COLONOSCOPY;  Surgeon: Chet Woodard Jr., MD;  Location: [facility];  Service: Endoscopy;  Laterality: Left;    EXCISION OF LESION OF BUCCAL MUCOSA Left 2019    Procedure: EXCISION, LESION, BUCCAL MUCOSA;  Surgeon: Dedra Khan MD;  Location: [facility] OR;  Service: ENT;  Laterality: Left;    HERNIA REPAIR      repaired during colon resection    HYSTERECTOMY      Complete    INSERTION OF VENOUS ACCESS PORT      LAPAROSCOPIC CHOLECYSTECTOMY N/A 2021    Procedure: CHOLECYSTECTOMY, LAPAROSCOPIC;  Surgeon: Gab Pierre MD;  Location: [facility] OR;  Service: General;  Laterality: N/A;    OOPHORECTOMY Bilateral     PORTACATH PLACEMENT Left     and later removed     REPLACEMENT OF VASCULAR ACCESS PORT      SURGICAL REMOVAL OF NEOPLASM OF MOUTH Left 6/15/2018    Procedure: EXCISION, NEOPLASM, MOUTH;  Surgeon: Velasquez Juárez MD;  Location: [address];  Service: ENT;  Laterality: Left;    TRANSFORAMINAL EPIDURAL INJECTION OF STEROID Left 2019    Procedure: Injection,steroid,epidural,transforaminal approach;  Surgeon: Job Porter MD;  [facility];  Service: Pain Management;  Laterality: Left;  L5-S1     Social History     Tobacco Use    Smoking status: Former     Types: Cigarettes     Quit date:      Years since quittin.1    Smokeless tobacco: Never   Substance Use Topics    Alcohol use: No    Drug use: No        REVIEW OF SYSTEMS: As noted in HPI   CARDIOVASCULAR: No recent arm, neck, or jaw pain  RESPIRATORY: No recent fever, cough chills,  or congestion  : No blood in the urine  GI: No Nausea, vomiting, constipation, diarrhea, blood, or reflux.  MUSCULOSKELETAL: No myalgias  NEURO:  No lightheadedness or dizziness  EYES: No Double vision, blurry, vision or headache        Objective        Vitals:    03/02/23 1033   BP: 129/73   Pulse: 81       LIPIDS - LAST 2   Lab Results   Component Value Date    CHOL 167 01/04/2023    CHOL 163 01/07/2022    HDL 43 01/04/2023    HDL 46 01/07/2022    LDLCALC 90.8 01/04/2023    LDLCALC 88.2 01/07/2022    TRIG 166 (H) 01/04/2023    TRIG 144 01/07/2022    CHOLHDL 25.7 01/04/2023    CHOLHDL 28.2 01/07/2022       CBC - LAST 2  Lab Results   Component Value Date    WBC 10.15 02/03/2023    WBC 9.33 01/25/2023    RBC 4.12 02/03/2023    RBC 3.85 (L) 01/25/2023    HGB 10.9 (L) 02/03/2023    HGB 10.2 (L) 01/25/2023    HCT 34.3 (L) 02/03/2023    HCT 32.1 (L) 01/25/2023    MCV 83 02/03/2023    MCV 83 01/25/2023    MCH 26.5 (L) 02/03/2023    MCH 26.5 (L) 01/25/2023    MCHC 31.8 (L) 02/03/2023    MCHC 31.8 (L) 01/25/2023    RDW 14.3 02/03/2023    RDW 14.2 01/25/2023     02/03/2023     01/25/2023    MPV 9.0 (L) 02/03/2023    MPV 9.4 01/25/2023    GRAN 6.6 02/03/2023    GRAN 64.7 02/03/2023    LYMPH 2.3 02/03/2023    LYMPH 23.0 02/03/2023    MONO 1.0 02/03/2023    MONO 9.5 02/03/2023    BASO 0.11 02/03/2023    BASO 0.12 11/15/2022    NRBC 0 02/03/2023    NRBC 0 11/15/2022       CHEMISTRY & LIVER FUNCTION - LAST 2  Lab Results   Component Value Date     02/03/2023     01/25/2023    K 3.9 02/03/2023    K 4.4 01/25/2023     02/03/2023     01/25/2023    CO2 23 02/03/2023    CO2 24 01/25/2023    ANIONGAP 10 02/03/2023    ANIONGAP 9 01/25/2023    BUN 14 02/03/2023    BUN 12 01/25/2023    CREATININE 1.14 02/03/2023    CREATININE 1.02 01/25/2023     (H) 02/03/2023     (H) 01/25/2023    CALCIUM 9.8 02/03/2023    CALCIUM 9.6 01/25/2023    MG 2.1 02/03/2023    MG 1.7 01/25/2023    ALBUMIN 4.9 02/03/2023    ALBUMIN 4.2 01/25/2023    PROT 8.5 (H) 02/03/2023    PROT 7.4 01/04/2023    ALKPHOS 101 02/03/2023    ALKPHOS 106 01/04/2023    ALT  41 (H) 02/03/2023    ALT 29 01/04/2023    AST 78 (H) 02/03/2023    AST 54 (H) 01/04/2023    BILITOT 0.4 02/03/2023    BILITOT 0.4 01/04/2023        CARDIAC PROFILE - LAST 2  Lab Results   Component Value Date     (H) 06/06/2012     08/01/2018    TROPONINI <0.012 02/03/2023    TROPONINI <0.012 11/08/2021        COAGULATION - LAST 2  No results found for: LABPT, INR, APTT    ENDOCRINE & PSA - LAST 2  Lab Results   Component Value Date    HGBA1C 8.4 (H) 01/25/2023    HGBA1C 8.8 (H) 01/04/2023    TSH 3.052 01/04/2023    TSH 3.580 01/07/2022        ECHOCARDIOGRAM RESULTS  Results for orders placed during the hospital encounter of 01/12/21    Echo Color Flow Doppler? Yes    Interpretation Summary  · Concentric hypertrophy and normal systolic function. The estimated ejection fraction is 60%  · Grade I left ventricular diastolic dysfunction.  · Normal right ventricular size with normal right ventricular systolic function.  · Mild tricuspid regurgitation.  · Normal central venous pressure (3 mmHg).  · The estimated PA systolic pressure is 35 mmHg.      CURRENT/PREVIOUS VISIT EKG  Results for orders placed or performed during the hospital encounter of 02/03/23   EKG 12-lead    Collection Time: 02/03/23  3:19 PM    Narrative    Test Reason : R00.2,    Vent. Rate : 079 BPM     Atrial Rate : 079 BPM     P-R Int : 180 ms          QRS Dur : 104 ms      QT Int : 430 ms       P-R-T Axes : 056 -14 018 degrees     QTc Int : 493 ms    Normal sinus rhythm  Prolonged QT  Abnormal ECG  When compared with ECG of 08-NOV-2021 03:01,  No significant change was found  Confirmed by Valdemar Gonzalez MD ([id number]) on 2/4/2023 9:27:31 AM    Referred By: [email or web address]   SELF           Confirmed By:Valdemar Gonzalez MD     No valid procedures specified.   Results for orders placed during the hospital encounter of 06/23/21    Nuclear Stress - Cardiology Interpreted    Interpretation Summary    Normal myocardial perfusion scan. There is no evidence of  myocardial ischemia or infarction.    The gated perfusion images showed an ejection fraction of 56% at rest.    The EKG portion of this study is negative for ischemia.    No valid procedures specified.    PHYSICAL EXAM  CONSTITUTIONAL: Well built, well nourished in no apparent distress  NECK: no carotid bruit, no JVD  LUNGS: CTA  CHEST WALL: no tenderness  HEART: regular rate and rhythm, S1, S2 normal, no murmur, click, rub or gallop   ABDOMEN: soft, non-tender; bowel sounds normal; no masses,  no organomegaly  EXTREMITIES: Extremities normal, no edema, no calf tenderness noted  NEURO: AAO X 3    I HAVE REVIEWED :    The vital signs, nurses notes, and all the pertinent radiology and labs.    Current Outpatient Medications   Medication Instructions    amLODIPine (NORVASC) 5 mg, Oral, Daily    aspirin (ECOTRIN) 81 mg, Oral, Nightly,      atenoloL (TENORMIN) 100 mg, Oral, Nightly    blood sugar diagnostic Strp 1 strip, Misc.(Non-Drug; Combo Route), 3 times daily    blood-glucose meter (ONETOUCH ULTRA2 METER) Misc USE AS DIRECTED    celecoxib (CELEBREX) 100 mg, Oral, 2 times daily    cholecalciferol, vitamin D3, (VITAMIN D3) 2,000 unit Cap 1 capsule, Oral, 2 times daily    clonazePAM (KLONOPIN) 0.5 mg, Oral, 2 times daily PRN    cloNIDine (CATAPRES) 0.1 mg, Oral, Nightly    donepeziL (ARICEPT) 5 mg, Oral, Nightly    fenofibrate micronized (LOFIBRA) 200 MG Cap TAKE 1 CAPSULE DAILY    gabapentin (NEURONTIN) 300 mg, Oral, 3 times daily    glimepiride (AMARYL) 4 MG tablet Take 1 tablet (4 mg total) by mouth before breakfast    insulin glargine, TOUJEO, (TOUJEO SOLOSTAR U-300 INSULIN) 300 unit/mL (1.5 mL) InPn pen INJECT 30 UNITS UNDER THE SKIN EVERY EVENING    insulin lispro (HUMALOG KWIKPEN INSULIN) 100 unit/mL pen Inject before meals 3x/day, per sliding scale. Max TDD 30u    lancets 33 gauge Misc Test blood sugar 3x/day. Dx code E11.22    latanoprost 0.005 % ophthalmic solution 1 drop, Both Eyes, Nightly    levothyroxine  "(SYNTHROID) 75 MCG tablet TAKE 1 TABLET BEFORE BREAKFAST    LIDOcaine (LIDODERM) 5 % No dose, route, or frequency recorded.    metFORMIN (GLUCOPHAGE-XR) 500 MG ER 24hr tablet TAKE 2 TABLETS DAILY WITH BREAKFAST    NOVOLOG FLEXPEN U-100 INSULIN 100 unit/mL (3 mL) InPn pen Subcutaneous, 3 times daily    olmesartan (BENICAR) 40 mg, Oral, Daily    omeprazole (PRILOSEC) 40 mg, Oral, 2 times daily before meals    ONETOUCH DELICA PLUS LANCET 33 gauge Misc USE 1 TO CHECK GLUCOSE THREE TIMES DAILY AS NEEDED    orphenadrine (NORFLEX) 100 mg, Oral, 2 times daily PRN    pen needle, diabetic (BD ULTRA-FINE DARIO PEN NEEDLE) 32 gauge x 5/32" Ndle USE 4X DAILY WITH INSULIN    pioglitazone (ACTOS) 15 mg, Oral, Daily    rosuvastatin (CRESTOR) 5 MG tablet TAKE 1 TABLET EVERY OTHER DAY    semaglutide (OZEMPIC) 0.25 mg or 0.5 mg(2 mg/1.5 mL) pen injector Inject 0.25 mg into the skin every 7 days. After 4 weeks, increase dose to 0.5 mg every 7 days.    sertraline (ZOLOFT) 50 mg, Oral, Nightly        Assessment & Plan     Hypertension  /73   Continue current cardiac medications     Palpitations  On magox at home   Continue atenolol   [place]udle holter placed by  [facility]    Diastolic dysfunction  Euvolemic on exam today       Dyslipidemia associated with type 2 diabetes mellitus  Continue statin     Other chest pain  Echo and nuclear stress already ordered by [facility]- will schedule at our clinic           Follow up Dr. Henson after testing             "

## 2023-03-10 ENCOUNTER — CLINICAL SUPPORT (OUTPATIENT)
Dept: CARDIOLOGY | Facility: HOSPITAL | Age: 68
End: 2023-03-10
Attending: PHYSICIAN ASSISTANT
Payer: MEDICARE

## 2023-03-10 VITALS — BODY MASS INDEX: 34.82 KG/M2 | HEIGHT: 65 IN | WEIGHT: 209 LBS | HEART RATE: 79 BPM

## 2023-03-10 DIAGNOSIS — I27.20 PULMONARY HTN: ICD-10-CM

## 2023-03-10 LAB
ASCENDING AORTA: 3.22 CM
AV INDEX (PROSTH): 0.84
AV MEAN GRADIENT: 6 MMHG
AV PEAK GRADIENT: 12 MMHG
AV VALVE AREA: 2.7 CM2
AV VELOCITY RATIO: 0.68
BSA FOR ECHO PROCEDURE: 2.08 M2
CV ECHO LV RWT: 0.63 CM
DOP CALC AO PEAK VEL: 1.7 M/S
DOP CALC AO VTI: 29.4 CM
DOP CALC LVOT AREA: 3.2 CM2
DOP CALC LVOT DIAMETER: 2.02 CM
DOP CALC LVOT PEAK VEL: 1.16 M/S
DOP CALC LVOT STROKE VOLUME: 79.44 CM3
DOP CALCLVOT PEAK VEL VTI: 24.8 CM
E WAVE DECELERATION TIME: 150.92 MSEC
E/A RATIO: 0.67
E/E' RATIO: 11.38 M/S
ECHO LV POSTERIOR WALL: 1.4 CM (ref 0.6–1.1)
EJECTION FRACTION: 55 %
FRACTIONAL SHORTENING: 27 % (ref 28–44)
INTERVENTRICULAR SEPTUM: 1.68 CM (ref 0.6–1.1)
IVRT: 137.01 MSEC
LA MAJOR: 5.1 CM
LA MINOR: 5.07 CM
LA WIDTH: 3.8 CM
LEFT ATRIUM SIZE: 4.2 CM
LEFT ATRIUM VOLUME INDEX: 34.1 ML/M2
LEFT ATRIUM VOLUME: 68.98 CM3
LEFT INTERNAL DIMENSION IN SYSTOLE: 3.21 CM (ref 2.1–4)
LEFT VENTRICLE DIASTOLIC VOLUME INDEX: 43.96 ML/M2
LEFT VENTRICLE DIASTOLIC VOLUME: 88.8 ML
LEFT VENTRICLE MASS INDEX: 138 G/M2
LEFT VENTRICLE SYSTOLIC VOLUME INDEX: 20.5 ML/M2
LEFT VENTRICLE SYSTOLIC VOLUME: 41.41 ML
LEFT VENTRICULAR INTERNAL DIMENSION IN DIASTOLE: 4.42 CM (ref 3.5–6)
LEFT VENTRICULAR MASS: 279.76 G
LV LATERAL E/E' RATIO: 10.11 M/S
LV SEPTAL E/E' RATIO: 13 M/S
LVOT MG: 2.82 MMHG
LVOT MV: 0.78 CM/S
MV PEAK A VEL: 1.36 M/S
MV PEAK E VEL: 0.91 M/S
MV STENOSIS PRESSURE HALF TIME: 43.77 MS
MV VALVE AREA P 1/2 METHOD: 5.03 CM2
PISA MRMAX VEL: 5.38 M/S
PISA TR MAX VEL: 2.91 M/S
PULM VEIN S/D RATIO: 1.69
PV PEAK D VEL: 0.49 M/S
PV PEAK S VEL: 0.83 M/S
RA MAJOR: 5.15 CM
RA PRESSURE: 3 MMHG
RIGHT VENTRICULAR END-DIASTOLIC DIMENSION: 3.25 CM
RIGHT VENTRICULAR LENGTH IN DIASTOLE (APICAL 4-CHAMBER VIEW): 6.3 CM
RV MID DIAMA: 31.93 CM
RV TISSUE DOPPLER FREE WALL SYSTOLIC VELOCITY 1 (APICAL 4 CHAMBER VIEW): 0.01 CM/S
SINUS: 3.13 CM
STJ: 3 CM
TDI LATERAL: 0.09 M/S
TDI SEPTAL: 0.07 M/S
TDI: 0.08 M/S
TR MAX PG: 34 MMHG
TRICUSPID ANNULAR PLANE SYSTOLIC EXCURSION: 2.88 CM
TV REST PULMONARY ARTERY PRESSURE: 37 MMHG

## 2023-03-10 PROCEDURE — 93306 TTE W/DOPPLER COMPLETE: CPT | Mod: 26,,, | Performed by: INTERNAL MEDICINE

## 2023-03-10 PROCEDURE — 93306 TTE W/DOPPLER COMPLETE: CPT | Mod: PO

## 2023-03-10 PROCEDURE — 93306 ECHO (CUPID ONLY): ICD-10-PCS | Mod: 26,,, | Performed by: INTERNAL MEDICINE

## 2023-03-13 ENCOUNTER — PATIENT MESSAGE (OUTPATIENT)
Dept: CARDIOLOGY | Facility: CLINIC | Age: 68
End: 2023-03-13
Payer: MEDICARE

## 2023-03-14 ENCOUNTER — PES CALL (OUTPATIENT)
Dept: ADMINISTRATIVE | Facility: CLINIC | Age: 68
End: 2023-03-14
Payer: MEDICARE

## 2023-03-23 ENCOUNTER — PES CALL (OUTPATIENT)
Dept: ADMINISTRATIVE | Facility: CLINIC | Age: 68
End: 2023-03-23
Payer: MEDICARE

## 2023-03-27 ENCOUNTER — PATIENT OUTREACH (OUTPATIENT)
Dept: ADMINISTRATIVE | Facility: HOSPITAL | Age: 68
End: 2023-03-27
Payer: MEDICARE

## 2023-03-27 NOTE — PROGRESS NOTES
UNCONTROLLED A1C REPORT.  PATIENT HAS AN UPCOMING LAB APPOINTMENT.  CHART REVIEWED;  LABS ORDERED AND LINKED WHAT IS NEEDED    Hemoglobin A1C   Date Value Ref Range Status   01/25/2023 8.4 (H) 0.0 - 5.6 % Final     Comment:     Reference Interval:  5.0 - 5.6 Normal   5.7 - 6.4 High Risk   > 6.5 Diabetic      Hgb A1c results are standardized based on the (NGSP) National   Glycohemoglobin Standardization Program.      Hemoglobin A1C levels are related to mean serum/plasma glucose   during the preceding 2-3 months.        01/04/2023 8.8 (H) 4.0 - 5.6 % Final     Comment:     ADA Screening Guidelines:  5.7-6.4%  Consistent with prediabetes  >or=6.5%  Consistent with diabetes    High levels of fetal hemoglobin interfere with the HbA1C  assay. Heterozygous hemoglobin variants (HbS, HgC, etc)do  not significantly interfere with this assay.   However, presence of multiple variants may affect accuracy.     08/26/2022 7.2 (H) 4.0 - 5.6 % Final     Comment:     ADA Screening Guidelines:  5.7-6.4%  Consistent with prediabetes  >or=6.5%  Consistent with diabetes    High levels of fetal hemoglobin interfere with the HbA1C  assay. Heterozygous hemoglobin variants (HbS, HgC, etc)do  not significantly interfere with this assay.   However, presence of multiple variants may affect accuracy.

## 2023-04-06 ENCOUNTER — TELEPHONE (OUTPATIENT)
Dept: CARDIOLOGY | Facility: CLINIC | Age: 68
End: 2023-04-06
Payer: MEDICARE

## 2023-04-06 NOTE — TELEPHONE ENCOUNTER
REC FAX FROM DR. MESSINA REQUESTING CLEARANCE FOR CERVICAL VITOR UNDER MAC ANESTHESIA AND TO HOLD ASA X 5 DAYS PRIOR    PT HAS AN APPT W/ DR. CUELLAR 6/15, PROCEDURE SCHEDULED 5/15  THX

## 2023-04-11 ENCOUNTER — PATIENT MESSAGE (OUTPATIENT)
Dept: ADMINISTRATIVE | Facility: HOSPITAL | Age: 68
End: 2023-04-11
Payer: MEDICARE

## 2023-04-11 ENCOUNTER — OFFICE VISIT (OUTPATIENT)
Dept: ENDOCRINOLOGY | Facility: CLINIC | Age: 68
End: 2023-04-11
Payer: MEDICARE

## 2023-04-11 DIAGNOSIS — I10 ESSENTIAL HYPERTENSION: ICD-10-CM

## 2023-04-11 DIAGNOSIS — K76.0 FATTY LIVER: ICD-10-CM

## 2023-04-11 DIAGNOSIS — Z79.4 TYPE 2 DIABETES MELLITUS WITH DIABETIC NEUROPATHY, WITH LONG-TERM CURRENT USE OF INSULIN: ICD-10-CM

## 2023-04-11 DIAGNOSIS — I51.89 DIASTOLIC DYSFUNCTION: ICD-10-CM

## 2023-04-11 DIAGNOSIS — E11.40 TYPE 2 DIABETES MELLITUS WITH DIABETIC NEUROPATHY, WITH LONG-TERM CURRENT USE OF INSULIN: ICD-10-CM

## 2023-04-11 DIAGNOSIS — E78.5 HYPERLIPIDEMIA, UNSPECIFIED HYPERLIPIDEMIA TYPE: ICD-10-CM

## 2023-04-11 DIAGNOSIS — N18.31 TYPE 2 DIABETES MELLITUS WITH STAGE 3A CHRONIC KIDNEY DISEASE, WITH LONG-TERM CURRENT USE OF INSULIN: Primary | ICD-10-CM

## 2023-04-11 DIAGNOSIS — E03.4 HYPOTHYROIDISM DUE TO ACQUIRED ATROPHY OF THYROID: ICD-10-CM

## 2023-04-11 DIAGNOSIS — Z79.4 TYPE 2 DIABETES MELLITUS WITH STAGE 3A CHRONIC KIDNEY DISEASE, WITH LONG-TERM CURRENT USE OF INSULIN: Primary | ICD-10-CM

## 2023-04-11 DIAGNOSIS — E11.22 TYPE 2 DIABETES MELLITUS WITH STAGE 3A CHRONIC KIDNEY DISEASE, WITH LONG-TERM CURRENT USE OF INSULIN: Primary | ICD-10-CM

## 2023-04-11 PROCEDURE — 4010F PR ACE/ARB THEARPY RXD/TAKEN: ICD-10-PCS | Mod: CPTII,95,, | Performed by: NURSE PRACTITIONER

## 2023-04-11 PROCEDURE — 3066F NEPHROPATHY DOC TX: CPT | Mod: CPTII,95,, | Performed by: NURSE PRACTITIONER

## 2023-04-11 PROCEDURE — 3066F PR DOCUMENTATION OF TREATMENT FOR NEPHROPATHY: ICD-10-PCS | Mod: CPTII,95,, | Performed by: NURSE PRACTITIONER

## 2023-04-11 PROCEDURE — 3061F NEG MICROALBUMINURIA REV: CPT | Mod: CPTII,95,, | Performed by: NURSE PRACTITIONER

## 2023-04-11 PROCEDURE — 1160F RVW MEDS BY RX/DR IN RCRD: CPT | Mod: CPTII,95,, | Performed by: NURSE PRACTITIONER

## 2023-04-11 PROCEDURE — 4010F ACE/ARB THERAPY RXD/TAKEN: CPT | Mod: CPTII,95,, | Performed by: NURSE PRACTITIONER

## 2023-04-11 PROCEDURE — 1160F PR REVIEW ALL MEDS BY PRESCRIBER/CLIN PHARMACIST DOCUMENTED: ICD-10-PCS | Mod: CPTII,95,, | Performed by: NURSE PRACTITIONER

## 2023-04-11 PROCEDURE — 99214 OFFICE O/P EST MOD 30 MIN: CPT | Mod: 95,,, | Performed by: NURSE PRACTITIONER

## 2023-04-11 PROCEDURE — 1159F PR MEDICATION LIST DOCUMENTED IN MEDICAL RECORD: ICD-10-PCS | Mod: CPTII,95,, | Performed by: NURSE PRACTITIONER

## 2023-04-11 PROCEDURE — 1159F MED LIST DOCD IN RCRD: CPT | Mod: CPTII,95,, | Performed by: NURSE PRACTITIONER

## 2023-04-11 PROCEDURE — 3044F HG A1C LEVEL LT 7.0%: CPT | Mod: CPTII,95,, | Performed by: NURSE PRACTITIONER

## 2023-04-11 PROCEDURE — 99214 PR OFFICE/OUTPT VISIT, EST, LEVL IV, 30-39 MIN: ICD-10-PCS | Mod: 95,,, | Performed by: NURSE PRACTITIONER

## 2023-04-11 PROCEDURE — 3044F PR MOST RECENT HEMOGLOBIN A1C LEVEL <7.0%: ICD-10-PCS | Mod: CPTII,95,, | Performed by: NURSE PRACTITIONER

## 2023-04-11 PROCEDURE — 3061F PR NEG MICROALBUMINURIA RESULT DOCUMENTED/REVIEW: ICD-10-PCS | Mod: CPTII,95,, | Performed by: NURSE PRACTITIONER

## 2023-04-11 NOTE — PROGRESS NOTES
The patient location is:  Patient Home   The chief complaint leading to consultation is: Type 2 DM  Visit type: Virtual visit with synchronous audio and video  Total time spent with patient: 15 min  Each patient to whom he or she provides medical services by telemedicine is:  (1) informed of the relationship between the physician and patient and the respective role of any other health care provider with respect to management of the patient; and (2) notified that he or she may decline to receive medical services by telemedicine and may withdraw from such care at any time.       CC: Ms. Bing Kearns arrives today for management of Type 2 DM and review of chronic medical conditions, as listed in the Visit Diagnosis section of this encounter.       HPI: Ms. Bing Kearns was diagnosed with Type 2 DM in her late 50s. She was diagnosed based on lab work. Initial treatment consisted of metformin. Januvia was added in . Toujeo was added in 2020. + FH of DM in sister, maternal GF. Denies hospitalizations due to DM.   Follows with Dr. Vanessa for CKD.     Patient was last seen by me in January. Ozempic was resumed at that time. She had previously stopped, due to cost. She has been able to stop the glimepiride, due to hypoglycemia that was occurring after resuming Ozempic.     BG readings are checked 2x/day.  She reads the following from her log:  Fastins   Dinner:     Hypoglycemia: Not since stopping glimepiride   Hypoglycemic Symptoms: hunger and jitteriness  Hypoglycemia Treatment: juice or glucose tabs    Missing Insulin/PO medication doses: No    Dietary Habits: Eats 3 meals/day. Occasional snack. Avoids sugary beverages.     Last DM education appointment: not recently      CURRENT DIABETIC MEDS: metformin XR 1000 mg daily, pioglitazone 15 mg daily, Ozempic 0.5 mg weekly, Toujeo 30 units QHS, Humalog sliding scale, target 150, ISF 25  Glucometer type: One Touch Ultra 2    Previous DM  treatments:  Trulicity - panic attacks, blood sugars in the 70s  Januvia - $$  Prandin - discontinued since starting Ozempic  glimepiride    Last Eye Exam: 2/8/2022, no DR. + glaucoma. Dr. Turcios. Has appt schedules in June.   Last Podiatry Exam: 2/2023, Dr. Helms.     REVIEW OF SYSTEMS  Constitutional: no c/o fatigue, weakness. + 5# weight loss since starting Ozempic.  Cardiac: no palpitations, chest pain   Respiratory: no cough. + chronic dyspnea.  GI: no c/o abdominal pain. Denies h/o pancreatitis. Reports occasional queasiness a few days after taking Ozempic. This is mild and tolerable.    Neuro: + numbness, tingling in feet  Endocrine: denies polyphagia, polydipsia, polyuria       Personally reviewed Past Medical, Surgical, Social History.    Vital Signs  LMP  (LMP Unknown)  -- virtual visit     Personally reviewed the below labs:    Hemoglobin A1C   Date Value Ref Range Status   04/03/2023 6.7 (H) 0.0 - 5.6 % Final     Comment:     Reference Interval:  5.0 - 5.6 Normal   5.7 - 6.4 High Risk   > 6.5 Diabetic      Hgb A1c results are standardized based on the (NGSP) National   Glycohemoglobin Standardization Program.      Hemoglobin A1C levels are related to mean serum/plasma glucose   during the preceding 2-3 months.        01/25/2023 8.4 (H) 0.0 - 5.6 % Final     Comment:     Reference Interval:  5.0 - 5.6 Normal   5.7 - 6.4 High Risk   > 6.5 Diabetic      Hgb A1c results are standardized based on the (NGSP) National   Glycohemoglobin Standardization Program.      Hemoglobin A1C levels are related to mean serum/plasma glucose   during the preceding 2-3 months.        01/04/2023 8.8 (H) 4.0 - 5.6 % Final     Comment:     ADA Screening Guidelines:  5.7-6.4%  Consistent with prediabetes  >or=6.5%  Consistent with diabetes    High levels of fetal hemoglobin interfere with the HbA1C  assay. Heterozygous hemoglobin variants (HbS, HgC, etc)do  not significantly interfere with this assay.   However, presence of  multiple variants may affect accuracy.         Chemistry        Component Value Date/Time     04/03/2023 1227    K 4.2 04/03/2023 1227     04/03/2023 1227    CO2 24 04/03/2023 1227    BUN 14 04/03/2023 1227    CREATININE 1.17 04/03/2023 1227     (H) 04/03/2023 1227        Component Value Date/Time    CALCIUM 9.2 04/03/2023 1227    ALKPHOS 88 04/03/2023 1227    AST 44 (H) 04/03/2023 1227    ALT 34 04/03/2023 1227    BILITOT 0.3 04/03/2023 1227    ESTGFRAFRICA >60.0 05/20/2022 0935    EGFRNONAA 52.1 (A) 05/20/2022 0935          Lab Results   Component Value Date    CHOL 167 01/04/2023    CHOL 163 01/07/2022    CHOL 162 02/24/2021     Lab Results   Component Value Date    HDL 43 01/04/2023    HDL 46 01/07/2022    HDL 54 02/24/2021     Lab Results   Component Value Date    LDLCALC 90.8 01/04/2023    LDLCALC 88.2 01/07/2022    LDLCALC 83.2 02/24/2021     Lab Results   Component Value Date    TRIG 166 (H) 01/04/2023    TRIG 144 01/07/2022    TRIG 124 02/24/2021     Lab Results   Component Value Date    CHOLHDL 25.7 01/04/2023    CHOLHDL 28.2 01/07/2022    CHOLHDL 33.3 02/24/2021       Lab Results   Component Value Date    MICALBCREAT 22.9 01/25/2023     Lab Results   Component Value Date    TSH 3.052 01/04/2023       CrCl cannot be calculated (Patient's most recent lab result is older than the maximum 7 days allowed.).    Vit D, 25-Hydroxy   Date Value Ref Range Status   10/11/2022 86 30 - 96 ng/mL Final     Comment:     Vitamin D deficiency.........<10 ng/mL                              Vitamin D insufficiency......10-29 ng/mL       Vitamin D sufficiency........> or equal to 30 ng/mL  Vitamin D toxicity............>100 ng/mL           PHYSICAL EXAMINATION  Deferred -- video visit.      Goals        HEMOGLOBIN A1C < 7.5                 Assessment/Plan  1. Type 2 diabetes mellitus with stage 3a chronic kidney disease, with long-term current use of insulin -- Controlled. No changes today. Advised her to  notify me if nausea worsend.   -- continue pioglitazone, metformin XR, Ozempic, Toujeo  -- BG monitoring 2x/day. Notify me if BG < 80 with any pattern.     -- Discussed diagnosis of DM, A1c goals, progression of disease, long term complications and tx options.  -- takes aspirin, ARB   2. Type 2 diabetes mellitus with diabetic neuropathy, with long-term current use of insulin -- optimize DM control   3. Essential hypertension  -- managed by cardiology  -- continue current meds and monitor   4. Hyperlipidemia, unspecified hyperlipidemia type  -- uncontrolled with elevated TRG  -- continue Crestor   5. Hypothyroidism due to acquired atrophy of thyroid  -- stable  -- continue current levothyroxine dose.    6. Diastolic dysfunction  -- following with cardiology.   -- will need to monitor status since patient is taking pioglitazone   7. Fatty liver -- maintain DM control  -- Ozempic may add benefit       FOLLOW UP  Follow up in about 4 months (around 8/11/2023).  Patient instructed to bring BG logs to each follow up   Patient encouraged to call for any BG/medication issues, concerns, or questions.      Orders Placed This Encounter   Procedures    Hemoglobin A1C    Basic Metabolic Panel

## 2023-04-25 DIAGNOSIS — Z79.4 TYPE 2 DIABETES MELLITUS WITH STAGE 3A CHRONIC KIDNEY DISEASE, WITH LONG-TERM CURRENT USE OF INSULIN: Primary | ICD-10-CM

## 2023-04-25 DIAGNOSIS — N18.31 TYPE 2 DIABETES MELLITUS WITH STAGE 3A CHRONIC KIDNEY DISEASE, WITH LONG-TERM CURRENT USE OF INSULIN: Primary | ICD-10-CM

## 2023-04-25 DIAGNOSIS — E11.22 TYPE 2 DIABETES MELLITUS WITH STAGE 3A CHRONIC KIDNEY DISEASE, WITH LONG-TERM CURRENT USE OF INSULIN: Primary | ICD-10-CM

## 2023-04-25 RX ORDER — SEMAGLUTIDE 0.68 MG/ML
0.5 INJECTION, SOLUTION SUBCUTANEOUS
Qty: 9 ML | Refills: 3 | Status: SHIPPED | OUTPATIENT
Start: 2023-04-25 | End: 2023-05-17 | Stop reason: SDUPTHER

## 2023-05-09 ENCOUNTER — TELEPHONE (OUTPATIENT)
Dept: PHARMACY | Facility: CLINIC | Age: 68
End: 2023-05-09
Payer: MEDICARE

## 2023-05-09 ENCOUNTER — TELEPHONE (OUTPATIENT)
Dept: ENDOCRINOLOGY | Facility: CLINIC | Age: 68
End: 2023-05-09

## 2023-05-09 ENCOUNTER — OFFICE VISIT (OUTPATIENT)
Dept: ENDOCRINOLOGY | Facility: CLINIC | Age: 68
End: 2023-05-09
Payer: MEDICARE

## 2023-05-09 DIAGNOSIS — I10 ESSENTIAL HYPERTENSION: ICD-10-CM

## 2023-05-09 DIAGNOSIS — Z79.4 TYPE 2 DIABETES MELLITUS WITH STAGE 3A CHRONIC KIDNEY DISEASE, WITH LONG-TERM CURRENT USE OF INSULIN: Primary | ICD-10-CM

## 2023-05-09 DIAGNOSIS — I51.89 DIASTOLIC DYSFUNCTION: ICD-10-CM

## 2023-05-09 DIAGNOSIS — E78.5 HYPERLIPIDEMIA, UNSPECIFIED HYPERLIPIDEMIA TYPE: ICD-10-CM

## 2023-05-09 DIAGNOSIS — E03.4 HYPOTHYROIDISM DUE TO ACQUIRED ATROPHY OF THYROID: ICD-10-CM

## 2023-05-09 DIAGNOSIS — Z79.4 TYPE 2 DIABETES MELLITUS WITH DIABETIC NEUROPATHY, WITH LONG-TERM CURRENT USE OF INSULIN: ICD-10-CM

## 2023-05-09 DIAGNOSIS — N18.31 TYPE 2 DIABETES MELLITUS WITH STAGE 3A CHRONIC KIDNEY DISEASE, WITH LONG-TERM CURRENT USE OF INSULIN: Primary | ICD-10-CM

## 2023-05-09 DIAGNOSIS — E11.40 TYPE 2 DIABETES MELLITUS WITH DIABETIC NEUROPATHY, WITH LONG-TERM CURRENT USE OF INSULIN: ICD-10-CM

## 2023-05-09 DIAGNOSIS — E11.22 TYPE 2 DIABETES MELLITUS WITH STAGE 3A CHRONIC KIDNEY DISEASE, WITH LONG-TERM CURRENT USE OF INSULIN: Primary | ICD-10-CM

## 2023-05-09 DIAGNOSIS — K76.0 FATTY LIVER: ICD-10-CM

## 2023-05-09 PROCEDURE — 1159F PR MEDICATION LIST DOCUMENTED IN MEDICAL RECORD: ICD-10-PCS | Mod: CPTII,95,, | Performed by: NURSE PRACTITIONER

## 2023-05-09 PROCEDURE — 3066F NEPHROPATHY DOC TX: CPT | Mod: CPTII,95,, | Performed by: NURSE PRACTITIONER

## 2023-05-09 PROCEDURE — 1160F RVW MEDS BY RX/DR IN RCRD: CPT | Mod: CPTII,95,, | Performed by: NURSE PRACTITIONER

## 2023-05-09 PROCEDURE — 3044F HG A1C LEVEL LT 7.0%: CPT | Mod: CPTII,95,, | Performed by: NURSE PRACTITIONER

## 2023-05-09 PROCEDURE — 4010F ACE/ARB THERAPY RXD/TAKEN: CPT | Mod: CPTII,95,, | Performed by: NURSE PRACTITIONER

## 2023-05-09 PROCEDURE — 1160F PR REVIEW ALL MEDS BY PRESCRIBER/CLIN PHARMACIST DOCUMENTED: ICD-10-PCS | Mod: CPTII,95,, | Performed by: NURSE PRACTITIONER

## 2023-05-09 PROCEDURE — 3061F NEG MICROALBUMINURIA REV: CPT | Mod: CPTII,95,, | Performed by: NURSE PRACTITIONER

## 2023-05-09 PROCEDURE — 99213 OFFICE O/P EST LOW 20 MIN: CPT | Mod: 95,,, | Performed by: NURSE PRACTITIONER

## 2023-05-09 PROCEDURE — 99213 PR OFFICE/OUTPT VISIT, EST, LEVL III, 20-29 MIN: ICD-10-PCS | Mod: 95,,, | Performed by: NURSE PRACTITIONER

## 2023-05-09 PROCEDURE — 3044F PR MOST RECENT HEMOGLOBIN A1C LEVEL <7.0%: ICD-10-PCS | Mod: CPTII,95,, | Performed by: NURSE PRACTITIONER

## 2023-05-09 PROCEDURE — 1159F MED LIST DOCD IN RCRD: CPT | Mod: CPTII,95,, | Performed by: NURSE PRACTITIONER

## 2023-05-09 PROCEDURE — 4010F PR ACE/ARB THEARPY RXD/TAKEN: ICD-10-PCS | Mod: CPTII,95,, | Performed by: NURSE PRACTITIONER

## 2023-05-09 PROCEDURE — 3066F PR DOCUMENTATION OF TREATMENT FOR NEPHROPATHY: ICD-10-PCS | Mod: CPTII,95,, | Performed by: NURSE PRACTITIONER

## 2023-05-09 PROCEDURE — 3061F PR NEG MICROALBUMINURIA RESULT DOCUMENTED/REVIEW: ICD-10-PCS | Mod: CPTII,95,, | Performed by: NURSE PRACTITIONER

## 2023-05-09 NOTE — TELEPHONE ENCOUNTER
I have reached out to Bing Kearns to inform her of the [employer] application process for Ozempic, Tuojeo and Novolog and whats required to apply.  Bing Kearns did not answer. I left a voicemail and mailed a letter introducing her to the pharmacy patient assistance program. I will follow up in 5 business days.

## 2023-05-09 NOTE — PROGRESS NOTES
The patient location is:  Patient Home   The chief complaint leading to consultation is: Type 2 DM  Visit type: Virtual visit with synchronous audio and video  Total time spent with patient: 15 min  Each patient to whom he or she provides medical services by telemedicine is:  (1) informed of the relationship between the physician and patient and the respective role of any other health care provider with respect to management of the patient; and (2) notified that he or she may decline to receive medical services by telemedicine and may withdraw from such care at any time.       CC: Ms. Bing Kearns arrives today for management of Type 2 DM and review of chronic medical conditions, as listed in the Visit Diagnosis section of this encounter.       HPI: Ms. Bing Kearns was diagnosed with Type 2 DM in her late 50s. She was diagnosed based on lab work. Initial treatment consisted of metformin. Januvia was added in . Toujeo was added in 2020. + FH of DM in sister, maternal GF. Denies hospitalizations due to DM.   Follows with Dr. Vanessa for CKD.     Patient was last seen by me in April.     Today, she scheduled an urgent visit. She states that she is in the donut hole with Ozempic. Was quoted near $700. Since , she chose to decrease Ozempic to 0.25 mg weekly in an effort to stretch her remaining supply. She also decided to add back her old glimepiride (4 mg) but this caused hypoglycemia when she would take this. She then stopped glimepiride.  Since doing so, her blood sugars have been better controlled.     BG readings are checked 2x/day.  She reads the following from her log:  Fastin-106  Dinner: 140s    Hypoglycemia: Not since stopping glimepiride   Hypoglycemic Symptoms: hunger and jitteriness  Hypoglycemia Treatment: juice or glucose tabs    Missing Insulin/PO medication doses: No    Dietary Habits: Eats 3 meals/day. Occasional snack. Avoids sugary beverages.     Last DM education  appointment: not recently      CURRENT DIABETIC MEDS: metformin XR 1000 mg daily, pioglitazone 15 mg daily, Ozempic 0.25 mg weekly, Toujeo 22 units QHS, Humalog sliding scale, target 150, ISF 25  Glucometer type: One Touch Ultra 2    Previous DM treatments:  Trulicity - panic attacks, blood sugars in the 70s  Januvia - $$  Prandin - discontinued since starting Ozempic  Glimepiride - hypoglycemia     Last Eye Exam: 2/8/2022, no DR. + glaucoma. Dr. Turcios. Has appt scheduled in June.   Last Podiatry Exam: 2/2023, Dr. Helms.     REVIEW OF SYSTEMS  Constitutional: no c/o fatigue, weakness. + weight loss since starting Ozempic.  Cardiac: no palpitations, chest pain   Respiratory: no cough. + chronic dyspnea.  GI: no c/o abdominal pain, nausea. Denies h/o pancreatitis.  Neuro: + numbness, tingling in feet  Endocrine: denies polyphagia, polydipsia, polyuria       Personally reviewed Past Medical, Surgical, Social History.    Vital Signs  LMP  (LMP Unknown)  -- virtual visit     Personally reviewed the below labs:    Hemoglobin A1C   Date Value Ref Range Status   04/03/2023 6.7 (H) 0.0 - 5.6 % Final     Comment:     Reference Interval:  5.0 - 5.6 Normal   5.7 - 6.4 High Risk   > 6.5 Diabetic      Hgb A1c results are standardized based on the (NGSP) National   Glycohemoglobin Standardization Program.      Hemoglobin A1C levels are related to mean serum/plasma glucose   during the preceding 2-3 months.        01/25/2023 8.4 (H) 0.0 - 5.6 % Final     Comment:     Reference Interval:  5.0 - 5.6 Normal   5.7 - 6.4 High Risk   > 6.5 Diabetic      Hgb A1c results are standardized based on the (NGSP) National   Glycohemoglobin Standardization Program.      Hemoglobin A1C levels are related to mean serum/plasma glucose   during the preceding 2-3 months.        01/04/2023 8.8 (H) 4.0 - 5.6 % Final     Comment:     ADA Screening Guidelines:  5.7-6.4%  Consistent with prediabetes  >or=6.5%  Consistent with diabetes    High levels  of fetal hemoglobin interfere with the HbA1C  assay. Heterozygous hemoglobin variants (HbS, HgC, etc)do  not significantly interfere with this assay.   However, presence of multiple variants may affect accuracy.         Chemistry        Component Value Date/Time     04/03/2023 1227    K 4.2 04/03/2023 1227     04/03/2023 1227    CO2 24 04/03/2023 1227    BUN 14 04/03/2023 1227    CREATININE 1.17 04/03/2023 1227     (H) 04/03/2023 1227        Component Value Date/Time    CALCIUM 9.2 04/03/2023 1227    ALKPHOS 88 04/03/2023 1227    AST 44 (H) 04/03/2023 1227    ALT 34 04/03/2023 1227    BILITOT 0.3 04/03/2023 1227    ESTGFRAFRICA >60.0 05/20/2022 0935    EGFRNONAA 52.1 (A) 05/20/2022 0935          Lab Results   Component Value Date    CHOL 167 01/04/2023    CHOL 163 01/07/2022    CHOL 162 02/24/2021     Lab Results   Component Value Date    HDL 43 01/04/2023    HDL 46 01/07/2022    HDL 54 02/24/2021     Lab Results   Component Value Date    LDLCALC 90.8 01/04/2023    LDLCALC 88.2 01/07/2022    LDLCALC 83.2 02/24/2021     Lab Results   Component Value Date    TRIG 166 (H) 01/04/2023    TRIG 144 01/07/2022    TRIG 124 02/24/2021     Lab Results   Component Value Date    CHOLHDL 25.7 01/04/2023    CHOLHDL 28.2 01/07/2022    CHOLHDL 33.3 02/24/2021       Lab Results   Component Value Date    MICALBCREAT 22.9 01/25/2023     Lab Results   Component Value Date    TSH 3.052 01/04/2023       CrCl cannot be calculated (Patient's most recent lab result is older than the maximum 7 days allowed.).    Vit D, 25-Hydroxy   Date Value Ref Range Status   10/11/2022 86 30 - 96 ng/mL Final     Comment:     Vitamin D deficiency.........<10 ng/mL                              Vitamin D insufficiency......10-29 ng/mL       Vitamin D sufficiency........> or equal to 30 ng/mL  Vitamin D toxicity............>100 ng/mL           PHYSICAL EXAMINATION  Deferred -- video visit.      Goals        HEMOGLOBIN A1C < 7.5                  Assessment/Plan  1. Type 2 diabetes mellitus with stage 3a chronic kidney disease, with long-term current use of insulin -- Controlled. Will involve Pt Assistance rep to help with Ozempic PAP. I explained to pt that when she runs out of Ozempic, we will initiate glimepiride 1 mg daily. 4 mg caused hypoglycemia.   -- for now, continue Ozempic, pioglitazone, metformin XR, Toujeo, Humalog correction scale.   -- BG monitoring 2x/day.     -- Discussed diagnosis of DM, A1c goals, progression of disease, long term complications and tx options.  -- takes aspirin, ARB   2. Type 2 diabetes mellitus with diabetic neuropathy, with long-term current use of insulin -- optimize DM control   3. Essential hypertension  -- managed by cardiology  -- continue current meds and monitor   4. Hyperlipidemia, unspecified hyperlipidemia type  -- uncontrolled with elevated TRG  -- continue Crestor   5. Hypothyroidism due to acquired atrophy of thyroid  -- stable  -- continue current levothyroxine dose.    6. Diastolic dysfunction  -- following with cardiology.   -- will need to monitor status since patient is taking pioglitazone   7. Fatty liver -- maintain DM control  -- Ozempic may add benefit       FOLLOW UP  As scheduled  Patient instructed to bring BG logs to each follow up   Patient encouraged to call for any BG/medication issues, concerns, or questions.      No orders of the defined types were placed in this encounter.

## 2023-05-17 ENCOUNTER — PATIENT MESSAGE (OUTPATIENT)
Dept: ENDOCRINOLOGY | Facility: CLINIC | Age: 68
End: 2023-05-17
Payer: MEDICARE

## 2023-05-17 ENCOUNTER — TELEPHONE (OUTPATIENT)
Dept: ENDOCRINOLOGY | Facility: CLINIC | Age: 68
End: 2023-05-17
Payer: MEDICARE

## 2023-05-17 DIAGNOSIS — N18.31 TYPE 2 DIABETES MELLITUS WITH STAGE 3A CHRONIC KIDNEY DISEASE, WITH LONG-TERM CURRENT USE OF INSULIN: ICD-10-CM

## 2023-05-17 DIAGNOSIS — E11.22 TYPE 2 DIABETES MELLITUS WITH STAGE 3A CHRONIC KIDNEY DISEASE, WITH LONG-TERM CURRENT USE OF INSULIN: ICD-10-CM

## 2023-05-17 DIAGNOSIS — Z79.4 TYPE 2 DIABETES MELLITUS WITH STAGE 3A CHRONIC KIDNEY DISEASE, WITH LONG-TERM CURRENT USE OF INSULIN: ICD-10-CM

## 2023-05-17 RX ORDER — SEMAGLUTIDE 0.68 MG/ML
0.5 INJECTION, SOLUTION SUBCUTANEOUS
Qty: 3 ML | Refills: 3 | Status: SHIPPED | OUTPATIENT
Start: 2023-05-17 | End: 2023-10-10 | Stop reason: SDUPTHER

## 2023-05-18 ENCOUNTER — PATIENT MESSAGE (OUTPATIENT)
Dept: PHARMACY | Facility: CLINIC | Age: 68
End: 2023-05-18
Payer: MEDICARE

## 2023-05-18 ENCOUNTER — TELEPHONE (OUTPATIENT)
Dept: ENDOCRINOLOGY | Facility: CLINIC | Age: 68
End: 2023-05-18
Payer: MEDICARE

## 2023-05-18 RX ORDER — INSULIN ASPART 100 [IU]/ML
INJECTION, SOLUTION INTRAVENOUS; SUBCUTANEOUS
Start: 2023-05-18 | End: 2024-03-20 | Stop reason: SDUPTHER

## 2023-06-01 LAB
LEFT EYE DM RETINOPATHY: NEGATIVE
RIGHT EYE DM RETINOPATHY: NEGATIVE

## 2023-06-02 ENCOUNTER — PES CALL (OUTPATIENT)
Dept: ADMINISTRATIVE | Facility: CLINIC | Age: 68
End: 2023-06-02
Payer: MEDICARE

## 2023-06-05 ENCOUNTER — PATIENT MESSAGE (OUTPATIENT)
Dept: ADMINISTRATIVE | Facility: CLINIC | Age: 68
End: 2023-06-05
Payer: MEDICARE

## 2023-06-05 ENCOUNTER — CLINICAL SUPPORT (OUTPATIENT)
Dept: CARDIOLOGY | Facility: HOSPITAL | Age: 68
End: 2023-06-05
Attending: PHYSICIAN ASSISTANT
Payer: MEDICARE

## 2023-06-05 ENCOUNTER — TELEPHONE (OUTPATIENT)
Dept: ADMINISTRATIVE | Facility: CLINIC | Age: 68
End: 2023-06-05
Payer: MEDICARE

## 2023-06-05 DIAGNOSIS — R00.2 PALPITATIONS: ICD-10-CM

## 2023-06-05 PROCEDURE — 93242 EXT ECG>48HR<7D RECORDING: CPT | Mod: PO

## 2023-06-05 PROCEDURE — 93244 EXT ECG>48HR<7D REV&INTERPJ: CPT | Mod: ,,, | Performed by: INTERNAL MEDICINE

## 2023-06-05 PROCEDURE — 93244 HOLTER MONITOR - 72 HOUR: ICD-10-PCS | Mod: ,,, | Performed by: INTERNAL MEDICINE

## 2023-06-07 ENCOUNTER — OFFICE VISIT (OUTPATIENT)
Dept: FAMILY MEDICINE | Facility: CLINIC | Age: 68
End: 2023-06-07
Payer: MEDICARE

## 2023-06-07 ENCOUNTER — TELEPHONE (OUTPATIENT)
Dept: ADMINISTRATIVE | Facility: CLINIC | Age: 68
End: 2023-06-07
Payer: MEDICARE

## 2023-06-07 VITALS — HEIGHT: 65 IN | WEIGHT: 209 LBS | BODY MASS INDEX: 34.82 KG/M2

## 2023-06-07 DIAGNOSIS — E21.3 HYPERPARATHYROIDISM: ICD-10-CM

## 2023-06-07 DIAGNOSIS — Z79.4 TYPE 2 DIABETES MELLITUS WITH STAGE 3A CHRONIC KIDNEY DISEASE, WITH LONG-TERM CURRENT USE OF INSULIN: ICD-10-CM

## 2023-06-07 DIAGNOSIS — E11.22 TYPE 2 DIABETES MELLITUS WITH STAGE 3A CHRONIC KIDNEY DISEASE, WITH LONG-TERM CURRENT USE OF INSULIN: ICD-10-CM

## 2023-06-07 DIAGNOSIS — N18.31 STAGE 3A CHRONIC KIDNEY DISEASE: ICD-10-CM

## 2023-06-07 DIAGNOSIS — E66.09 CLASS 1 OBESITY DUE TO EXCESS CALORIES WITH SERIOUS COMORBIDITY AND BODY MASS INDEX (BMI) OF 34.0 TO 34.9 IN ADULT: ICD-10-CM

## 2023-06-07 DIAGNOSIS — Z00.00 ENCOUNTER FOR PREVENTIVE HEALTH EXAMINATION: Primary | ICD-10-CM

## 2023-06-07 DIAGNOSIS — I27.20 PULMONARY HTN: ICD-10-CM

## 2023-06-07 DIAGNOSIS — E11.69 DYSLIPIDEMIA ASSOCIATED WITH TYPE 2 DIABETES MELLITUS: ICD-10-CM

## 2023-06-07 DIAGNOSIS — I10 HYPERTENSION, UNSPECIFIED TYPE: ICD-10-CM

## 2023-06-07 DIAGNOSIS — N18.31 TYPE 2 DIABETES MELLITUS WITH STAGE 3A CHRONIC KIDNEY DISEASE, WITH LONG-TERM CURRENT USE OF INSULIN: ICD-10-CM

## 2023-06-07 DIAGNOSIS — E78.5 DYSLIPIDEMIA ASSOCIATED WITH TYPE 2 DIABETES MELLITUS: ICD-10-CM

## 2023-06-07 PROBLEM — C18.3 MALIGNANT NEOPLASM OF HEPATIC FLEXURE: Status: ACTIVE | Noted: 2023-06-07

## 2023-06-07 PROCEDURE — 3008F PR BODY MASS INDEX (BMI) DOCUMENTED: ICD-10-PCS | Mod: CPTII,95,,

## 2023-06-07 PROCEDURE — 3061F NEG MICROALBUMINURIA REV: CPT | Mod: CPTII,95,,

## 2023-06-07 PROCEDURE — 1125F AMNT PAIN NOTED PAIN PRSNT: CPT | Mod: CPTII,95,,

## 2023-06-07 PROCEDURE — 3061F PR NEG MICROALBUMINURIA RESULT DOCUMENTED/REVIEW: ICD-10-PCS | Mod: CPTII,95,,

## 2023-06-07 PROCEDURE — 3288F PR FALLS RISK ASSESSMENT DOCUMENTED: ICD-10-PCS | Mod: CPTII,95,,

## 2023-06-07 PROCEDURE — 1101F PR PT FALLS ASSESS DOC 0-1 FALLS W/OUT INJ PAST YR: ICD-10-PCS | Mod: CPTII,95,,

## 2023-06-07 PROCEDURE — 3288F FALL RISK ASSESSMENT DOCD: CPT | Mod: CPTII,95,,

## 2023-06-07 PROCEDURE — 4010F ACE/ARB THERAPY RXD/TAKEN: CPT | Mod: CPTII,95,,

## 2023-06-07 PROCEDURE — 1101F PT FALLS ASSESS-DOCD LE1/YR: CPT | Mod: CPTII,95,,

## 2023-06-07 PROCEDURE — 3044F PR MOST RECENT HEMOGLOBIN A1C LEVEL <7.0%: ICD-10-PCS | Mod: CPTII,95,,

## 2023-06-07 PROCEDURE — 1170F FXNL STATUS ASSESSED: CPT | Mod: CPTII,95,,

## 2023-06-07 PROCEDURE — 3008F BODY MASS INDEX DOCD: CPT | Mod: CPTII,95,,

## 2023-06-07 PROCEDURE — 3066F NEPHROPATHY DOC TX: CPT | Mod: CPTII,95,,

## 2023-06-07 PROCEDURE — 3066F PR DOCUMENTATION OF TREATMENT FOR NEPHROPATHY: ICD-10-PCS | Mod: CPTII,95,,

## 2023-06-07 PROCEDURE — 1160F RVW MEDS BY RX/DR IN RCRD: CPT | Mod: CPTII,95,,

## 2023-06-07 PROCEDURE — 4010F PR ACE/ARB THEARPY RXD/TAKEN: ICD-10-PCS | Mod: CPTII,95,,

## 2023-06-07 PROCEDURE — G0439 PR MEDICARE ANNUAL WELLNESS SUBSEQUENT VISIT: ICD-10-PCS | Mod: 95,,,

## 2023-06-07 PROCEDURE — 1170F PR FUNCTIONAL STATUS ASSESSED: ICD-10-PCS | Mod: CPTII,95,,

## 2023-06-07 PROCEDURE — 1159F PR MEDICATION LIST DOCUMENTED IN MEDICAL RECORD: ICD-10-PCS | Mod: CPTII,95,,

## 2023-06-07 PROCEDURE — 1160F PR REVIEW ALL MEDS BY PRESCRIBER/CLIN PHARMACIST DOCUMENTED: ICD-10-PCS | Mod: CPTII,95,,

## 2023-06-07 PROCEDURE — G0439 PPPS, SUBSEQ VISIT: HCPCS | Mod: 95,,,

## 2023-06-07 PROCEDURE — 1159F MED LIST DOCD IN RCRD: CPT | Mod: CPTII,95,,

## 2023-06-07 PROCEDURE — 3044F HG A1C LEVEL LT 7.0%: CPT | Mod: CPTII,95,,

## 2023-06-07 PROCEDURE — 1125F PR PAIN SEVERITY QUANTIFIED, PAIN PRESENT: ICD-10-PCS | Mod: CPTII,95,,

## 2023-06-07 RX ORDER — REPAGLINIDE 1 MG/1
1 TABLET ORAL DAILY
COMMUNITY
End: 2023-12-19 | Stop reason: ALTCHOICE

## 2023-06-07 NOTE — PROGRESS NOTES
The patient location is: [place]  The chief complaint leading to consultation is: Medicare Wellness Visit       Visit type: audiovisual     Face to Face time with patient: 27  60 minutes of total time spent on the encounter, which includes face to face time and non-face to face time preparing to see the patient (eg, review of tests), Obtaining and/or reviewing separately obtained history, Documenting clinical information in the electronic or other health record, Independently interpreting results (not separately reported) and communicating results to the patient/family/caregiver, or Care coordination (not separately reported).            Each patient to whom he or she provides medical services by telemedicine is:  (1) informed of the relationship between the physician and patient and the respective role of any other health care provider with respect to management of the patient; and (2) notified that he or she may decline to receive medical services by telemedicine and may withdraw from such care at any time.      Bing Kearns presented for a  Medicare AWV and comprehensive Health Risk Assessment today. The following components were reviewed and updated:    Medical history  Family History  Social history  Allergies and Current Medications  Health Risk Assessment  Health Maintenance  Care Team         ** See Completed Assessments for Annual Wellness Visit within the encounter summary.**         The following assessments were completed:  Living Situation  CAGE  Depression Screening  Timed Get Up and Go  Whisper Test  Cognitive Function Screening  Nutrition Screening  ADL Screening  PAQ Screening      Review for Opioid Screening: Pt does not have Rx for Opioids      Review for Substance Use Disorders: Patient does not use substance        Current Outpatient Medications:     amlodipine (NORVASC) 5 MG tablet, Take 5 mg by mouth once daily. , Disp: , Rfl:     aspirin (ECOTRIN) 81 MG EC tablet, Take 81 mg by mouth  every evening., Disp: , Rfl:     atenoloL (TENORMIN) 100 MG tablet, Take 100 mg by mouth every evening., Disp: , Rfl:     celecoxib (CELEBREX) 100 MG capsule, Take 100 mg by mouth 2 (two) times daily., Disp: , Rfl:     cholecalciferol, vitamin D3, (VITAMIN D3) 2,000 unit Cap, Take 1 capsule by mouth 2 (two) times a day., Disp: , Rfl: 3    clonazePAM (KLONOPIN) 0.5 MG tablet, Take 1 tablet (0.5 mg total) by mouth 2 (two) times daily as needed for Anxiety., Disp: 30 tablet, Rfl: 0    cloNIDine (CATAPRES) 0.1 MG tablet, Take 0.1 mg by mouth every evening., Disp: , Rfl:     fenofibrate micronized (LOFIBRA) 200 MG Cap, TAKE 1 CAPSULE DAILY, Disp: 90 capsule, Rfl: 3    gabapentin (NEURONTIN) 300 MG capsule, TAKE 1 CAPSULE THREE TIMES A DAY, Disp: 270 capsule, Rfl: 3    insulin glargine, TOUJEO, (TOUJEO SOLOSTAR U-300 INSULIN) 300 unit/mL (1.5 mL) InPn pen, INJECT 30 UNITS UNDER THE SKIN EVERY EVENING, Disp: 6 pen, Rfl: 1    latanoprost 0.005 % ophthalmic solution, Place 1 drop into both eyes nightly., Disp: , Rfl:     levothyroxine (SYNTHROID) 75 MCG tablet, TAKE 1 TABLET BEFORE BREAKFAST, Disp: 90 tablet, Rfl: 3    metFORMIN (GLUCOPHAGE-XR) 500 MG ER 24hr tablet, TAKE 2 TABLETS DAILY WITH BREAKFAST, Disp: 180 tablet, Rfl: 3    olmesartan (BENICAR) 40 MG tablet, Take 40 mg by mouth once daily., Disp: , Rfl:     omeprazole (PRILOSEC) 40 MG capsule, Take 40 mg by mouth 2 (two) times daily before meals., Disp: , Rfl: 0    pioglitazone (ACTOS) 15 MG tablet, Take 1 tablet (15 mg total) by mouth once daily., Disp: 90 tablet, Rfl: 4    rosuvastatin (CRESTOR) 5 MG tablet, TAKE 1 TABLET EVERY OTHER DAY (Patient taking differently: Take 5 mg by mouth every other day.), Disp: 45 tablet, Rfl: 3    semaglutide (OZEMPIC) 0.25 mg or 0.5 mg (2 mg/3 mL) pen injector, Inject 0.5 mg into the skin every 7 days., Disp: 3 mL, Rfl: 3    sertraline (ZOLOFT) 50 MG tablet, Take 50 mg by mouth every evening., Disp: , Rfl: 2    blood sugar  "diagnostic Strp, 1 strip by Misc.(Non-Drug; Combo Route) route 3 (three) times daily., Disp: 300 each, Rfl: 3    blood-glucose meter (ONETOUCH ULTRA2 METER) Misc, USE AS DIRECTED, Disp: 1 each, Rfl: 0    donepeziL (ARICEPT) 5 MG tablet, Take 1 tablet (5 mg total) by mouth every evening. (Patient not taking: Reported on 6/7/2023), Disp: 30 tablet, Rfl: 5    insulin aspart U-100 (NOVOLOG FLEXPEN U-100 INSULIN) 100 unit/mL (3 mL) InPn pen, Inject into skin 3x/day per correction scale,. Max TDD 30u (Patient not taking: Reported on 6/7/2023), Disp: , Rfl:     lancets 33 gauge Misc, Test blood sugar 3x/day. Dx code E11.22, Disp: 300 each, Rfl: 3    LIDOcaine (LIDODERM) 5 %, , Disp: , Rfl:     ONETOUCH DELICA PLUS LANCET 33 gauge Misc, USE 1 TO CHECK GLUCOSE THREE TIMES DAILY AS NEEDED, Disp: 300 each, Rfl: 3    pen needle, diabetic (BD ULTRA-FINE DARIO PEN NEEDLE) 32 gauge x 5/32" Ndle, USE 4X DAILY WITH INSULIN, Disp: 150 each, Rfl: 5    repaglinide (PRANDIN) 1 MG tablet, repaglinide 1 mg tablet, Disp: , Rfl:   No current facility-administered medications for this visit.    Facility-Administered Medications Ordered in Other Visits:     0.9%  NaCl infusion, , Intravenous, Continuous, Job Porter MD, Last Rate: 10 mL/hr at 12/20/19 1245, New Bag at 12/20/19 1245    barium sulfate 98 % SusR 120 mL, 120 mL, Oral, ONCE PRN, Carloz Bosch MD    ez paque ba sulfate 120 mL, 120 mL, Oral, ONCE PRN, Carloz Bosch MD    lactated ringers infusion, , Intravenous, Once PRN, Job Porter MD    sodium bicarbonate-citric acid-simethicone (EZ GAS II) 2.21-1.53 gram/4 gram oral granules 4 g, 1 packet, Oral, ONCE PRN, Carloz Bosch MD         Vitals:    06/07/23 1101   Weight: 94.8 kg (209 lb)   Height: 5' 5" (1.651 m)   PainSc:   5   PainLoc: Foot      Physical Exam  Constitutional:       General: She is not in acute distress.     Appearance: Normal appearance. She is well-developed and well-groomed. She is not " toxic-appearing.   Pulmonary:      Effort: No respiratory distress.   Skin:     Coloration: Skin is not pale.   Neurological:      Mental Status: She is alert and oriented to person, place, and time.   Psychiatric:         Mood and Affect: Mood normal.         Speech: Speech normal.         Behavior: Behavior normal. Behavior is cooperative.             Diagnoses and health risks identified today and associated recommendations/orders:    1. Encounter for preventive health examination      2. Pulmonary HTN  Chronic; stable . Followed by PCP.     3. Type 2 diabetes mellitus with stage 3a chronic kidney disease, with long-term current use of insulin  Chronic; stable on medications. Followed by Endocrinology.    4. Dyslipidemia associated with type 2 diabetes mellitus  Chronic; stable on medications. Followed by Endocrinology and PCP.    5. Stage 3a chronic kidney disease  Chronic; stable on medications. Followed by Nephrology.    6. Hyperparathyroidism  Chronic; stable on medications. Followed by Nephrology.    7. Hypertension, unspecified type  Chronic; stable on medications. Followed by PCP.     8. Class 1 obesity due to excess calories with serious comorbidity and body mass index (BMI) of 34.0 to 34.9 in adult  Discussed importance of eating a heart-healthy, low fat diet and discussed importance of engaging in physical activity at least 5x/week for a minimum of 30 min/day.     Provided Bing with a 5-10 year written screening schedule and personal prevention plan. Recommendations were developed using the USPSTF age appropriate recommendations. Education, counseling, and referrals were provided as needed. After Visit Summary printed and given to patient which includes a list of additional screenings\tests needed.    Follow up in about 1 year (around 6/7/2024).    Virginie Easton NP    Advance Care Planning   I offered to discuss advanced care planning, including how to pick a person who would make decisions for  you if you were unable to make them for yourself, called a health care power of , and what kind of decisions you might make such as use of life sustaining treatments such as ventilators and tube feeding when faced with a life limiting illness recorded on a living will that they will need to know. (How you want to be cared for as you near the end of your natural life)     X  Patient is unwilling to engage in a discussion regarding advance directives at this time.

## 2023-06-07 NOTE — PATIENT INSTRUCTIONS
Counseling and Referral of Other Preventative  (Italic type indicates deductible and co-insurance are waived)    Patient Name: Bing Calhoun's Date: 6/7/2023    Health Maintenance       Date Due Completion Date    Eye Exam 02/08/2023 2/8/2022    Override on 1/11/2019: Done (saw Dr. Horan)    Override on 12/13/2017: Done (with Dr. Tolentino's office)    Override on 12/29/2016: Done (in Lenore's office)    Override on 12/11/2015: Done (Tam)    Override on 11/19/2014: Done    COVID-19 Vaccine (4 - Additional dose for Sarah series) 03/15/2023 11/15/2022    Hemoglobin A1c 10/03/2023 4/3/2023    Override on 11/3/2014: Done    Foot Exam 12/28/2023 12/28/2022 (Done)    Override on 12/28/2022: Done (DR. Helms)    Override on 11/8/2021: Done (Dr. Helms)    Override on 7/6/2020: Done (Dr Macias)    Lipid Panel 01/04/2024 1/4/2023    Pneumococcal Vaccines (Age 65+) (4 - PPSV23 if available, else PCV20) 01/14/2024 1/14/2019    Diabetes Urine Screening 01/25/2024 1/25/2023    Mammogram 04/26/2024 4/26/2023    Override on 7/29/2016: Done ([employer])    Override on 5/27/2014: Done (per [employer] claims data)    DEXA Scan 04/06/2025 4/6/2022    TETANUS VACCINE 07/21/2025 7/21/2015    Colorectal Cancer Screening 04/16/2031 4/16/2021    Override on 9/19/2014: Done (per [employer] claims data)        No orders of the defined types were placed in this encounter.    The following information is provided to all patients.  This information is to help you find resources for any of the problems found today that may be affecting your health:                Living healthy guide: www.[facility].[place].gov      Understanding Diabetes: www.diabetes.org      Eating healthy: www.cdc.gov/healthyweight      CDC home safety checklist: www.cdc.gov/steadi/patient.html      Agency on Aging: www.goea.[place].[facility]      Alcoholics anonymous (AA): www.aa.org      Physical Activity: www.esequiel.nih.gov/pq3bgnb      Tobacco use:  www.quitwithusla.org

## 2023-06-12 LAB
OHS CV EVENT MONITOR DAY: 0
OHS CV HOLTER LENGTH DECIMAL HOURS: 48
OHS CV HOLTER LENGTH HOURS: 48
OHS CV HOLTER LENGTH MINUTES: 0
OHS CV HOLTER SINUS AVERAGE HR: 79
OHS CV HOLTER SINUS MAX HR: 97
OHS CV HOLTER SINUS MIN HR: 63

## 2023-06-29 ENCOUNTER — OFFICE VISIT (OUTPATIENT)
Dept: CARDIOLOGY | Facility: CLINIC | Age: 68
End: 2023-06-29
Payer: MEDICARE

## 2023-06-29 VITALS
SYSTOLIC BLOOD PRESSURE: 115 MMHG | HEART RATE: 77 BPM | DIASTOLIC BLOOD PRESSURE: 76 MMHG | WEIGHT: 216.94 LBS | BODY MASS INDEX: 36.14 KG/M2 | HEIGHT: 65 IN

## 2023-06-29 DIAGNOSIS — R00.2 PALPITATIONS: ICD-10-CM

## 2023-06-29 DIAGNOSIS — I51.89 DIASTOLIC DYSFUNCTION: ICD-10-CM

## 2023-06-29 DIAGNOSIS — I27.20 PULMONARY HTN: Primary | ICD-10-CM

## 2023-06-29 DIAGNOSIS — E78.5 DYSLIPIDEMIA ASSOCIATED WITH TYPE 2 DIABETES MELLITUS: ICD-10-CM

## 2023-06-29 DIAGNOSIS — R06.09 DYSPNEA ON EXERTION: ICD-10-CM

## 2023-06-29 DIAGNOSIS — R07.89 OTHER CHEST PAIN: ICD-10-CM

## 2023-06-29 DIAGNOSIS — I10 HYPERTENSION, UNSPECIFIED TYPE: ICD-10-CM

## 2023-06-29 DIAGNOSIS — E11.69 DYSLIPIDEMIA ASSOCIATED WITH TYPE 2 DIABETES MELLITUS: ICD-10-CM

## 2023-06-29 DIAGNOSIS — F41.9 ANXIETY: ICD-10-CM

## 2023-06-29 PROCEDURE — 93010 ELECTROCARDIOGRAM REPORT: CPT | Mod: S$GLB,,, | Performed by: INTERNAL MEDICINE

## 2023-06-29 PROCEDURE — 3044F PR MOST RECENT HEMOGLOBIN A1C LEVEL <7.0%: ICD-10-PCS | Mod: CPTII,S$GLB,,

## 2023-06-29 PROCEDURE — 3061F PR NEG MICROALBUMINURIA RESULT DOCUMENTED/REVIEW: ICD-10-PCS | Mod: CPTII,S$GLB,,

## 2023-06-29 PROCEDURE — 3078F DIAST BP <80 MM HG: CPT | Mod: CPTII,S$GLB,,

## 2023-06-29 PROCEDURE — 1159F PR MEDICATION LIST DOCUMENTED IN MEDICAL RECORD: ICD-10-PCS | Mod: CPTII,S$GLB,,

## 2023-06-29 PROCEDURE — 3288F PR FALLS RISK ASSESSMENT DOCUMENTED: ICD-10-PCS | Mod: CPTII,S$GLB,,

## 2023-06-29 PROCEDURE — 93005 ELECTROCARDIOGRAM TRACING: CPT | Mod: PO

## 2023-06-29 PROCEDURE — 93010 EKG 12-LEAD: ICD-10-PCS | Mod: S$GLB,,, | Performed by: INTERNAL MEDICINE

## 2023-06-29 PROCEDURE — 4010F ACE/ARB THERAPY RXD/TAKEN: CPT | Mod: CPTII,S$GLB,,

## 2023-06-29 PROCEDURE — 99214 OFFICE O/P EST MOD 30 MIN: CPT | Mod: S$GLB,,,

## 2023-06-29 PROCEDURE — 1101F PR PT FALLS ASSESS DOC 0-1 FALLS W/OUT INJ PAST YR: ICD-10-PCS | Mod: CPTII,S$GLB,,

## 2023-06-29 PROCEDURE — 3066F NEPHROPATHY DOC TX: CPT | Mod: CPTII,S$GLB,,

## 2023-06-29 PROCEDURE — 1159F MED LIST DOCD IN RCRD: CPT | Mod: CPTII,S$GLB,,

## 2023-06-29 PROCEDURE — 3008F PR BODY MASS INDEX (BMI) DOCUMENTED: ICD-10-PCS | Mod: CPTII,S$GLB,,

## 2023-06-29 PROCEDURE — 4010F PR ACE/ARB THEARPY RXD/TAKEN: ICD-10-PCS | Mod: CPTII,S$GLB,,

## 2023-06-29 PROCEDURE — 1125F AMNT PAIN NOTED PAIN PRSNT: CPT | Mod: CPTII,S$GLB,,

## 2023-06-29 PROCEDURE — 3008F BODY MASS INDEX DOCD: CPT | Mod: CPTII,S$GLB,,

## 2023-06-29 PROCEDURE — 1125F PR PAIN SEVERITY QUANTIFIED, PAIN PRESENT: ICD-10-PCS | Mod: CPTII,S$GLB,,

## 2023-06-29 PROCEDURE — 3288F FALL RISK ASSESSMENT DOCD: CPT | Mod: CPTII,S$GLB,,

## 2023-06-29 PROCEDURE — 3074F PR MOST RECENT SYSTOLIC BLOOD PRESSURE < 130 MM HG: ICD-10-PCS | Mod: CPTII,S$GLB,,

## 2023-06-29 PROCEDURE — 3074F SYST BP LT 130 MM HG: CPT | Mod: CPTII,S$GLB,,

## 2023-06-29 PROCEDURE — 3078F PR MOST RECENT DIASTOLIC BLOOD PRESSURE < 80 MM HG: ICD-10-PCS | Mod: CPTII,S$GLB,,

## 2023-06-29 PROCEDURE — 99999 PR PBB SHADOW E&M-EST. PATIENT-LVL III: ICD-10-PCS | Mod: PBBFAC,,,

## 2023-06-29 PROCEDURE — 99999 PR PBB SHADOW E&M-EST. PATIENT-LVL III: CPT | Mod: PBBFAC,,,

## 2023-06-29 PROCEDURE — 3066F PR DOCUMENTATION OF TREATMENT FOR NEPHROPATHY: ICD-10-PCS | Mod: CPTII,S$GLB,,

## 2023-06-29 PROCEDURE — 99214 PR OFFICE/OUTPT VISIT, EST, LEVL IV, 30-39 MIN: ICD-10-PCS | Mod: S$GLB,,,

## 2023-06-29 PROCEDURE — 1101F PT FALLS ASSESS-DOCD LE1/YR: CPT | Mod: CPTII,S$GLB,,

## 2023-06-29 PROCEDURE — 3061F NEG MICROALBUMINURIA REV: CPT | Mod: CPTII,S$GLB,,

## 2023-06-29 PROCEDURE — 3044F HG A1C LEVEL LT 7.0%: CPT | Mod: CPTII,S$GLB,,

## 2023-06-29 NOTE — ASSESSMENT & PLAN NOTE
[facility]  Vaginal delivery discharge summary      Patient: Dolores Brito      MR#:[id number]  Admission  Diagnosis:   Patient Active Problem List   Diagnosis   • Gestational hypertension   • Spontaneous vaginal delivery     Discharge Diagnosis:   Gestational hypertension   Spontaneous vaginal delivery         Date of Admission: 5/1/2020  Date of Discharge:  5/3/2020    Procedures:  Vaginal, Spontaneous     5/1/2020    7:10 PM      Service:  Obstetrics    Hospital Course:  Patient underwent vaginal delivery and remained in the hospital for 2 days.  During that time she remained afebrile and hemodynamically stable.  On the day of discharge, she was eating, ambulating and voiding without difficulty.  She is breastfeeding. She denies HA, vision changes, and epigastric pain.     Labs:    Lab Results   Component Value Date    WBC 10.69 05/02/2020    HGB 11.9 (L) 05/02/2020    HCT 37.4 05/02/2020    MCV 83.1 05/02/2020     05/02/2020    URICACID 7.6 (H) 05/02/2020    AST 34 (H) 05/02/2020    ALT 17 05/02/2020     (H) 05/02/2020     Results from last 7 days   Lab Units 05/01/20  1135   ABO TYPING  A   RH TYPING  Positive   ANTIBODY SCREEN  Negative       Discharge Medications     Discharge Medications      Continue These Medications      Instructions Start Date   ibuprofen 600 MG tablet  Commonly known as:  ADVIL,MOTRIN   600 mg, Oral, Every 6 Hours PRN      Prenatal 27-1 27-1 MG tablet tablet   1 tablet, Oral, Daily         Stop These Medications    amoxicillin 500 MG capsule  Commonly known as:  AMOXIL     ferrous sulfate 325 (65 FE) MG tablet     labetalol 200 MG tablet  Commonly known as:  NORMODYNE     NIFEdipine CC 30 MG 24 hr tablet  Commonly known as:  ADALAT CC            Discharge Disposition:  To Home    Discharge Condition:  Stable    Discharge Diet: Regular    Activity at Discharge: Pelvic rest    Follow-up Appointments  Follow up with [facility] in 1 week with Tele health visit for BP check.   Holter WNL       Ben John CNM  05/03/20  11:58

## 2023-06-29 NOTE — PROGRESS NOTES
Subjective:    Patient ID:  Bing Kearns is a 67 y.o. female patient here for evaluation Chest Pain and Shortness of Breath    History of Present Illness:     Mrs. Kearns is an 85 year old F who would like to establish care with Dr. Henson here today because despite a normal echo and holter she continues with palps, TERRELL, and chest tightness.   She was not able to complete a nuclear stress test due to inability to tolerate regadenosine.           Most Recent Echocardiogram Results  Results for orders placed in visit on 03/10/23    Echo    Interpretation Summary  · The left ventricle is normal in size with moderate concentric hypertrophy and normal systolic function.  · The estimated ejection fraction is 50-55%.  · Normal right ventricular size with normal right ventricular systolic function.  · Normal central venous pressure (3 mmHg).  · The estimated PA systolic pressure is 37 mmHg.  · There is no pulmonary hypertension.      Most Recent Nuclear Stress Test Results  Results for orders placed during the hospital encounter of 06/23/21    Nuclear Stress - Cardiology Interpreted    Interpretation Summary    Normal myocardial perfusion scan. There is no evidence of myocardial ischemia or infarction.    The gated perfusion images showed an ejection fraction of 56% at rest.    The EKG portion of this study is negative for ischemia.    Other Most Recent Cardiology Results  Results for orders placed in visit on 06/05/23    Holter monitor - 72 hour    Interpretation Summary  · Patient monitored for 1d 22h  · Average heart rate was 79 bpm, Minimum heart rate was 63 bpm on Day 2 / 05:15:15 am, Max heart rate was 97 bpm on Day 1 / 08:34:04 pm  · Atrial Fibrillation or Flutter: [place] was 0 %  · SVE(s): [place] was 2.71 %  · PVC(s): [place] was 0 %  · Pause: 0 events  · Patient recorded 0 events during the monitoring period  · No significant clinical arrhythmia appreciated.      REVIEW OF SYSTEMS: As noted in HPI    CARDIOVASCULAR: +chest pain. +palps. No recent arm/neck/jaw pain, or edema.  RESPIRATORY: +SOB. No recent fever, cough.  : No blood in the urine  GI: No reflux, nausea, vomiting, or blood in stool.   MUSCULOSKELETAL: No falls.   NEURO: No headaches, syncope, or dizziness.  EYES: No sudden changes in vision.     Past Medical History:   Diagnosis Date    Anxiety     Cancer     colon    Chronic diarrhea     Chronic kidney disease     stage 3    Diabetes mellitus     Diastolic dysfunction     GERD (gastroesophageal reflux disease)     Glaucoma     History of migraine headaches     Hyperlipemia     Hypertension     PONV (postoperative nausea and vomiting)     Pulmonary hypertension     Sleep apnea with use of continuous positive airway pressure (CPAP)     waiting on mask to arrive    Thyroid disease     hypothyroid     Past Surgical History:   Procedure Laterality Date    APPENDECTOMY      done during colon resection    CARPAL TUNNEL RELEASE Left 2021    Procedure: Left carpal tunnel release, Left Thumb, Middle, and Ring Trigger Finger Release;  Surgeon: Pilo Paez MD;  Location: [facility] OR;  Service: Orthopedics;  Laterality: Left;  Procedure: Left carpal tunnel release, Left Thumb, Middle, and Ring Trigger Finger Release    Position: Supine    Anesthesia: General    Equipment: Basic handset, carpal tunnel set    CATARACT EXTRACTION, BILATERAL Bilateral      SECTION      COLON SURGERY      COLONOSCOPY Left 2015    Procedure: COLONOSCOPY;  Surgeon: Chet Woodard Jr., MD;  Location: [facility] ENDO;  Service: Endoscopy;  Laterality: Left;    EXCISION OF LESION OF BUCCAL MUCOSA Left 2019    Procedure: EXCISION, LESION, BUCCAL MUCOSA;  Surgeon: Dedra Khan MD;  Location: [facility] OR;  Service: ENT;  Laterality: Left;    HERNIA REPAIR      repaired during colon resection    HYSTERECTOMY      Complete    INSERTION OF VENOUS ACCESS PORT      LAPAROSCOPIC CHOLECYSTECTOMY N/A 2021     Procedure: CHOLECYSTECTOMY, LAPAROSCOPIC;  Surgeon: Gab Pierre MD;  Location: [facility];  Service: General;  Laterality: N/A;    OOPHORECTOMY Bilateral 2010    PORTACATH PLACEMENT Left     and later removed     REPLACEMENT OF VASCULAR ACCESS PORT      SURGICAL REMOVAL OF NEOPLASM OF MOUTH Left 6/15/2018    Procedure: EXCISION, NEOPLASM, MOUTH;  Surgeon: Velasquez Juárez MD;  [facility];  Service: ENT;  Laterality: Left;    TRANSFORAMINAL EPIDURAL INJECTION OF STEROID Left 2019    Procedure: Injection,steroid,epidural,transforaminal approach;  Surgeon: Job Porter MD;  Location: [facility] OR;  Service: Pain Management;  Laterality: Left;  L5-S1     Social History     Tobacco Use    Smoking status: Former     Types: Cigarettes     Quit date:      Years since quittin.    Smokeless tobacco: Never   Substance Use Topics    Alcohol use: No    Drug use: No         Objective      Vitals:    23 0911   BP: 115/76   Pulse: 77       LAST EKG  Results for orders placed or performed during the hospital encounter of 23   EKG 12-lead    Collection Time: 23  3:19 PM    Narrative    Test Reason : R00.2,    Vent. Rate : 079 BPM     Atrial Rate : 079 BPM     P-R Int : 180 ms          QRS Dur : 104 ms      QT Int : 430 ms       P-R-T Axes : 056 -14 018 degrees     QTc Int : 493 ms    Normal sinus rhythm  Prolonged QT  Abnormal ECG  When compared with ECG of 2021 03:01,  No significant change was found  Confirmed by Valdemar Gonzalez MD ([id number]) on 2023 9:27:31 AM    Referred By: [email or web address]   SELF           Confirmed By:Valdemar Gonzalez MD     LIPIDS - LAST 2   Lab Results   Component Value Date    CHOL 167 2023    CHOL 163 2022    HDL 43 2023    HDL 46 2022    LDLCALC 90.8 2023    LDLCALC 88.2 2022    TRIG 166 (H) 2023    TRIG 144 2022    CHOLHDL 25.7 2023    CHOLHDL 28.2 2022     CARDIAC PROFILE - LAST 2  Lab Results   Component  Value Date     (H) 06/06/2012     08/01/2018    TROPONINI <0.012 02/03/2023    TROPONINI <0.012 11/08/2021      CBC - LAST 2  Lab Results   Component Value Date    WBC 10.79 06/21/2023    WBC 10.15 02/03/2023    RBC 3.92 (L) 06/21/2023    RBC 4.12 02/03/2023    HGB 10.1 (L) 06/21/2023    HGB 10.9 (L) 02/03/2023    HCT 32.3 (L) 06/21/2023    HCT 34.3 (L) 02/03/2023     (H) 06/21/2023     02/03/2023     No results found for: LABPT, INR, APTT  CHEMISTRY - LAST 2  Lab Results   Component Value Date     06/21/2023     04/03/2023    K 4.6 06/21/2023    K 4.2 04/03/2023     06/21/2023     04/03/2023    CO2 26 06/21/2023    CO2 24 04/03/2023    ANIONGAP 9 06/21/2023    ANIONGAP 12 04/03/2023    BUN 14 06/21/2023    BUN 14 04/03/2023    CREATININE 1.18 06/21/2023    CREATININE 1.17 04/03/2023     (H) 06/21/2023     (H) 04/03/2023    CALCIUM 9.1 06/21/2023    CALCIUM 9.2 04/03/2023    MG 1.8 06/21/2023    MG 2.1 02/03/2023    ALBUMIN 4.3 06/21/2023    ALBUMIN 4.5 04/03/2023    PROT 7.5 04/03/2023    PROT 8.5 (H) 02/03/2023    ALKPHOS 88 04/03/2023    ALKPHOS 101 02/03/2023    ALT 34 04/03/2023    ALT 41 (H) 02/03/2023    AST 44 (H) 04/03/2023    AST 78 (H) 02/03/2023    BILITOT 0.3 04/03/2023    BILITOT 0.4 02/03/2023      ENDOCRINE - LAST 2  Lab Results   Component Value Date    HGBA1C 6.7 (H) 04/03/2023    HGBA1C 8.4 (H) 01/25/2023    TSH 3.052 01/04/2023    TSH 3.580 01/07/2022        PHYSICAL EXAM  CONSTITUTIONAL: Well built, well nourished in no apparent distress, appears anxious/fidgeting   NECK: no carotid bruit, no JVD  LUNGS: CTA  CHEST WALL: no tenderness  HEART: regular rate and rhythm, S1, S2 normal, no murmur, click, rub or gallop   ABDOMEN: soft, non-tender; bowel sounds normal; no masses,  no organomegaly  EXTREMITIES: Extremities normal, no edema, no calf tenderness noted  NEURO: AAO X 3    I HAVE REVIEWED :    The vital signs, most recent  "cardiac testing, and most recent pertinent non-cardiology provider notes.    Current Outpatient Medications   Medication Instructions    amLODIPine (NORVASC) 5 mg, Oral, Daily    aspirin (ECOTRIN) 81 mg, Oral, Nightly,      atenoloL (TENORMIN) 100 mg, Oral, Nightly    blood sugar diagnostic Strp 1 strip, Misc.(Non-Drug; Combo Route), 3 times daily    blood-glucose meter (ONETOUCH ULTRA2 METER) Misc USE AS DIRECTED    celecoxib (CELEBREX) 100 mg, Oral, 2 times daily    cholecalciferol, vitamin D3, (VITAMIN D3) 2,000 unit Cap 1 capsule, Oral, 2 times daily    clonazePAM (KLONOPIN) 0.5 mg, Oral, 2 times daily PRN    cloNIDine (CATAPRES) 0.1 mg, Oral, Nightly    donepeziL (ARICEPT) 5 mg, Oral, Nightly    fenofibrate micronized (LOFIBRA) 200 MG Cap TAKE 1 CAPSULE DAILY    gabapentin (NEURONTIN) 300 MG capsule TAKE 1 CAPSULE THREE TIMES A DAY    insulin aspart U-100 (NOVOLOG FLEXPEN U-100 INSULIN) 100 unit/mL (3 mL) InPn pen Inject into skin 3x/day per correction scale,. Max TDD 30u    insulin glargine, TOUJEO, (TOUJEO SOLOSTAR U-300 INSULIN) 300 unit/mL (1.5 mL) InPn pen INJECT 30 UNITS UNDER THE SKIN EVERY EVENING    lancets 33 gauge Misc Test blood sugar 3x/day. Dx code E11.22    latanoprost 0.005 % ophthalmic solution 1 drop, Both Eyes, Nightly    levothyroxine (SYNTHROID) 75 MCG tablet TAKE 1 TABLET BEFORE BREAKFAST    LIDOcaine (LIDODERM) 5 % No dose, route, or frequency recorded.    metFORMIN (GLUCOPHAGE-XR) 500 MG ER 24hr tablet TAKE 2 TABLETS DAILY WITH BREAKFAST    olmesartan (BENICAR) 40 mg, Oral, Daily    omeprazole (PRILOSEC) 40 mg, Oral, 2 times daily before meals    ONETOUCH DELICA PLUS LANCET 33 gauge Misc USE 1 TO CHECK GLUCOSE THREE TIMES DAILY AS NEEDED    OZEMPIC 0.5 mg, Subcutaneous, Every 7 days    pen needle, diabetic (BD ULTRA-FINE DARIO PEN NEEDLE) 32 gauge x 5/32" Ndle USE 4X DAILY WITH INSULIN    pioglitazone (ACTOS) 15 mg, Oral, Daily    repaglinide (PRANDIN) 1 MG tablet repaglinide 1 mg tablet "    rosuvastatin (CRESTOR) 5 MG tablet TAKE 1 TABLET EVERY OTHER DAY    sertraline (ZOLOFT) 50 mg, Oral, Nightly      Assessment & Plan     Other chest pain  Echo ok   Holter ok   Patient with arthritis, brace to ankle & knee-- cannot walk on treadmill   Patient went into a massive panic attack last time she was given regadenosein   Will try dobutamine stress echo instead   Hold atenolol night before     Dyslipidemia associated with type 2 diabetes mellitus  Continue statin     Diastolic dysfunction  Euvolemic   No obj evidence of CHF on echo     Hypertension  BP well controlled   Continue olmesartan 40 mg daily   Continue atenolol 100 mg nightly   Continue amlodipine 5 mg daily   Low Na diet     Palpitations  Holter WNL        Follow up in about 4 weeks (around 7/27/2023).     Nya Howe PA-C   [facility]   Office: [phone]

## 2023-06-29 NOTE — ASSESSMENT & PLAN NOTE
Echo ok   Holter ok   Patient with arthritis, brace to ankle & knee-- cannot walk on treadmill   Patient went into a massive panic attack last time she was given regadenosein   Will try dobutamine stress echo instead   Hold atenolol night before

## 2023-06-29 NOTE — ASSESSMENT & PLAN NOTE
BP well controlled   Continue olmesartan 40 mg daily   Continue atenolol 100 mg nightly   Continue amlodipine 5 mg daily   Low Na diet

## 2023-07-07 DIAGNOSIS — Z79.4 TYPE 2 DIABETES MELLITUS WITH STAGE 3A CHRONIC KIDNEY DISEASE, WITH LONG-TERM CURRENT USE OF INSULIN: ICD-10-CM

## 2023-07-07 DIAGNOSIS — E11.22 TYPE 2 DIABETES MELLITUS WITH STAGE 3A CHRONIC KIDNEY DISEASE, WITH LONG-TERM CURRENT USE OF INSULIN: ICD-10-CM

## 2023-07-07 DIAGNOSIS — N18.31 TYPE 2 DIABETES MELLITUS WITH STAGE 3A CHRONIC KIDNEY DISEASE, WITH LONG-TERM CURRENT USE OF INSULIN: ICD-10-CM

## 2023-07-07 RX ORDER — INSULIN GLARGINE 300 U/ML
INJECTION, SOLUTION SUBCUTANEOUS
Qty: 6 PEN | Refills: 3 | Status: SHIPPED | OUTPATIENT
Start: 2023-07-07 | End: 2024-03-18

## 2023-07-13 ENCOUNTER — PATIENT MESSAGE (OUTPATIENT)
Dept: ENDOCRINOLOGY | Facility: CLINIC | Age: 68
End: 2023-07-13
Payer: MEDICARE

## 2023-07-31 ENCOUNTER — CLINICAL SUPPORT (OUTPATIENT)
Dept: CARDIOLOGY | Facility: HOSPITAL | Age: 68
End: 2023-07-31
Payer: MEDICARE

## 2023-07-31 VITALS — BODY MASS INDEX: 35.99 KG/M2 | WEIGHT: 216 LBS | HEIGHT: 65 IN

## 2023-07-31 DIAGNOSIS — R07.89 OTHER CHEST PAIN: ICD-10-CM

## 2023-07-31 DIAGNOSIS — R06.09 DYSPNEA ON EXERTION: ICD-10-CM

## 2023-07-31 LAB
ASCENDING AORTA: 3.08 CM
BSA FOR ECHO PROCEDURE: 2.12 M2
CV ECHO LV RWT: 0.48 CM
CV STRESS BASE HR: 79 BPM
DIASTOLIC BLOOD PRESSURE: 78 MMHG
DOP CALC LVOT AREA: 3.9 CM2
DOP CALC LVOT DIAMETER: 2.22 CM
DOP CALC LVOT PEAK VEL: 1.02 M/S
DOP CALC LVOT STROKE VOLUME: 86.66 CM3
DOP CALCLVOT PEAK VEL VTI: 22.4 CM
E WAVE DECELERATION TIME: 212.51 MSEC
E/A RATIO: 0.8
E/E' RATIO: 10.93 M/S
ECHO LV POSTERIOR WALL: 1.27 CM (ref 0.6–1.1)
FRACTIONAL SHORTENING: 28 % (ref 28–44)
INTERVENTRICULAR SEPTUM: 1.35 CM (ref 0.6–1.1)
IVRT: 76.12 MSEC
LA MAJOR: 4.81 CM
LA MINOR: 4.97 CM
LA WIDTH: 3.6 CM
LEFT ATRIUM SIZE: 3.96 CM
LEFT ATRIUM VOLUME INDEX: 29 ML/M2
LEFT ATRIUM VOLUME: 59.24 CM3
LEFT INTERNAL DIMENSION IN SYSTOLE: 3.8 CM (ref 2.1–4)
LEFT VENTRICLE DIASTOLIC VOLUME INDEX: 66.34 ML/M2
LEFT VENTRICLE DIASTOLIC VOLUME: 135.34 ML
LEFT VENTRICLE MASS INDEX: 142 G/M2
LEFT VENTRICLE SYSTOLIC VOLUME INDEX: 30.4 ML/M2
LEFT VENTRICLE SYSTOLIC VOLUME: 62.09 ML
LEFT VENTRICULAR INTERNAL DIMENSION IN DIASTOLE: 5.3 CM (ref 3.5–6)
LEFT VENTRICULAR MASS: 290.07 G
LV LATERAL E/E' RATIO: 11.71 M/S
LV SEPTAL E/E' RATIO: 10.25 M/S
LVOT MG: 1.98 MMHG
LVOT MV: 0.64 CM/S
MV PEAK A VEL: 1.02 M/S
MV PEAK E VEL: 0.82 M/S
MV STENOSIS PRESSURE HALF TIME: 61.63 MS
MV VALVE AREA P 1/2 METHOD: 3.57 CM2
OHS CV CPX 1 MINUTE RECOVERY HEART RATE: 102 BPM
OHS CV CPX 85 PERCENT MAX PREDICTED HEART RATE MALE: 124
OHS CV CPX MAX PREDICTED HEART RATE: 146
OHS CV CPX PATIENT IS FEMALE: 1
OHS CV CPX PATIENT IS MALE: 0
OHS CV CPX PEAK DIASTOLIC BLOOD PRESSURE: 52 MMHG
OHS CV CPX PEAK HEAR RATE: 109 BPM
OHS CV CPX PEAK RATE PRESSURE PRODUCT: ABNORMAL
OHS CV CPX PEAK SYSTOLIC BLOOD PRESSURE: 146 MMHG
OHS CV CPX PERCENT MAX PREDICTED HEART RATE ACHIEVED: 75
OHS CV CPX RATE PRESSURE PRODUCT PRESENTING: 9401
PISA TR MAX VEL: 2.6 M/S
PULM VEIN S/D RATIO: 1.11
PV PEAK D VEL: 0.47 M/S
PV PEAK S VEL: 0.52 M/S
RA MAJOR: 4.59 CM
RA WIDTH: 4.3 CM
SINUS: 3.07 CM
STJ: 2.47 CM
STRESS ECHO POST EXERCISE DUR MIN: 15 MINUTES
STRESS ECHO POST EXERCISE DUR SEC: 32 SECONDS
SYSTOLIC BLOOD PRESSURE: 119 MMHG
TDI LATERAL: 0.07 M/S
TDI SEPTAL: 0.08 M/S
TDI: 0.08 M/S
TR MAX PG: 27 MMHG
Z-SCORE OF LEFT VENTRICULAR DIMENSION IN END DIASTOLE: -1.38
Z-SCORE OF LEFT VENTRICULAR DIMENSION IN END SYSTOLE: 0.15

## 2023-07-31 PROCEDURE — 93351 STRESS TTE COMPLETE: CPT | Mod: PO

## 2023-07-31 PROCEDURE — 93351 STRESS ECHO (CUPID ONLY): ICD-10-PCS | Mod: 26,,, | Performed by: INTERNAL MEDICINE

## 2023-07-31 PROCEDURE — 93351 STRESS TTE COMPLETE: CPT | Mod: 26,,, | Performed by: INTERNAL MEDICINE

## 2023-07-31 NOTE — NURSING NOTE
Dobutamine Stress Echo procedure and medication explained; patient verbalized understanding. 24G IV started to left AC; flushed and secured. Dobutamine infusion started at 10mcg/kg/min per protocol (increased by 10mcg/kg/min every 3 minutes with a max dose of 60mcg/kg/min). Dobutamine 50mcg/kg/min infused; NO Atropine given. Patient developed chest pressure and dobutmaine infusion was stopped. Chest pressure subsided during recovery period. IV D/C'd, cath tip intact. No distress noted.

## 2023-08-03 ENCOUNTER — OFFICE VISIT (OUTPATIENT)
Dept: CARDIOLOGY | Facility: CLINIC | Age: 68
End: 2023-08-03
Payer: MEDICARE

## 2023-08-03 VITALS
DIASTOLIC BLOOD PRESSURE: 76 MMHG | HEART RATE: 83 BPM | HEIGHT: 65 IN | WEIGHT: 219.81 LBS | SYSTOLIC BLOOD PRESSURE: 147 MMHG | BODY MASS INDEX: 36.62 KG/M2

## 2023-08-03 DIAGNOSIS — E11.69 DYSLIPIDEMIA ASSOCIATED WITH TYPE 2 DIABETES MELLITUS: ICD-10-CM

## 2023-08-03 DIAGNOSIS — E78.5 DYSLIPIDEMIA ASSOCIATED WITH TYPE 2 DIABETES MELLITUS: ICD-10-CM

## 2023-08-03 DIAGNOSIS — I10 HYPERTENSION, UNSPECIFIED TYPE: ICD-10-CM

## 2023-08-03 DIAGNOSIS — R07.89 OTHER CHEST PAIN: ICD-10-CM

## 2023-08-03 DIAGNOSIS — E66.01 SEVERE OBESITY (BMI 35.0-39.9) WITH COMORBIDITY: ICD-10-CM

## 2023-08-03 DIAGNOSIS — I27.20 PULMONARY HTN: ICD-10-CM

## 2023-08-03 DIAGNOSIS — R06.02 SHORTNESS OF BREATH: Primary | ICD-10-CM

## 2023-08-03 DIAGNOSIS — R00.2 PALPITATIONS: ICD-10-CM

## 2023-08-03 PROCEDURE — 3066F NEPHROPATHY DOC TX: CPT | Mod: CPTII,S$GLB,,

## 2023-08-03 PROCEDURE — 3008F BODY MASS INDEX DOCD: CPT | Mod: CPTII,S$GLB,,

## 2023-08-03 PROCEDURE — 3061F PR NEG MICROALBUMINURIA RESULT DOCUMENTED/REVIEW: ICD-10-PCS | Mod: CPTII,S$GLB,,

## 2023-08-03 PROCEDURE — 99214 PR OFFICE/OUTPT VISIT, EST, LEVL IV, 30-39 MIN: ICD-10-PCS | Mod: S$GLB,,,

## 2023-08-03 PROCEDURE — 99214 OFFICE O/P EST MOD 30 MIN: CPT | Mod: S$GLB,,,

## 2023-08-03 PROCEDURE — 1126F AMNT PAIN NOTED NONE PRSNT: CPT | Mod: CPTII,S$GLB,,

## 2023-08-03 PROCEDURE — 1159F MED LIST DOCD IN RCRD: CPT | Mod: CPTII,S$GLB,,

## 2023-08-03 PROCEDURE — 1101F PT FALLS ASSESS-DOCD LE1/YR: CPT | Mod: CPTII,S$GLB,,

## 2023-08-03 PROCEDURE — 1101F PR PT FALLS ASSESS DOC 0-1 FALLS W/OUT INJ PAST YR: ICD-10-PCS | Mod: CPTII,S$GLB,,

## 2023-08-03 PROCEDURE — 3077F PR MOST RECENT SYSTOLIC BLOOD PRESSURE >= 140 MM HG: ICD-10-PCS | Mod: CPTII,S$GLB,,

## 2023-08-03 PROCEDURE — 1159F PR MEDICATION LIST DOCUMENTED IN MEDICAL RECORD: ICD-10-PCS | Mod: CPTII,S$GLB,,

## 2023-08-03 PROCEDURE — 99999 PR PBB SHADOW E&M-EST. PATIENT-LVL III: ICD-10-PCS | Mod: PBBFAC,,,

## 2023-08-03 PROCEDURE — 3044F HG A1C LEVEL LT 7.0%: CPT | Mod: CPTII,S$GLB,,

## 2023-08-03 PROCEDURE — 3066F PR DOCUMENTATION OF TREATMENT FOR NEPHROPATHY: ICD-10-PCS | Mod: CPTII,S$GLB,,

## 2023-08-03 PROCEDURE — 4010F PR ACE/ARB THEARPY RXD/TAKEN: ICD-10-PCS | Mod: CPTII,S$GLB,,

## 2023-08-03 PROCEDURE — 3077F SYST BP >= 140 MM HG: CPT | Mod: CPTII,S$GLB,,

## 2023-08-03 PROCEDURE — 3008F PR BODY MASS INDEX (BMI) DOCUMENTED: ICD-10-PCS | Mod: CPTII,S$GLB,,

## 2023-08-03 PROCEDURE — 3078F PR MOST RECENT DIASTOLIC BLOOD PRESSURE < 80 MM HG: ICD-10-PCS | Mod: CPTII,S$GLB,,

## 2023-08-03 PROCEDURE — 3078F DIAST BP <80 MM HG: CPT | Mod: CPTII,S$GLB,,

## 2023-08-03 PROCEDURE — 4010F ACE/ARB THERAPY RXD/TAKEN: CPT | Mod: CPTII,S$GLB,,

## 2023-08-03 PROCEDURE — 3061F NEG MICROALBUMINURIA REV: CPT | Mod: CPTII,S$GLB,,

## 2023-08-03 PROCEDURE — 3288F FALL RISK ASSESSMENT DOCD: CPT | Mod: CPTII,S$GLB,,

## 2023-08-03 PROCEDURE — 1126F PR PAIN SEVERITY QUANTIFIED, NO PAIN PRESENT: ICD-10-PCS | Mod: CPTII,S$GLB,,

## 2023-08-03 PROCEDURE — 99999 PR PBB SHADOW E&M-EST. PATIENT-LVL III: CPT | Mod: PBBFAC,,,

## 2023-08-03 PROCEDURE — 3288F PR FALLS RISK ASSESSMENT DOCUMENTED: ICD-10-PCS | Mod: CPTII,S$GLB,,

## 2023-08-03 PROCEDURE — 3044F PR MOST RECENT HEMOGLOBIN A1C LEVEL <7.0%: ICD-10-PCS | Mod: CPTII,S$GLB,,

## 2023-08-03 NOTE — PROGRESS NOTES
Subjective:    Patient ID:  Bing Kearns is a 67 y.o. female patient here for evaluation Follow-up    History of Present Illness:       Mrs. Kearns is a 67 year old F who will establish care with Dr. Henson here today for the results of her stress echo. The palpitations and chest pains have improved but her shortness of breath continues.   We discussed further angiographic assessment, monitoring, or referral to pulmonology for lung evaluation.   BP well controlled.         Most Recent Echocardiogram Results  Results for orders placed in visit on 07/31/23    Stress Echo Which stress agent will be used? Pharmacological; Color Flow Doppler? No    Interpretation Summary    Left Ventricle: The left ventricle is normal in size. There is mild concentric hypertrophy. Normal wall motion. There is low normal systolic function with a visually estimated ejection fraction of 50 - 55%.    Right Ventricle: Normal right ventricular cavity size. Systolic function is normal.    Post-stress      Most Recent Nuclear Stress Test Results  Results for orders placed during the hospital encounter of 06/23/21    Nuclear Stress - Cardiology Interpreted    Interpretation Summary    Normal myocardial perfusion scan. There is no evidence of myocardial ischemia or infarction.    The gated perfusion images showed an ejection fraction of 56% at rest.    The EKG portion of this study is negative for ischemia.      Most Recent Cardiac PET Stress Test Results  No results found for this or any previous visit.      Most Recent Cardiovascular Angiogram results  No results found for this or any previous visit.      Other Most Recent Cardiology Results  Results for orders placed in visit on 06/05/23    Holter monitor - 72 hour    Interpretation Summary  · Patient monitored for 1d 22h  · Average heart rate was 79 bpm, Minimum heart rate was 63 bpm on Day 2 / 05:15:15 am, Max heart rate was 97 bpm on Day 1 / 08:34:04 pm  · Atrial Fibrillation or  Flutter: [place] was 0 %  · SVE(s): [place] was 2.71 %  · PVC(s): [place] was 0 %  · Pause: 0 events  · Patient recorded 0 events during the monitoring period  · No significant clinical arrhythmia appreciated.      REVIEW OF SYSTEMS: As noted in HPI   CARDIOVASCULAR: No recent chest pain, palpitations, arm/neck/jaw pain, or edema.  RESPIRATORY: No recent fever, cough, SOB.  : No blood in the urine  GI: No reflux, nausea, vomiting, or blood in stool.   MUSCULOSKELETAL: No falls.   NEURO: No headaches, syncope, or dizziness.  EYES: No sudden changes in vision.     Past Medical History:   Diagnosis Date    Anxiety     Cancer     colon    Chronic diarrhea     Chronic kidney disease     stage 3    Diabetes mellitus     Diastolic dysfunction     GERD (gastroesophageal reflux disease)     Glaucoma     History of migraine headaches     Hyperlipemia     Hypertension     PONV (postoperative nausea and vomiting)     Pulmonary hypertension     Sleep apnea with use of continuous positive airway pressure (CPAP)     waiting on mask to arrive    Thyroid disease     hypothyroid     Past Surgical History:   Procedure Laterality Date    APPENDECTOMY      done during colon resection    CARPAL TUNNEL RELEASE Left 2021    Procedure: Left carpal tunnel release, Left Thumb, Middle, and Ring Trigger Finger Release;  Surgeon: Pilo Paez MD;  Location: [facility];  Service: Orthopedics;  Laterality: Left;  Procedure: Left carpal tunnel release, Left Thumb, Middle, and Ring Trigger Finger Release    Position: Supine    Anesthesia: General    Equipment: Basic handset, carpal tunnel set    CATARACT EXTRACTION, BILATERAL Bilateral      SECTION      COLON SURGERY      COLONOSCOPY Left 2015    Procedure: COLONOSCOPY;  Surgeon: Chet Woodard Jr., MD;  Location: [facility];  Service: Endoscopy;  Laterality: Left;    EXCISION OF LESION OF BUCCAL MUCOSA Left 2019    Procedure: EXCISION, LESION, BUCCAL MUCOSA;  Surgeon:  Dedra Khan MD;  Location: [facility] OR;  Service: ENT;  Laterality: Left;    HERNIA REPAIR      repaired during colon resection    HYSTERECTOMY      Complete    INSERTION OF VENOUS ACCESS PORT      LAPAROSCOPIC CHOLECYSTECTOMY N/A 2021    Procedure: CHOLECYSTECTOMY, LAPAROSCOPIC;  Surgeon: Gab Pierre MD;  Location: [facility] OR;  Service: General;  Laterality: N/A;    OOPHORECTOMY Bilateral     PORTACATH PLACEMENT Left     and later removed     REPLACEMENT OF VASCULAR ACCESS PORT      SURGICAL REMOVAL OF NEOPLASM OF MOUTH Left 6/15/2018    Procedure: EXCISION, NEOPLASM, MOUTH;  Surgeon: Velasquez Juárez MD;  Location: [facility];  Service: ENT;  Laterality: Left;    TRANSFORAMINAL EPIDURAL INJECTION OF STEROID Left 2019    Procedure: Injection,steroid,epidural,transforaminal approach;  Surgeon: Job Porter MD;  Location: [facility] OR;  Service: Pain Management;  Laterality: Left;  L5-S1     Social History     Tobacco Use    Smoking status: Former     Current packs/day: 0.00     Types: Cigarettes     Quit date:      Years since quittin.6    Smokeless tobacco: Never   Substance Use Topics    Alcohol use: No    Drug use: No         Objective      Vitals:    23 1322   BP: (!) 147/76   Pulse: 83       LAST EKG  Results for orders placed or performed in visit on 23   IN OFFICE EKG 12-LEAD (to [place])    Collection Time: 23  9:18 AM    Narrative    Test Reason : I27.20,F41.9,    Vent. Rate : 075 BPM     Atrial Rate : 075 BPM     P-R Int : 178 ms          QRS Dur : 096 ms      QT Int : 400 ms       P-R-T Axes : 049 -20 000 degrees     QTc Int : 446 ms    Normal sinus rhythm  Normal ECG  When compared with ECG of 2023 15:19,  No significant change was found  Confirmed by Sarita VARGHESE, Jake TILLEY ([id number]) on 2023 10:10:02 AM    Referred By: HILARY BURLESON           Confirmed By:Jake Stein MD     LIPIDS - LAST 2   Lab Results   Component Value Date    CHOL 167 2023     "CHOL 163 01/07/2022    HDL 43 01/04/2023    HDL 46 01/07/2022    LDLCALC 90.8 01/04/2023    LDLCALC 88.2 01/07/2022    TRIG 166 (H) 01/04/2023    TRIG 144 01/07/2022    CHOLHDL 25.7 01/04/2023    CHOLHDL 28.2 01/07/2022     CARDIAC PROFILE - LAST 2  Lab Results   Component Value Date     (H) 06/06/2012     08/01/2018    TROPONINI <0.012 02/03/2023    TROPONINI <0.012 11/08/2021      CBC - LAST 2  Lab Results   Component Value Date    WBC 10.79 06/21/2023    WBC 10.15 02/03/2023    RBC 3.92 (L) 06/21/2023    RBC 4.12 02/03/2023    HGB 10.1 (L) 06/21/2023    HGB 10.9 (L) 02/03/2023    HCT 32.3 (L) 06/21/2023    HCT 34.3 (L) 02/03/2023     (H) 06/21/2023     02/03/2023     No results found for: "LABPT", "INR", "APTT"  CHEMISTRY - LAST 2  Lab Results   Component Value Date     06/21/2023     04/03/2023    K 4.6 06/21/2023    K 4.2 04/03/2023     06/21/2023     04/03/2023    CO2 26 06/21/2023    CO2 24 04/03/2023    ANIONGAP 9 06/21/2023    ANIONGAP 12 04/03/2023    BUN 14 06/21/2023    BUN 14 04/03/2023    CREATININE 1.18 06/21/2023    CREATININE 1.17 04/03/2023     (H) 06/21/2023     (H) 04/03/2023    CALCIUM 9.1 06/21/2023    CALCIUM 9.2 04/03/2023    MG 1.8 06/21/2023    MG 2.1 02/03/2023    ALBUMIN 4.3 06/21/2023    ALBUMIN 4.5 04/03/2023    PROT 7.5 04/03/2023    PROT 8.5 (H) 02/03/2023    ALKPHOS 88 04/03/2023    ALKPHOS 101 02/03/2023    ALT 34 04/03/2023    ALT 41 (H) 02/03/2023    AST 44 (H) 04/03/2023    AST 78 (H) 02/03/2023    BILITOT 0.3 04/03/2023    BILITOT 0.4 02/03/2023      ENDOCRINE - LAST 2  Lab Results   Component Value Date    HGBA1C 6.7 (H) 04/03/2023    HGBA1C 8.4 (H) 01/25/2023    TSH 3.052 01/04/2023    TSH 3.580 01/07/2022        PHYSICAL EXAM  CONSTITUTIONAL: Well built, well nourished in no apparent distress  NECK: no carotid bruit, no JVD  LUNGS: CTA  CHEST WALL: no tenderness  HEART: regular rate and rhythm, S1, S2 normal, " no murmur, click, rub or gallop   ABDOMEN: soft, non-tender; bowel sounds normal; no masses,  no organomegaly  EXTREMITIES: Extremities normal, no edema, no calf tenderness noted  NEURO: AAO X 3    I HAVE REVIEWED :    The vital signs, most recent cardiac testing, and most recent pertinent non-cardiology provider notes.    Current Outpatient Medications   Medication Instructions    amLODIPine (NORVASC) 5 mg, Oral, Daily    aspirin (ECOTRIN) 81 mg, Oral, Nightly,      atenoloL (TENORMIN) 100 mg, Oral, Nightly    blood sugar diagnostic Strp 1 strip, Misc.(Non-Drug; Combo Route), 3 times daily    blood-glucose meter (ONETOUCH ULTRA2 METER) Misc USE AS DIRECTED    celecoxib (CELEBREX) 100 mg, Oral, 2 times daily    cholecalciferol, vitamin D3, (VITAMIN D3) 2,000 unit Cap 1 capsule, Oral, 2 times daily    clonazePAM (KLONOPIN) 0.5 mg, Oral, 2 times daily PRN    cloNIDine (CATAPRES) 0.1 mg, Oral, Nightly    donepeziL (ARICEPT) 5 mg, Oral, Nightly    fenofibrate micronized (LOFIBRA) 200 MG Cap TAKE 1 CAPSULE DAILY    gabapentin (NEURONTIN) 300 MG capsule TAKE 1 CAPSULE THREE TIMES A DAY    insulin aspart U-100 (NOVOLOG FLEXPEN U-100 INSULIN) 100 unit/mL (3 mL) InPn pen Inject into skin 3x/day per correction scale,. Max TDD 30u    insulin glargine, TOUJEO, (TOUJEO SOLOSTAR U-300 INSULIN) 300 unit/mL (1.5 mL) InPn pen INJECT 30 UNITS UNDER THE SKIN EVERY EVENING    lancets 33 gauge Misc Test blood sugar 3x/day. Dx code E11.22    latanoprost 0.005 % ophthalmic solution 1 drop, Both Eyes, Nightly    levothyroxine (SYNTHROID) 75 MCG tablet TAKE 1 TABLET BEFORE BREAKFAST    LIDOcaine (LIDODERM) 5 % No dose, route, or frequency recorded.    metFORMIN (GLUCOPHAGE-XR) 500 MG ER 24hr tablet TAKE 2 TABLETS DAILY WITH BREAKFAST    olmesartan (BENICAR) 40 mg, Oral, Daily    omeprazole (PRILOSEC) 40 mg, Oral, 2 times daily before meals    ONETOUCH DELICA PLUS LANCET 33 gauge Misc USE 1 TO CHECK GLUCOSE THREE TIMES DAILY AS NEEDED     "OZEMPIC 0.5 mg, Subcutaneous, Every 7 days    pen needle, diabetic (BD ULTRA-FINE DARIO PEN NEEDLE) 32 gauge x 5/32" Ndle USE 4X DAILY WITH INSULIN    pioglitazone (ACTOS) 15 mg, Oral, Daily    repaglinide (PRANDIN) 1 MG tablet repaglinide 1 mg tablet    rosuvastatin (CRESTOR) 5 MG tablet TAKE 1 TABLET EVERY OTHER DAY    sertraline (ZOLOFT) 50 mg, Oral, Nightly      Assessment & Plan     Pulmonary HTN  PASP 36 mmhg   Will refer to pulm for further eval   Cardiac testing normal    Dyslipidemia associated with type 2 diabetes mellitus  Continue statin     Palpitations  Resolves, she queries booster shot involvement     Other chest pain  Resolved  Negative stress echo     Hypertension  BP ok   Continue olmesartan, atenolol, and amlodipine           Follow up in about 6 months (around 2/3/2024).     Naomi Peters, PA-C [facility]   Office: [phone]          "

## 2023-08-06 DIAGNOSIS — M54.9 CHRONIC UPPER BACK PAIN: ICD-10-CM

## 2023-08-06 DIAGNOSIS — G89.29 CHRONIC UPPER BACK PAIN: ICD-10-CM

## 2023-08-06 NOTE — TELEPHONE ENCOUNTER
Care Due:                  Date            Visit Type   Department     Provider  --------------------------------------------------------------------------------                                EP -                              PRIMARY      [facility] FAMILY  Last Visit: 11-      CARE ([facility])   MEDICINE       Gabi Case  Next Visit: None Scheduled  None         None Found                                                            Last  Test          Frequency    Reason                     Performed    Due Date  --------------------------------------------------------------------------------    Office Visit  12 months..  fenofibrate..............  11-   10-    [facility] Embedded Care Due Messages. Reference number: [id number].   8/06/2023 1:50:48 PM CDT

## 2023-08-08 RX ORDER — ORPHENADRINE CITRATE 100 MG/1
100 TABLET, EXTENDED RELEASE ORAL 2 TIMES DAILY PRN
Qty: 90 TABLET | Refills: 0 | Status: SHIPPED | OUTPATIENT
Start: 2023-08-08 | End: 2024-01-02

## 2023-08-14 ENCOUNTER — PATIENT MESSAGE (OUTPATIENT)
Dept: ADMINISTRATIVE | Facility: HOSPITAL | Age: 68
End: 2023-08-14
Payer: MEDICARE

## 2023-08-15 ENCOUNTER — OFFICE VISIT (OUTPATIENT)
Dept: ENDOCRINOLOGY | Facility: CLINIC | Age: 68
End: 2023-08-15
Payer: MEDICARE

## 2023-08-15 DIAGNOSIS — Z79.4 TYPE 2 DIABETES MELLITUS WITH STAGE 3A CHRONIC KIDNEY DISEASE, WITH LONG-TERM CURRENT USE OF INSULIN: Primary | ICD-10-CM

## 2023-08-15 DIAGNOSIS — E03.4 HYPOTHYROIDISM DUE TO ACQUIRED ATROPHY OF THYROID: ICD-10-CM

## 2023-08-15 DIAGNOSIS — E78.5 HYPERLIPIDEMIA, UNSPECIFIED HYPERLIPIDEMIA TYPE: ICD-10-CM

## 2023-08-15 DIAGNOSIS — I51.89 DIASTOLIC DYSFUNCTION: ICD-10-CM

## 2023-08-15 DIAGNOSIS — K76.0 FATTY LIVER: ICD-10-CM

## 2023-08-15 DIAGNOSIS — Z79.4 TYPE 2 DIABETES MELLITUS WITH DIABETIC NEUROPATHY, WITH LONG-TERM CURRENT USE OF INSULIN: ICD-10-CM

## 2023-08-15 DIAGNOSIS — N18.31 TYPE 2 DIABETES MELLITUS WITH STAGE 3A CHRONIC KIDNEY DISEASE, WITH LONG-TERM CURRENT USE OF INSULIN: Primary | ICD-10-CM

## 2023-08-15 DIAGNOSIS — E11.22 TYPE 2 DIABETES MELLITUS WITH STAGE 3A CHRONIC KIDNEY DISEASE, WITH LONG-TERM CURRENT USE OF INSULIN: Primary | ICD-10-CM

## 2023-08-15 DIAGNOSIS — E11.40 TYPE 2 DIABETES MELLITUS WITH DIABETIC NEUROPATHY, WITH LONG-TERM CURRENT USE OF INSULIN: ICD-10-CM

## 2023-08-15 DIAGNOSIS — I10 ESSENTIAL HYPERTENSION: ICD-10-CM

## 2023-08-15 PROCEDURE — 3051F PR MOST RECENT HEMOGLOBIN A1C LEVEL 7.0 - < 8.0%: ICD-10-PCS | Mod: CPTII,95,, | Performed by: NURSE PRACTITIONER

## 2023-08-15 PROCEDURE — 3066F PR DOCUMENTATION OF TREATMENT FOR NEPHROPATHY: ICD-10-PCS | Mod: CPTII,95,, | Performed by: NURSE PRACTITIONER

## 2023-08-15 PROCEDURE — 1160F RVW MEDS BY RX/DR IN RCRD: CPT | Mod: CPTII,95,, | Performed by: NURSE PRACTITIONER

## 2023-08-15 PROCEDURE — 1159F PR MEDICATION LIST DOCUMENTED IN MEDICAL RECORD: ICD-10-PCS | Mod: CPTII,95,, | Performed by: NURSE PRACTITIONER

## 2023-08-15 PROCEDURE — 3051F HG A1C>EQUAL 7.0%<8.0%: CPT | Mod: CPTII,95,, | Performed by: NURSE PRACTITIONER

## 2023-08-15 PROCEDURE — 99214 PR OFFICE/OUTPT VISIT, EST, LEVL IV, 30-39 MIN: ICD-10-PCS | Mod: 95,,, | Performed by: NURSE PRACTITIONER

## 2023-08-15 PROCEDURE — 99214 OFFICE O/P EST MOD 30 MIN: CPT | Mod: 95,,, | Performed by: NURSE PRACTITIONER

## 2023-08-15 PROCEDURE — 4010F PR ACE/ARB THEARPY RXD/TAKEN: ICD-10-PCS | Mod: CPTII,95,, | Performed by: NURSE PRACTITIONER

## 2023-08-15 PROCEDURE — 3066F NEPHROPATHY DOC TX: CPT | Mod: CPTII,95,, | Performed by: NURSE PRACTITIONER

## 2023-08-15 PROCEDURE — 1160F PR REVIEW ALL MEDS BY PRESCRIBER/CLIN PHARMACIST DOCUMENTED: ICD-10-PCS | Mod: CPTII,95,, | Performed by: NURSE PRACTITIONER

## 2023-08-15 PROCEDURE — 3061F PR NEG MICROALBUMINURIA RESULT DOCUMENTED/REVIEW: ICD-10-PCS | Mod: CPTII,95,, | Performed by: NURSE PRACTITIONER

## 2023-08-15 PROCEDURE — 1159F MED LIST DOCD IN RCRD: CPT | Mod: CPTII,95,, | Performed by: NURSE PRACTITIONER

## 2023-08-15 PROCEDURE — 4010F ACE/ARB THERAPY RXD/TAKEN: CPT | Mod: CPTII,95,, | Performed by: NURSE PRACTITIONER

## 2023-08-15 PROCEDURE — 3061F NEG MICROALBUMINURIA REV: CPT | Mod: CPTII,95,, | Performed by: NURSE PRACTITIONER

## 2023-08-15 NOTE — PROGRESS NOTES
The patient location is:  Patient Home   The chief complaint leading to consultation is: Type 2 DM  Visit type: Virtual visit with synchronous audio and video  Total time spent with patient: 15 min  Each patient to whom he or she provides medical services by telemedicine is:  (1) informed of the relationship between the physician and patient and the respective role of any other health care provider with respect to management of the patient; and (2) notified that he or she may decline to receive medical services by telemedicine and may withdraw from such care at any time.       CC: Ms. Bing Kearns arrives today for management of Type 2 DM and review of chronic medical conditions, as listed in the Visit Diagnosis section of this encounter.       HPI: Ms. Bing Kearns was diagnosed with Type 2 DM in her late 50s. She was diagnosed based on lab work. Initial treatment consisted of metformin. Januvia was added in . Toujeo was added in 2020. + FH of DM in sister, maternal GF. Denies hospitalizations due to DM.   Follows with Dr. Vanessa for CKD.     Patient was last seen by me in May.    She was approved for Ozempic through Patient Assistance.  However, she has not received it yet. She is giving the 0.25 mg dose to ration her current supply. Has 1 dose left. She cannot afford to pay another $250 at the pharmacy.     BG readings are checked 2x/day.  She reads the following from her log:  Fastin-130  Dinner: same    Hypoglycemia: Not recently  Hypoglycemic Symptoms: hunger and jitteriness  Hypoglycemia Treatment: juice or glucose tabs    Missing Insulin/PO medication doses: No    Dietary Habits: Eats 3 meals/day. Occasional snack. Avoids sugary beverages.     Last DM education appointment: not recently      CURRENT DIABETIC MEDS: metformin XR 1000 mg daily, pioglitazone 15 mg daily, Ozempic 0.25 mg weekly, Toujeo 28 units QHS, Humalog sliding scale, target 150, ISF 25  Glucometer type: One  Touch Ultra 2    Previous DM treatments:  Trulicity - panic attacks, blood sugars in the 70s  Januvia - $$  Prandin - discontinued since starting Ozempic  Glimepiride - hypoglycemia     Last Eye Exam: 2/8/2022, no DR. + glaucoma. Dr. Turcios. Has appt scheduled in June.   Last Podiatry Exam: 2/2023, Dr. Helms.     REVIEW OF SYSTEMS  Constitutional: no c/o fatigue, weakness, weight loss  Cardiac: no palpitations, chest pain   Respiratory: no cough. + chronic dyspnea. Had complete workup with cardiology, which was unrevealing. Has upcoming appt with Pulmonolgy.  GI: no c/o abdominal pain. Denies h/o pancreatitis. + intermittent nausea 1-2 days after Ozempic.  Neuro: + numbness, tingling in feet  Endocrine: denies polyphagia, polydipsia, polyuria       Personally reviewed Past Medical, Surgical, Social History.    Vital Signs  LMP  (LMP Unknown)  -- virtual visit     Personally reviewed the below labs:    Hemoglobin A1C   Date Value Ref Range Status   08/08/2023 7.0 (H) 0.0 - 5.6 % Final     Comment:     Reference Interval:  5.0 - 5.6 Normal   5.7 - 6.4 High Risk   > 6.5 Diabetic      Hgb A1c results are standardized based on the (NGSP) National   Glycohemoglobin Standardization Program.      Hemoglobin A1C levels are related to mean serum/plasma glucose   during the preceding 2-3 months.        04/03/2023 6.7 (H) 0.0 - 5.6 % Final     Comment:     Reference Interval:  5.0 - 5.6 Normal   5.7 - 6.4 High Risk   > 6.5 Diabetic      Hgb A1c results are standardized based on the (NGSP) National   Glycohemoglobin Standardization Program.      Hemoglobin A1C levels are related to mean serum/plasma glucose   during the preceding 2-3 months.        01/25/2023 8.4 (H) 0.0 - 5.6 % Final     Comment:     Reference Interval:  5.0 - 5.6 Normal   5.7 - 6.4 High Risk   > 6.5 Diabetic      Hgb A1c results are standardized based on the (NGSP) National   Glycohemoglobin Standardization Program.      Hemoglobin A1C levels are related  to mean serum/plasma glucose   during the preceding 2-3 months.            Chemistry        Component Value Date/Time     08/08/2023 1139    K 4.7 08/08/2023 1139     08/08/2023 1139    CO2 24 08/08/2023 1139    BUN 14 08/08/2023 1139    CREATININE 1.23 08/08/2023 1139     (H) 08/08/2023 1139        Component Value Date/Time    CALCIUM 9.5 08/08/2023 1139    ALKPHOS 88 04/03/2023 1227    AST 44 (H) 04/03/2023 1227    ALT 34 04/03/2023 1227    BILITOT 0.3 04/03/2023 1227    ESTGFRAFRICA >60.0 05/20/2022 0935    EGFRNONAA 52.1 (A) 05/20/2022 0935          Lab Results   Component Value Date    CHOL 167 01/04/2023    CHOL 163 01/07/2022    CHOL 162 02/24/2021     Lab Results   Component Value Date    HDL 43 01/04/2023    HDL 46 01/07/2022    HDL 54 02/24/2021     Lab Results   Component Value Date    LDLCALC 90.8 01/04/2023    LDLCALC 88.2 01/07/2022    LDLCALC 83.2 02/24/2021     Lab Results   Component Value Date    TRIG 166 (H) 01/04/2023    TRIG 144 01/07/2022    TRIG 124 02/24/2021     Lab Results   Component Value Date    CHOLHDL 25.7 01/04/2023    CHOLHDL 28.2 01/07/2022    CHOLHDL 33.3 02/24/2021       Lab Results   Component Value Date    MICALBCREAT Unable to calculate 06/21/2023     Lab Results   Component Value Date    TSH 3.052 01/04/2023       CrCl cannot be calculated (Patient's most recent lab result is older than the maximum 7 days allowed.).    Vit D, 25-Hydroxy   Date Value Ref Range Status   06/21/2023 61 30 - 96 ng/mL Final     Comment:     Vitamin D deficiency.........<10 ng/mL                              Vitamin D insufficiency......10-29 ng/mL       Vitamin D sufficiency........> or equal to 30 ng/mL  Vitamin D toxicity............>100 ng/mL           PHYSICAL EXAMINATION  Deferred -- video visit.      Goals        HEMOGLOBIN A1C < 7.5                 Assessment/Plan  1. Type 2 diabetes mellitus with stage 3a chronic kidney disease, with long-term current use of insulin -- A1c  is acceptable. Will await shipment of Ozempic.   -- once received, start Ozempic 0.25 mg weekly. After 4 weeks, increase dose to 0.5 mg weekly. Discussed medication's mechanism of action, side effects, and contraindications. Advised pt to notify me for extreme nausea, abdominal pain, etc.  -- continue pioglitazone, metformin XR, Toujeo, Humalog correction scale. May need to increase insulin doses until she resume Ozempic.   -- BG monitoring 2x/day.     -- Discussed diagnosis of DM, A1c goals, progression of disease, long term complications and tx options.  -- takes aspirin, ARB   2. Type 2 diabetes mellitus with diabetic neuropathy, with long-term current use of insulin -- optimize DM control   3. Essential hypertension  -- managed by cardiology  -- continue current meds and monitor   4. Hyperlipidemia, unspecified hyperlipidemia type  -- uncontrolled with elevated TRG  -- continue Crestor  -- lipid panel with RTC   5. Hypothyroidism due to acquired atrophy of thyroid  -- stable  -- continue current levothyroxine dose.   -- TSH with RTC   6. Diastolic dysfunction  -- following with cardiology.   -- monitor status with pioglitazone   7. Fatty liver -- maintain DM control  -- Ozempic may add benefit       FOLLOW UP  Follow up in about 4 months (around 12/15/2023).  Patient instructed to bring BG logs to each follow up   Patient encouraged to call for any BG/medication issues, concerns, or questions.      Orders Placed This Encounter   Procedures    Hemoglobin A1C    Lipid Panel    Comprehensive Metabolic Panel    TSH

## 2023-08-16 ENCOUNTER — PATIENT OUTREACH (OUTPATIENT)
Dept: ADMINISTRATIVE | Facility: HOSPITAL | Age: 68
End: 2023-08-16
Payer: MEDICARE

## 2023-08-16 NOTE — LETTER
AUTHORIZATION FOR RELEASE OF   CONFIDENTIAL INFORMATION    Dear ELIGIO Turcios MD,    We are seeing Bing Kearns, date of birth 1955, in the clinic at [facility]. Gabi Case MD is the patient's PCP. Bing Kearns has an outstanding lab/procedure at the time we reviewed her chart. In order to help keep her health information updated, she has authorized us to request the following medical record(s):        (  )  MAMMOGRAM                                      (  )  COLONOSCOPY      (  )  PAP SMEAR                                          (  )  OUTSIDE LAB RESULTS     (  )  DEXA SCAN                                          (X) DIABIETIC EYE EXAM            (  )  FOOT EXAM                                          (  )  ENTIRE RECORD     (  )  OUTSIDE IMMUNIZATIONS                 (  )  _______________         Please fax records to [facility], Conlin, Erin M., MD, [phone]    If you have any questions, please contact Kumar Contreras LPN Care Coordinator  at [phone].            Patient Name: Bing Kearns  : 1955  Patient Phone #: [phone]

## 2023-08-23 ENCOUNTER — TELEPHONE (OUTPATIENT)
Dept: CARDIOLOGY | Facility: CLINIC | Age: 68
End: 2023-08-23
Payer: MEDICARE

## 2023-08-23 DIAGNOSIS — I27.20 PULMONARY HYPERTENSION: Primary | ICD-10-CM

## 2023-08-23 RX ORDER — SODIUM CHLORIDE 0.9 % (FLUSH) 0.9 %
10 SYRINGE (ML) INJECTION
Status: DISCONTINUED | OUTPATIENT
Start: 2023-08-23 | End: 2023-10-25

## 2023-08-23 RX ORDER — SODIUM CHLORIDE 9 MG/ML
INJECTION, SOLUTION INTRAVENOUS ONCE
Status: CANCELLED | OUTPATIENT
Start: 2023-08-23 | End: 2023-08-23

## 2023-08-23 NOTE — TELEPHONE ENCOUNTER
----- Message from Amy Montero LPN sent at 8/23/2023  8:43 AM CDT -----    ----- Message -----  From: Annie Henson MD  Sent: 8/22/2023   6:47 PM CDT  To: Natividad Valencia Staff    Can we schedule her for RHC please?  ----- Message -----  From: Agueda Amador NP  Sent: 8/22/2023   1:11 PM CDT  To: Annie Henson MD

## 2023-08-23 NOTE — TELEPHONE ENCOUNTER
Spoke with pt and scheduled RHC for 9/14 at 10 am. Gave pt verbal instructions and sent portal message. Pt KAREN

## 2023-09-15 ENCOUNTER — TELEPHONE (OUTPATIENT)
Dept: CARDIOLOGY | Facility: CLINIC | Age: 68
End: 2023-09-15
Payer: MEDICARE

## 2023-09-15 NOTE — TELEPHONE ENCOUNTER
----- Message from Melony Milian sent at 9/15/2023  9:47 AM CDT -----  Regarding: appt access  Type:  Sooner Appointment Request    Caller is requesting a sooner appointment.  Caller declined first available appointment listed below.  Caller will not accept being placed on the waitlist and is requesting a message be sent to doctor.    Name of Caller:  pt   When is the first available appointment?  0ct   Symptoms:  2wk f/u   Best Call Back Number:  [phone] (home)     Additional Information:  requesting a appt and call back asap please advise thank you

## 2023-09-20 ENCOUNTER — PATIENT MESSAGE (OUTPATIENT)
Dept: ENDOCRINOLOGY | Facility: CLINIC | Age: 68
End: 2023-09-20
Payer: MEDICARE

## 2023-09-20 ENCOUNTER — PATIENT MESSAGE (OUTPATIENT)
Dept: CARDIOLOGY | Facility: CLINIC | Age: 68
End: 2023-09-20
Payer: MEDICARE

## 2023-09-20 DIAGNOSIS — I51.89 DIASTOLIC DYSFUNCTION: Primary | ICD-10-CM

## 2023-09-20 RX ORDER — FUROSEMIDE 20 MG/1
20 TABLET ORAL DAILY
Qty: 30 TABLET | Refills: 11 | Status: SHIPPED | OUTPATIENT
Start: 2023-09-20 | End: 2024-09-19

## 2023-09-20 NOTE — TELEPHONE ENCOUNTER
Torsemide does not usually affect blood sugar so that's likely unrelated. The BMP was ordered so please have that drawn today. Has there been any weight loss or change in dyspnea? We can decrease the torsemide to 10 mg once daily or switch to Lasix which is less efficacious

## 2023-09-25 DIAGNOSIS — E11.9 TYPE 2 DIABETES MELLITUS WITHOUT COMPLICATION, WITHOUT LONG-TERM CURRENT USE OF INSULIN: ICD-10-CM

## 2023-09-27 RX ORDER — PIOGLITAZONEHYDROCHLORIDE 15 MG/1
15 TABLET ORAL
Qty: 90 TABLET | Refills: 3 | Status: SHIPPED | OUTPATIENT
Start: 2023-09-27 | End: 2023-12-19

## 2023-09-29 NOTE — TELEPHONE ENCOUNTER
Chief complaint:  Chief Complaint   Patient presents with   • Acne   • Office Visit     HPI:   The patient is a established 13 year old female. The patient is accompanied by Catie who is patient step mother. I last saw her on 10/04/2022.    The patient presents in 1 year follow up for acne.  Location:  The acne involves face, chest, back and shoulder   Duration:  The acne started years ago  Quality:     The patient develops painful acne lesions-Yes  Modifying factors:     Current treatments for acne include: Differin 0.1% gel which she is not using and Minocycline 100 mg daily, she is not longer taking the Minocycline as she finished this awhile ago    The patient presents with the working plan of likely having us teaching about and prescribe the use of isotretinoin.      ROS:  Constitutional:  In general the patient feels well:  Yes  Integument:  The patient denies any other signs or symptoms of skin disease:  Yes  Psychiatric:  The patient denies personal history of psychiatric problems such as depression or suicidal behavior or aggressive behavior:  No    We reviewed the patient's pregnancy prevention plan which is:  Pending   The patient is pregnant:           [] Yes   [x] No    [] Not applicable.  The patient is breast feeding:  [] Yes   [x] No    [] Not applicable.    ALLERGIES:  No Known Allergies    Current Outpatient Medications   Medication Sig Dispense Refill   • lidocaine-prilocaine (EMLA) cream Apply small amount to affected area under occlusion 1 hour prior to procedure. 30 g 0   • methylphenidate (RITALIN) 20 MG tablet Take 20 mg by mouth.     • methylpheniDATE (RITALIN) 5 MG tablet Take 25 mg by mouth 2 (two) times a day.     • clindamycin (Cleocin-T) 1 % lotion Apply daily to the face in the morning after showering. 60 mL 11     No current facility-administered medications for this visit.        No past medical history on file.    DERMATOLOGY PMH:    Acne      FH:  The patient has a family history of  Please advise.     severe acne:     The patient denies family history of psychiatric problems such as depression or suicidal behavior or aggressive behavior :   No    SH: In 8th grade - Plays Volleyball, Basketball and Tennis.     PHYSICAL EXAM:     The patient's weight as measured today is 71.7 kg.    Forehead, cheeks, chin, chest, back and shoulder closed comedones and pustules       Laboratory:     No visits with results within 1 Month(s) from this visit.   Latest known visit with results is:   No results found for any previous visit.        Assessment and Plan:    1.  Acne  High Risk Medication Use    We counseled the patient regarding the risks and benefits of Isotretinoin and the realistic expectations that can be achieved from a course of Isotretinoin therapy.  Specifically, we discussed the potential for dry lips, dry skin, dryness of eyes, photosensitivity, decreased night vision, nose bleeds, headaches, upset stomach, increased risk of inflammatory bowel disease, bone or muscle aches, possible mood changes and hypertriglyceridemia.  We also discussed the absolute necessity that no pregnant woman takes the Isotretinoin since it would result in a severely malformed child.  The patient was provided with the Isotretinoin Educational Kit for Female Patients Who Can Get Pregnant, and an Isotretinoin handout regarding common side effects seen with Isotretinoin.  These materials were reviewed and discussed. The patient was registered in the [employer] program.    I recommended treating at the dose equivalent of  40 mg a day to begin    Wt Readings from Last 1 Encounters:   09/29/23 71.7 kg (158 lb) (95 %, Z= 1.61)*     * Growth percentiles are based on CDC (Girls, 2-20 Years) data.    patient.    When the patient starts the isotretinoin the patient should discontinue the acne medications I currently have him on which include Minocycline, Clindamycin Gel and Differin 0.1% gel.     I ordered a hepatic panel and fasting triglyceride level  for approximately 3 weeks from now.    Return to clinic in 2 months for Accutane follow up.    Email - [email or web address]     Pharmacy - [facility]                 On 9/29/2023, IAmna CMA, scribed the services personally performed by David Jacobs MD.    The documentation recorded by the scribe accurately and completely reflects the service(s) I personally performed and the decisions made by me.     David Jacobs MD  [facility]

## 2023-10-10 DIAGNOSIS — Z79.4 TYPE 2 DIABETES MELLITUS WITH STAGE 3A CHRONIC KIDNEY DISEASE, WITH LONG-TERM CURRENT USE OF INSULIN: ICD-10-CM

## 2023-10-10 DIAGNOSIS — N18.31 TYPE 2 DIABETES MELLITUS WITH STAGE 3A CHRONIC KIDNEY DISEASE, WITH LONG-TERM CURRENT USE OF INSULIN: ICD-10-CM

## 2023-10-10 DIAGNOSIS — E11.22 TYPE 2 DIABETES MELLITUS WITH STAGE 3A CHRONIC KIDNEY DISEASE, WITH LONG-TERM CURRENT USE OF INSULIN: ICD-10-CM

## 2023-10-10 RX ORDER — SEMAGLUTIDE 0.68 MG/ML
0.5 INJECTION, SOLUTION SUBCUTANEOUS
Qty: 5 EACH | Refills: 3 | Status: SHIPPED | OUTPATIENT
Start: 2023-10-10 | End: 2023-12-19

## 2023-10-12 ENCOUNTER — OFFICE VISIT (OUTPATIENT)
Dept: CARDIOLOGY | Facility: CLINIC | Age: 68
End: 2023-10-12
Payer: MEDICARE

## 2023-10-12 VITALS
BODY MASS INDEX: 36.26 KG/M2 | HEIGHT: 65 IN | HEART RATE: 70 BPM | DIASTOLIC BLOOD PRESSURE: 69 MMHG | WEIGHT: 217.63 LBS | SYSTOLIC BLOOD PRESSURE: 132 MMHG

## 2023-10-12 DIAGNOSIS — E78.5 HYPERLIPIDEMIA, UNSPECIFIED HYPERLIPIDEMIA TYPE: ICD-10-CM

## 2023-10-12 DIAGNOSIS — I51.89 DIASTOLIC DYSFUNCTION: ICD-10-CM

## 2023-10-12 DIAGNOSIS — R06.02 SHORTNESS OF BREATH: ICD-10-CM

## 2023-10-12 DIAGNOSIS — R07.89 OTHER CHEST PAIN: ICD-10-CM

## 2023-10-12 DIAGNOSIS — Z13.6 ENCOUNTER FOR SCREENING FOR CARDIOVASCULAR DISORDERS: Primary | ICD-10-CM

## 2023-10-12 DIAGNOSIS — I27.20 PULMONARY HTN: ICD-10-CM

## 2023-10-12 DIAGNOSIS — R00.2 PALPITATIONS: ICD-10-CM

## 2023-10-12 PROCEDURE — 3008F BODY MASS INDEX DOCD: CPT | Mod: CPTII,S$GLB,,

## 2023-10-12 PROCEDURE — 3066F NEPHROPATHY DOC TX: CPT | Mod: CPTII,S$GLB,,

## 2023-10-12 PROCEDURE — 3288F PR FALLS RISK ASSESSMENT DOCUMENTED: ICD-10-PCS | Mod: CPTII,S$GLB,,

## 2023-10-12 PROCEDURE — 3078F DIAST BP <80 MM HG: CPT | Mod: CPTII,S$GLB,,

## 2023-10-12 PROCEDURE — 99214 OFFICE O/P EST MOD 30 MIN: CPT | Mod: S$GLB,,,

## 2023-10-12 PROCEDURE — 4010F PR ACE/ARB THEARPY RXD/TAKEN: ICD-10-PCS | Mod: CPTII,S$GLB,,

## 2023-10-12 PROCEDURE — 4010F ACE/ARB THERAPY RXD/TAKEN: CPT | Mod: CPTII,S$GLB,,

## 2023-10-12 PROCEDURE — 3051F PR MOST RECENT HEMOGLOBIN A1C LEVEL 7.0 - < 8.0%: ICD-10-PCS | Mod: CPTII,S$GLB,,

## 2023-10-12 PROCEDURE — 3288F FALL RISK ASSESSMENT DOCD: CPT | Mod: CPTII,S$GLB,,

## 2023-10-12 PROCEDURE — 99214 PR OFFICE/OUTPT VISIT, EST, LEVL IV, 30-39 MIN: ICD-10-PCS | Mod: S$GLB,,,

## 2023-10-12 PROCEDURE — 1126F PR PAIN SEVERITY QUANTIFIED, NO PAIN PRESENT: ICD-10-PCS | Mod: CPTII,S$GLB,,

## 2023-10-12 PROCEDURE — 99999 PR PBB SHADOW E&M-EST. PATIENT-LVL V: CPT | Mod: PBBFAC,,,

## 2023-10-12 PROCEDURE — 3066F PR DOCUMENTATION OF TREATMENT FOR NEPHROPATHY: ICD-10-PCS | Mod: CPTII,S$GLB,,

## 2023-10-12 PROCEDURE — 3075F PR MOST RECENT SYSTOLIC BLOOD PRESS GE 130-139MM HG: ICD-10-PCS | Mod: CPTII,S$GLB,,

## 2023-10-12 PROCEDURE — 1126F AMNT PAIN NOTED NONE PRSNT: CPT | Mod: CPTII,S$GLB,,

## 2023-10-12 PROCEDURE — 3061F PR NEG MICROALBUMINURIA RESULT DOCUMENTED/REVIEW: ICD-10-PCS | Mod: CPTII,S$GLB,,

## 2023-10-12 PROCEDURE — 1101F PT FALLS ASSESS-DOCD LE1/YR: CPT | Mod: CPTII,S$GLB,,

## 2023-10-12 PROCEDURE — 3075F SYST BP GE 130 - 139MM HG: CPT | Mod: CPTII,S$GLB,,

## 2023-10-12 PROCEDURE — 1101F PR PT FALLS ASSESS DOC 0-1 FALLS W/OUT INJ PAST YR: ICD-10-PCS | Mod: CPTII,S$GLB,,

## 2023-10-12 PROCEDURE — 99999 PR PBB SHADOW E&M-EST. PATIENT-LVL V: ICD-10-PCS | Mod: PBBFAC,,,

## 2023-10-12 PROCEDURE — 3061F NEG MICROALBUMINURIA REV: CPT | Mod: CPTII,S$GLB,,

## 2023-10-12 PROCEDURE — 1159F PR MEDICATION LIST DOCUMENTED IN MEDICAL RECORD: ICD-10-PCS | Mod: CPTII,S$GLB,,

## 2023-10-12 PROCEDURE — 3008F PR BODY MASS INDEX (BMI) DOCUMENTED: ICD-10-PCS | Mod: CPTII,S$GLB,,

## 2023-10-12 PROCEDURE — 1159F MED LIST DOCD IN RCRD: CPT | Mod: CPTII,S$GLB,,

## 2023-10-12 PROCEDURE — 3078F PR MOST RECENT DIASTOLIC BLOOD PRESSURE < 80 MM HG: ICD-10-PCS | Mod: CPTII,S$GLB,,

## 2023-10-12 PROCEDURE — 3051F HG A1C>EQUAL 7.0%<8.0%: CPT | Mod: CPTII,S$GLB,,

## 2023-10-12 NOTE — ASSESSMENT & PLAN NOTE
Lasix not helping, she feels it it worsening   Has pulm follow up next few days, f/u their recs   Will get cardiac CTA in light of continued unexplained symptoms and negative stress test

## 2023-10-12 NOTE — PROGRESS NOTES
Subjective:    Patient ID:  Bing Kearns is a 68 y.o. female patient here for evaluation Follow-up (Angiogram with Natividad)    History of Present Illness:     Mrs. Palomino is a 68 year old F who follows with Dr. Henson here today for a c follow up which showed elevated right and left filling pressures. She was started on torsemide, did not tolerate it well. Switched to lasix which she is tolerating better but does not feel like it is helping her SOB. She feels actually like it is progressively getting worse. NO chest pain. Palps are better.         Most Recent Echocardiogram Results  Results for orders placed in visit on 07/31/23    Stress Echo Which stress agent will be used? Pharmacological; Color Flow Doppler? No    Interpretation Summary    Left Ventricle: The left ventricle is normal in size. There is mild concentric hypertrophy. Normal wall motion. There is low normal systolic function with a visually estimated ejection fraction of 50 - 55%.    Right Ventricle: Normal right ventricular cavity size. Systolic function is normal.    Post-stress      Most Recent Nuclear Stress Test Results  Results for orders placed during the hospital encounter of 06/23/21    Nuclear Stress - Cardiology Interpreted    Interpretation Summary    Normal myocardial perfusion scan. There is no evidence of myocardial ischemia or infarction.    The gated perfusion images showed an ejection fraction of 56% at rest.    The EKG portion of this study is negative for ischemia.      Most Recent Cardiac PET Stress Test Results  No results found for this or any previous visit.      Most Recent Cardiovascular Angiogram results  Results for orders placed during the hospital encounter of 09/14/23    Cardiac catheterization    Conclusion  Procedures:  Right heart cath    Conclusions:  Elevated right and left filling pressures with moderate post capillary pulmonary hypertension and preserved cardiac output  No significant left-to-right  shunt    Recommendations:  Start a loop diuretic and check BMP in 1 week        Description of procedure:  The patient presented to the Cath Lab in a(n) elective fashion.  The patient was prepped and draped in a sterile manner.  Timeout, airway and ASA assessment were completed.  Local anesthesia with 2% lidocaine was administered..    A peripheral IV had been placed in the right antecubital fossa.  This was exchanged using micro puncture to a 7 Wolof slender sheath.  A [place]in[place]-Crystal catheter was advanced in follow up hemodynamic measurements were obtained.  The sheath was removed at the end of the procedure and manual pressure was held with adequate hemostasis.    The patient tolerated the procedure well and left the cath lab in stable condition.    Hemodynamics:  RA 18  RV 52/20  PA 50/30 with a mean of 36  PA wedge pressure 24    Noninvasive blood pressure 134/81    Cardiac output 8.4, cardiac index 4.4 by thermodilution    RA sat 70.4%  PA sat 70.7%        Complications:  None  Estimated blood loss:  Minimal mL    Annie Henson MD, [facility]  Interventional Cardiology/Structural Heart Disease  [facility]  Office: [phone]    The clinically important portion of this report has been dictated in the section above. Our Epic reporting system does not allow us to provide a clinically meaningful report without this dictation. Please beware that there may be errors and discrepancies in the computer transcription and all of the clicks required to finalize the report.  The procedure log was documented by Documenter: Gregory Duckworth and verified by Annie Henson MD.    Date: 9/21/2023  Time: 2:47 PM      Other Most Recent Cardiology Results  Results for orders placed in visit on 06/05/23    Holter monitor - 72 hour    Interpretation Summary  · Patient monitored for 1d 22h  · Average heart rate was 79 bpm, Minimum heart rate was 63 bpm on Day 2 / 05:15:15 am, Max heart rate was 97 bpm on Day  :34:04 pm  · Atrial Fibrillation or Flutter: [place] was 0 %  · SVE(s): [place] was 2.71 %  · PVC(s): [place] was 0 %  · Pause: 0 events  · Patient recorded 0 events during the monitoring period  · No significant clinical arrhythmia appreciated.      REVIEW OF SYSTEMS: As noted in HPI   CARDIOVASCULAR: No recent chest pain, palpitations, arm/neck/jaw pain, or edema.  RESPIRATORY: +TERRELL. No recent fever, cough.   : No blood in the urine  GI: No reflux, nausea, vomiting, or blood in stool.   MUSCULOSKELETAL: No falls.   NEURO: No headaches, syncope, or dizziness.  EYES: No sudden changes in vision.     Past Medical History:   Diagnosis Date    Anxiety     Cancer     colon    Chronic diarrhea     Chronic kidney disease     stage 3    Diabetes mellitus     Diastolic dysfunction     GERD (gastroesophageal reflux disease)     Glaucoma     History of migraine headaches     Hyperlipemia     Hypertension     PONV (postoperative nausea and vomiting)     Pulmonary hypertension     Sleep apnea with use of continuous positive airway pressure (CPAP)     waiting on mask to arrive    Thyroid disease     hypothyroid     Past Surgical History:   Procedure Laterality Date    APPENDECTOMY      done during colon resection    CARPAL TUNNEL RELEASE Left 2021    Procedure: Left carpal tunnel release, Left Thumb, Middle, and Ring Trigger Finger Release;  Surgeon: Pilo Paez MD;  Location: [facility];  Service: Orthopedics;  Laterality: Left;  Procedure: Left carpal tunnel release, Left Thumb, Middle, and Ring Trigger Finger Release    Position: Supine    Anesthesia: General    Equipment: Basic handset, carpal tunnel set    CATARACT EXTRACTION, BILATERAL Bilateral      SECTION      COLON SURGERY  2005    COLONOSCOPY Left 2015    Procedure: COLONOSCOPY;  Surgeon: Chet Woodard Jr., MD;  Location: [facility];  Service: Endoscopy;  Laterality: Left;    EXCISION OF LESION OF BUCCAL MUCOSA Left 2019    Procedure:  EXCISION, LESION, BUCCAL MUCOSA;  Surgeon: Dedra Khan MD;  Location: [facility] OR;  Service: ENT;  Laterality: Left;    HERNIA REPAIR      repaired during colon resection    HYSTERECTOMY      Complete    INSERTION OF VENOUS ACCESS PORT      LAPAROSCOPIC CHOLECYSTECTOMY N/A 2021    Procedure: CHOLECYSTECTOMY, LAPAROSCOPIC;  Surgeon: Gab Pierre MD;  Location: [facility] OR;  Service: General;  Laterality: N/A;    OOPHORECTOMY Bilateral 2010    PORTACATH PLACEMENT Left     and later removed     REPLACEMENT OF VASCULAR ACCESS PORT      RIGHT HEART CATHETERIZATION Right 2023    Procedure: Right heart cath;  Surgeon: Annie Henson MD;  Location: [facility] CATH;  Service: Cardiology;  Laterality: Right;    SURGICAL REMOVAL OF NEOPLASM OF MOUTH Left 6/15/2018    Procedure: EXCISION, NEOPLASM, MOUTH;  Surgeon: Velasquez Juárez MD;  Location: [facility] OR [address] [place] [address];  Service: ENT;  Laterality: Left;    TRANSFORAMINAL EPIDURAL INJECTION OF STEROID Left 2019    Procedure: Injection,steroid,epidural,transforaminal approach;  Surgeon: Job Porter MD;  Location: [facility] OR;  Service: Pain Management;  Laterality: Left;  L5-S1     Social History     Tobacco Use    Smoking status: Former     Current packs/day: 0.00     Types: Cigarettes     Quit date:      Years since quittin.7    Smokeless tobacco: Never   Substance Use Topics    Alcohol use: No    Drug use: No         Objective      Vitals:    10/12/23 1129   BP: 132/69   Pulse: 70       LAST EKG  Results for orders placed or performed during the hospital encounter of 23   EKG 12-lead    Collection Time: 23  8:55 AM    Narrative    Test Reason : I27.20,    Vent. Rate : 068 BPM     Atrial Rate : 068 BPM     P-R Int : 178 ms          QRS Dur : 100 ms      QT Int : 438 ms       P-R-T Axes : 031 -24 -17 degrees     QTc Int : 465 ms    Normal sinus rhythm  Minimal voltage criteria for LVH, may be normal variant ( R in aVL )  Borderline Abnormal ECG  Confirmed by  "Ashleigh Obrien ([id number]) on 9/19/2023 11:04:58 AM    Referred By:  POLO           Confirmed By:Ashleigh Obrien     LIPIDS - LAST 2   Lab Results   Component Value Date    CHOL 167 01/04/2023    CHOL 163 01/07/2022    HDL 43 01/04/2023    HDL 46 01/07/2022    LDLCALC 90.8 01/04/2023    LDLCALC 88.2 01/07/2022    TRIG 166 (H) 01/04/2023    TRIG 144 01/07/2022    CHOLHDL 25.7 01/04/2023    CHOLHDL 28.2 01/07/2022     CARDIAC PROFILE - LAST 2  Lab Results   Component Value Date     (H) 06/06/2012     08/01/2018    TROPONINI <0.012 02/03/2023    TROPONINI <0.012 11/08/2021      CBC - LAST 2  Lab Results   Component Value Date    WBC 8.49 09/14/2023    WBC 10.79 06/21/2023    RBC 3.97 (L) 09/14/2023    RBC 3.92 (L) 06/21/2023    HGB 10.2 (L) 09/14/2023    HGB 10.1 (L) 06/21/2023    HCT 32.3 (L) 09/14/2023    HCT 32.3 (L) 06/21/2023     09/14/2023     (H) 06/21/2023     No results found for: "LABPT", "INR", "APTT"  CHEMISTRY - LAST 2  Lab Results   Component Value Date     09/21/2023     09/14/2023    K 4.5 09/21/2023    K 4.4 09/14/2023     09/21/2023     09/14/2023    CO2 24 09/21/2023    CO2 26 09/14/2023    ANIONGAP 12 09/21/2023    ANIONGAP 9 09/14/2023    BUN 16 09/21/2023    BUN 16 09/14/2023    CREATININE 1.18 09/21/2023    CREATININE 1.03 09/14/2023     (H) 09/21/2023     (H) 09/14/2023    CALCIUM 9.3 09/21/2023    CALCIUM 9.5 09/14/2023    PH 7.37 09/14/2023    PH 7.39 09/14/2023    MG 1.8 06/21/2023    MG 2.1 02/03/2023    ALBUMIN 4.4 09/14/2023    ALBUMIN 4.3 06/21/2023    PROT 7.9 09/14/2023    PROT 7.5 04/03/2023    ALKPHOS 90 09/14/2023    ALKPHOS 88 04/03/2023    ALT 40 (H) 09/14/2023    ALT 34 04/03/2023    AST 58 (H) 09/14/2023    AST 44 (H) 04/03/2023    BILITOT 0.5 09/14/2023    BILITOT 0.3 04/03/2023      ENDOCRINE - LAST 2  Lab Results   Component Value Date    HGBA1C 7.0 (H) 08/08/2023    HGBA1C 6.7 (H) 04/03/2023    TSH 3.052 01/04/2023    " TSH 3.580 01/07/2022        PHYSICAL EXAM  CONSTITUTIONAL: Well built, well nourished in no apparent distress  NECK: no carotid bruit, no JVD  LUNGS: CTA  CHEST WALL: no tenderness  HEART: regular rate and rhythm, S1, S2 normal, no murmur, click, rub or gallop   ABDOMEN: soft, non-tender; bowel sounds normal; no masses,  no organomegaly  EXTREMITIES: Extremities normal, no edema, no calf tenderness noted  NEURO: AAO X 3    I HAVE REVIEWED :    The vital signs, most recent cardiac testing, and most recent pertinent non-cardiology provider notes.    Current Outpatient Medications   Medication Instructions    amLODIPine (NORVASC) 5 mg, Oral, Daily    aspirin (ECOTRIN) 81 mg, Oral, Nightly,      atenoloL (TENORMIN) 100 mg, Oral, Nightly    blood sugar diagnostic Strp 1 strip, Misc.(Non-Drug; Combo Route), 3 times daily    blood-glucose meter (ONETOUCH ULTRA2 METER) Misc USE AS DIRECTED    celecoxib (CELEBREX) 100 mg, Oral, 2 times daily    cholecalciferol, vitamin D3, (VITAMIN D3) 2,000 unit Cap 1 capsule, Oral, 2 times daily    clonazePAM (KLONOPIN) 0.5 mg, Oral, 2 times daily PRN    cloNIDine (CATAPRES) 0.1 mg, Oral, Nightly    fenofibrate micronized (LOFIBRA) 200 MG Cap TAKE 1 CAPSULE DAILY    furosemide (LASIX) 20 mg, Oral, Daily    gabapentin (NEURONTIN) 300 MG capsule TAKE 1 CAPSULE THREE TIMES A DAY    insulin aspart U-100 (NOVOLOG FLEXPEN U-100 INSULIN) 100 unit/mL (3 mL) InPn pen Inject into skin 3x/day per correction scale,. Max TDD 30u    insulin glargine, TOUJEO, (TOUJEO SOLOSTAR U-300 INSULIN) 300 unit/mL (1.5 mL) InPn pen INJECT 30 UNITS UNDER THE SKIN EVERY EVENING    lancets 33 gauge Misc Test blood sugar 3x/day. Dx code E11.22    latanoprost 0.005 % ophthalmic solution 1 drop, Both Eyes, Nightly    levothyroxine (SYNTHROID) 75 MCG tablet TAKE 1 TABLET BEFORE BREAKFAST    LIDOcaine (LIDODERM) 5 % 1 patch, Daily PRN    metFORMIN (GLUCOPHAGE-XR) 500 MG ER 24hr tablet TAKE 2 TABLETS DAILY WITH BREAKFAST     "olmesartan (BENICAR) 40 mg, Oral, Daily    omeprazole (PRILOSEC) 40 mg, Oral, 2 times daily before meals    ONETOUCH DELICA PLUS LANCET 33 gauge Misc USE 1 TO CHECK GLUCOSE THREE TIMES DAILY AS NEEDED    orphenadrine (NORFLEX) 100 mg, Oral, 2 times daily PRN    OZEMPIC 0.5 mg, Subcutaneous, Every 7 days    pen needle, diabetic (BD ULTRA-FINE DARIO PEN NEEDLE) 32 gauge x 5/32" Ndle USE 4X DAILY WITH INSULIN    pioglitazone (ACTOS) 15 mg, Oral    repaglinide (PRANDIN) 1 mg, Oral, Daily    rosuvastatin (CRESTOR) 5 MG tablet TAKE 1 TABLET EVERY OTHER DAY    sertraline (ZOLOFT) 50 mg, Oral, Nightly      Assessment & Plan     Diastolic dysfunction  Continue lasix 20 mg daily, states not helping SOB at all   Euvolemic on exam     Hyperlipemia  Continue crestor 5 mg every toher day     Palpitations  Improved with atenolol 100 mg nightly     Other chest pain  Resolved  Negative stress echo     Shortness of breath  Lasix not helping, she feels it it worsening   Has pulm follow up next few days, f/u their recs   Will get cardiac CTA in light of continued unexplained symptoms and negative stress test     Pulmonary HTN  F/u pulm         Follow up after results of CTA    Naomi Peters, PA-C [facility]   Office: [phone]          "

## 2023-11-02 DIAGNOSIS — E11.22 TYPE 2 DIABETES MELLITUS WITH STAGE 3A CHRONIC KIDNEY DISEASE, WITH LONG-TERM CURRENT USE OF INSULIN: ICD-10-CM

## 2023-11-02 DIAGNOSIS — N18.31 TYPE 2 DIABETES MELLITUS WITH STAGE 3A CHRONIC KIDNEY DISEASE, WITH LONG-TERM CURRENT USE OF INSULIN: ICD-10-CM

## 2023-11-02 DIAGNOSIS — Z79.4 TYPE 2 DIABETES MELLITUS WITH STAGE 3A CHRONIC KIDNEY DISEASE, WITH LONG-TERM CURRENT USE OF INSULIN: ICD-10-CM

## 2023-11-03 RX ORDER — INSULIN LISPRO 100 [IU]/ML
INJECTION, SOLUTION INTRAVENOUS; SUBCUTANEOUS
Qty: 15 ML | Refills: 6 | Status: SHIPPED | OUTPATIENT
Start: 2023-11-03 | End: 2024-03-20

## 2023-11-08 DIAGNOSIS — E11.9 TYPE 2 DIABETES MELLITUS WITHOUT COMPLICATION, WITHOUT LONG-TERM CURRENT USE OF INSULIN: ICD-10-CM

## 2023-11-09 RX ORDER — CALCIUM CITRATE/VITAMIN D3 200MG-6.25
TABLET ORAL
Qty: 300 STRIP | Refills: 2 | Status: SHIPPED | OUTPATIENT
Start: 2023-11-09

## 2023-11-09 RX ORDER — LANCETS 33 GAUGE
EACH MISCELLANEOUS
Qty: 300 EACH | Refills: 2 | Status: SHIPPED | OUTPATIENT
Start: 2023-11-09

## 2023-11-20 DIAGNOSIS — E11.9 TYPE 2 DIABETES MELLITUS WITHOUT COMPLICATION, WITHOUT LONG-TERM CURRENT USE OF INSULIN: ICD-10-CM

## 2023-11-21 RX ORDER — PEN NEEDLE, DIABETIC 30 GX3/16"
NEEDLE, DISPOSABLE MISCELLANEOUS
Qty: 200 EACH | Refills: 5 | Status: SHIPPED | OUTPATIENT
Start: 2023-11-21 | End: 2024-03-20 | Stop reason: SDUPTHER

## 2023-11-27 NOTE — LETTER
AUTHORIZATION FOR RELEASE OF   CONFIDENTIAL INFORMATION    Dear Dr. Rodriguez,    We are seeing Bing Kearns, date of birth 1955, in the clinic at [facility]. Gabi Case MD is the patient's PCP. Bing Kearns has an outstanding lab/procedure at the time we reviewed her chart. In order to help keep her health information updated, she has authorized us to request the following medical record(s):        (  )  MAMMOGRAM                                      ( X )  COLONOSCOPY      (  )  PAP SMEAR                                          (  )  OUTSIDE LAB RESULTS     (  )  DEXA SCAN                                          (  )  EYE EXAM            (  )  FOOT EXAM                                          (  )  ENTIRE RECORD     (  )  OUTSIDE IMMUNIZATIONS                 (  )  _______________         Please fax records to Gabi Case MD, [phone]     If you have any questions, please contact our office at [phone].           Patient Name: Bing Kearns  : 1955  Patient Phone #: [phone]      [email or web address]

## 2023-12-05 ENCOUNTER — LAB VISIT (OUTPATIENT)
Dept: LAB | Facility: HOSPITAL | Age: 68
End: 2023-12-05
Attending: NURSE PRACTITIONER
Payer: MEDICARE

## 2023-12-05 DIAGNOSIS — E11.22 TYPE 2 DIABETES MELLITUS WITH STAGE 3A CHRONIC KIDNEY DISEASE, WITH LONG-TERM CURRENT USE OF INSULIN: ICD-10-CM

## 2023-12-05 DIAGNOSIS — Z79.4 TYPE 2 DIABETES MELLITUS WITH STAGE 3A CHRONIC KIDNEY DISEASE, WITH LONG-TERM CURRENT USE OF INSULIN: ICD-10-CM

## 2023-12-05 DIAGNOSIS — N18.31 TYPE 2 DIABETES MELLITUS WITH STAGE 3A CHRONIC KIDNEY DISEASE, WITH LONG-TERM CURRENT USE OF INSULIN: ICD-10-CM

## 2023-12-05 DIAGNOSIS — E03.4 HYPOTHYROIDISM DUE TO ACQUIRED ATROPHY OF THYROID: ICD-10-CM

## 2023-12-05 LAB
ALBUMIN SERPL BCP-MCNC: 3.9 G/DL (ref 3.5–5.2)
ALP SERPL-CCNC: 77 U/L (ref 55–135)
ALT SERPL W/O P-5'-P-CCNC: 28 U/L (ref 10–44)
ANION GAP SERPL CALC-SCNC: 13 MMOL/L (ref 8–16)
AST SERPL-CCNC: 40 U/L (ref 10–40)
BILIRUB SERPL-MCNC: 0.4 MG/DL (ref 0.1–1)
BUN SERPL-MCNC: 12 MG/DL (ref 8–23)
CALCIUM SERPL-MCNC: 9.8 MG/DL (ref 8.7–10.5)
CHLORIDE SERPL-SCNC: 105 MMOL/L (ref 95–110)
CHOLEST SERPL-MCNC: 154 MG/DL (ref 120–199)
CHOLEST/HDLC SERPL: 3.8 {RATIO} (ref 2–5)
CO2 SERPL-SCNC: 23 MMOL/L (ref 23–29)
CREAT SERPL-MCNC: 1.1 MG/DL (ref 0.5–1.4)
EST. GFR  (NO RACE VARIABLE): 54.4 ML/MIN/1.73 M^2
ESTIMATED AVG GLUCOSE: 163 MG/DL (ref 68–131)
GLUCOSE SERPL-MCNC: 100 MG/DL (ref 70–110)
HBA1C MFR BLD: 7.3 % (ref 4–5.6)
HDLC SERPL-MCNC: 41 MG/DL (ref 40–75)
HDLC SERPL: 26.6 % (ref 20–50)
LDLC SERPL CALC-MCNC: 82.4 MG/DL (ref 63–159)
NONHDLC SERPL-MCNC: 113 MG/DL
POTASSIUM SERPL-SCNC: 4.5 MMOL/L (ref 3.5–5.1)
PROT SERPL-MCNC: 7.7 G/DL (ref 6–8.4)
SODIUM SERPL-SCNC: 141 MMOL/L (ref 136–145)
TRIGL SERPL-MCNC: 153 MG/DL (ref 30–150)
TSH SERPL DL<=0.005 MIU/L-ACNC: 2.2 UIU/ML (ref 0.4–4)

## 2023-12-05 PROCEDURE — 84443 ASSAY THYROID STIM HORMONE: CPT | Performed by: NURSE PRACTITIONER

## 2023-12-05 PROCEDURE — 80061 LIPID PANEL: CPT | Performed by: NURSE PRACTITIONER

## 2023-12-05 PROCEDURE — 36415 COLL VENOUS BLD VENIPUNCTURE: CPT | Mod: PN | Performed by: NURSE PRACTITIONER

## 2023-12-05 PROCEDURE — 80053 COMPREHEN METABOLIC PANEL: CPT | Performed by: NURSE PRACTITIONER

## 2023-12-05 PROCEDURE — 83036 HEMOGLOBIN GLYCOSYLATED A1C: CPT | Performed by: NURSE PRACTITIONER

## 2023-12-19 ENCOUNTER — TELEPHONE (OUTPATIENT)
Dept: ENDOCRINOLOGY | Facility: CLINIC | Age: 68
End: 2023-12-19

## 2023-12-19 ENCOUNTER — OFFICE VISIT (OUTPATIENT)
Dept: ENDOCRINOLOGY | Facility: CLINIC | Age: 68
End: 2023-12-19
Payer: MEDICARE

## 2023-12-19 DIAGNOSIS — Z79.4 TYPE 2 DIABETES MELLITUS WITH STAGE 3A CHRONIC KIDNEY DISEASE, WITH LONG-TERM CURRENT USE OF INSULIN: Primary | ICD-10-CM

## 2023-12-19 DIAGNOSIS — Z79.4 TYPE 2 DIABETES MELLITUS WITH DIABETIC NEUROPATHY, WITH LONG-TERM CURRENT USE OF INSULIN: ICD-10-CM

## 2023-12-19 DIAGNOSIS — E11.22 TYPE 2 DIABETES MELLITUS WITH STAGE 3A CHRONIC KIDNEY DISEASE, WITH LONG-TERM CURRENT USE OF INSULIN: Primary | ICD-10-CM

## 2023-12-19 DIAGNOSIS — N18.31 TYPE 2 DIABETES MELLITUS WITH STAGE 3A CHRONIC KIDNEY DISEASE, WITH LONG-TERM CURRENT USE OF INSULIN: Primary | ICD-10-CM

## 2023-12-19 DIAGNOSIS — E11.40 TYPE 2 DIABETES MELLITUS WITH DIABETIC NEUROPATHY, WITH LONG-TERM CURRENT USE OF INSULIN: ICD-10-CM

## 2023-12-19 DIAGNOSIS — K76.0 FATTY LIVER: ICD-10-CM

## 2023-12-19 DIAGNOSIS — I10 ESSENTIAL HYPERTENSION: ICD-10-CM

## 2023-12-19 DIAGNOSIS — E03.4 HYPOTHYROIDISM DUE TO ACQUIRED ATROPHY OF THYROID: ICD-10-CM

## 2023-12-19 DIAGNOSIS — E78.5 HYPERLIPIDEMIA, UNSPECIFIED HYPERLIPIDEMIA TYPE: ICD-10-CM

## 2023-12-19 DIAGNOSIS — I51.89 DIASTOLIC DYSFUNCTION: ICD-10-CM

## 2023-12-19 PROCEDURE — 3051F PR MOST RECENT HEMOGLOBIN A1C LEVEL 7.0 - < 8.0%: ICD-10-PCS | Mod: CPTII,95,, | Performed by: NURSE PRACTITIONER

## 2023-12-19 PROCEDURE — 4010F PR ACE/ARB THEARPY RXD/TAKEN: ICD-10-PCS | Mod: CPTII,95,, | Performed by: NURSE PRACTITIONER

## 2023-12-19 PROCEDURE — 1160F RVW MEDS BY RX/DR IN RCRD: CPT | Mod: CPTII,95,, | Performed by: NURSE PRACTITIONER

## 2023-12-19 PROCEDURE — 99214 PR OFFICE/OUTPT VISIT, EST, LEVL IV, 30-39 MIN: ICD-10-PCS | Mod: 95,,, | Performed by: NURSE PRACTITIONER

## 2023-12-19 PROCEDURE — 3066F PR DOCUMENTATION OF TREATMENT FOR NEPHROPATHY: ICD-10-PCS | Mod: CPTII,95,, | Performed by: NURSE PRACTITIONER

## 2023-12-19 PROCEDURE — 4010F ACE/ARB THERAPY RXD/TAKEN: CPT | Mod: CPTII,95,, | Performed by: NURSE PRACTITIONER

## 2023-12-19 PROCEDURE — 1159F PR MEDICATION LIST DOCUMENTED IN MEDICAL RECORD: ICD-10-PCS | Mod: CPTII,95,, | Performed by: NURSE PRACTITIONER

## 2023-12-19 PROCEDURE — 1160F PR REVIEW ALL MEDS BY PRESCRIBER/CLIN PHARMACIST DOCUMENTED: ICD-10-PCS | Mod: CPTII,95,, | Performed by: NURSE PRACTITIONER

## 2023-12-19 PROCEDURE — 3061F NEG MICROALBUMINURIA REV: CPT | Mod: CPTII,95,, | Performed by: NURSE PRACTITIONER

## 2023-12-19 PROCEDURE — 3061F PR NEG MICROALBUMINURIA RESULT DOCUMENTED/REVIEW: ICD-10-PCS | Mod: CPTII,95,, | Performed by: NURSE PRACTITIONER

## 2023-12-19 PROCEDURE — 1159F MED LIST DOCD IN RCRD: CPT | Mod: CPTII,95,, | Performed by: NURSE PRACTITIONER

## 2023-12-19 PROCEDURE — 3066F NEPHROPATHY DOC TX: CPT | Mod: CPTII,95,, | Performed by: NURSE PRACTITIONER

## 2023-12-19 PROCEDURE — 2023F PR DILATED RETINAL EXAM W/O EVID OF RETINOPATHY: ICD-10-PCS | Mod: CPTII,95,, | Performed by: NURSE PRACTITIONER

## 2023-12-19 PROCEDURE — 3051F HG A1C>EQUAL 7.0%<8.0%: CPT | Mod: CPTII,95,, | Performed by: NURSE PRACTITIONER

## 2023-12-19 PROCEDURE — 99214 OFFICE O/P EST MOD 30 MIN: CPT | Mod: 95,,, | Performed by: NURSE PRACTITIONER

## 2023-12-19 PROCEDURE — 2023F DILAT RTA XM W/O RTNOPTHY: CPT | Mod: CPTII,95,, | Performed by: NURSE PRACTITIONER

## 2023-12-19 RX ORDER — SEMAGLUTIDE 1.34 MG/ML
1 INJECTION, SOLUTION SUBCUTANEOUS
Qty: 3 ML | Refills: 11
Start: 2023-12-19 | End: 2024-03-20 | Stop reason: SDUPTHER

## 2023-12-19 RX ORDER — METFORMIN HYDROCHLORIDE 500 MG/1
1000 TABLET, EXTENDED RELEASE ORAL
Qty: 180 TABLET | Refills: 3 | Status: SHIPPED | OUTPATIENT
Start: 2023-12-19 | End: 2024-12-18

## 2023-12-19 NOTE — PROGRESS NOTES
The patient location is:  Patient Home   The chief complaint leading to consultation is: Type 2 DM  Visit type: Virtual visit with synchronous audio and video  Total time spent with patient: 15 min  Each patient to whom he or she provides medical services by telemedicine is:  (1) informed of the relationship between the physician and patient and the respective role of any other health care provider with respect to management of the patient; and (2) notified that he or she may decline to receive medical services by telemedicine and may withdraw from such care at any time.       CC: Ms. Bing Kearns arrives today for management of Type 2 DM and review of chronic medical conditions, as listed in the Visit Diagnosis section of this encounter.       HPI: Ms. Bing Kearns was diagnosed with Type 2 DM in her late 50s. She was diagnosed based on lab work. Initial treatment consisted of metformin. Januvia was added in . Toujeo was added in 2020. + FH of DM in sister, maternal GF. Denies hospitalizations due to DM.   Follows with Dr. Vanessa for CKD.     Patient was last seen by me in August.     She was approved for [employer] through Patient Assistance.  She has been taking the therapeutic dose for ~ 1 month.     She has somehow resumed Prandin. Appears to be ordered by her insurance company's provider during HRA visit. However, she would like to eliminate some DM meds if possible.     BG readings are checked 2x/day.  She reads the following from her log:  Fastin-125  Bedtime (3 hours after dinner): 120-170    Hypoglycemia: Not recently but feels symptomatic when glucose < 100  Hypoglycemic Symptoms: hunger and jitteriness  Hypoglycemia Treatment: juice or glucose tabs    Missing Insulin/PO medication doses: No    Dietary Habits: Eats 3 meals/day. Occasional snack. Avoids sugary beverages.     Last DM education appointment: not recently    She is planning for iron infusions for JEANNIE.      CURRENT  DIABETIC MEDS: metformin XR 1000 mg daily, pioglitazone 15 mg daily, Prandin 1 mg daily, Ozempic 0.5 mg weekly, Toujeo 32 units QHS, Humalog sliding scale, target 150, ISF 25  Glucometer type: One Touch Ultra 2    Previous DM treatments:  Trulicity - panic attacks, blood sugars in the 70s  Januvia - $$  Prandin - discontinued since starting Ozempic  Glimepiride - hypoglycemia     Last Eye Exam: 6/1/2023, no DR. + glaucoma. Dr. Turcios.   Last Podiatry Exam: 2/2023, Dr. Helms.     REVIEW OF SYSTEMS  Constitutional: no c/o fatigue, weakness, weight loss  Cardiac: no palpitations, chest pain   Respiratory: + dry cough. + chronic dyspnea. Follows with Pulmonology for pulmonary HTN.   GI: no c/o abdominal pain. Denies h/o pancreatitis. + infrequent nausea   Neuro: + numbness, tingling in feet. Occasional mild headaches at the end of the week (~ 5 days after Ozempic).   Endocrine: denies polyphagia, polydipsia, polyuria       Personally reviewed Past Medical, Surgical, Social History.    Vital Signs  LMP  (LMP Unknown)  -- virtual visit     Personally reviewed the below labs:    Hemoglobin A1C   Date Value Ref Range Status   12/05/2023 7.3 (H) 4.0 - 5.6 % Final     Comment:     ADA Screening Guidelines:  5.7-6.4%  Consistent with prediabetes  >or=6.5%  Consistent with diabetes    High levels of fetal hemoglobin interfere with the HbA1C  assay. Heterozygous hemoglobin variants (HbS, HgC, etc)do  not significantly interfere with this assay.   However, presence of multiple variants may affect accuracy.     08/08/2023 7.0 (H) 0.0 - 5.6 % Final     Comment:     Reference Interval:  5.0 - 5.6 Normal   5.7 - 6.4 High Risk   > 6.5 Diabetic      Hgb A1c results are standardized based on the (NGSP) National   Glycohemoglobin Standardization Program.      Hemoglobin A1C levels are related to mean serum/plasma glucose   during the preceding 2-3 months.        04/03/2023 6.7 (H) 0.0 - 5.6 % Final     Comment:     Reference  Interval:  5.0 - 5.6 Normal   5.7 - 6.4 High Risk   > 6.5 Diabetic      Hgb A1c results are standardized based on the (NGSP) National   Glycohemoglobin Standardization Program.      Hemoglobin A1C levels are related to mean serum/plasma glucose   during the preceding 2-3 months.            Chemistry        Component Value Date/Time     12/05/2023 1027    K 4.5 12/05/2023 1027     12/05/2023 1027    CO2 23 12/05/2023 1027    BUN 12 12/05/2023 1027    CREATININE 1.1 12/05/2023 1027     12/05/2023 1027        Component Value Date/Time    CALCIUM 9.8 12/05/2023 1027    ALKPHOS 77 12/05/2023 1027    AST 40 12/05/2023 1027    ALT 28 12/05/2023 1027    BILITOT 0.4 12/05/2023 1027    ESTGFRAFRICA >60.0 05/20/2022 0935    EGFRNONAA 52.1 (A) 05/20/2022 0935          Lab Results   Component Value Date    CHOL 154 12/05/2023    CHOL 167 01/04/2023    CHOL 163 01/07/2022     Lab Results   Component Value Date    HDL 41 12/05/2023    HDL 43 01/04/2023    HDL 46 01/07/2022     Lab Results   Component Value Date    LDLCALC 82.4 12/05/2023    LDLCALC 90.8 01/04/2023    LDLCALC 88.2 01/07/2022     Lab Results   Component Value Date    TRIG 153 (H) 12/05/2023    TRIG 166 (H) 01/04/2023    TRIG 144 01/07/2022     Lab Results   Component Value Date    CHOLHDL 26.6 12/05/2023    CHOLHDL 25.7 01/04/2023    CHOLHDL 28.2 01/07/2022       Lab Results   Component Value Date    MICALBCREAT 8.3 10/25/2023     Lab Results   Component Value Date    TSH 2.199 12/05/2023       CrCl cannot be calculated (Patient's most recent lab result is older than the maximum 7 days allowed.).    Vit D, 25-Hydroxy   Date Value Ref Range Status   06/21/2023 61 30 - 96 ng/mL Final     Comment:     Vitamin D deficiency.........<10 ng/mL                              Vitamin D insufficiency......10-29 ng/mL       Vitamin D sufficiency........> or equal to 30 ng/mL  Vitamin D toxicity............>100 ng/mL           PHYSICAL EXAMINATION  Deferred --  video visit.      Goals        HEMOGLOBIN A1C < 7.5                 Assessment/Plan  1. Type 2 diabetes mellitus with stage 3a chronic kidney disease, with long-term current use of insulin -- A1c is acceptable. Will plan to increase Ozempic to 1 mg with next PAP refill.   -- stop Prandin, pioglitazone  -- for now, continue Ozempic 0.5 mg weekly  -- continue metformin XR, Toujeo, Humalog correction scale.  -- BG monitoring 2x/day.     -- Discussed diagnosis of DM, A1c goals, progression of disease, long term complications and tx options.  -- takes aspirin, ARB   2. Type 2 diabetes mellitus with diabetic neuropathy, with long-term current use of insulin -- optimize DM control   3. Essential hypertension  -- managed by cardiology  -- continue current meds and monitor   4. Hyperlipidemia, unspecified hyperlipidemia type  -- improved  -- continue Crestor   5. Hypothyroidism due to acquired atrophy of thyroid  -- stable  -- continue current levothyroxine dose.    6. Diastolic dysfunction  -- following with cardiology.    7. Fatty liver -- maintain DM control  -- Ozempic may add benefit       FOLLOW UP  Follow up in about 3 months (around 3/19/2024).  Patient instructed to bring BG logs to each follow up   Patient encouraged to call for any BG/medication issues, concerns, or questions.      Orders Placed This Encounter   Procedures    Hemoglobin A1C    Comprehensive Metabolic Panel

## 2023-12-26 DIAGNOSIS — G89.29 CHRONIC UPPER BACK PAIN: ICD-10-CM

## 2023-12-26 DIAGNOSIS — M54.9 CHRONIC UPPER BACK PAIN: ICD-10-CM

## 2023-12-26 NOTE — TELEPHONE ENCOUNTER
Care Due:                  Date            Visit Type   Department     Provider  --------------------------------------------------------------------------------                                EP -                              PRIMARY      [facility]  Last Visit: 11-      CARE ([facility])   MEDICINE       Gabi Case  Next Visit: None Scheduled  None         None Found                                                            Last  Test          Frequency    Reason                     Performed    Due Date  --------------------------------------------------------------------------------    Office Visit  15 months..  fenofibrate..............  11- 01-    [facility] Embedded Care Due Messages. Reference number: [id number].   12/26/2023 3:04:58 PM CST

## 2023-12-26 NOTE — TELEPHONE ENCOUNTER
Refill Routing Note   Medication(s) are not appropriate for processing by [facility] for the following reason(s):        Outside of protocol    [facility] action(s):  Route     Requires appointment : Yes             Appointments  past 12m or future 3m with PCP    Date Provider   Last Visit   11/3/2022 Gabi Case MD   Next Visit   Visit date not found Gabi Case MD   ED visits in past 90 days: 0        Note composed:3:32 PM 12/26/2023

## 2024-01-02 RX ORDER — ORPHENADRINE CITRATE 100 MG/1
100 TABLET, EXTENDED RELEASE ORAL 2 TIMES DAILY PRN
Qty: 36 TABLET | Refills: 0 | Status: SHIPPED | OUTPATIENT
Start: 2024-01-02

## 2024-01-23 ENCOUNTER — TELEPHONE (OUTPATIENT)
Dept: CARDIOLOGY | Facility: CLINIC | Age: 69
End: 2024-01-23
Payer: MEDICARE

## 2024-02-02 ENCOUNTER — TELEPHONE (OUTPATIENT)
Dept: ENDOCRINOLOGY | Facility: CLINIC | Age: 69
End: 2024-02-02
Payer: MEDICARE

## 2024-02-02 RX ORDER — LEVOTHYROXINE SODIUM 75 UG/1
TABLET ORAL
Qty: 30 TABLET | Refills: 0 | Status: SHIPPED | OUTPATIENT
Start: 2024-02-02 | End: 2024-02-16 | Stop reason: SDUPTHER

## 2024-02-02 NOTE — TELEPHONE ENCOUNTER
Refill Routing Note   Medication(s) are not appropriate for processing by [facility] for the following reason(s):        Patient not seen by provider within 15 months  ED/Hospital Visit since last OV with provider    [facility] action(s):  Defer               Appointments  past 12m or future 3m with PCP    Date Provider   Last Visit   11/3/2022 Gabi Case MD   Next Visit   2/16/2024 Gabi Case MD   ED visits in past 90 days: 0        Note composed:7:41 AM 02/02/2024

## 2024-02-02 NOTE — TELEPHONE ENCOUNTER
No care due was identified.  [facility] Embedded Care Due Messages. Reference number: [id number].   2/02/2024 12:21:20 AM CST

## 2024-02-16 ENCOUNTER — PATIENT OUTREACH (OUTPATIENT)
Dept: ADMINISTRATIVE | Facility: HOSPITAL | Age: 69
End: 2024-02-16
Payer: MEDICARE

## 2024-02-16 ENCOUNTER — OFFICE VISIT (OUTPATIENT)
Dept: FAMILY MEDICINE | Facility: CLINIC | Age: 69
End: 2024-02-16
Payer: MEDICARE

## 2024-02-16 VITALS
WEIGHT: 209.88 LBS | RESPIRATION RATE: 16 BRPM | OXYGEN SATURATION: 96 % | DIASTOLIC BLOOD PRESSURE: 60 MMHG | BODY MASS INDEX: 34.97 KG/M2 | HEART RATE: 84 BPM | HEIGHT: 65 IN | SYSTOLIC BLOOD PRESSURE: 104 MMHG

## 2024-02-16 DIAGNOSIS — I27.20 PULMONARY HYPERTENSION: ICD-10-CM

## 2024-02-16 DIAGNOSIS — E11.40 TYPE 2 DIABETES MELLITUS WITH DIABETIC NEUROPATHY, WITH LONG-TERM CURRENT USE OF INSULIN: ICD-10-CM

## 2024-02-16 DIAGNOSIS — Z79.4 TYPE 2 DIABETES MELLITUS WITH STAGE 3A CHRONIC KIDNEY DISEASE, WITH LONG-TERM CURRENT USE OF INSULIN: ICD-10-CM

## 2024-02-16 DIAGNOSIS — E21.3 HYPERPARATHYROIDISM: ICD-10-CM

## 2024-02-16 DIAGNOSIS — E03.4 HYPOTHYROIDISM DUE TO ACQUIRED ATROPHY OF THYROID: Primary | ICD-10-CM

## 2024-02-16 DIAGNOSIS — Z23 NEED FOR VACCINATION: ICD-10-CM

## 2024-02-16 DIAGNOSIS — E66.09 CLASS 1 OBESITY DUE TO EXCESS CALORIES WITH SERIOUS COMORBIDITY AND BODY MASS INDEX (BMI) OF 34.0 TO 34.9 IN ADULT: ICD-10-CM

## 2024-02-16 DIAGNOSIS — N18.32 STAGE 3B CHRONIC KIDNEY DISEASE: ICD-10-CM

## 2024-02-16 DIAGNOSIS — Z79.4 TYPE 2 DIABETES MELLITUS WITH DIABETIC NEUROPATHY, WITH LONG-TERM CURRENT USE OF INSULIN: ICD-10-CM

## 2024-02-16 DIAGNOSIS — N18.31 TYPE 2 DIABETES MELLITUS WITH STAGE 3A CHRONIC KIDNEY DISEASE, WITH LONG-TERM CURRENT USE OF INSULIN: ICD-10-CM

## 2024-02-16 DIAGNOSIS — E11.22 TYPE 2 DIABETES MELLITUS WITH STAGE 3A CHRONIC KIDNEY DISEASE, WITH LONG-TERM CURRENT USE OF INSULIN: ICD-10-CM

## 2024-02-16 DIAGNOSIS — Z12.31 ENCOUNTER FOR SCREENING MAMMOGRAM FOR MALIGNANT NEOPLASM OF BREAST: ICD-10-CM

## 2024-02-16 PROBLEM — E66.811 CLASS 1 OBESITY DUE TO EXCESS CALORIES WITH SERIOUS COMORBIDITY AND BODY MASS INDEX (BMI) OF 34.0 TO 34.9 IN ADULT: Status: ACTIVE | Noted: 2023-08-03

## 2024-02-16 PROBLEM — C18.3 MALIGNANT NEOPLASM OF HEPATIC FLEXURE: Status: RESOLVED | Noted: 2023-06-07 | Resolved: 2024-02-16

## 2024-02-16 PROCEDURE — 1101F PT FALLS ASSESS-DOCD LE1/YR: CPT | Mod: CPTII,S$GLB,, | Performed by: INTERNAL MEDICINE

## 2024-02-16 PROCEDURE — 99999 PR PBB SHADOW E&M-EST. PATIENT-LVL III: CPT | Mod: PBBFAC,,, | Performed by: INTERNAL MEDICINE

## 2024-02-16 PROCEDURE — 90677 PCV20 VACCINE IM: CPT | Mod: S$GLB,,, | Performed by: INTERNAL MEDICINE

## 2024-02-16 PROCEDURE — 3074F SYST BP LT 130 MM HG: CPT | Mod: CPTII,S$GLB,, | Performed by: INTERNAL MEDICINE

## 2024-02-16 PROCEDURE — 99214 OFFICE O/P EST MOD 30 MIN: CPT | Mod: S$GLB,,, | Performed by: INTERNAL MEDICINE

## 2024-02-16 PROCEDURE — 3288F FALL RISK ASSESSMENT DOCD: CPT | Mod: CPTII,S$GLB,, | Performed by: INTERNAL MEDICINE

## 2024-02-16 PROCEDURE — 4010F ACE/ARB THERAPY RXD/TAKEN: CPT | Mod: CPTII,S$GLB,, | Performed by: INTERNAL MEDICINE

## 2024-02-16 PROCEDURE — 3061F NEG MICROALBUMINURIA REV: CPT | Mod: CPTII,S$GLB,, | Performed by: INTERNAL MEDICINE

## 2024-02-16 PROCEDURE — 1160F RVW MEDS BY RX/DR IN RCRD: CPT | Mod: CPTII,S$GLB,, | Performed by: INTERNAL MEDICINE

## 2024-02-16 PROCEDURE — 3008F BODY MASS INDEX DOCD: CPT | Mod: CPTII,S$GLB,, | Performed by: INTERNAL MEDICINE

## 2024-02-16 PROCEDURE — G0009 ADMIN PNEUMOCOCCAL VACCINE: HCPCS | Mod: S$GLB,,, | Performed by: INTERNAL MEDICINE

## 2024-02-16 PROCEDURE — 3078F DIAST BP <80 MM HG: CPT | Mod: CPTII,S$GLB,, | Performed by: INTERNAL MEDICINE

## 2024-02-16 PROCEDURE — 1159F MED LIST DOCD IN RCRD: CPT | Mod: CPTII,S$GLB,, | Performed by: INTERNAL MEDICINE

## 2024-02-16 PROCEDURE — 3066F NEPHROPATHY DOC TX: CPT | Mod: CPTII,S$GLB,, | Performed by: INTERNAL MEDICINE

## 2024-02-16 RX ORDER — DICYCLOMINE HYDROCHLORIDE 10 MG/1
10 CAPSULE ORAL 3 TIMES DAILY
COMMUNITY
Start: 2023-12-26

## 2024-02-16 RX ORDER — LEVOTHYROXINE SODIUM 75 UG/1
75 TABLET ORAL
Qty: 90 TABLET | Refills: 3 | Status: SHIPPED | OUTPATIENT
Start: 2024-02-16

## 2024-02-16 NOTE — PROGRESS NOTES
Population Health Chart Review & Patient Outreach Details    Outreach Performed: NO    Additional Pop Health Notes:           Updates Requested / Reviewed:      Updated Care Coordination Note         Health Maintenance Topics Overdue:    Health Maintenance Due   Topic Date Due    COVID-19 Vaccine (4 - 2023-24 season) 09/01/2023    Foot Exam  12/28/2023    Pneumococcal Vaccines (Age 65+) (4 of 4 - PPSV23 or PCV20) 01/14/2024    Mammogram  04/26/2024         Health Maintenance Topic(s) Outreach Outcomes & Actions Taken:    Diabetic Foot Exam - Outreach Outcomes & Actions Taken  : External Records Requested & Care Team Updated if Applicable

## 2024-02-16 NOTE — LETTER
FAX      AUTHORIZATION FOR RELEASE OF   CONFIDENTIAL INFORMATION        Dear Dr Helms,      We are seeing Bing Kearns, date of birth 1955, in the clinic at [facility]. Gabi Case MD is the patient's PCP. Bing Kearns has an outstanding lab/procedure at the time we reviewed their chart. In order to help keep their health information updated, Bing Shabazzbessy Kearns has authorized us to request the following medical record(s):     ()  MAMMOGRAM (WITHIN 2 YRS)  ()  COLON/PATH W/RECALL (WITHIN 10 YRS)     ()  PAP SMEAR (WITHIN 3 YRS)      ()  OUTSIDE LAB RESULTS    ()  DEXA SCAN (WITHIN 3 YRS)      () DM EYE EXAM (WITHIN 48 MONTHS)          (x)  FOOT EXAM (WITHIN 1yr.)          () OUTSIDE IMMUNIZATIONS    ()  OTHER                                   Please fax records to [facility] [phone].     If you have any questions, please contact Nolvia at [phone]              Patient Name: Bign Kearns  : 1955  Patient Phone #: [phone]

## 2024-02-20 ENCOUNTER — PATIENT OUTREACH (OUTPATIENT)
Dept: ADMINISTRATIVE | Facility: HOSPITAL | Age: 69
End: 2024-02-20
Payer: MEDICARE

## 2024-02-20 NOTE — PROGRESS NOTES
Population Health Chart Review & Patient Outreach Details    Outreach Performed: NO    Additional Pop Health Notes:           Updates Requested / Reviewed:      Updated Care Coordination Note, , and Care Team Updated         Health Maintenance Topics Overdue:    Health Maintenance Due   Topic Date Due    COVID-19 Vaccine (4 - 2023-24 season) 09/01/2023    Foot Exam  12/28/2023    Mammogram  04/26/2024         Health Maintenance Topic(s) Outreach Outcomes & Actions Taken:    Diabetic Foot Exam - Outreach Outcomes & Actions Taken  : External Records Uploaded, History & Care Team Updated if Applicable

## 2024-02-22 ENCOUNTER — TELEPHONE (OUTPATIENT)
Dept: ENDOCRINOLOGY | Facility: CLINIC | Age: 69
End: 2024-02-22
Payer: MEDICARE

## 2024-03-04 DIAGNOSIS — E11.40 TYPE 2 DIABETES MELLITUS WITH DIABETIC NEUROPATHY, WITH LONG-TERM CURRENT USE OF INSULIN: ICD-10-CM

## 2024-03-04 DIAGNOSIS — Z79.4 TYPE 2 DIABETES MELLITUS WITH DIABETIC NEUROPATHY, WITH LONG-TERM CURRENT USE OF INSULIN: ICD-10-CM

## 2024-03-05 RX ORDER — GABAPENTIN 300 MG/1
CAPSULE ORAL
Qty: 270 CAPSULE | Refills: 3 | Status: SHIPPED | OUTPATIENT
Start: 2024-03-05

## 2024-03-18 ENCOUNTER — PATIENT MESSAGE (OUTPATIENT)
Dept: ENDOCRINOLOGY | Facility: CLINIC | Age: 69
End: 2024-03-18
Payer: MEDICARE

## 2024-03-18 ENCOUNTER — TELEPHONE (OUTPATIENT)
Dept: NEUROLOGY | Facility: CLINIC | Age: 69
End: 2024-03-18
Payer: MEDICARE

## 2024-03-18 DIAGNOSIS — N18.31 TYPE 2 DIABETES MELLITUS WITH STAGE 3A CHRONIC KIDNEY DISEASE, WITH LONG-TERM CURRENT USE OF INSULIN: ICD-10-CM

## 2024-03-18 DIAGNOSIS — E11.22 TYPE 2 DIABETES MELLITUS WITH STAGE 3A CHRONIC KIDNEY DISEASE, WITH LONG-TERM CURRENT USE OF INSULIN: ICD-10-CM

## 2024-03-18 DIAGNOSIS — Z79.4 TYPE 2 DIABETES MELLITUS WITH STAGE 3A CHRONIC KIDNEY DISEASE, WITH LONG-TERM CURRENT USE OF INSULIN: ICD-10-CM

## 2024-03-18 RX ORDER — INSULIN GLARGINE 300 [IU]/ML
INJECTION, SOLUTION SUBCUTANEOUS
Qty: 6 PEN | Refills: 3
Start: 2024-03-18 | End: 2024-03-20 | Stop reason: SDUPTHER

## 2024-03-18 RX ORDER — FENOFIBRATE 200 MG/1
CAPSULE ORAL
Qty: 90 CAPSULE | Refills: 3 | Status: SHIPPED | OUTPATIENT
Start: 2024-03-18

## 2024-03-18 NOTE — TELEPHONE ENCOUNTER
Left voice mail for patient letting her know I need to switch her appointment to may 16th from the may 15th

## 2024-03-18 NOTE — TELEPHONE ENCOUNTER
Refill Routing Note   Medication(s) are not appropriate for processing by [facility] for the following reason(s):        Drug-disease interaction  Drug-Disease: fenofibrate micronized and Calculus of gallbladder with acute cholecystitis without obstruction    [facility] action(s):  Defer               Appointments  past 12m or future 3m with PCP    Date Provider   Last Visit   2/16/2024 Gabi Case MD   Next Visit   Visit date not found Gabi Case MD   ED visits in past 90 days: 0        Note composed:5:09 AM 03/18/2024

## 2024-03-18 NOTE — TELEPHONE ENCOUNTER
No care due was identified.  [facility] Embedded Care Due Messages. Reference number: [id number].   3/18/2024 12:15:36 AM CDT

## 2024-03-19 ENCOUNTER — PATIENT MESSAGE (OUTPATIENT)
Dept: ADMINISTRATIVE | Facility: HOSPITAL | Age: 69
End: 2024-03-19
Payer: MEDICARE

## 2024-03-20 DIAGNOSIS — E11.22 TYPE 2 DIABETES MELLITUS WITH STAGE 3A CHRONIC KIDNEY DISEASE, WITH LONG-TERM CURRENT USE OF INSULIN: ICD-10-CM

## 2024-03-20 DIAGNOSIS — Z79.4 TYPE 2 DIABETES MELLITUS WITH STAGE 3A CHRONIC KIDNEY DISEASE, WITH LONG-TERM CURRENT USE OF INSULIN: ICD-10-CM

## 2024-03-20 DIAGNOSIS — E11.9 TYPE 2 DIABETES MELLITUS WITHOUT COMPLICATION, WITHOUT LONG-TERM CURRENT USE OF INSULIN: ICD-10-CM

## 2024-03-20 DIAGNOSIS — N18.31 TYPE 2 DIABETES MELLITUS WITH STAGE 3A CHRONIC KIDNEY DISEASE, WITH LONG-TERM CURRENT USE OF INSULIN: ICD-10-CM

## 2024-03-20 RX ORDER — INSULIN ASPART 100 [IU]/ML
INJECTION, SOLUTION INTRAVENOUS; SUBCUTANEOUS
Qty: 45 ML | Refills: 0 | Status: SHIPPED | OUTPATIENT
Start: 2024-03-20

## 2024-03-20 RX ORDER — INSULIN GLARGINE 300 [IU]/ML
INJECTION, SOLUTION SUBCUTANEOUS
Qty: 6 PEN | Refills: 3 | Status: SHIPPED | OUTPATIENT
Start: 2024-03-20

## 2024-03-20 RX ORDER — BLOOD SUGAR DIAGNOSTIC
STRIP MISCELLANEOUS
Qty: 400 EACH | Refills: 0 | Status: SHIPPED | OUTPATIENT
Start: 2024-03-20

## 2024-03-20 RX ORDER — SEMAGLUTIDE 1.34 MG/ML
1 INJECTION, SOLUTION SUBCUTANEOUS
Qty: 4 EACH | Refills: 0 | Status: SHIPPED | OUTPATIENT
Start: 2024-03-20 | End: 2025-03-20

## 2024-04-01 DIAGNOSIS — M54.9 CHRONIC UPPER BACK PAIN: ICD-10-CM

## 2024-04-01 DIAGNOSIS — G89.29 CHRONIC UPPER BACK PAIN: ICD-10-CM

## 2024-04-01 RX ORDER — ORPHENADRINE CITRATE 100 MG/1
100 TABLET, EXTENDED RELEASE ORAL 2 TIMES DAILY PRN
Qty: 36 TABLET | Refills: 0 | Status: SHIPPED | OUTPATIENT
Start: 2024-04-01

## 2024-04-01 NOTE — TELEPHONE ENCOUNTER
Refill Routing Note   Medication(s) are not appropriate for processing by [facility] for the following reason(s):        Outside of protocol    [facility] action(s):  Route               Appointments  past 12m or future 3m with PCP    Date Provider   Last Visit   2/16/2024 Gabi Case MD   Next Visit   Visit date not found Gabi Case MD   ED visits in past 90 days: 0        Note composed:4:34 PM 04/01/2024

## 2024-04-01 NOTE — TELEPHONE ENCOUNTER
No care due was identified.  [facility] Embedded Care Due Messages. Reference number: [id number].   4/01/2024 1:56:03 PM CDT

## 2024-05-04 NOTE — PROGRESS NOTES
"Subjective:       Patient ID: Bing Kearns is a 63 y.o. female.    Chief Complaint: Other (feels like something is in throat )    HPI   Patient has appt with a Pathologist at [facility] [employer] in [place] tomorrow to discuss oral leukoplakia, and is returning to see Dr. Dedra Khan here next week.  Patient returns today for "throat feels like it closes" when she lays down on her right side at night for the past several weeks. Dysphonia comes and goes. Throat irritation comes and goes. Chronic dry cough X few years, occasional post-tussive emesis. Last EGD was approximately 9 months ago by Dr. William; negative per patient. Takes omeprazole 40 mg BID.     Review of Systems   Constitutional: Negative.    HENT: Positive for congestion, postnasal drip, rhinorrhea, sore throat and voice change. Negative for sneezing.         Frequent throat clearing  Sensation of lump or swelling in the back of her throat  Frequent throat irritation   Eyes: Negative.  Negative for discharge, redness and itching.   Respiratory: Positive for cough and choking.    Cardiovascular: Negative.    Gastrointestinal: Negative.    Musculoskeletal: Negative.    Skin: Negative.    Neurological: Negative.    Hematological: Negative.    Psychiatric/Behavioral: Negative.        Objective:      Physical Exam   Constitutional: She is oriented to person, place, and time. Vital signs are normal. She appears well-developed and well-nourished. She is cooperative. She does not appear ill. No distress.   HENT:   Head: Normocephalic and atraumatic.   Right Ear: Hearing, tympanic membrane, external ear and ear canal normal. Tympanic membrane is not erythematous. No middle ear effusion.   Left Ear: Hearing, tympanic membrane, external ear and ear canal normal. Tympanic membrane is not erythematous.  No middle ear effusion.   Nose: Nose normal. No mucosal edema or rhinorrhea. Right sinus exhibits no maxillary sinus tenderness and no frontal sinus " tenderness. Left sinus exhibits no maxillary sinus tenderness and no frontal sinus tenderness.   Mouth/Throat: Uvula is midline, oropharynx is clear and moist and mucous membranes are normal. Mucous membranes are not pale, not dry and not cyanotic. 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 No oropharyngeal exudate, posterior oropharyngeal edema or posterior oropharyngeal erythema.   Eyes: Pupils are equal, round, and reactive to light. Conjunctivae, EOM and lids are normal. Right eye exhibits no discharge. Left eye exhibits no discharge. No scleral icterus.   Neck: Trachea normal and normal range of motion. Neck supple. No tracheal deviation present. No thyroid mass and no thyromegaly present.   Cardiovascular: Normal rate.   Pulmonary/Chest: Effort normal. No stridor. No respiratory distress. She has no wheezes.   Musculoskeletal: Normal range of motion.   Lymphadenopathy:        Head (right side): No submental, no submandibular, no tonsillar, no preauricular and no posterior auricular adenopathy present.        Head (left side): No submental, no submandibular, no tonsillar, no preauricular and no posterior auricular adenopathy present.     She has no cervical adenopathy.        Right cervical: No superficial cervical and no posterior cervical adenopathy present.       Left cervical: No superficial cervical and no posterior cervical adenopathy present.   Neurological: She is alert and oriented to person, place, and time. She has normal strength. Coordination and gait normal.   Skin: Skin is warm, dry and intact. No lesion and no rash noted. She is not diaphoretic. No cyanosis. No pallor.   Psychiatric: She has a normal mood and affect. Her speech is normal and behavior is normal. Judgment and thought content normal. Cognition and memory are normal.   Nursing note and vitals reviewed.      Procedure: Flexible laryngoscopy    In order to fully examine the upper aerodigestive tract, including  the larynx, in a patient with a hyperactive gag reflex, and suboptimal visualization with indirect mirror exam,  flexible endoscopy is required.   After explaining the procedure and obtaining verbal consent, a timeout was performed with the patient's participation according to the universal protocol. Both nasal cavities were anesthetized with 4% Xylocaine spray mixed with Bal-Synephrine. The flexible laryngoscope  was inserted into the nasal cavity and advanced to visualize the nasal cavity, nasopharynx, the posterior oropharynx, hypopharynx, and the endolarynx with the  findings noted. The scope was removed and the procedure terminated. The patient tolerated this procedure well without apparent complication.     OVERALL FINDINGS  Nasopharynx - the torus is clear. There are no lesions of the posterior wall.   Oropharynx - no lesions of the tongue base. There is no obvious fullness or asymmetry.  Hypopharynx - there are no lesions of the pyriform sinuses or postcricoid region   Larynx - there are no lesions of the supraglottic or glottic larynx.  Vocal fold mobility is normal.     SPECIFIC FINDINGS  Adenoid tissue - normal   Nasopharynx & eustachian tube orifices - normal   Posterior pharyngeal wall - normal   Base of tongue - normal   Epiglottis - normal   Valleculae - normal   Pyriform sinuses - normal   False vocal cords - normal   True vocal cords - normal  Arytenoids - normal   Interarytenoid space - post-cricoid pachyderma  Right BOT / Pharynx    Right BOT / Pharynx    Larynx (significant post-cricoid pachyderma)    Larynx (marked reflux changes)    Assessment:     LPRD  Significant globus sensation    Sensation of airway restriction at night when she lays supine  Dysphonia  Throat discomfort  Plan:     Advised/Cautioned: The results of today's ENT exam and flexible endoscopy were detailed to the patient and all questions were answered. Patient education centered around GERD, known exacerbants and contemporary  treatment options. Laryngoscope photos were given to the patient. Handouts given on LPRD and GERD were given to the patient. After review of these, patient elected to be continue her PPI 40 mg BID on an empty stomach. I encouraged the patient once she has completed the evening meal to not snack or consume any other food products or caffeinated beverages for at least  minutes before retiring. Finally, I encouraged the patient to sleep about 30 degrees above horizontal, and this can be facilitated by using 2-3 pillows or a wedge foam product. If the patient is not demonstrably improved in 6-8 weeks, consultation with gastroenterology may be indicated to rule out intrinsic disease in the lower esophagus, stomach, or proximal duodenum.     CT soft tissue neck -- will call pt with results as soon as available   No Yes...

## 2024-05-08 NOTE — PROGRESS NOTES
[facility]   Cardiology Clinic Note  Date: 5/9/24    Patient: Bing Kearns, 1955, [id number]  Primary Care Provider: Gabi Case MD     Chief Complaint/Reason for Referral:  Follow up pulmonary hypertension and primary prevention    Subjective     Bing Kearns is a 68 y.o. female who presents for follow up. They are not accompanied.    Still has some dyspnea on exertion. It is intermittent usually after exertion. Not related to seasons. No orthopnea, Pnd. Sleeps with CPAP. Dependent edema occasionally. No chest discomfort. No syncope. Rare palpitations. No melena, hematochezia, hematemesis, hematuria or hemoptysis. Balance is okay. No hospitalizations or ED visits. Sedentary. Takes rosuvastatin 5 mg     Focused Past History includes:  Chronic HFpEF   TTE July 2023 reported mild LVH, EF 50-55%.  Normal RV.  Negative stress echo at 75% MPHR  48 hour Holter June 2023 reported unremarkable  Pulmonary hypertension  Right heart catheterization September 2023:  RA 18, PA 50/30 (36), wedge 24.  CI 4.4.  No left-to-right shunt  CKD 3A  FRANCISCO on CPAP   Hypertension   Type 2 diabetes on insulin  Intolerance to rosuvastatin, simva and atorvastatin  Casual smoker less than 5 years      Current Outpatient Medications   Medication Sig    albuterol (PROVENTIL/VENTOLIN HFA) 90 mcg/actuation inhaler Inhale 2 puffs into the lungs every 6 (six) hours as needed for Wheezing. Rescue    aspirin (ECOTRIN) 81 MG EC tablet Take 81 mg by mouth every evening.    atenoloL (TENORMIN) 100 MG tablet Take 100 mg by mouth every evening.    blood-glucose meter (ONETOUCH ULTRA2 METER) [facility] USE AS DIRECTED    celecoxib (CELEBREX) 100 MG capsule Take 100 mg by mouth 2 (two) times daily.    cetirizine (ZYRTEC) 10 MG tablet Take 1 tablet (10 mg total) by mouth once daily.    cholecalciferol, vitamin D3, (VITAMIN D3) 2,000 unit Cap Take 1 capsule by mouth 2 (two) times a day.    clonazePAM (KLONOPIN) 0.5 MG tablet  "Take 1 tablet (0.5 mg total) by mouth 2 (two) times daily as needed for Anxiety.    dicyclomine (BENTYL) 10 MG capsule Take 10 mg by mouth 3 (three) times daily.    fenofibrate micronized (LOFIBRA) 200 MG Cap TAKE 1 CAPSULE DAILY    furosemide (LASIX) 20 MG tablet 1 tab every morning, may take add'l 20 mg dose if wt up 2#    gabapentin (NEURONTIN) 300 MG capsule TAKE 1 CAPSULE THREE TIMES A DAY    insulin glargine, TOUJEO, (TOUJEO SOLOSTAR U-300 INSULIN) 300 unit/mL (1.5 mL) InPn pen INJECT 36 UNITS UNDER THE SKIN EVERY EVENING    latanoprost 0.005 % ophthalmic solution Place 1 drop into both eyes nightly.    levothyroxine (SYNTHROID) 75 MCG tablet Take 1 tablet (75 mcg total) by mouth before breakfast.    LIDOcaine (LIDODERM) 5 % 1 patch daily as needed.    metFORMIN (GLUCOPHAGE-XR) 500 MG ER 24hr tablet Take 2 tablets (1,000 mg total) by mouth daily with breakfast.    NOVOLOG FLEXPEN U-100 INSULIN 100 unit/mL (3 mL) InPn pen Inject into skin 3x/day per correction scale,. Max TDD 30u    olmesartan (BENICAR) 40 MG tablet Take 40 mg by mouth once daily.    omeprazole (PRILOSEC) 40 MG capsule Take 40 mg by mouth 2 (two) times daily before meals.    ONETOUCH DELICA PLUS LANCET 33 gauge Misc USE 1 TO CHECK GLUCOSE THREE TIMES DAILY AS NEEDED    orphenadrine (NORFLEX) 100 mg tablet TAKE 1 TABLET BY MOUTH TWICE DAILY AS NEEDED FOR MUSCLE SPASMS    pen needle, diabetic (NOVOFINE 32) 32 gauge x 1/4" Ndle use 4 times daily with insulin    rOPINIRole (REQUIP) 0.25 MG tablet Take 1 tablet (0.25 mg total) by mouth every evening.    rosuvastatin (CRESTOR) 5 MG tablet TAKE 1 TABLET EVERY OTHER DAY (Patient taking differently: Take 5 mg by mouth every other day.)    semaglutide (OZEMPIC) 1 mg/dose (4 mg/3 mL) Inject 1 mg into the skin every 7 days.    sertraline (ZOLOFT) 50 MG tablet Take 50 mg by mouth every evening.    TRUE METRIX GLUCOSE TEST STRIP Strp USE 1 STRIP THREE TIMES A DAY    TRUEPLUS LANCETS 33 gauge Misc USE TO TEST " BLOOD SUGAR THREE TIMES A DAY    amLODIPine (NORVASC) 5 MG tablet Take 1 tablet (5 mg total) by mouth 2 (two) times daily.     Current Facility-Administered Medications   Medication    ferumoxytoL injection 510 mg    ferumoxytoL injection 510 mg     Facility-Administered Medications Ordered in Other Visits   Medication    0.9%  NaCl infusion    lactated ringers infusion               Review of Systems  Constitutional: negative for fevers, night sweats, and weight loss  Eyes: negative for visual disturbance, diplopia  Respiratory: negative for cough, hemoptysis, sputum, and wheezing  Cardiovascular: see HPI  Gastrointestinal: negative for abdominal pain, bright red blood per rectum, change in bowel habits, dysphagia, melena, and reflux symptoms  Genitourinary:negative for dysuria, frequency, and hematuria  Hematologic/lymphatic: negative for bleeding, easy bruising, and lymphadenopathy  Musculoskeletal:negative for arthralgias, back pain, and myalgias  Neurological: negative for gait problems, paresthesia, speech problems, vertigo, and weakness  Behavioral/Psych: negative for excessive alcohol consumption, illegal drug usage, and sleep disturbance    -------------------------------------    Anxiety    Cancer    colon    Chronic diarrhea    Chronic kidney disease    stage 3    Diabetes mellitus    Diastolic dysfunction    GERD (gastroesophageal reflux disease)    Glaucoma    History of migraine headaches    Hyperlipemia    Hypertension    PONV (postoperative nausea and vomiting)    Pulmonary hypertension    Sleep apnea with use of continuous positive airway pressure (CPAP)    waiting on mask to arrive    Thyroid disease    hypothyroid     ----------------------------    Appendectomy    done during colon resection    Carpal tunnel release    Procedure: Left carpal tunnel release, Left Thumb, Middle, and Ring Trigger Finger Release;  Surgeon: Pilo Paez MD;  Location: [facility];  Service: Orthopedics;   Laterality: Left;  Procedure: Left carpal tunnel release, Left Thumb, Middle, and Ring Trigger Finger Release    Position: Supine    Anesthesia: General    Equipment: Basic handset, carpal tunnel set    Cataract extraction, bilateral     section    Colon surgery    Colonoscopy    Procedure: COLONOSCOPY;  Surgeon: Chet Woodard Jr., MD;  Location: [facility];  Service: Endoscopy;  Laterality: Left;    Excision of lesion of buccal mucosa    Procedure: EXCISION, LESION, BUCCAL MUCOSA;  Surgeon: Dedra Khan MD;  Location: [facility];  Service: ENT;  Laterality: Left;    Hernia repair    repaired during colon resection    Hysterectomy    Complete    Insertion of venous access port    Laparoscopic cholecystectomy    Procedure: CHOLECYSTECTOMY, LAPAROSCOPIC;  Surgeon: Gab Pierre MD;  Location: [facility];  Service: General;  Laterality: N/A;    Oophorectomy    Portacath placement    and later removed     Replacement of vascular access port    Right heart catheterization    Procedure: Right heart cath;  Surgeon: Annie Henson MD;  Location: [facility];  Service: Cardiology;  Laterality: Right;    Surgical removal of neoplasm of mouth    Procedure: EXCISION, NEOPLASM, MOUTH;  Surgeon: Velasquez Juárez MD;  Location: [address];  Service: ENT;  Laterality: Left;    Transforaminal epidural injection of steroid    Procedure: Injection,steroid,epidural,transforaminal approach;  Surgeon: Job Porter MD;  Location: [facility]placed [facility];  Service: Pain Management;  Laterality: Left;  L5-S1        Family History   Problem Relation Name Age of Onset    Cancer Mother          breast cancer at age 42    Breast cancer Mother  42    Heart disease Father      COPD Father      Hypertension Sister      Diabetes Sister      Anemia Sister      Cancer Sister      Supraventricular tachycardia Sister      Multiple myeloma Sister      Kidney disease Sister      Other Sister          low bp with syncope events    Cancer Brother          stomach  "cancer at age 24    Hypertension Daughter      Hypertension Son x3      Social History     Tobacco Use    Smoking status: Former     Current packs/day: 0.00     Types: Cigarettes     Quit date:      Years since quittin.3    Smokeless tobacco: Never   Substance Use Topics    Alcohol use: No    Drug use: No         Physical Exam  /76 (BP Location: Left arm, Patient Position: Sitting, BP Method: Large (Automatic))   Pulse 93   Resp 20   Ht 5' 5" (1.651 m)   Wt 92.9 kg (204 lb 12.9 oz)   LMP  (LMP Unknown)   BMI 34.08 kg/m²   Body surface area is 2.06 meters squared.  Body mass index is 34.08 kg/m².    General appearance: alert, appears stated age, cooperative, and no distress  Head: Normocephalic, without obvious abnormality, atraumatic  Neck: no carotid bruit, no JVD, and supple, symmetrical, trachea midline  Lungs: clear to auscultation bilaterally  Heart: regular rate and rhythm; S1, S2 normal, no murmur, click, rub or gallop  Abdomen: soft, non-tender, no distended  Extremities: extremities atraumatic, no pitting edema  Skin: warm, no cyanosis, no pathologic ecchymosis in exposed portions  Neurologic: Grossly normal. A&O x3      Labs Reviewed     Lab Results   Component Value Date    WBC 11.24 2024    WBC 11.35 2024    HGB 12.4 2024    HGB 12.4 2024     2024     2024       Lab Results   Component Value Date     2024    K 4.3 2024     2024    CO2 24 2024    CALCIUM 10.3 (H) 2024    MG 1.7 10/25/2023     (H) 2024    PROT 7.9 2024       Lab Results   Component Value Date    BUN 10 2024    BUN 16 2024    BUN 16 2024    CREATININE 1.25 2024    CREATININE 1.39 2024    CREATININE 1.42 (H) 2024    EGFRNORACEVR 47 (A) 2024    EGFRNORACEVR 41 (A) 2024    EGFRNORACEVR 40 (A) 2024       Lab Results   Component Value Date    ALBUMIN 4.6 " 04/26/2024    BILITOT 0.3 04/26/2024    ALKPHOS 90 04/26/2024    ALT 37 (H) 04/26/2024       Lab Results   Component Value Date    TRIG 209 (H) 04/11/2024    CHOL 160 04/11/2024    HDL 45 04/11/2024    LDLCALC 73.2 04/11/2024    LDLCALC 66.0 01/30/2024       Lab Results   Component Value Date    HGBA1C 7.3 (H) 04/26/2024    HGBA1C 7.3 (H) 12/05/2023    TSH 2.199 12/05/2023    FREET4 1.20 04/04/2016       Lab Results   Component Value Date    NTPROBNP 67 04/11/2024    NTPROBNP 252 10/25/2023    NTPROBNP 153 02/03/2023                 ASSESSMENT & PLAN:  1. Shortness of breath        2. Aortic atherosclerosis  IN OFFICE EKG 12-LEAD (to Muse)      3. Encounter for screening for cardiovascular disorders  CT Cardiac Scoring      4. Diastolic dysfunction  Echo      5. Pulmonary HTN  Echo      6. Type 2 diabetes mellitus with stage 3a chronic kidney disease, with long-term current use of insulin        7. Statin intolerance               This is a 68 y.o. pleasant  female with type 2 diabetes on insulin, pulmonary hypertension, FRANCISCO, hypertension.  We discussed medical management and discussed screening for CAD and consolidated her medical regimen some.       Stop clonidine.  Increase amlodipine to 5 mg twice daily.  Continue atenolol 100 mg once daily (we will replace with carvedilol in follow up).  Continue olmesartan as is   Coronary calcium score for risk stratification.  She is at baseline high-risk given her type 2 diabetes on insulin.  Depending on her calcium score we will recommend either a stress test or invasive coronary angiogram.  Equally important, depending on her calcium score we will recommend switching to a PCSK9 inhibitor (she has intolerance to multiple statins) or merely adding on ezetimibe  TTE for surveillance of pulmonary hypertension   Continue furosemide as is for now which is 20-40 mg once daily as needed  Emphasized the importance of modifying lifestyle related risk factors including  limiting alcohol intake, exercise, diet most resembling a Mediterranean diet.    I appreciate the opportunity to participate in Bing Kearns 's care today.  Please follow up with me in 3-4 months.      Annie Henson MD, [facility]  Interventional Cardiology/Structural Heart Disease  [facility]  Office: [phone]            Parts of this note were completed using voice recognition software. Please excuse any misspellings or syntax errors and reach out to me with questions.

## 2024-05-09 ENCOUNTER — OFFICE VISIT (OUTPATIENT)
Dept: CARDIOLOGY | Facility: CLINIC | Age: 69
End: 2024-05-09
Payer: MEDICARE

## 2024-05-09 VITALS
BODY MASS INDEX: 34.12 KG/M2 | RESPIRATION RATE: 20 BRPM | SYSTOLIC BLOOD PRESSURE: 115 MMHG | WEIGHT: 204.81 LBS | HEIGHT: 65 IN | HEART RATE: 93 BPM | DIASTOLIC BLOOD PRESSURE: 76 MMHG

## 2024-05-09 DIAGNOSIS — E11.22 TYPE 2 DIABETES MELLITUS WITH STAGE 3A CHRONIC KIDNEY DISEASE, WITH LONG-TERM CURRENT USE OF INSULIN: ICD-10-CM

## 2024-05-09 DIAGNOSIS — I27.20 PULMONARY HTN: ICD-10-CM

## 2024-05-09 DIAGNOSIS — I51.89 DIASTOLIC DYSFUNCTION: ICD-10-CM

## 2024-05-09 DIAGNOSIS — Z78.9 STATIN INTOLERANCE: ICD-10-CM

## 2024-05-09 DIAGNOSIS — I70.0 AORTIC ATHEROSCLEROSIS: ICD-10-CM

## 2024-05-09 DIAGNOSIS — Z79.4 TYPE 2 DIABETES MELLITUS WITH STAGE 3A CHRONIC KIDNEY DISEASE, WITH LONG-TERM CURRENT USE OF INSULIN: ICD-10-CM

## 2024-05-09 DIAGNOSIS — R06.02 SHORTNESS OF BREATH: Primary | ICD-10-CM

## 2024-05-09 DIAGNOSIS — N18.31 TYPE 2 DIABETES MELLITUS WITH STAGE 3A CHRONIC KIDNEY DISEASE, WITH LONG-TERM CURRENT USE OF INSULIN: ICD-10-CM

## 2024-05-09 DIAGNOSIS — Z13.6 ENCOUNTER FOR SCREENING FOR CARDIOVASCULAR DISORDERS: ICD-10-CM

## 2024-05-09 PROCEDURE — 4010F ACE/ARB THERAPY RXD/TAKEN: CPT | Mod: CPTII,S$GLB,, | Performed by: INTERNAL MEDICINE

## 2024-05-09 PROCEDURE — 1159F MED LIST DOCD IN RCRD: CPT | Mod: CPTII,S$GLB,, | Performed by: INTERNAL MEDICINE

## 2024-05-09 PROCEDURE — 3074F SYST BP LT 130 MM HG: CPT | Mod: CPTII,S$GLB,, | Performed by: INTERNAL MEDICINE

## 2024-05-09 PROCEDURE — 99214 OFFICE O/P EST MOD 30 MIN: CPT | Mod: S$GLB,,, | Performed by: INTERNAL MEDICINE

## 2024-05-09 PROCEDURE — 1101F PT FALLS ASSESS-DOCD LE1/YR: CPT | Mod: CPTII,S$GLB,, | Performed by: INTERNAL MEDICINE

## 2024-05-09 PROCEDURE — 3051F HG A1C>EQUAL 7.0%<8.0%: CPT | Mod: CPTII,S$GLB,, | Performed by: INTERNAL MEDICINE

## 2024-05-09 PROCEDURE — 3078F DIAST BP <80 MM HG: CPT | Mod: CPTII,S$GLB,, | Performed by: INTERNAL MEDICINE

## 2024-05-09 PROCEDURE — 3008F BODY MASS INDEX DOCD: CPT | Mod: CPTII,S$GLB,, | Performed by: INTERNAL MEDICINE

## 2024-05-09 PROCEDURE — G2211 COMPLEX E/M VISIT ADD ON: HCPCS | Mod: S$GLB,,, | Performed by: INTERNAL MEDICINE

## 2024-05-09 PROCEDURE — 99999 PR PBB SHADOW E&M-EST. PATIENT-LVL IV: CPT | Mod: PBBFAC,,, | Performed by: INTERNAL MEDICINE

## 2024-05-09 PROCEDURE — 3066F NEPHROPATHY DOC TX: CPT | Mod: CPTII,S$GLB,, | Performed by: INTERNAL MEDICINE

## 2024-05-09 PROCEDURE — 3288F FALL RISK ASSESSMENT DOCD: CPT | Mod: CPTII,S$GLB,, | Performed by: INTERNAL MEDICINE

## 2024-05-09 PROCEDURE — 3061F NEG MICROALBUMINURIA REV: CPT | Mod: CPTII,S$GLB,, | Performed by: INTERNAL MEDICINE

## 2024-05-09 RX ORDER — AMLODIPINE BESYLATE 5 MG/1
5 TABLET ORAL 2 TIMES DAILY
Qty: 180 TABLET | Refills: 1 | Status: SHIPPED | OUTPATIENT
Start: 2024-05-09

## 2024-05-09 NOTE — PATIENT INSTRUCTIONS
STOP clonidine  INCREASE amlodipine to 5 mg twice daily  Echocardiogram for pulmonary hypertension- we may change Lasix based on that  Coronary calcium score  If your calcium score is >100 will recommend an angiogram AND start you on an injectable for cholesterol  If your calcium score is <100 then we will stay the course and consider adding Zetia  Return in 3-4 months

## 2024-05-20 ENCOUNTER — HOSPITAL ENCOUNTER (OUTPATIENT)
Dept: CARDIOLOGY | Facility: HOSPITAL | Age: 69
Discharge: HOME | End: 2024-05-20
Attending: INTERNAL MEDICINE
Payer: MEDICARE

## 2024-05-20 VITALS — WEIGHT: 204 LBS | BODY MASS INDEX: 33.99 KG/M2 | HEIGHT: 65 IN

## 2024-05-20 DIAGNOSIS — I51.89 DIASTOLIC DYSFUNCTION: ICD-10-CM

## 2024-05-20 DIAGNOSIS — I27.20 PULMONARY HTN: ICD-10-CM

## 2024-05-20 PROCEDURE — 93010 ELECTROCARDIOGRAM REPORT: CPT | Mod: S$GLB,,, | Performed by: INTERNAL MEDICINE

## 2024-05-20 PROCEDURE — 93306 TTE W/DOPPLER COMPLETE: CPT | Mod: 26,,, | Performed by: INTERNAL MEDICINE

## 2024-05-20 PROCEDURE — 93005 ELECTROCARDIOGRAM TRACING: CPT | Mod: PO

## 2024-05-20 PROCEDURE — 93306 TTE W/DOPPLER COMPLETE: CPT | Mod: PO

## 2024-05-21 LAB
OHS QRS DURATION: 104 MS
OHS QTC CALCULATION: 453 MS

## 2024-05-28 LAB
ASCENDING AORTA: 3.83 CM
AV INDEX (PROSTH): 0.63
AV MEAN GRADIENT: 6 MMHG
AV PEAK GRADIENT: 9 MMHG
AV VALVE AREA BY VELOCITY RATIO: 2.11 CM²
AV VALVE AREA: 2.01 CM²
AV VELOCITY RATIO: 0.66
BSA FOR ECHO PROCEDURE: 2.06 M2
CV ECHO LV RWT: 0.35 CM
DOP CALC AO PEAK VEL: 1.5 M/S
DOP CALC AO VTI: 30.7 CM
DOP CALC LVOT AREA: 3.2 CM2
DOP CALC LVOT DIAMETER: 2.02 CM
DOP CALC LVOT PEAK VEL: 0.99 M/S
DOP CALC LVOT STROKE VOLUME: 61.82 CM3
DOP CALCLVOT PEAK VEL VTI: 19.3 CM
E WAVE DECELERATION TIME: 236.27 MSEC
E/A RATIO: 0.64
E/E' RATIO: 10.29 M/S
ECHO LV POSTERIOR WALL: 0.91 CM (ref 0.6–1.1)
FRACTIONAL SHORTENING: 35 % (ref 28–44)
INTERVENTRICULAR SEPTUM: 1.21 CM (ref 0.6–1.1)
IVRT: 78.02 MSEC
LEFT ATRIUM SIZE: 3.92 CM
LEFT ATRIUM VOLUME INDEX MOD: 19.8 ML/M2
LEFT ATRIUM VOLUME MOD: 39.33 CM3
LEFT INTERNAL DIMENSION IN SYSTOLE: 3.41 CM (ref 2.1–4)
LEFT VENTRICLE DIASTOLIC VOLUME INDEX: 65.99 ML/M2
LEFT VENTRICLE DIASTOLIC VOLUME: 131.32 ML
LEFT VENTRICLE MASS INDEX: 107 G/M2
LEFT VENTRICLE SYSTOLIC VOLUME INDEX: 23.9 ML/M2
LEFT VENTRICLE SYSTOLIC VOLUME: 47.63 ML
LEFT VENTRICULAR INTERNAL DIMENSION IN DIASTOLE: 5.23 CM (ref 3.5–6)
LEFT VENTRICULAR MASS: 211.94 G
LV LATERAL E/E' RATIO: 9 M/S
LV SEPTAL E/E' RATIO: 12 M/S
LVOT MG: 1.76 MMHG
LVOT MV: 0.61 CM/S
MV PEAK A VEL: 1.12 M/S
MV PEAK E VEL: 0.72 M/S
MV STENOSIS PRESSURE HALF TIME: 68.52 MS
MV VALVE AREA P 1/2 METHOD: 3.21 CM2
PISA TR MAX VEL: 2.73 M/S
PULM VEIN S/D RATIO: 1.48
PV PEAK D VEL: 0.48 M/S
PV PEAK S VEL: 0.71 M/S
RA PRESSURE ESTIMATED: 3 MMHG
RIGHT VENTRICULAR LENGTH IN DIASTOLE (APICAL 4-CHAMBER VIEW): 6.22 CM
RV MID DIAMA: 3.29 CM
RV TB RVSP: 6 MMHG
RV TISSUE DOPPLER FREE WALL SYSTOLIC VELOCITY 1 (APICAL 4 CHAMBER VIEW): 15.41 CM/S
SINUS: 3.52 CM
STJ: 2.9 CM
TDI LATERAL: 0.08 M/S
TDI SEPTAL: 0.06 M/S
TDI: 0.07 M/S
TR MAX PG: 30 MMHG
TRICUSPID ANNULAR PLANE SYSTOLIC EXCURSION: 1.95 CM
TV REST PULMONARY ARTERY PRESSURE: 33 MMHG
Z-SCORE OF LEFT VENTRICULAR DIMENSION IN END DIASTOLE: -0.99
Z-SCORE OF LEFT VENTRICULAR DIMENSION IN END SYSTOLE: -0.31

## 2024-06-13 LAB
LEFT EYE DM RETINOPATHY: NEGATIVE
RIGHT EYE DM RETINOPATHY: NEGATIVE

## 2024-06-25 ENCOUNTER — HOSPITAL ENCOUNTER (OUTPATIENT)
Dept: RADIOLOGY | Facility: HOSPITAL | Age: 69
Discharge: HOME | End: 2024-06-25
Attending: INTERNAL MEDICINE
Payer: MEDICARE

## 2024-06-25 DIAGNOSIS — Z13.6 ENCOUNTER FOR SCREENING FOR CARDIOVASCULAR DISORDERS: ICD-10-CM

## 2024-06-25 PROCEDURE — 75571 CT HRT W/O DYE W/CA TEST: CPT | Mod: TC,PO

## 2024-06-25 PROCEDURE — 75571 CT HRT W/O DYE W/CA TEST: CPT | Mod: 26,,, | Performed by: STUDENT IN AN ORGANIZED HEALTH CARE EDUCATION/TRAINING PROGRAM

## 2024-06-25 RX ORDER — INSULIN ASPART 100 [IU]/ML
INJECTION, SOLUTION INTRAVENOUS; SUBCUTANEOUS
Qty: 45 ML | Refills: 0 | Status: SHIPPED | OUTPATIENT
Start: 2024-06-25

## 2024-06-25 NOTE — PROGRESS NOTES
This is a significantly elevated calcium score, mostly in the LAD.  Given her risk factors, dyspnea, abnormal EKG I favor proceeding directly to a coronary angiogram

## 2024-06-27 ENCOUNTER — TELEPHONE (OUTPATIENT)
Dept: CARDIOLOGY | Facility: CLINIC | Age: 69
End: 2024-06-27
Payer: MEDICARE

## 2024-06-27 DIAGNOSIS — I70.0 AORTIC ATHEROSCLEROSIS: ICD-10-CM

## 2024-06-27 DIAGNOSIS — I27.20 PULMONARY HYPERTENSION: ICD-10-CM

## 2024-06-27 DIAGNOSIS — R93.1 ELEVATED CORONARY ARTERY CALCIUM SCORE: Primary | ICD-10-CM

## 2024-06-27 DIAGNOSIS — R06.00 DYSPNEA, UNSPECIFIED TYPE: ICD-10-CM

## 2024-06-27 RX ORDER — SODIUM CHLORIDE 9 MG/ML
INJECTION, SOLUTION INTRAVENOUS ONCE
OUTPATIENT
Start: 2024-06-27 | End: 2024-06-27

## 2024-06-27 RX ORDER — SODIUM CHLORIDE 0.9 % (FLUSH) 0.9 %
10 SYRINGE (ML) INJECTION
Status: SHIPPED | OUTPATIENT
Start: 2024-06-27

## 2024-06-27 NOTE — PROGRESS NOTES
Angiogram    Arrive for procedure at: [facility] on 7/9/24 at 9 am. Your procedure is scheduled for 11 am. Enter through the main entrance and check in at the reception desk. They will direct you upstairs to the cath lab.    You will receive a phone call from [facility] Pre-Op Department with further instructions and exact arrival time prior to your scheduled procedure.    Notify the nurse if you are ALLERGIC TO IODINE.    FASTING: You MAY NOT have anything to eat or drink AFTER MIDNIGHT the day before your procedure.       MEDICATIONS: You may take your regular morning medications with water. If there are any medications that you should not take, you will be instructed to hold them for that morning.    CARDIOLOGY PRE-PROCEDURE MEDICATION ORDERS:  ** Please hold any medications that are checked below:    HOLD   # OF DAYS TO HOLD  Metformin    Day before procedure & morning of procedure  Short acting insulin   Morning of procedure  Lasix: do not take the morning of your procedure  Please hold GLP-1 Drugs such as  Ozempic 1 week prior to procedure.    CONTINUE the Following Medications   Please continue all your other medications including your aspirin    WHAT TO EXPECT:    How long will the procedure take?  The procedure will take an average of 1 - 2 hours to perform.  After the procedure, you will need to lay flat for around 4 - 6 hours to minimize bleeding from the puncture site. If the wrist is accessed you will need to keep your arm still as instructed by the nurse.    When can I go home?  You may be able to be discharged home that same afternoon if there were no complications.  If you have one of the following: balloon; stent; pacemaker or defibrillator procedures, you may spend one night for observation.  Your doctor will determine your discharge based upon your progress.  The results of your procedure will be discussed with you before you are discharged.  Any further testing or procedures will  be scheduled for you either before you leave or you will be instructed to call for a future appointment.      TRANSPORTATION:  PLEASE ARRANGE TO HAVE SOMEONE DRIVE YOU HOME FOLLOWING YOUR PROCEDURE, YOU WILL NOT BE ALLOWED TO DRIVE.

## 2024-06-27 NOTE — TELEPHONE ENCOUNTER
----- Message from Vishnu Higgins sent at 6/27/2024  1:38 PM CDT -----  Contact: self  Type:  Patient Returning Call    Who Called:  PT  Who Left Message for Patient:  n/a  Does the patient know what this is regarding?:  Test results  Best Call Back Number:  [phone]   Additional Information:

## 2024-07-01 NOTE — PROGRESS NOTES
CC: Ms. Bing Kearns arrives today for management of Type 2 DM and review of chronic medical conditions, as listed in the Visit Diagnosis section of this encounter.       HPI: Ms. Bing Kearns was diagnosed with Type 2 DM in her late 50s. She was diagnosed based on lab work. Initial treatment consisted of metformin. Januvia was added in . Toujeo was added in 2020. + FH of DM in sister, maternal GF. Denies hospitalizations due to DM.   Follows with Dr. Vanessa for CKD.   Follows with Dr. Henson.     Patient was last seen by me in December.    BG readings are checked 2x/day.  She reads the following from her log:  Fastin-120  Dinner: 130    Hypoglycemia: Rare but does feel symptomatic when glucose < 100  Hypoglycemic Symptoms: hunger and jitteriness  Hypoglycemia Treatment: juice or glucose tabs    Missing Insulin/PO medication doses: No    Dietary Habits: Eats 3 meals/day. Occasional snack. Avoids sugary beverages.     Last DM education appointment: not recently    She has torn L meniscus. Following with Dr. Nuñez.     Has upcoming angiogram w/Dr. Henson. Reports recent elevated calcium score.       CURRENT DIABETIC MEDS: metformin XR 1000 mg daily, Ozempic 1 mg weekly, Toujeo 36 units QHS, Humalog sliding scale, target 150, ISF 25 (She receives Ozempic through [employer])  Glucometer type: One Touch Ultra 2    Previous DM treatments:  Trulicity - panic attacks, blood sugars in the 70s  Januvia - $$  Prandin - discontinued since starting Ozempic  Glimepiride - hypoglycemia   Prandin  Pioglitazone     Last Eye Exam: 2024, no DRMell + glaucoma. Dr. Turcios.   Last Podiatry Exam: 2024,  Dr. Helms.     REVIEW OF SYSTEMS  Constitutional: no c/o fatigue, weakness, weight loss  Cardiac: no palpitations, chest pain   Respiratory: + dry cough. + chronic dyspnea. Follows with Pulmonology for pulmonary HTN.   GI: no c/o abdominal pain. Denies h/o pancreatitis. + infrequent nausea. +  "chronic diarrhea that she attributes to IBS.  Neuro: + numbness, tingling in feet. Sometimes tingling in the hands.   Endocrine: denies polyphagia, polydipsia, polyuria       Personally reviewed Past Medical, Surgical, Social History.    Vital Signs  /70   Pulse 82   Ht 5' 5" (1.651 m)   Wt 93.2 kg (205 lb 9.3 oz)   LMP  (LMP Unknown)   BMI 34.21 kg/m²      Personally reviewed the below labs:    Hemoglobin A1C   Date Value Ref Range Status   04/26/2024 7.3 (H) 0.0 - 5.6 % Final     Comment:     Reference Interval:  5.0 - 5.6 Normal   5.7 - 6.4 High Risk   > 6.5 Diabetic      Hgb A1c results are standardized based on the (NGSP) National   Glycohemoglobin Standardization Program.      Hemoglobin A1C levels are related to mean serum/plasma glucose   during the preceding 2-3 months.        12/05/2023 7.3 (H) 4.0 - 5.6 % Final     Comment:     ADA Screening Guidelines:  5.7-6.4%  Consistent with prediabetes  >or=6.5%  Consistent with diabetes    High levels of fetal hemoglobin interfere with the HbA1C  assay. Heterozygous hemoglobin variants (HbS, HgC, etc)do  not significantly interfere with this assay.   However, presence of multiple variants may affect accuracy.     08/08/2023 7.0 (H) 0.0 - 5.6 % Final     Comment:     Reference Interval:  5.0 - 5.6 Normal   5.7 - 6.4 High Risk   > 6.5 Diabetic      Hgb A1c results are standardized based on the (NGSP) National   Glycohemoglobin Standardization Program.      Hemoglobin A1C levels are related to mean serum/plasma glucose   during the preceding 2-3 months.            Chemistry        Component Value Date/Time     04/26/2024 1201    K 4.3 04/26/2024 1201     04/26/2024 1201    CO2 24 04/26/2024 1201    BUN 10 04/26/2024 1201    CREATININE 1.25 04/26/2024 1201     (H) 04/26/2024 1201        Component Value Date/Time    CALCIUM 10.3 (H) 04/26/2024 1201    ALKPHOS 90 04/26/2024 1201    AST 51 (H) 04/26/2024 1201    ALT 37 (H) 04/26/2024 1201    " BILITOT 0.3 04/26/2024 1201    ESTGFRAFRICA >60.0 05/20/2022 0935    EGFRNONAA 52.1 (A) 05/20/2022 0935          Lab Results   Component Value Date    CHOL 160 04/11/2024    CHOL 155 01/30/2024    CHOL 154 12/05/2023     Lab Results   Component Value Date    HDL 45 04/11/2024    HDL 44 01/30/2024    HDL 41 12/05/2023     Lab Results   Component Value Date    LDLCALC 73.2 04/11/2024    LDLCALC 66.0 01/30/2024    LDLCALC 82.4 12/05/2023     Lab Results   Component Value Date    TRIG 209 (H) 04/11/2024    TRIG 225 (H) 01/30/2024    TRIG 153 (H) 12/05/2023     Lab Results   Component Value Date    CHOLHDL 28.1 04/11/2024    CHOLHDL 28.4 01/30/2024    CHOLHDL 26.6 12/05/2023       Lab Results   Component Value Date    MICALBCREAT 18.8 04/11/2024     Lab Results   Component Value Date    TSH 2.199 12/05/2023       CrCl cannot be calculated (Patient's most recent lab result is older than the maximum 7 days allowed.).    Vit D, 25-Hydroxy   Date Value Ref Range Status   06/21/2023 61 30 - 96 ng/mL Final     Comment:     Vitamin D deficiency.........<10 ng/mL                              Vitamin D insufficiency......10-29 ng/mL       Vitamin D sufficiency........> or equal to 30 ng/mL  Vitamin D toxicity............>100 ng/mL           PHYSICAL EXAMINATION  Constitutional: Appears well, no distress  Respiratory: CTA, even and unlabored.  Cardiovascular: RRR, no murmurs, no carotid bruits.   GI: active bowel sounds, no hernia noted.  Skin: warm and dry; no visible wounds  Neuro: oriented to person, place, time  Feet: appropriate footwear.     Goals        HEMOGLOBIN A1C < 7.5                 Assessment/Plan  1. Type 2 diabetes mellitus with stage 3a chronic kidney disease, with long-term current use of insulin -- A1c is reasonable/stable. Recommended personal CGMS. She is interested in this.   -- continue current meds  -- ordered Dexcom G7 through [employer]. Advised pt to let me know once she receives so we can set her  up for training.     -- Discussed diagnosis of DM, A1c goals, progression of disease, long term complications and tx options.  -- takes aspirin, ARB, statin   2. Type 2 diabetes mellitus with diabetic neuropathy, with long-term current use of insulin -- optimize DM control   3. Essential hypertension  -- managed by cardiology  -- continue current meds and monitor   4. Hyperlipidemia, unspecified hyperlipidemia type  -- uncontrolled with elevated TRG  -- continue Crestor   5. Hypothyroidism due to acquired atrophy of thyroid  -- stable  -- continue current levothyroxine dose.   -- TSH with RTC   6. Diastolic dysfunction  -- following with cardiology.    7. Fatty liver -- maintain DM control  -- Ozempic may add benefit       FOLLOW UP  Follow up in about 4 months (around 11/2/2024).  Patient instructed to bring BG logs to each follow up   Patient encouraged to call for any BG/medication issues, concerns, or questions.      Orders Placed This Encounter   Procedures    Hemoglobin A1C    Basic Metabolic Panel    TSH

## 2024-07-02 ENCOUNTER — OFFICE VISIT (OUTPATIENT)
Dept: ENDOCRINOLOGY | Facility: CLINIC | Age: 69
End: 2024-07-02
Payer: MEDICARE

## 2024-07-02 VITALS
HEART RATE: 82 BPM | HEIGHT: 65 IN | BODY MASS INDEX: 34.25 KG/M2 | WEIGHT: 205.56 LBS | DIASTOLIC BLOOD PRESSURE: 70 MMHG | SYSTOLIC BLOOD PRESSURE: 132 MMHG

## 2024-07-02 DIAGNOSIS — N18.31 TYPE 2 DIABETES MELLITUS WITH STAGE 3A CHRONIC KIDNEY DISEASE, WITH LONG-TERM CURRENT USE OF INSULIN: Primary | ICD-10-CM

## 2024-07-02 DIAGNOSIS — E11.40 TYPE 2 DIABETES MELLITUS WITH DIABETIC NEUROPATHY, WITH LONG-TERM CURRENT USE OF INSULIN: ICD-10-CM

## 2024-07-02 DIAGNOSIS — E11.22 TYPE 2 DIABETES MELLITUS WITH STAGE 3A CHRONIC KIDNEY DISEASE, WITH LONG-TERM CURRENT USE OF INSULIN: Primary | ICD-10-CM

## 2024-07-02 DIAGNOSIS — E11.9 TYPE 2 DIABETES MELLITUS WITHOUT COMPLICATION, WITHOUT LONG-TERM CURRENT USE OF INSULIN: ICD-10-CM

## 2024-07-02 DIAGNOSIS — I51.89 DIASTOLIC DYSFUNCTION: ICD-10-CM

## 2024-07-02 DIAGNOSIS — Z79.4 TYPE 2 DIABETES MELLITUS WITH STAGE 3A CHRONIC KIDNEY DISEASE, WITH LONG-TERM CURRENT USE OF INSULIN: Primary | ICD-10-CM

## 2024-07-02 DIAGNOSIS — K76.0 FATTY LIVER: ICD-10-CM

## 2024-07-02 DIAGNOSIS — Z79.4 TYPE 2 DIABETES MELLITUS WITH DIABETIC NEUROPATHY, WITH LONG-TERM CURRENT USE OF INSULIN: ICD-10-CM

## 2024-07-02 DIAGNOSIS — E78.5 HYPERLIPIDEMIA, UNSPECIFIED HYPERLIPIDEMIA TYPE: ICD-10-CM

## 2024-07-02 DIAGNOSIS — E03.4 HYPOTHYROIDISM DUE TO ACQUIRED ATROPHY OF THYROID: ICD-10-CM

## 2024-07-02 DIAGNOSIS — I10 ESSENTIAL HYPERTENSION: ICD-10-CM

## 2024-07-02 PROCEDURE — 3008F BODY MASS INDEX DOCD: CPT | Mod: CPTII,S$GLB,, | Performed by: NURSE PRACTITIONER

## 2024-07-02 PROCEDURE — 3075F SYST BP GE 130 - 139MM HG: CPT | Mod: CPTII,S$GLB,, | Performed by: NURSE PRACTITIONER

## 2024-07-02 PROCEDURE — 3078F DIAST BP <80 MM HG: CPT | Mod: CPTII,S$GLB,, | Performed by: NURSE PRACTITIONER

## 2024-07-02 PROCEDURE — 4010F ACE/ARB THERAPY RXD/TAKEN: CPT | Mod: CPTII,S$GLB,, | Performed by: NURSE PRACTITIONER

## 2024-07-02 PROCEDURE — 99999 PR PBB SHADOW E&M-EST. PATIENT-LVL V: CPT | Mod: PBBFAC,,, | Performed by: NURSE PRACTITIONER

## 2024-07-02 PROCEDURE — 3051F HG A1C>EQUAL 7.0%<8.0%: CPT | Mod: CPTII,S$GLB,, | Performed by: NURSE PRACTITIONER

## 2024-07-02 PROCEDURE — 3061F NEG MICROALBUMINURIA REV: CPT | Mod: CPTII,S$GLB,, | Performed by: NURSE PRACTITIONER

## 2024-07-02 PROCEDURE — 1101F PT FALLS ASSESS-DOCD LE1/YR: CPT | Mod: CPTII,S$GLB,, | Performed by: NURSE PRACTITIONER

## 2024-07-02 PROCEDURE — 3066F NEPHROPATHY DOC TX: CPT | Mod: CPTII,S$GLB,, | Performed by: NURSE PRACTITIONER

## 2024-07-02 PROCEDURE — 1159F MED LIST DOCD IN RCRD: CPT | Mod: CPTII,S$GLB,, | Performed by: NURSE PRACTITIONER

## 2024-07-02 PROCEDURE — 1126F AMNT PAIN NOTED NONE PRSNT: CPT | Mod: CPTII,S$GLB,, | Performed by: NURSE PRACTITIONER

## 2024-07-02 PROCEDURE — 99214 OFFICE O/P EST MOD 30 MIN: CPT | Mod: S$GLB,,, | Performed by: NURSE PRACTITIONER

## 2024-07-02 PROCEDURE — 3288F FALL RISK ASSESSMENT DOCD: CPT | Mod: CPTII,S$GLB,, | Performed by: NURSE PRACTITIONER

## 2024-07-02 PROCEDURE — G2211 COMPLEX E/M VISIT ADD ON: HCPCS | Mod: S$GLB,,, | Performed by: NURSE PRACTITIONER

## 2024-07-02 PROCEDURE — 1160F RVW MEDS BY RX/DR IN RCRD: CPT | Mod: CPTII,S$GLB,, | Performed by: NURSE PRACTITIONER

## 2024-07-02 RX ORDER — BLOOD-GLUCOSE SENSOR
1 EACH MISCELLANEOUS
Qty: 9 EACH | Refills: 3 | Status: SHIPPED | OUTPATIENT
Start: 2024-07-02 | End: 2025-07-02

## 2024-07-02 RX ORDER — BLOOD-GLUCOSE,RECEIVER,CONT
1 EACH MISCELLANEOUS CONTINUOUS
Qty: 1 EACH | Refills: 0 | Status: SHIPPED | OUTPATIENT
Start: 2024-07-02 | End: 2025-07-02

## 2024-07-02 RX ORDER — UBIDECARENONE 30 MG
30 CAPSULE ORAL
COMMUNITY
Start: 2024-04-18 | End: 2025-04-18

## 2024-07-03 NOTE — PROGRESS NOTES
2022 Care Everywhere updates requested and reviewed.  Immunizations reconciled. Media reports reviewed.  Duplicate HM overrides and  orders removed.  Overdue HM topic chart audit and/or requested.  Overdue lab testing linked to upcoming lab appointments if applies.  Pt scheduled for mammo 3/8/2022 and urine micro 2022    Health Maintenance Due   Topic Date Due    Mammogram  2022    Diabetes Urine Screening  03/10/2022      I called the patient and discussed the information below. She verbalized an understanding.

## 2024-07-09 ENCOUNTER — TELEPHONE (OUTPATIENT)
Dept: ENDOCRINOLOGY | Facility: CLINIC | Age: 69
End: 2024-07-09
Payer: MEDICARE

## 2024-07-10 ENCOUNTER — PATIENT MESSAGE (OUTPATIENT)
Dept: ADMINISTRATIVE | Facility: HOSPITAL | Age: 69
End: 2024-07-10
Payer: MEDICARE

## 2024-07-11 ENCOUNTER — PATIENT OUTREACH (OUTPATIENT)
Dept: ADMINISTRATIVE | Facility: HOSPITAL | Age: 69
End: 2024-07-11
Payer: MEDICARE

## 2024-07-11 NOTE — PROGRESS NOTES
Population Health Chart Review & Patient Outreach Details      Additional [facility] Health Notes:               Updates Requested / Reviewed:      Updated Care Coordination Note, , and Immunizations Reconciliation Completed or Queried: [place]         Health Maintenance Topics Overdue:      VB Score: 1     Uncontrolled BP                       Health Maintenance Topic(s) Outreach Outcomes & Actions Taken:    Eye Exam - Outreach Outcomes & Actions Taken  : External Records Requested & Care Team Updated if Applicable

## 2024-07-11 NOTE — LETTER
AUTHORIZATION FOR RELEASE OF   CONFIDENTIAL INFORMATION    Dear ELIGIO Turcios MD,    We are seeing Bing Kearns, date of birth 1955, in the clinic at [facility]. Gabi Case MD is the patient's PCP. Bing Kearns has an outstanding lab/procedure at the time we reviewed her chart. In order to help keep her health information updated, she has authorized us to request the following medical record(s):        (  )  MAMMOGRAM                                      (  )  COLONOSCOPY      (  )  PAP SMEAR                                          (  )  OUTSIDE LAB RESULTS     (  )  DEXA SCAN                                          (X)  EYE EXAM            (  )  FOOT EXAM                                          (  )  ENTIRE RECORD     (  )  OUTSIDE IMMUNIZATIONS                 (  )  _______________         Please fax records to [facility], Conlin, Erin M., MD, [phone].    If you have any questions, please contact Kumar Contreras LPN Care Coordinator  at [phone].            Patient Name: Bing Kearns  : 1955  Patient Phone #: [phone]                         Bing Kearns  MRN: [id number]  : 1955  Age: 68 y.o.  Sex: female         Patient/Legal Guardian Signature  This signature was collected at 2024           _______________________________   Printed Name/Relationship to Patient      Consent for Examination and Treatment: I hereby authorize the providers and employees of [facility] ([facility]) to provide medical treatment/services which includes, but is not limited to, performing and administering tests and diagnostic procedures that are deemed necessary, including, but not limited to, imaging examinations, blood tests and other laboratory procedures as may be required by the hospital, clinic, or may be ordered by my physician(s) or persons working under the general and/or special instructions of my  physician(s).      I understand and agree that this consent covers all authorized persons, including but not limited to physicians, residents, nurse practitioners, physicians' assistants, specialists, consultants, student nurses, and independently contracted physicians, who are called upon by the physician in charge, to carry out the diagnostic procedures and medical or surgical treatment.     I hereby authorize [facility] to retain or dispose of any specimens or tissue, should there be such remaining from any test or procedure.     I hereby authorize and give consent for [facility] providers and employees to take photographs, images or videotapes of such diagnostic, surgical or treatment procedures of Patient as may be required by [facility] or as may be ordered by a physician. I further acknowledge and agree that [facility] may use cameras or other devices for patient monitoring.     I am aware that the practice of medicine is not an exact science, and I acknowledge that no guarantees have been made to me as to the outcome of any tests, procedures or treatment.     Authorization for Release of Information: I understand that my insurance company and/or their agents may need information necessary to make determinations about payment/reimbursement. I hereby provide authorization to release to all insurance companies, their successors, assignees, other parties with whom they may have contracted, or others acting on their behalf, that are involved with payment for any hospital and/or clinic charges incurred by the patient, any information that they request and deem necessary for payment/reimbursement, and/or quality review.  I further authorize the release of my health information to physicians or other health care practitioners on staff who are involved in my health care now and in the future, and to other health care providers, entities, or institutions for the purpose of my continued care and treatment, including referrals.      REGISTRATION AUTHORIZATION  Form No. 95827 (Rev. 3/25/2024)    Page 1 of 3                       Medicare Patient's Certification and Authorization to Release Information and Payment Request:  I certify that the information given by me in applying for payment under Title XVIII of the Social Security Act is correct. I authorize any hills of medical or other information about me to release to the Social SecurityAdministration, or its intermediaries or carriers, any information needed for this or a related Medicare claim. I request that payment of authorized benefits be made on my behalf.     Assignment of Insurance Benefits:   I hereby authorize any and all insurance companies, health plans, defined   benefit plans, health insurers or any entity that is or may be responsible for payment of my medical expenses to pay all hospital and medical benefits now due, and to become due and payable to me under any hospital benefits, sick benefits, injury benefits or any other benefit for services rendered to me, including Major Medical Benefits, direct to [facility] and all independently contracted physicians. I assign any and all rights that I may have against any and all insurance companies, health plans, defined benefit plans, health insurers or any entity that is or may be responsible for payment of my medical expenses, including, but not limited to any right to appeal a denial of a claim, any right to bring any action, lawsuit, administrative proceeding, or other cause of action on my behalf. I specifically assign my right to pursue litigation against any and all insurance companies, health plans, defined benefit plans, health insurers or any entity that is or may be responsible for payment of my medical expenses based upon a refusal to pay charges.            E. Valuables: It is understood and agreed that [facility] is not liable for the damage to or loss of any money, jewelry,   documents, dentures, eye glasses, hearing  aids, prosthetics, or other property of value.     F. Computer Equipment: I understand and agree that should I choose to use computer equipment owned by [facility] or if I choose to access the Internet via [facility]s network, I do so at my own risk. [facility] is not responsible for any damage to my computer equipment or to any damages of any type that might arise from my loss of equipment or data.     G. Acceptance of Financial Responsibility:  I agree that in consideration of the services and   supplies that have been   or will be furnished to the patient, I am hereby obligated to pay all charges made for or on the account of the patient according to the standard rates (in effect at the time the services and supplies are delivered) established by [facility], including its Patient Financial Assistance Policy to the extent it is applicable. I understand that I am responsible for all charges, or portions thereof, not covered by insurance or other sources. Patient refunds will be distributed only after balances at all [facility] facilities are paid.     H. Communication Authorization:  I hereby authorize [facility] and its representatives, along with any billing service   or  who may work on their behalf, to contact me on   my cell phone and/or home phone using pre- recorded messages, artificial voice messages, automatic telephone dialing devices or other computer assisted technology, or by electronic      mail, text messaging, or by any other form of electronic communication. This includes, but is not limited to, appointment reminders, yearly physical exam reminders, preventive care reminders, patient campaigns, welcome calls, and calls about account balances on my account or any account on which I am listed as a guarantor. I understand I have the right to opt out of these communications at any time.      Relationship  Between  Facility and  Provider:      I understand that some, but not all, providers furnishing  services to the patient are not employees or agents of [facility]. The patient is under the care and supervision of his/her attending physician, and it is the responsibility of the facility and its nursing staff to carry out the instructions of such physicians. It is the responsibility of the patient's physician/designee to obtain the patient's informed consent, when required, for medical or surgical treatment, special diagnostic or therapeutic procedures, or hospital services rendered for the patient under the special instructions of the physician/designee.           REGISTRATION AUTHORIZATION  Form No. 37911 (Rev. 3/25/2024)    Page 2 of 3                       Immunizations: [facility] shares immunization information with state sponsored health departments to help you and your doctor keep track of your immunization records. By signing, you consent to have this information shared with the health department in your state:                                [place] - LINKS ([place] Immunization Network for Kids Statewide)                                [place] - MIIX ([place] Immunization Information eXchange)                                [place] - ImmPRINT (Immunization Patient Registry with Integrated Technology)     TERM: This authorization is valid for this and subsequent care/treatment I receive at [facility] and will remain valid unless/until revoked in writing by me.     [facility]: As used in this document, [facility] means all [facility] owned and managed facilities, including, but not limited to, all health centers, surgery centers, clinics, urgent care centers, and hospitals.         [facility] complies with applicable Federal civil rights laws and does not discriminate on the basis of race, color, national origin, age, disability, or sex.  ATENCIÓN: si curtis neri, tiene a menendez disposición servicios gratuitos de asistencia lingüística. Clyde hernandez [phone].  [facility] Ý: N?u b?n nói  Ti?ng Vi?t, có các d?ch v? h? tr? ngôn ng? mi?n phí dành cho b?n. G?i s? [phone].        REGISTRATION AUTHORIZATION  Form No. 21874 (Rev. 3/25/2024)   Page 3 of 3

## 2024-07-12 ENCOUNTER — PATIENT OUTREACH (OUTPATIENT)
Dept: ADMINISTRATIVE | Facility: HOSPITAL | Age: 69
End: 2024-07-12
Payer: MEDICARE

## 2024-07-12 DIAGNOSIS — E11.9 TYPE 2 DIABETES MELLITUS WITHOUT COMPLICATION, WITHOUT LONG-TERM CURRENT USE OF INSULIN: ICD-10-CM

## 2024-07-12 DIAGNOSIS — Z79.4 TYPE 2 DIABETES MELLITUS WITH STAGE 3A CHRONIC KIDNEY DISEASE, WITH LONG-TERM CURRENT USE OF INSULIN: ICD-10-CM

## 2024-07-12 DIAGNOSIS — E11.22 TYPE 2 DIABETES MELLITUS WITH STAGE 3A CHRONIC KIDNEY DISEASE, WITH LONG-TERM CURRENT USE OF INSULIN: ICD-10-CM

## 2024-07-12 DIAGNOSIS — N18.31 TYPE 2 DIABETES MELLITUS WITH STAGE 3A CHRONIC KIDNEY DISEASE, WITH LONG-TERM CURRENT USE OF INSULIN: ICD-10-CM

## 2024-07-12 RX ORDER — BLOOD SUGAR DIAGNOSTIC
STRIP MISCELLANEOUS
Qty: 4 EACH | Refills: 0 | Status: SHIPPED | OUTPATIENT
Start: 2024-07-12

## 2024-07-12 RX ORDER — SEMAGLUTIDE 1.34 MG/ML
1 INJECTION, SOLUTION SUBCUTANEOUS
Qty: 4 EACH | Refills: 0 | Status: SHIPPED | OUTPATIENT
Start: 2024-07-12 | End: 2025-07-12

## 2024-07-12 NOTE — PROGRESS NOTES
Population Health Chart Review & Patient Outreach Details      Additional [facility] Health Notes:               Updates Requested / Reviewed:      Updated Care Coordination Note, Care Everywhere, and Immunizations Reconciliation Completed or Queried: [place]         Health Maintenance Topics Overdue:      [facility] Score: 1     Uncontrolled BP                       Health Maintenance Topic(s) Outreach Outcomes & Actions Taken:    Eye Exam - Outreach Outcomes & Actions Taken  : Diabetic Eye External Records Uploaded, Care Team & History Updated if Applicable

## 2024-07-23 ENCOUNTER — OFFICE VISIT (OUTPATIENT)
Dept: CARDIOLOGY | Facility: CLINIC | Age: 69
End: 2024-07-23
Payer: MEDICARE

## 2024-07-23 VITALS
SYSTOLIC BLOOD PRESSURE: 129 MMHG | WEIGHT: 207.25 LBS | BODY MASS INDEX: 34.53 KG/M2 | DIASTOLIC BLOOD PRESSURE: 79 MMHG | HEIGHT: 65 IN | HEART RATE: 92 BPM

## 2024-07-23 DIAGNOSIS — I27.20 PULMONARY HTN: ICD-10-CM

## 2024-07-23 DIAGNOSIS — I51.89 DIASTOLIC DYSFUNCTION: Primary | ICD-10-CM

## 2024-07-23 DIAGNOSIS — I10 HYPERTENSION, UNSPECIFIED TYPE: ICD-10-CM

## 2024-07-23 DIAGNOSIS — I71.21 ANEURYSM OF ASCENDING AORTA WITHOUT RUPTURE: ICD-10-CM

## 2024-07-23 DIAGNOSIS — E78.5 HYPERLIPIDEMIA, UNSPECIFIED HYPERLIPIDEMIA TYPE: ICD-10-CM

## 2024-07-23 DIAGNOSIS — I25.10 CORONARY ARTERY DISEASE INVOLVING NATIVE CORONARY ARTERY OF NATIVE HEART WITHOUT ANGINA PECTORIS: ICD-10-CM

## 2024-07-23 PROCEDURE — 3078F DIAST BP <80 MM HG: CPT | Mod: CPTII,S$GLB,,

## 2024-07-23 PROCEDURE — 99214 OFFICE O/P EST MOD 30 MIN: CPT | Mod: S$GLB,,,

## 2024-07-23 PROCEDURE — 4010F ACE/ARB THERAPY RXD/TAKEN: CPT | Mod: CPTII,S$GLB,,

## 2024-07-23 PROCEDURE — 1159F MED LIST DOCD IN RCRD: CPT | Mod: CPTII,S$GLB,,

## 2024-07-23 PROCEDURE — 3074F SYST BP LT 130 MM HG: CPT | Mod: CPTII,S$GLB,,

## 2024-07-23 PROCEDURE — 3066F NEPHROPATHY DOC TX: CPT | Mod: CPTII,S$GLB,,

## 2024-07-23 PROCEDURE — 3288F FALL RISK ASSESSMENT DOCD: CPT | Mod: CPTII,S$GLB,,

## 2024-07-23 PROCEDURE — 3051F HG A1C>EQUAL 7.0%<8.0%: CPT | Mod: CPTII,S$GLB,,

## 2024-07-23 PROCEDURE — 99999 PR PBB SHADOW E&M-EST. PATIENT-LVL V: CPT | Mod: PBBFAC,,,

## 2024-07-23 PROCEDURE — 3061F NEG MICROALBUMINURIA REV: CPT | Mod: CPTII,S$GLB,,

## 2024-07-23 PROCEDURE — 1126F AMNT PAIN NOTED NONE PRSNT: CPT | Mod: CPTII,S$GLB,,

## 2024-07-23 PROCEDURE — 1101F PT FALLS ASSESS-DOCD LE1/YR: CPT | Mod: CPTII,S$GLB,,

## 2024-07-23 PROCEDURE — 3008F BODY MASS INDEX DOCD: CPT | Mod: CPTII,S$GLB,,

## 2024-07-23 RX ORDER — CARVEDILOL 12.5 MG/1
25 TABLET ORAL 2 TIMES DAILY WITH MEALS
Qty: 360 TABLET | Refills: 3 | Status: SHIPPED | OUTPATIENT
Start: 2024-07-23 | End: 2025-07-23

## 2024-07-23 RX ORDER — EVOLOCUMAB 140 MG/ML
140 INJECTION, SOLUTION SUBCUTANEOUS
Qty: 6 ML | Refills: 0 | Status: ACTIVE | OUTPATIENT
Start: 2024-07-23 | End: 2024-10-21

## 2024-07-23 NOTE — PATIENT INSTRUCTIONS
Current med list:   Coreg/carvedilol 25 mg twice daily   Olmesartan 40 mg daily   Amlodipine 2.5 mg twice daily   Aspirin 81 mg daily  Crestor 5 mg every other day  Fenofibrate 200 mg daily   Repatha/euvolcumab (injectable) 140 mg every 2 weeks   Lasix 20 mg daily with extra dose if >2 lb weight gain in 1 day or > 5 lb weight gain in 1 week

## 2024-07-23 NOTE — PROGRESS NOTES
[facility]  [facility]  Date: 7/23/24    Patient: Bing Kearns, 1955, [id number]  Primary Care Provider: Gabi Case MD     Chief Complaint: Follow up     Subjective:       Bing Kearns is a 68 y.o. female who presents for follow up. They follow with Dr. Henson.    Average -150s at home. Weight stable. Recent elevated CAC score of 390 with complaints of TERRELL for which LHC performed. LHC demonstrated mild epicardial CAD. TERRELL stable since angiogram without significant change. Denies chest discomfort, palpitations, dizziness, syncope, orthopnea, PND, edema.    Focused Past History includes:  Chronic HFpEF   TTE July 2023 reported mild LVH, EF 50-55%.  Normal RV. Negative stress echo at 75% MPHR  48 hour Holter June 2023 reported unremarkable  TTE 5/2024: LVEF 60-65%, grade I diastolic dysfunction, trace MR, PASP 33.   Pulmonary hypertension  Right heart catheterization September 2023:  RA 18, PA 50/30 (36), wedge 24.  CI 4.4.  No left-to-right shunt  Coronary artery disease  LHC 7/2024: mild epicardial CAD  Ascending aortic aneurysm  Cardiac CT 6/2024: dilatation of the ascending aorta measuring 4.1 cm    CKD 3A  FRANCISCO on CPAP   Hypertension   Type 2 diabetes on insulin  Intolerance to rosuvastatin, simva and atorvastatin  Casual smoker less than 5 years    Current Outpatient Medications   Medication Sig    albuterol (PROVENTIL/VENTOLIN HFA) 90 mcg/actuation inhaler Inhale 2 puffs into the lungs every 6 (six) hours as needed for Wheezing. Rescue    amLODIPine (NORVASC) 5 MG tablet Take 1 tablet (5 mg total) by mouth 2 (two) times daily.    aspirin (ECOTRIN) 81 MG EC tablet Take 81 mg by mouth every evening.    blood-glucose meter (ONETOUCH ULTRA2 METER) [facility] USE AS DIRECTED    celecoxib (CELEBREX) 100 MG capsule Take 100 mg by mouth as needed.    cetirizine (ZYRTEC) 10 MG tablet Take 1 tablet (10 mg total) by mouth once daily.    cholecalciferol, vitamin D3, (VITAMIN D3) 2,000  unit Cap Take 1 capsule by mouth 2 (two) times a day.    clonazePAM (KLONOPIN) 0.5 MG tablet Take 1 tablet (0.5 mg total) by mouth 2 (two) times daily as needed for Anxiety.    co-enzyme Q-10 30 mg capsule Take 30 mg by mouth.    DEXCOM G7  Misc 1 Device by Misc.(Non-Drug; Combo Route) route continuous.    DEXCOM G7 SENSOR Samantha 1 Device by Misc.(Non-Drug; Combo Route) route every 10 days.    dicyclomine (BENTYL) 10 MG capsule Take 10 mg by mouth as needed.    fenofibrate micronized (LOFIBRA) 200 MG Cap TAKE 1 CAPSULE DAILY    fluticasone-umeclidin-vilanter (TRELEGY ELLIPTA) 100-62.5-25 mcg DsDv Inhale 1 puff into the lungs once daily.    furosemide (LASIX) 20 MG tablet 1 tab every morning, may take add'l 20 mg dose if wt up 2#    gabapentin (NEURONTIN) 300 MG capsule TAKE 1 CAPSULE THREE TIMES A DAY    insulin glargine, TOUJEO, (TOUJEO SOLOSTAR U-300 INSULIN) 300 unit/mL (1.5 mL) InPn pen INJECT 36 UNITS UNDER THE SKIN EVERY EVENING    ipratropium (ATROVENT) 21 mcg (0.03 %) nasal spray 2 sprays by Each Nostril route 2 (two) times daily.    latanoprost 0.005 % ophthalmic solution Place 1 drop into both eyes nightly.    levothyroxine (SYNTHROID) 75 MCG tablet Take 1 tablet (75 mcg total) by mouth before breakfast.    LIDOcaine (LIDODERM) 5 % 1 patch daily as needed.    loperamide (IMODIUM A-D) 2 mg Tab Take 2 mg by mouth as needed.    metFORMIN (GLUCOPHAGE-XR) 500 MG ER 24hr tablet Take 2 tablets (1,000 mg total) by mouth daily with breakfast.    NOVOLOG FLEXPEN U-100 INSULIN 100 unit/mL (3 mL) InPn pen Inject into skin 3x/day per correction scale,. Max TDD 30u    olmesartan (BENICAR) 40 MG tablet Take 40 mg by mouth once daily.    omeprazole (PRILOSEC) 40 MG capsule Take 40 mg by mouth 2 (two) times daily before meals.    ONETOUCH DELICA PLUS LANCET 33 gauge Misc USE 1 TO CHECK GLUCOSE THREE TIMES DAILY AS NEEDED    orphenadrine (NORFLEX) 100 mg tablet TAKE 1 TABLET BY MOUTH TWICE DAILY AS NEEDED FOR MUSCLE  "SPASMS    pen needle, diabetic (NOVOFINE 32) 32 gauge x 1/4" Ndle use 4 times daily with insulin    rOPINIRole (REQUIP) 0.25 MG tablet Take 1 tablet (0.25 mg total) by mouth every evening.    rosuvastatin (CRESTOR) 5 MG tablet TAKE 1 TABLET EVERY OTHER DAY (Patient taking differently: Take 5 mg by mouth every other day.)    semaglutide (OZEMPIC) 1 mg/dose (4 mg/3 mL) Inject 1 mg into the skin every 7 days.    sertraline (ZOLOFT) 50 MG tablet Take 50 mg by mouth every evening.    TRUE METRIX GLUCOSE TEST STRIP Strp USE 1 STRIP THREE TIMES A DAY    TRUEPLUS LANCETS 33 gauge Misc USE TO TEST BLOOD SUGAR THREE TIMES A DAY    carvediloL (COREG) 12.5 MG tablet Take 2 tablets (25 mg total) by mouth 2 (two) times daily with meals.    evolocumab (REPATHA SURECLICK) 140 mg/mL PnIj Inject 1 mL (140 mg total) into the skin every 14 (fourteen) days.     Current Facility-Administered Medications   Medication    ferumoxytoL injection 510 mg    ferumoxytoL injection 510 mg    sodium chloride 0.9% flush 10 mL     Facility-Administered Medications Ordered in Other Visits   Medication    0.9%  NaCl infusion    lactated ringers infusion            Objective       Review of Systems  Constitutional: negative for fevers, night sweats, and weight loss  Eyes: negative for visual disturbance, diplopia  Respiratory: negative for cough, hemoptysis, sputum, and wheezing  Cardiovascular: see HPI  Gastrointestinal: negative for abdominal pain, bright red blood per rectum, change in bowel habits, dysphagia, melena, and reflux symptoms  Genitourinary:negative for dysuria, frequency, and hematuria  Hematologic/lymphatic: negative for bleeding, easy bruising, and lymphadenopathy  Musculoskeletal:negative for arthralgias, back pain, and myalgias  Neurological: negative for gait problems, paresthesia, speech problems, vertigo, and weakness  Behavioral/Psych: negative for excessive alcohol consumption, illegal drug usage, and sleep " disturbance    -------------------------------------    Anemia, unspecified    Anxiety    Cancer    colon    Chronic diarrhea    Chronic kidney disease    stage 3    Diabetes mellitus    Diastolic dysfunction    GERD (gastroesophageal reflux disease)    Glaucoma    History of migraine headaches    Hyperlipemia    Hypertension    PONV (postoperative nausea and vomiting)    Pulmonary hypertension    Sleep apnea with use of continuous positive airway pressure (CPAP)    waiting on mask to arrive    Thyroid disease    hypothyroid     ----------------------------    Angiogram, coronary, with left heart catheterization    Procedure: Left Heart Cath;  Surgeon: Annie Henson MD;  Location: [facility] CATH;  Service: Cardiology;;    Appendectomy    done during colon resection    Carpal tunnel release    Procedure: Left carpal tunnel release, Left Thumb, Middle, and Ring Trigger Finger Release;  Surgeon: Pilo Paez MD;  Location: [facility] OR;  Service: Orthopedics;  Laterality: Left;  Procedure: Left carpal tunnel release, Left Thumb, Middle, and Ring Trigger Finger Release    Position: Supine    Anesthesia: General    Equipment: Basic handset, carpal tunnel set    Cataract extraction, bilateral     section    Colon surgery    Colonoscopy    Procedure: COLONOSCOPY;  Surgeon: Chet Woodard Jr., MD;  Location: [facility] ENDO;  Service: Endoscopy;  Laterality: Left;    Excision of lesion of buccal mucosa    Procedure: EXCISION, LESION, BUCCAL MUCOSA;  Surgeon: Dedra Khan MD;  Location: [facility] OR;  Service: ENT;  Laterality: Left;    Hernia repair    repaired during colon resection    Hysterectomy    Complete    Insertion of venous access port    Laparoscopic cholecystectomy    Procedure: CHOLECYSTECTOMY, LAPAROSCOPIC;  Surgeon: Gab Pierre MD;  Location: [facility] OR;  Service: General;  Laterality: N/A;    Oophorectomy    Portacath placement    and later removed     Replacement of vascular access port    Right heart  "catheterization    Procedure: Right heart cath;  Surgeon: Annie Henson MD;  [facility];  Service: Cardiology;  Laterality: Right;    Surgical removal of neoplasm of mouth    Procedure: EXCISION, NEOPLASM, MOUTH;  Surgeon: Velasquez Juárez MD;  [facility];  Service: ENT;  Laterality: Left;    Transforaminal epidural injection of steroid    Procedure: Injection,steroid,epidural,transforaminal approach;  Surgeon: Job Porter MD;  Location: [facility] OR;  Service: Pain Management;  Laterality: Left;  L5-S1        Family History   Problem Relation Name Age of Onset    Cancer Mother          breast cancer at age 42    Breast cancer Mother  42    Heart disease Father      COPD Father      Hypertension Sister      Diabetes Sister      Anemia Sister      Cancer Sister      Supraventricular tachycardia Sister      Multiple myeloma Sister      Kidney disease Sister      Other Sister          low bp with syncope events    Cancer Brother          stomach cancer at age 24    Hypertension Daughter      Hypertension Son x3      Social History     Tobacco Use    Smoking status: Former     Current packs/day: 0.00     Types: Cigarettes     Quit date:      Years since quittin.5    Smokeless tobacco: Never   Substance Use Topics    Alcohol use: No    Drug use: No       Physical Exam  /79 (BP Location: Left arm, Patient Position: Sitting, BP Method: Medium (Automatic))   Pulse 92   Ht 5' 5" (1.651 m)   Wt 94 kg (207 lb 3.7 oz)   LMP  (LMP Unknown)   BMI 34.49 kg/m²   Body surface area is 2.08 meters squared.  Body mass index is 34.49 kg/m².    General appearance: alert, appears stated age, cooperative, and no distress  Head: Normocephalic, without obvious abnormality, atraumatic  Neck: no carotid bruit, no JVD, and supple, symmetrical, trachea midline  Lungs: clear to auscultation bilaterally  Heart: regular rate and rhythm; S1, S2 normal, no murmur, click, rub or gallop  Abdomen: soft, " non-tender, no distended  Extremities: extremities atraumatic, no pitting edema  Skin: warm, no cyanosis, no pathologic ecchymosis in exposed portions  Neurologic: Grossly normal. A&O x3      Lab Review   Lab Results   Component Value Date    WBC 8.61 07/09/2024    HGB 11.8 (L) 07/09/2024    HCT 36.3 (L) 07/09/2024    MCV 91 07/09/2024     07/09/2024         BMP  Lab Results   Component Value Date     (L) 07/09/2024    K 4.5 07/09/2024     07/09/2024    CO2 22 07/09/2024    BUN 19 (H) 07/09/2024    CREATININE 1.24 07/09/2024    CALCIUM 10.2 07/09/2024    ANIONGAP 11 07/09/2024    ESTGFRAFRICA >60.0 05/20/2022    EGFRNONAA 52.1 (A) 05/20/2022       Lab Results   Component Value Date    ALBUMIN 4.6 07/09/2024       Lab Results   Component Value Date    ALT 40 (H) 07/09/2024    AST 52 (H) 07/09/2024    ALKPHOS 90 07/09/2024    BILITOT 0.7 07/09/2024       Lab Results   Component Value Date    TSH 2.199 12/05/2023       Lab Results   Component Value Date    CHOL 160 04/11/2024    CHOL 155 01/30/2024    CHOL 154 12/05/2023     Lab Results   Component Value Date    HDL 45 04/11/2024    HDL 44 01/30/2024    HDL 41 12/05/2023     Lab Results   Component Value Date    LDLCALC 73.2 04/11/2024    LDLCALC 66.0 01/30/2024    LDLCALC 82.4 12/05/2023     Lab Results   Component Value Date    TRIG 209 (H) 04/11/2024    TRIG 225 (H) 01/30/2024    TRIG 153 (H) 12/05/2023     Lab Results   Component Value Date    CHOLHDL 28.1 04/11/2024    CHOLHDL 28.4 01/30/2024    CHOLHDL 26.6 12/05/2023                Assessment & Plan:     This is a 68 y.o. female with chronic HFpEF, pulmonary HTN, HTN, HLD. Recent elevated CAC score of 390 for which LHC performed. LHC demonstrated mild epicardial CAD. Patient notes stable TERRELL since.     1. Diastolic dysfunction  - Euvolemic on exam  - Continue benicar 40 mg qd    - Continue lasix 20 mg qd. Take additional dose if weight gain > 2 lbs in 1 day or > 5 lbs in 1 week     2.  Pulmonary HTN  - TTE 5/2024: PASP 33  - Continue lasix 20 mg qd. Take additional dose if weight gain > 2 lbs in 1 day or > 5 lbs in 1 week    3. Coronary artery disease  - Continue ASA 81 mg qd   - Discontinue crestor 5 mg every other day  - Start repatha 140 mg every 2 weeks    4. Ascending aortic aneurysm  - CT 6/2024: 4.1 cm ascending aorta dilatation   - Repeat CT chest in 6/2025    5. Hypertension  - Well controlled  - Continue amlodipine 2.5 mg BID  - Discontinue atenolol 100 mg qd. Start coreg 25 mg BID.  - Continue benicar 40 mg qd    - Continue lasix 20 mg qd   - Maintain BP log    6. Hyperlipidemia  - Continue ASA 81 mg qd   - Discontinue crestor 5 mg every other day  - Continue fenofibrate 200 mg qd    - Start repatha 140 mg every 2 weeks    Emphasized the importance of modifying lifestyle related risk factors including exercise, diet most resembling a Mediterranean diet.    Please follow up as scheduled.      Jewell Delarosa PA-C  [facility]  Office: [phone]             Methotrexate Pregnancy And Lactation Text: This medication is Pregnancy Category X and is known to cause fetal harm. This medication is excreted in breast milk.

## 2024-07-25 ENCOUNTER — PATIENT MESSAGE (OUTPATIENT)
Dept: ENDOCRINOLOGY | Facility: CLINIC | Age: 69
End: 2024-07-25
Payer: MEDICARE

## 2024-07-25 NOTE — TELEPHONE ENCOUNTER
"Pt states she has Dexcom  but not the "monitor." Maybe she means the sensors. Please call the pharmacy it was sent to and ask if it needs a PA. Should be approved. Just seems like there's a hold up   "

## 2024-08-14 ENCOUNTER — OFFICE VISIT (OUTPATIENT)
Dept: ORTHOPEDICS | Facility: CLINIC | Age: 69
End: 2024-08-14
Payer: MEDICARE

## 2024-08-14 DIAGNOSIS — G56.01 CARPAL TUNNEL SYNDROME ON RIGHT: Primary | ICD-10-CM

## 2024-08-14 DIAGNOSIS — M65.331 TRIGGER FINGER, RIGHT MIDDLE FINGER: ICD-10-CM

## 2024-08-14 PROCEDURE — 20526 THER INJECTION CARP TUNNEL: CPT | Mod: RT,S$GLB,, | Performed by: ORTHOPAEDIC SURGERY

## 2024-08-14 PROCEDURE — 99213 OFFICE O/P EST LOW 20 MIN: CPT | Mod: 25,S$GLB,, | Performed by: ORTHOPAEDIC SURGERY

## 2024-08-14 PROCEDURE — 1159F MED LIST DOCD IN RCRD: CPT | Mod: CPTII,S$GLB,, | Performed by: ORTHOPAEDIC SURGERY

## 2024-08-14 PROCEDURE — 3066F NEPHROPATHY DOC TX: CPT | Mod: CPTII,S$GLB,, | Performed by: ORTHOPAEDIC SURGERY

## 2024-08-14 PROCEDURE — 1101F PT FALLS ASSESS-DOCD LE1/YR: CPT | Mod: CPTII,S$GLB,, | Performed by: ORTHOPAEDIC SURGERY

## 2024-08-14 PROCEDURE — 99999 PR PBB SHADOW E&M-EST. PATIENT-LVL III: CPT | Mod: PBBFAC,,, | Performed by: ORTHOPAEDIC SURGERY

## 2024-08-14 PROCEDURE — 3061F NEG MICROALBUMINURIA REV: CPT | Mod: CPTII,S$GLB,, | Performed by: ORTHOPAEDIC SURGERY

## 2024-08-14 PROCEDURE — 3288F FALL RISK ASSESSMENT DOCD: CPT | Mod: CPTII,S$GLB,, | Performed by: ORTHOPAEDIC SURGERY

## 2024-08-14 PROCEDURE — 20550 NJX 1 TENDON SHEATH/LIGAMENT: CPT | Mod: 51,XS,RT,S$GLB | Performed by: ORTHOPAEDIC SURGERY

## 2024-08-14 PROCEDURE — 4010F ACE/ARB THERAPY RXD/TAKEN: CPT | Mod: CPTII,S$GLB,, | Performed by: ORTHOPAEDIC SURGERY

## 2024-08-14 PROCEDURE — 1125F AMNT PAIN NOTED PAIN PRSNT: CPT | Mod: CPTII,S$GLB,, | Performed by: ORTHOPAEDIC SURGERY

## 2024-08-14 PROCEDURE — 3051F HG A1C>EQUAL 7.0%<8.0%: CPT | Mod: CPTII,S$GLB,, | Performed by: ORTHOPAEDIC SURGERY

## 2024-08-14 RX ORDER — TRIAMCINOLONE ACETONIDE 40 MG/ML
40 INJECTION, SUSPENSION INTRA-ARTICULAR; INTRAMUSCULAR
Status: DISCONTINUED | OUTPATIENT
Start: 2024-08-14 | End: 2024-08-14 | Stop reason: HOSPADM

## 2024-08-14 RX ADMIN — TRIAMCINOLONE ACETONIDE 40 MG: 40 INJECTION, SUSPENSION INTRA-ARTICULAR; INTRAMUSCULAR at 11:08

## 2024-08-14 NOTE — PROCEDURES
Carpal Tunnel    Date/Time: 8/14/2024 11:20 AM    Performed by: Pilo Paez MD  Authorized by: Pilo Paez MD    Consent Done?:  Yes (Verbal)  Indications:  Pain  Site marked: the procedure site was marked    Timeout: prior to procedure the correct patient, procedure, and site was verified    Prep: patient was prepped and draped in usual sterile fashion      Local anesthesia used?: Yes    Local anesthetic:  Lidocaine 1% without epinephrine  Anesthetic total (ml):  0.5    Location:  Wrist (Right carpal tunnel)  Site:  R carpal tunnel  Needle size:  25 G  Medications:  40 mg triamcinolone acetonide 40 mg/mL (20 mg injected)  Patient tolerance:  Patient tolerated the procedure well with no immediate complications

## 2024-08-14 NOTE — PROCEDURES
Tendon Sheath    Date/Time: 8/14/2024 11:20 AM    Performed by: Pilo Paez MD  Authorized by: Pilo Paez MD    Consent Done?:  Yes (Verbal)  Indications:  Pain  Site marked: the procedure site was marked    Timeout: prior to procedure the correct patient, procedure, and site was verified    Prep: patient was prepped and draped in usual sterile fashion      Local anesthetic:  Lidocaine 1% without epinephrine  Anesthetic total (ml):  0.5    Location:  Long finger  Site:  R long flexor tendon sheath  Needle size:  25 G  Medications:  40 mg triamcinolone acetonide 40 mg/mL (20mg injected)  Patient tolerance:  Patient tolerated the procedure well with no immediate complications

## 2024-08-14 NOTE — PROGRESS NOTES
Ms Kearns returns to clinic today.  Has a history of right carpal tunnel syndrome and right middle finger triggering.  She has undergone a left carpal tunnel release and multiple trigger finger releases on the left.  She was awaiting cardiology approval for the possibility of surgery to her right hand wrist     Physical exam: Examination of the right hand and wrist reveals that there is no edema.  There are no major skin changes.  Palpation produces tenderness over the A1 pulley of the middle finger.  There is a nodule present at that site.  Flexion and extension of the finger does reveal active triggering.  She has a positive Tinel's and Durkan's test at the wrist.  Has mild decreased sensation in the median distribution.  Has a 2+ radial pulse     Assessment: Right carpal tunnel syndrome and right middle finger triggering     Plan:     1. The patient states that she can not do the surgery at this time as she was waiting visit with her cardiologist.  She would like to try steroid injection.    2.  After consent was obtained injection was placed to the right carpal tunnel as well as the right middle finger flexor tendon sheath    3.  She will follow up in 3 months for repeat evaluation.  If she was cleared by Cardiology we may consider the timing of surgery at that point

## 2024-08-22 ENCOUNTER — TELEPHONE (OUTPATIENT)
Dept: CARDIOLOGY | Facility: CLINIC | Age: 69
End: 2024-08-22
Payer: MEDICARE

## 2024-08-22 NOTE — TELEPHONE ENCOUNTER
----- Message from Radha Noel sent at 8/22/2024 10:17 AM CDT -----  Contact: pt [phone]  Type: Needs Medical Advice  Who Called:  Mahin Charles     Best Call Back Number: [phone]  Additional Information: Requesting a call back to discuss rx for repatha. Psl call back no further info

## 2024-08-22 NOTE — TELEPHONE ENCOUNTER
[employer] calling to let us know they are missing the patient portion of the application. They will reach out to the patient.

## 2024-08-23 ENCOUNTER — OFFICE VISIT (OUTPATIENT)
Dept: FAMILY MEDICINE | Facility: CLINIC | Age: 69
End: 2024-08-23
Payer: MEDICARE

## 2024-08-23 VITALS
SYSTOLIC BLOOD PRESSURE: 116 MMHG | BODY MASS INDEX: 34.24 KG/M2 | RESPIRATION RATE: 18 BRPM | WEIGHT: 205.5 LBS | HEART RATE: 84 BPM | DIASTOLIC BLOOD PRESSURE: 68 MMHG | HEIGHT: 65 IN

## 2024-08-23 DIAGNOSIS — H65.92 MIDDLE EAR EFFUSION, LEFT: ICD-10-CM

## 2024-08-23 DIAGNOSIS — H65.91 OTITIS MEDIA WITH EFFUSION, RIGHT: Primary | ICD-10-CM

## 2024-08-23 DIAGNOSIS — Z79.4 TYPE 2 DIABETES MELLITUS WITH STAGE 3A CHRONIC KIDNEY DISEASE, WITH LONG-TERM CURRENT USE OF INSULIN: ICD-10-CM

## 2024-08-23 DIAGNOSIS — N18.31 TYPE 2 DIABETES MELLITUS WITH STAGE 3A CHRONIC KIDNEY DISEASE, WITH LONG-TERM CURRENT USE OF INSULIN: ICD-10-CM

## 2024-08-23 DIAGNOSIS — E11.22 TYPE 2 DIABETES MELLITUS WITH STAGE 3A CHRONIC KIDNEY DISEASE, WITH LONG-TERM CURRENT USE OF INSULIN: ICD-10-CM

## 2024-08-23 DIAGNOSIS — G47.30 SLEEP APNEA WITH USE OF CONTINUOUS POSITIVE AIRWAY PRESSURE (CPAP): ICD-10-CM

## 2024-08-23 PROCEDURE — 99999 PR PBB SHADOW E&M-EST. PATIENT-LVL V: CPT | Mod: PBBFAC,,, | Performed by: NURSE PRACTITIONER

## 2024-08-23 RX ORDER — AMOXICILLIN 875 MG/1
875 TABLET, FILM COATED ORAL EVERY 12 HOURS
Qty: 20 TABLET | Refills: 0 | Status: SHIPPED | OUTPATIENT
Start: 2024-08-23 | End: 2024-09-02

## 2024-08-23 RX ORDER — ROSUVASTATIN CALCIUM 10 MG/1
TABLET, COATED ORAL
COMMUNITY
Start: 2024-08-20

## 2024-08-23 RX ORDER — LEVOCETIRIZINE DIHYDROCHLORIDE 5 MG/1
5 TABLET, FILM COATED ORAL NIGHTLY
Qty: 90 TABLET | Refills: 0 | Status: SHIPPED | OUTPATIENT
Start: 2024-08-23 | End: 2025-08-23

## 2024-08-23 NOTE — PATIENT INSTRUCTIONS
May try saline nasal spray/mist,   below nose really well,  follow with Flonase.   Daily antihistamine-  Xyzal   No improvement in next couple of days,  or worsening of  symptoms-  start amoxicillin

## 2024-08-23 NOTE — PROGRESS NOTES
THIS DOCUMENT WAS MADE IN PART WITH VOICE RECOGNITION SOFTWARE.  OCCASIONALLY THIS SOFTWARE WILL MISINTERPRET WORDS OR PHRASES.     Assessment and Plan:    Otitis media with effusion, right  Comments:  advised on symptomatic treatment   daily antihistamine,    nasal saline,  followed by Flonase.  Orders:  -     amoxicillin (AMOXIL) 875 MG tablet; Take 1 tablet (875 mg total) by mouth every 12 (twelve) hours. for 10 days  Dispense: 20 tablet; Refill: 0    Middle ear effusion, left  -     levocetirizine (XYZAL) 5 MG tablet; Take 1 tablet (5 mg total) by mouth every evening.  Dispense: 90 tablet; Refill: 0    Sleep apnea with use of continuous positive airway pressure (CPAP)  Comments:  stable   using CPAP- tolerates well    Type 2 diabetes mellitus with stage 3a chronic kidney disease, with long-term current use of insulin  Comments:  Recent A1c 7.3  controlled   continue regimen   keep follow up with Endocrinology and Nephrology             Visit summary:  Advised on diagnosis, medications and symptomatic treatment.     Patient will try symptomatic treatment 1st-  saline,  Flonase,  daily antihistamine.   If no improvement in next couple of days- will start  amoxicillin.     She will let us know if symptoms  continue after treatment.      Emergent conditions /ER precautions  discussed.       Follow up if symptoms worsen or fail to improve.   ______________________________________________________________________  Subjective:    Chief Complaint:  Chief Complaint   Patient presents with    Ear Fullness     Patient states her right ear feels blocked, and when she's fussing at the dog it's like it echos. No drainage.         HPI:  Bing is a 68 y.o. year old female here concerned regarding feeling of fullness/ clogged right ear- 1st noticed yesterday.   States some muffled hearing,  can hear her voice echo in right ear.  She denies ear pain,  noticed some tenderness behind ear.  No fever  or exudate. Patient reports some  sinus congestion- primarily when she wakes up in the morning-  does use CPAP.  She reports nasal drainage is clear.       Medications:  Current Outpatient Medications on File Prior to Visit   Medication Sig Dispense Refill    albuterol (PROVENTIL/VENTOLIN HFA) 90 mcg/actuation inhaler Inhale 2 puffs into the lungs every 6 (six) hours as needed for Wheezing. Rescue 18 g 3    amLODIPine (NORVASC) 5 MG tablet Take 1 tablet (5 mg total) by mouth 2 (two) times daily. 180 tablet 1    aspirin (ECOTRIN) 81 MG EC tablet Take 81 mg by mouth every evening.      blood-glucose meter (ONETOUCH ULTRA2 METER) Misc USE AS DIRECTED 1 each 0    carvediloL (COREG) 12.5 MG tablet Take 2 tablets (25 mg total) by mouth 2 (two) times daily with meals. 360 tablet 3    celecoxib (CELEBREX) 100 MG capsule Take 100 mg by mouth as needed.      cholecalciferol, vitamin D3, (VITAMIN D3) 2,000 unit Cap Take 1 capsule by mouth 2 (two) times a day.  3    clonazePAM (KLONOPIN) 0.5 MG tablet Take 1 tablet (0.5 mg total) by mouth 2 (two) times daily as needed for Anxiety. 30 tablet 0    co-enzyme Q-10 30 mg capsule Take 30 mg by mouth.      DEXCOM G7  Misc 1 Device by Misc.(Non-Drug; Combo Route) route continuous. 1 each 0    DEXCOM G7 SENSOR Samantha 1 Device by Misc.(Non-Drug; Combo Route) route every 10 days. 9 each 3    dicyclomine (BENTYL) 10 MG capsule Take 10 mg by mouth as needed.      evolocumab (REPATHA SURECLICK) 140 mg/mL PnIj Inject 1 mL (140 mg total) into the skin every 14 (fourteen) days. 6 mL 0    fenofibrate micronized (LOFIBRA) 200 MG Cap TAKE 1 CAPSULE DAILY 90 capsule 3    fluticasone-umeclidin-vilanter (TRELEGY ELLIPTA) 100-62.5-25 mcg DsDv Inhale 1 puff into the lungs once daily. 30 each 11    furosemide (LASIX) 20 MG tablet 1 tab every morning, may take add'l 20 mg dose if wt up 2# 45 tablet 11    gabapentin (NEURONTIN) 300 MG capsule TAKE 1 CAPSULE THREE TIMES A  capsule 3    insulin glargine, TOUJEO, (TOUJEO  "SOLOSTAR U-300 INSULIN) 300 unit/mL (1.5 mL) InPn pen INJECT 36 UNITS UNDER THE SKIN EVERY EVENING 6 Pen 3    ipratropium (ATROVENT) 21 mcg (0.03 %) nasal spray 2 sprays by Each Nostril route 2 (two) times daily. 30 mL 5    latanoprost 0.005 % ophthalmic solution Place 1 drop into both eyes nightly.      levothyroxine (SYNTHROID) 75 MCG tablet Take 1 tablet (75 mcg total) by mouth before breakfast. 90 tablet 3    LIDOcaine (LIDODERM) 5 % 1 patch daily as needed.      loperamide (IMODIUM A-D) 2 mg Tab Take 2 mg by mouth as needed.      metFORMIN (GLUCOPHAGE-XR) 500 MG ER 24hr tablet Take 2 tablets (1,000 mg total) by mouth daily with breakfast. 180 tablet 3    NOVOLOG FLEXPEN U-100 INSULIN 100 unit/mL (3 mL) InPn pen Inject into skin 3x/day per correction scale,. Max TDD 30u 45 mL 0    olmesartan (BENICAR) 40 MG tablet Take 40 mg by mouth once daily.      omeprazole (PRILOSEC) 40 MG capsule Take 40 mg by mouth 2 (two) times daily before meals.  0    ONETOUCH DELICA PLUS LANCET 33 gauge Misc USE 1 TO CHECK GLUCOSE THREE TIMES DAILY AS NEEDED 300 each 3    orphenadrine (NORFLEX) 100 mg tablet TAKE 1 TABLET BY MOUTH TWICE DAILY AS NEEDED FOR MUSCLE SPASMS 36 tablet 0    pen needle, diabetic (NOVOFINE 32) 32 gauge x 1/4" Ndle use 4 times daily with insulin 400 each 0    rOPINIRole (REQUIP) 0.25 MG tablet Take 1 tablet (0.25 mg total) by mouth every evening. 90 tablet 3    rosuvastatin (CRESTOR) 10 MG tablet       semaglutide (OZEMPIC) 1 mg/dose (4 mg/3 mL) Inject 1 mg into the skin every 7 days. 4 each 0    sertraline (ZOLOFT) 50 MG tablet Take 50 mg by mouth every evening.  2    TRUE METRIX GLUCOSE TEST STRIP Strp USE 1 STRIP THREE TIMES A  strip 2    TRUEPLUS LANCETS 33 gauge Misc USE TO TEST BLOOD SUGAR THREE TIMES A  each 2    [DISCONTINUED] cetirizine (ZYRTEC) 10 MG tablet Take 1 tablet (10 mg total) by mouth once daily. 30 tablet 11     Current Facility-Administered Medications on File Prior to Visit " "  Medication Dose Route Frequency Provider Last Rate Last Admin    0.9%  NaCl infusion   Intravenous Continuous Job Porter MD 10 mL/hr at 12/20/19 1245 New Bag at 12/20/19 1245    ferumoxytoL injection 510 mg  510 mg Intravenous Once Agueda Amador NP        ferumoxytoL injection 510 mg  510 mg Intravenous Once Agueda Amador NP        lactated ringers infusion   Intravenous Once PRN Job Porter MD        sodium chloride 0.9% flush 10 mL  10 mL Intravenous PRN Annie Henson MD           Review of Systems:  Review of Systems   Constitutional:  Negative for chills, fatigue and fever.   HENT:  Positive for congestion, hearing loss (muffled hearing right ear), postnasal drip and tinnitus. Negative for ear discharge, ear pain, rhinorrhea, sinus pressure, sinus pain, sneezing and sore throat.    Respiratory:  Negative for cough and shortness of breath.    Cardiovascular:  Negative for chest pain and leg swelling.   Gastrointestinal:  Negative for nausea and vomiting.   Allergic/Immunologic: Negative for environmental allergies.       Past Medical History:  Past Medical History:   Diagnosis Date    Anemia, unspecified     Anxiety     Cancer     colon    Chronic diarrhea     Chronic kidney disease     stage 3    Diabetes mellitus     Diastolic dysfunction     GERD (gastroesophageal reflux disease)     Glaucoma     History of migraine headaches     Hyperlipemia     Hypertension     PONV (postoperative nausea and vomiting)     Pulmonary hypertension     Sleep apnea with use of continuous positive airway pressure (CPAP)     waiting on mask to arrive    Thyroid disease     hypothyroid       Objective:    Vitals:  Vitals:    08/23/24 0936   BP: 116/68   Pulse: 84   Resp: 18   Weight: 93.2 kg (205 lb 7.5 oz)   Height: 5' 5" (1.651 m)   PainSc:   4   PainLoc: Neck       Physical Exam  Vitals and nursing note reviewed.   Constitutional:       General: She is not in acute distress.     Appearance: She is obese.   HENT:      Head: " Normocephalic and atraumatic.      Right Ear: Ear canal and external ear normal. No tenderness. A middle ear effusion (clear fluid) is present. Tympanic membrane is erythematous (mild).      Left Ear: Ear canal and external ear normal. No tenderness. A middle ear effusion (clear fluid) is present.      Nose: Mucosal edema present. No rhinorrhea.      Right Turbinates: Not swollen or pale.      Left Turbinates: Not swollen or pale.      Right Sinus: No maxillary sinus tenderness or frontal sinus tenderness.      Left Sinus: No maxillary sinus tenderness or frontal sinus tenderness.      Mouth/Throat:      Mouth: Mucous membranes are moist.      Pharynx: Uvula midline. No pharyngeal swelling or posterior oropharyngeal erythema.   Eyes:      Conjunctiva/sclera: Conjunctivae normal.   Neck:      Thyroid: No thyromegaly.   Cardiovascular:      Rate and Rhythm: Normal rate and regular rhythm.      Heart sounds: Normal heart sounds.   Pulmonary:      Effort: Pulmonary effort is normal. No respiratory distress.      Breath sounds: Normal breath sounds.   Musculoskeletal:         General: Normal range of motion.      Cervical back: Normal range of motion and neck supple.   Lymphadenopathy:      Cervical: No cervical adenopathy.   Skin:     General: Skin is warm and dry.   Neurological:      General: No focal deficit present.      Mental Status: She is alert and oriented to person, place, and time.      Cranial Nerves: No cranial nerve deficit.   Psychiatric:         Mood and Affect: Mood normal.         Behavior: Behavior normal.         Thought Content: Thought content normal.         Data:  CMP  Sodium   Date Value Ref Range Status   08/21/2024 136 136 - 145 mmol/L Final     Potassium   Date Value Ref Range Status   08/21/2024 4.4 3.5 - 5.1 mmol/L Final     Comment:     Anion Gap reference range revised on 4/28/2023     Chloride   Date Value Ref Range Status   08/21/2024 102 95 - 110 mmol/L Final     CO2   Date Value Ref  Range Status   08/21/2024 21 (L) 22 - 31 mmol/L Final     Glucose   Date Value Ref Range Status   08/21/2024 204 (H) 70 - 110 mg/dL Final     Comment:     The ADA recommends the following guidelines for fasting glucose:    Normal:       less than 100 mg/dL    Prediabetes:  100 mg/dL to 125 mg/dL    Diabetes:     126 mg/dL or higher       BUN   Date Value Ref Range Status   08/21/2024 17 7 - 18 mg/dL Final     Creatinine   Date Value Ref Range Status   08/21/2024 1.07 0.50 - 1.40 mg/dL Final     Calcium   Date Value Ref Range Status   08/21/2024 10.0 8.4 - 10.2 mg/dL Final     Total Protein   Date Value Ref Range Status   07/09/2024 8.3 6.0 - 8.4 g/dL Final     Albumin   Date Value Ref Range Status   08/21/2024 4.5 3.5 - 5.2 g/dL Final     Total Bilirubin   Date Value Ref Range Status   07/09/2024 0.7 0.2 - 1.3 mg/dL Final     Alkaline Phosphatase   Date Value Ref Range Status   07/09/2024 90 38 - 145 U/L Final     AST   Date Value Ref Range Status   07/09/2024 52 (H) 14 - 36 U/L Final     ALT   Date Value Ref Range Status   07/09/2024 40 (H) 0 - 35 U/L Final     Anion Gap   Date Value Ref Range Status   08/21/2024 13 (H) 5 - 12 mmol/L Final     Comment:     Anion Gap reference range revised on 4/28/2023     eGFR   Date Value Ref Range Status   08/21/2024 56 (A) >60 mL/min/1.73 m^2 Final    .      Medical history reviewed, Medications reconciled.          Gretchen Ballesteros, FNP-C  Family Medicine

## 2024-08-29 ENCOUNTER — TELEPHONE (OUTPATIENT)
Dept: CARDIOLOGY | Facility: CLINIC | Age: 69
End: 2024-08-29
Payer: MEDICARE

## 2024-08-29 NOTE — TELEPHONE ENCOUNTER
----- Message from Ann-Marie Ayala PharmD sent at 8/29/2024  9:47 AM CDT -----  Regarding: Repathmeghann  Good morning,    We have been in contact with Ms. Kearns in regards to her Repatha. At our last call for an update on her patient assistance program application she stated that she would like to discuss with you further before going forward with the medication. We discussed indications and benefits of the medication. I informed her we will also follow up after she has had time to discuss with you. Please reach out with any questions or concerns.    Thanks,  Ann-Marie Ayala, Lisandra  Clinical Pharmacist  [facility]   Phone: [phone]  Fax: [phone]

## 2024-09-06 RX ORDER — LANCETS 33 GAUGE
EACH MISCELLANEOUS
Qty: 300 EACH | Refills: 3 | Status: SHIPPED | OUTPATIENT
Start: 2024-09-06

## 2024-09-19 ENCOUNTER — OFFICE VISIT (OUTPATIENT)
Dept: CARDIOLOGY | Facility: CLINIC | Age: 69
End: 2024-09-19
Payer: MEDICARE

## 2024-09-19 VITALS
HEART RATE: 86 BPM | DIASTOLIC BLOOD PRESSURE: 71 MMHG | BODY MASS INDEX: 35.18 KG/M2 | HEIGHT: 65 IN | SYSTOLIC BLOOD PRESSURE: 133 MMHG | WEIGHT: 211.19 LBS

## 2024-09-19 DIAGNOSIS — I50.32 CHRONIC HEART FAILURE WITH PRESERVED EJECTION FRACTION (HFPEF): ICD-10-CM

## 2024-09-19 DIAGNOSIS — E66.01 SEVERE OBESITY (BMI 35.0-39.9) WITH COMORBIDITY: ICD-10-CM

## 2024-09-19 DIAGNOSIS — I25.10 CORONARY ARTERY DISEASE INVOLVING NATIVE CORONARY ARTERY OF NATIVE HEART WITHOUT ANGINA PECTORIS: ICD-10-CM

## 2024-09-19 DIAGNOSIS — I10 PRIMARY HYPERTENSION: ICD-10-CM

## 2024-09-19 DIAGNOSIS — I77.819 ACQUIRED DILATION OF ASCENDING AORTA AND AORTIC ROOT: Primary | ICD-10-CM

## 2024-09-19 PROBLEM — I77.810 ACQUIRED DILATION OF ASCENDING AORTA AND AORTIC ROOT: Status: ACTIVE | Noted: 2024-09-19

## 2024-09-19 PROCEDURE — 3078F DIAST BP <80 MM HG: CPT | Mod: CPTII,S$GLB,, | Performed by: INTERNAL MEDICINE

## 2024-09-19 PROCEDURE — 1159F MED LIST DOCD IN RCRD: CPT | Mod: CPTII,S$GLB,, | Performed by: INTERNAL MEDICINE

## 2024-09-19 PROCEDURE — 3061F NEG MICROALBUMINURIA REV: CPT | Mod: CPTII,S$GLB,, | Performed by: INTERNAL MEDICINE

## 2024-09-19 PROCEDURE — 1125F AMNT PAIN NOTED PAIN PRSNT: CPT | Mod: CPTII,S$GLB,, | Performed by: INTERNAL MEDICINE

## 2024-09-19 PROCEDURE — 3288F FALL RISK ASSESSMENT DOCD: CPT | Mod: CPTII,S$GLB,, | Performed by: INTERNAL MEDICINE

## 2024-09-19 PROCEDURE — 99999 PR PBB SHADOW E&M-EST. PATIENT-LVL V: CPT | Mod: PBBFAC,,, | Performed by: INTERNAL MEDICINE

## 2024-09-19 PROCEDURE — 99214 OFFICE O/P EST MOD 30 MIN: CPT | Mod: S$GLB,,, | Performed by: INTERNAL MEDICINE

## 2024-09-19 PROCEDURE — 3008F BODY MASS INDEX DOCD: CPT | Mod: CPTII,S$GLB,, | Performed by: INTERNAL MEDICINE

## 2024-09-19 PROCEDURE — 3075F SYST BP GE 130 - 139MM HG: CPT | Mod: CPTII,S$GLB,, | Performed by: INTERNAL MEDICINE

## 2024-09-19 PROCEDURE — 3051F HG A1C>EQUAL 7.0%<8.0%: CPT | Mod: CPTII,S$GLB,, | Performed by: INTERNAL MEDICINE

## 2024-09-19 PROCEDURE — 1101F PT FALLS ASSESS-DOCD LE1/YR: CPT | Mod: CPTII,S$GLB,, | Performed by: INTERNAL MEDICINE

## 2024-09-19 PROCEDURE — 4010F ACE/ARB THERAPY RXD/TAKEN: CPT | Mod: CPTII,S$GLB,, | Performed by: INTERNAL MEDICINE

## 2024-09-19 PROCEDURE — 3066F NEPHROPATHY DOC TX: CPT | Mod: CPTII,S$GLB,, | Performed by: INTERNAL MEDICINE

## 2024-09-19 RX ORDER — ATENOLOL 100 MG/1
100 TABLET ORAL DAILY
Qty: 90 TABLET | Refills: 1 | Status: SHIPPED | OUTPATIENT
Start: 2024-09-19 | End: 2025-09-19

## 2024-09-19 NOTE — PROGRESS NOTES
[facility]   Cardiology Clinic Note  Date: 9/19/24    Patient: Bing Kearns, 1955, [id number]  Primary Care Provider: Gabi Case MD     Chief Complaint/Reason for Referral:  Follow up pulmonary hypertension and primary prevention    Subjective     Bing Kearns is a 68 y.o. female who presents for follow up. They are not accompanied.    Still has some dyspnea on exertion. It is intermittent usually after exertion. Not related to seasons. No orthopnea, Pnd. Sleeps with CPAP. Dependent edema occasionally. No chest discomfort. No syncope. Rare palpitations. No melena, hematochezia, hematemesis, hematuria or hemoptysis. Balance is okay. No hospitalizations or ED visits. Sedentary. Takes rosuvastatin 5 mg     Hasn't taken     Focused Past History includes:  Chronic HFpEF   TTE July 2023 reported mild LVH, EF 50-55%.  Normal RV.  Negative stress echo at 75% MPHR  48 hour Holter June 2023 reported unremarkable  TTE May 2024: EF 60-65%.  Normal RV.  Mild TR.  Ascending aorta 3.8.  PASP 33.  Angiogram July 2024:  Normal EDP.  Minimal CAD  Post capillary Pulmonary hypertension  Right heart catheterization September 2023:  RA 18, PA 50/30 (36), wedge 24.  CI 4.4.  No left-to-right shunt  CKD 3A  FRANCISCO on CPAP   Hypertension   Type 2 diabetes on insulin  Intolerance to rosuvastatin, simva and atorvastatin  Casual smoker less than 5 years      Current Outpatient Medications   Medication Sig    albuterol (PROVENTIL/VENTOLIN HFA) 90 mcg/actuation inhaler Inhale 2 puffs into the lungs every 6 (six) hours as needed for Wheezing. Rescue    amLODIPine (NORVASC) 5 MG tablet Take 1 tablet (5 mg total) by mouth 2 (two) times daily.    aspirin (ECOTRIN) 81 MG EC tablet Take 81 mg by mouth every evening.    blood-glucose meter (ONETOUCH ULTRA2 METER) [facility] USE AS DIRECTED    cholecalciferol, vitamin D3, (VITAMIN D3) 2,000 unit Cap Take 1 capsule by mouth 2 (two) times a day.    co-enzyme Q-10 30 mg  "capsule Take 30 mg by mouth.    DEXCOM G7  Misc 1 Device by Misc.(Non-Drug; Combo Route) route continuous.    DEXCOM G7 SENSOR Samantha 1 Device by Misc.(Non-Drug; Combo Route) route every 10 days.    dicyclomine (BENTYL) 10 MG capsule Take 10 mg by mouth as needed.    fenofibrate micronized (LOFIBRA) 200 MG Cap TAKE 1 CAPSULE DAILY    fluticasone-umeclidin-vilanter (TRELEGY ELLIPTA) 100-62.5-25 mcg DsDv Inhale 1 puff into the lungs once daily.    furosemide (LASIX) 20 MG tablet 1 tab every morning, may take add'l 20 mg dose if wt up 2#    gabapentin (NEURONTIN) 300 MG capsule TAKE 1 CAPSULE THREE TIMES A DAY    insulin glargine, TOUJEO, (TOUJEO SOLOSTAR U-300 INSULIN) 300 unit/mL (1.5 mL) InPn pen INJECT 36 UNITS UNDER THE SKIN EVERY EVENING    ipratropium (ATROVENT) 21 mcg (0.03 %) nasal spray 2 sprays by Each Nostril route 2 (two) times daily.    latanoprost 0.005 % ophthalmic solution Place 1 drop into both eyes nightly.    levocetirizine (XYZAL) 5 MG tablet Take 1 tablet (5 mg total) by mouth every evening.    levothyroxine (SYNTHROID) 75 MCG tablet Take 1 tablet (75 mcg total) by mouth before breakfast.    LIDOcaine (LIDODERM) 5 % 1 patch daily as needed.    loperamide (IMODIUM A-D) 2 mg Tab Take 2 mg by mouth as needed.    metFORMIN (GLUCOPHAGE-XR) 500 MG ER 24hr tablet Take 2 tablets (1,000 mg total) by mouth daily with breakfast.    NOVOLOG FLEXPEN U-100 INSULIN 100 unit/mL (3 mL) InPn pen Inject into skin 3x/day per correction scale,. Max TDD 30u    olmesartan (BENICAR) 40 MG tablet Take 40 mg by mouth once daily.    omeprazole (PRILOSEC) 40 MG capsule Take 40 mg by mouth 2 (two) times daily before meals.    ONETOUCH DELICA PLUS LANCET 33 gauge Misc USE 1 TO CHECK GLUCOSE THREE TIMES DAILY AS NEEDED    pen needle, diabetic (NOVOFINE 32) 32 gauge x 1/4" Ndle use 4 times daily with insulin    rOPINIRole (REQUIP) 0.25 MG tablet Take 1 tablet (0.25 mg total) by mouth every evening.    rosuvastatin " (CRESTOR) 10 MG tablet     semaglutide (OZEMPIC) 1 mg/dose (4 mg/3 mL) Inject 1 mg into the skin every 7 days.    sertraline (ZOLOFT) 50 MG tablet Take 50 mg by mouth every evening.    TRUE METRIX GLUCOSE TEST STRIP Strp USE 1 STRIP THREE TIMES A DAY    TRUEPLUS LANCETS 33 gauge Misc USE TO TEST BLOOD SUGAR THREE TIMES A DAY    atenoloL (TENORMIN) 100 MG tablet Take 1 tablet (100 mg total) by mouth once daily.    celecoxib (CELEBREX) 100 MG capsule Take 100 mg by mouth as needed. (Patient not taking: Reported on 9/19/2024)    clonazePAM (KLONOPIN) 0.5 MG tablet Take 1 tablet (0.5 mg total) by mouth 2 (two) times daily as needed for Anxiety. (Patient not taking: Reported on 9/19/2024)    evolocumab (REPATHA SURECLICK) 140 mg/mL PnIj Inject 1 mL (140 mg total) into the skin every 14 (fourteen) days. (Patient not taking: Reported on 9/19/2024)    orphenadrine (NORFLEX) 100 mg tablet TAKE 1 TABLET BY MOUTH TWICE DAILY AS NEEDED FOR MUSCLE SPASMS (Patient not taking: Reported on 9/19/2024)     Current Facility-Administered Medications   Medication    ferumoxytoL injection 510 mg    ferumoxytoL injection 510 mg    sodium chloride 0.9% flush 10 mL     Facility-Administered Medications Ordered in Other Visits   Medication    0.9%  NaCl infusion    lactated ringers infusion               Review of Systems  Constitutional: negative for fevers, night sweats, and weight loss  Eyes: negative for visual disturbance, diplopia  Respiratory: negative for cough, hemoptysis, sputum, and wheezing  Cardiovascular: see HPI  Gastrointestinal: negative for abdominal pain, bright red blood per rectum, change in bowel habits, dysphagia, melena, and reflux symptoms  Genitourinary:negative for dysuria, frequency, and hematuria  Hematologic/lymphatic: negative for bleeding, easy bruising, and lymphadenopathy  Musculoskeletal:negative for arthralgias, back pain, and myalgias  Neurological: negative for gait problems, paresthesia, speech  problems, vertigo, and weakness  Behavioral/Psych: negative for excessive alcohol consumption, illegal drug usage, and sleep disturbance    -------------------------------------    Anemia, unspecified    Anxiety    Cancer    colon    Chronic diarrhea    Chronic kidney disease    stage 3    Diabetes mellitus    Diastolic dysfunction    GERD (gastroesophageal reflux disease)    Glaucoma    History of migraine headaches    Hyperlipemia    Hypertension    PONV (postoperative nausea and vomiting)    Pulmonary hypertension    Sleep apnea with use of continuous positive airway pressure (CPAP)    waiting on mask to arrive    Thyroid disease    hypothyroid     ----------------------------    Angiogram, coronary, with left heart catheterization    Procedure: Left Heart Cath;  Surgeon: Annie Henson MD;  Location: [facility] CATH;  Service: Cardiology;;    Appendectomy    done during colon resection    Carpal tunnel release    Procedure: Left carpal tunnel release, Left Thumb, Middle, and Ring Trigger Finger Release;  Surgeon: Pilo Paez MD;  Location: [facility] OR;  Service: Orthopedics;  Laterality: Left;  Procedure: Left carpal tunnel release, Left Thumb, Middle, and Ring Trigger Finger Release    Position: Supine    Anesthesia: General    Equipment: Basic handset, carpal tunnel set    Cataract extraction, bilateral     section    Colon surgery    Colonoscopy    Procedure: COLONOSCOPY;  Surgeon: Chet Woodard Jr., MD;  Location: [facility] ENDO;  Service: Endoscopy;  Laterality: Left;    Excision of lesion of buccal mucosa    Procedure: EXCISION, LESION, BUCCAL MUCOSA;  Surgeon: Dedra Khan MD;  Location: [facility] OR;  Service: ENT;  Laterality: Left;    Hernia repair    repaired during colon resection    Hysterectomy    Complete    Insertion of venous access port    Laparoscopic cholecystectomy    Procedure: CHOLECYSTECTOMY, LAPAROSCOPIC;  Surgeon: Gab Pierre MD;  Location: [facility] OR;  Service: General;  Laterality: N/A;     "Oophorectomy    Portacath placement    and later removed     Replacement of vascular access port    Right heart catheterization    Procedure: Right heart cath;  Surgeon: Annie Henson MD;  Location: [facility] CATH;  Service: Cardiology;  Laterality: Right;    Surgical removal of neoplasm of mouth    Procedure: EXCISION, NEOPLASM, MOUTH;  Surgeon: Velasquez Juárez MD;  Location: [facility] OR [facility] FLR;  Service: ENT;  Laterality: Left;    Transforaminal epidural injection of steroid    Procedure: Injection,steroid,epidural,transforaminal approach;  Surgeon: Job Porter MD;  Location: [facility] OR;  Service: Pain Management;  Laterality: Left;  L5-S1        Family History   Problem Relation Name Age of Onset    Cancer Mother          breast cancer at age 42    Breast cancer Mother  42    Heart disease Father      COPD Father      Hypertension Sister      Diabetes Sister      Anemia Sister      Cancer Sister      Supraventricular tachycardia Sister      Multiple myeloma Sister      Kidney disease Sister      Other Sister          low bp with syncope events    Cancer Brother          stomach cancer at age 24    Hypertension Daughter      Hypertension Son x3      Social History     Tobacco Use    Smoking status: Former     Current packs/day: 0.00     Types: Cigarettes     Quit date:      Years since quittin.    Smokeless tobacco: Never   Substance Use Topics    Alcohol use: No    Drug use: No         Physical Exam  /71 (BP Location: Left forearm, Patient Position: Sitting, BP Method: Medium (Automatic))   Pulse 86   Ht 5' 5" (1.651 m)   Wt 95.8 kg (211 lb 3.2 oz)   LMP  (LMP Unknown)   BMI 35.15 kg/m²   Body surface area is 2.1 meters squared.  Body mass index is 35.15 kg/m².    General appearance: alert, appears stated age, cooperative, and no distress  Head: Normocephalic, without obvious abnormality, atraumatic  Neck: no carotid bruit, no JVD, and supple, symmetrical, trachea midline  Lungs: clear to " auscultation bilaterally  Heart: regular rate and rhythm; S1, S2 normal, no murmur, click, rub or gallop  Abdomen: soft, non-tender, no distended  Extremities: extremities atraumatic, no pitting edema  Skin: warm, no cyanosis, no pathologic ecchymosis in exposed portions  Neurologic: Grossly normal. A&O x3      Labs Reviewed     Lab Results   Component Value Date    WBC 11.56 08/21/2024    HGB 11.7 (L) 08/21/2024     08/21/2024       Lab Results   Component Value Date     08/21/2024    K 4.4 08/21/2024     08/21/2024    CO2 21 (L) 08/21/2024    CALCIUM 10.0 08/21/2024    MG 1.7 08/21/2024     (H) 08/21/2024    PROT 8.3 07/09/2024       Lab Results   Component Value Date    BUN 17 08/21/2024    BUN 19 (H) 07/09/2024    BUN 10 04/26/2024    CREATININE 1.07 08/21/2024    CREATININE 1.24 07/09/2024    CREATININE 1.25 04/26/2024    EGFRNORACEVR 56 (A) 08/21/2024    EGFRNORACEVR 47 (A) 07/09/2024    EGFRNORACEVR 47 (A) 04/26/2024       Lab Results   Component Value Date    ALBUMIN 4.5 08/21/2024    BILITOT 0.7 07/09/2024    ALKPHOS 90 07/09/2024    ALT 40 (H) 07/09/2024       Lab Results   Component Value Date    TRIG 209 (H) 04/11/2024    CHOL 160 04/11/2024    HDL 45 04/11/2024    LDLCALC 73.2 04/11/2024    LDLCALC 66.0 01/30/2024       Lab Results   Component Value Date    HGBA1C 7.3 (H) 04/26/2024    HGBA1C 7.3 (H) 12/05/2023    TSH 2.199 12/05/2023    FREET4 1.20 04/04/2016       Lab Results   Component Value Date    NTPROBNP 67 04/11/2024    NTPROBNP 252 10/25/2023    NTPROBNP 153 02/03/2023                 ASSESSMENT & PLAN:  1. Acquired dilation of ascending aorta and aortic root  CTA Chest Aorta Non Coronary      2. Coronary artery disease involving native coronary artery of native heart without angina pectoris        3. Primary hypertension        4. Chronic heart failure with preserved ejection fraction (HFpEF)        5. Severe obesity (BMI 35.0-39.9) with comorbidity                  This is a 68 y.o. pleasant  female with type 2 diabetes on insulin, post-capillary pulmonary hypertension, FRANCISCO, hypertension, mild CAD, ascending aortic ectasia and anxiety.      Continue amlodipine 5 b.i.d., atenolol 100 q.d. (did not tolerate carvedilol)., olmesartan 40 q.d.  Continue low-dose aspirin   Evolocumab may not be affordable to her even despite patient assistance.  She will look into it and get back to us.  When she was on low-dose rosuvastatin in April her LDL was 73 so we may be able to get away with a moderate intensity statin plus-minus ezetimibe  Continue furosemide  20-40 mg once daily as needed  CTA chest in November - if stable then annually  Emphasized the importance of modifying lifestyle related risk factors including weight loss, limiting alcohol intake, exercise, diet most resembling a Mediterranean diet.    I appreciate the opportunity to participate in Bing Box Som 's care today.  Please follow up with me in 6 months.      Annie Henson MD, FACC  Interventional Cardiology/Structural Heart Disease  [facility]  Office: [phone]            Parts of this note were completed using voice recognition software. Please excuse any misspellings or syntax errors and reach out to me with questions.

## 2024-09-19 NOTE — PATIENT INSTRUCTIONS
Look into Repatha again. If not reasonable cost then will recheck a lipid panel and go with a lower intensity statin  No medication changes  CT of the aorta in November  Return in 6 months

## 2024-09-23 DIAGNOSIS — E11.9 TYPE 2 DIABETES MELLITUS WITHOUT COMPLICATION, WITHOUT LONG-TERM CURRENT USE OF INSULIN: ICD-10-CM

## 2024-09-23 RX ORDER — PIOGLITAZONEHYDROCHLORIDE 15 MG/1
15 TABLET ORAL
Qty: 90 TABLET | Refills: 3 | Status: SHIPPED | OUTPATIENT
Start: 2024-09-23

## 2024-09-26 RX ORDER — INSULIN ASPART 100 [IU]/ML
INJECTION, SOLUTION INTRAVENOUS; SUBCUTANEOUS
Qty: 15 EACH | Refills: 0 | Status: SHIPPED | OUTPATIENT
Start: 2024-09-26

## 2024-10-01 ENCOUNTER — TELEPHONE (OUTPATIENT)
Dept: CARDIOLOGY | Facility: CLINIC | Age: 69
End: 2024-10-01
Payer: MEDICARE

## 2024-10-01 NOTE — TELEPHONE ENCOUNTER
----- Message from Radha sent at 10/1/2024 11:51 AM CDT -----  Contact: Abbi [phone]  Type:  Pharmacy Calling to Clarify an RX    Name of Caller:  Abbi   [facility]   Prescription Name:  carvediloL   What do they need to clarify?:  Pt requesting refill for this but atenoloL was sent in by Dr Natividad Bey Call Back Number:  [phone] in voice [phone]  Additional Information:  Pls call back and advise

## 2024-10-03 ENCOUNTER — PATIENT MESSAGE (OUTPATIENT)
Dept: FAMILY MEDICINE | Facility: CLINIC | Age: 69
End: 2024-10-03
Payer: MEDICARE

## 2024-10-06 DIAGNOSIS — H65.92 MIDDLE EAR EFFUSION, LEFT: ICD-10-CM

## 2024-10-07 RX ORDER — LEVOCETIRIZINE DIHYDROCHLORIDE 5 MG/1
5 TABLET, FILM COATED ORAL NIGHTLY
Qty: 90 TABLET | Refills: 3 | Status: SHIPPED | OUTPATIENT
Start: 2024-10-07 | End: 2025-10-02

## 2024-10-07 NOTE — TELEPHONE ENCOUNTER
Refill Routing Note   Medication(s) are not appropriate for processing by [facility] for the following reason(s):        No active prescription written by provider  New or recently adjusted medication  Clarification of medication (Rx) details    [facility] action(s):  Defer               Appointments  past 12m or future 3m with PCP    Date Provider   Last Visit   2/16/2024 Gabi Case MD   Next Visit   Visit date not found Gabi Case MD   ED visits in past 90 days: 0        Note composed:12:40 PM 10/07/2024

## 2024-10-08 DIAGNOSIS — H65.92 MIDDLE EAR EFFUSION, LEFT: ICD-10-CM

## 2024-10-08 RX ORDER — LEVOCETIRIZINE DIHYDROCHLORIDE 5 MG/1
5 TABLET, FILM COATED ORAL NIGHTLY
Qty: 90 TABLET | Refills: 0 | OUTPATIENT
Start: 2024-10-08

## 2024-10-08 NOTE — TELEPHONE ENCOUNTER
Refill Decision Note   Bing Som  is requesting a refill authorization.  Brief Assessment and Rationale for Refill:  Quick Discontinue     Medication Therapy Plan:  E-Prescribing Status: Receipt confirmed by [facility] (10/7/2024)      Comments:     Note composed:5:48 PM 10/08/2024

## 2024-10-09 DIAGNOSIS — H65.92 MIDDLE EAR EFFUSION, LEFT: ICD-10-CM

## 2024-10-09 DIAGNOSIS — E78.5 HYPERLIPIDEMIA, UNSPECIFIED HYPERLIPIDEMIA TYPE: Primary | ICD-10-CM

## 2024-10-10 DIAGNOSIS — E11.9 TYPE 2 DIABETES MELLITUS WITHOUT COMPLICATION, WITHOUT LONG-TERM CURRENT USE OF INSULIN: ICD-10-CM

## 2024-10-10 DIAGNOSIS — N18.31 TYPE 2 DIABETES MELLITUS WITH STAGE 3A CHRONIC KIDNEY DISEASE, WITH LONG-TERM CURRENT USE OF INSULIN: ICD-10-CM

## 2024-10-10 DIAGNOSIS — E11.22 TYPE 2 DIABETES MELLITUS WITH STAGE 3A CHRONIC KIDNEY DISEASE, WITH LONG-TERM CURRENT USE OF INSULIN: ICD-10-CM

## 2024-10-10 DIAGNOSIS — Z79.4 TYPE 2 DIABETES MELLITUS WITH STAGE 3A CHRONIC KIDNEY DISEASE, WITH LONG-TERM CURRENT USE OF INSULIN: ICD-10-CM

## 2024-10-10 RX ORDER — BLOOD SUGAR DIAGNOSTIC
STRIP MISCELLANEOUS
Qty: 400 EACH | Refills: 0 | Status: SHIPPED | OUTPATIENT
Start: 2024-10-10

## 2024-10-10 RX ORDER — LEVOCETIRIZINE DIHYDROCHLORIDE 5 MG/1
5 TABLET, FILM COATED ORAL NIGHTLY
Qty: 90 TABLET | Refills: 0 | OUTPATIENT
Start: 2024-10-10

## 2024-10-10 RX ORDER — SEMAGLUTIDE 1.34 MG/ML
1 INJECTION, SOLUTION SUBCUTANEOUS
Qty: 4 EACH | Refills: 0 | Status: SHIPPED | OUTPATIENT
Start: 2024-10-10 | End: 2025-10-10

## 2024-10-11 NOTE — TELEPHONE ENCOUNTER
Refill Decision Note   Bing Kearns  is requesting a refill authorization.  Brief Assessment and Rationale for Refill:  Quick Discontinue     Medication Therapy Plan:         Comments:     Note composed:10:19 PM 10/10/2024

## 2024-10-23 ENCOUNTER — PATIENT MESSAGE (OUTPATIENT)
Dept: ADMINISTRATIVE | Facility: HOSPITAL | Age: 69
End: 2024-10-23
Payer: MEDICARE

## 2024-10-29 ENCOUNTER — PATIENT OUTREACH (OUTPATIENT)
Dept: ADMINISTRATIVE | Facility: HOSPITAL | Age: 69
End: 2024-10-29
Payer: MEDICARE

## 2024-11-04 DIAGNOSIS — I10 PRIMARY HYPERTENSION: Primary | ICD-10-CM

## 2024-11-04 RX ORDER — AMLODIPINE BESYLATE 5 MG/1
5 TABLET ORAL 2 TIMES DAILY
Qty: 180 TABLET | Refills: 3 | Status: SHIPPED | OUTPATIENT
Start: 2024-11-04

## 2024-11-06 ENCOUNTER — PATIENT MESSAGE (OUTPATIENT)
Dept: ENDOCRINOLOGY | Facility: CLINIC | Age: 69
End: 2024-11-06
Payer: MEDICARE

## 2024-11-07 ENCOUNTER — LAB VISIT (OUTPATIENT)
Dept: LAB | Facility: HOSPITAL | Age: 69
End: 2024-11-07
Attending: INTERNAL MEDICINE
Payer: MEDICARE

## 2024-11-07 DIAGNOSIS — E11.22 TYPE 2 DIABETES MELLITUS WITH STAGE 3A CHRONIC KIDNEY DISEASE, WITH LONG-TERM CURRENT USE OF INSULIN: ICD-10-CM

## 2024-11-07 DIAGNOSIS — E78.5 HYPERLIPIDEMIA, UNSPECIFIED HYPERLIPIDEMIA TYPE: ICD-10-CM

## 2024-11-07 DIAGNOSIS — Z79.4 TYPE 2 DIABETES MELLITUS WITH STAGE 3A CHRONIC KIDNEY DISEASE, WITH LONG-TERM CURRENT USE OF INSULIN: ICD-10-CM

## 2024-11-07 DIAGNOSIS — N18.31 TYPE 2 DIABETES MELLITUS WITH STAGE 3A CHRONIC KIDNEY DISEASE, WITH LONG-TERM CURRENT USE OF INSULIN: ICD-10-CM

## 2024-11-07 DIAGNOSIS — C18.3 MALIGNANT NEOPLASM OF HEPATIC FLEXURE: ICD-10-CM

## 2024-11-07 DIAGNOSIS — E03.4 HYPOTHYROIDISM DUE TO ACQUIRED ATROPHY OF THYROID: ICD-10-CM

## 2024-11-07 LAB
ALBUMIN SERPL BCP-MCNC: 3.8 G/DL (ref 3.5–5.2)
ALP SERPL-CCNC: 87 U/L (ref 40–150)
ALT SERPL W/O P-5'-P-CCNC: 25 U/L (ref 10–44)
ANION GAP SERPL CALC-SCNC: 13 MMOL/L (ref 8–16)
ANION GAP SERPL CALC-SCNC: 13 MMOL/L (ref 8–16)
AST SERPL-CCNC: 41 U/L (ref 10–40)
BASOPHILS # BLD AUTO: 0.13 K/UL (ref 0–0.2)
BASOPHILS NFR BLD: 1.4 % (ref 0–1.9)
BILIRUB SERPL-MCNC: 0.4 MG/DL (ref 0.1–1)
BUN SERPL-MCNC: 12 MG/DL (ref 8–23)
BUN SERPL-MCNC: 13 MG/DL (ref 8–23)
CALCIUM SERPL-MCNC: 10.3 MG/DL (ref 8.7–10.5)
CALCIUM SERPL-MCNC: 10.6 MG/DL (ref 8.7–10.5)
CEA SERPL-MCNC: 3.2 NG/ML (ref 0–5)
CHLORIDE SERPL-SCNC: 100 MMOL/L (ref 95–110)
CHLORIDE SERPL-SCNC: 101 MMOL/L (ref 95–110)
CHOLEST SERPL-MCNC: 162 MG/DL (ref 120–199)
CHOLEST/HDLC SERPL: 4.1 {RATIO} (ref 2–5)
CO2 SERPL-SCNC: 23 MMOL/L (ref 23–29)
CO2 SERPL-SCNC: 23 MMOL/L (ref 23–29)
CREAT SERPL-MCNC: 1.1 MG/DL (ref 0.5–1.4)
CREAT SERPL-MCNC: 1.2 MG/DL (ref 0.5–1.4)
DIFFERENTIAL METHOD BLD: ABNORMAL
EOSINOPHIL # BLD AUTO: 0.2 K/UL (ref 0–0.5)
EOSINOPHIL NFR BLD: 2.2 % (ref 0–8)
ERYTHROCYTE [DISTWIDTH] IN BLOOD BY AUTOMATED COUNT: 13.2 % (ref 11.5–14.5)
EST. GFR  (NO RACE VARIABLE): 48.7 ML/MIN/1.73 M^2
EST. GFR  (NO RACE VARIABLE): 54.1 ML/MIN/1.73 M^2
ESTIMATED AVG GLUCOSE: 189 MG/DL (ref 68–131)
GLUCOSE SERPL-MCNC: 169 MG/DL (ref 70–110)
GLUCOSE SERPL-MCNC: 173 MG/DL (ref 70–110)
HBA1C MFR BLD: 8.2 % (ref 4–5.6)
HCT VFR BLD AUTO: 36 % (ref 37–48.5)
HDLC SERPL-MCNC: 40 MG/DL (ref 40–75)
HDLC SERPL: 24.7 % (ref 20–50)
HGB BLD-MCNC: 11.6 G/DL (ref 12–16)
IMM GRANULOCYTES # BLD AUTO: 0.07 K/UL (ref 0–0.04)
IMM GRANULOCYTES NFR BLD AUTO: 0.8 % (ref 0–0.5)
LDLC SERPL CALC-MCNC: 83.8 MG/DL (ref 63–159)
LYMPHOCYTES # BLD AUTO: 2 K/UL (ref 1–4.8)
LYMPHOCYTES NFR BLD: 22.5 % (ref 18–48)
MCH RBC QN AUTO: 29.5 PG (ref 27–31)
MCHC RBC AUTO-ENTMCNC: 32.2 G/DL (ref 32–36)
MCV RBC AUTO: 92 FL (ref 82–98)
MONOCYTES # BLD AUTO: 0.9 K/UL (ref 0.3–1)
MONOCYTES NFR BLD: 10.3 % (ref 4–15)
NEUTROPHILS # BLD AUTO: 5.7 K/UL (ref 1.8–7.7)
NEUTROPHILS NFR BLD: 62.8 % (ref 38–73)
NONHDLC SERPL-MCNC: 122 MG/DL
NRBC BLD-RTO: 0 /100 WBC
PLATELET # BLD AUTO: 328 K/UL (ref 150–450)
PMV BLD AUTO: 9.6 FL (ref 9.2–12.9)
POTASSIUM SERPL-SCNC: 4.3 MMOL/L (ref 3.5–5.1)
POTASSIUM SERPL-SCNC: 4.3 MMOL/L (ref 3.5–5.1)
PROT SERPL-MCNC: 7.7 G/DL (ref 6–8.4)
RBC # BLD AUTO: 3.93 M/UL (ref 4–5.4)
SODIUM SERPL-SCNC: 136 MMOL/L (ref 136–145)
SODIUM SERPL-SCNC: 137 MMOL/L (ref 136–145)
TRIGL SERPL-MCNC: 191 MG/DL (ref 30–150)
TSH SERPL DL<=0.005 MIU/L-ACNC: 2.8 UIU/ML (ref 0.4–4)
WBC # BLD AUTO: 9.02 K/UL (ref 3.9–12.7)

## 2024-11-07 PROCEDURE — 80053 COMPREHEN METABOLIC PANEL: CPT | Performed by: INTERNAL MEDICINE

## 2024-11-07 PROCEDURE — 36415 COLL VENOUS BLD VENIPUNCTURE: CPT | Mod: PN | Performed by: NURSE PRACTITIONER

## 2024-11-07 PROCEDURE — 85025 COMPLETE CBC W/AUTO DIFF WBC: CPT | Performed by: INTERNAL MEDICINE

## 2024-11-07 PROCEDURE — 84443 ASSAY THYROID STIM HORMONE: CPT | Performed by: NURSE PRACTITIONER

## 2024-11-07 PROCEDURE — 83036 HEMOGLOBIN GLYCOSYLATED A1C: CPT | Performed by: NURSE PRACTITIONER

## 2024-11-07 PROCEDURE — 80061 LIPID PANEL: CPT | Performed by: INTERNAL MEDICINE

## 2024-11-07 PROCEDURE — 80048 BASIC METABOLIC PNL TOTAL CA: CPT | Performed by: NURSE PRACTITIONER

## 2024-11-07 PROCEDURE — 82378 CARCINOEMBRYONIC ANTIGEN: CPT | Performed by: INTERNAL MEDICINE

## 2024-11-14 ENCOUNTER — TELEPHONE (OUTPATIENT)
Dept: ENDOCRINOLOGY | Facility: CLINIC | Age: 69
End: 2024-11-14

## 2024-11-14 ENCOUNTER — OFFICE VISIT (OUTPATIENT)
Dept: ENDOCRINOLOGY | Facility: CLINIC | Age: 69
End: 2024-11-14
Payer: MEDICARE

## 2024-11-14 VITALS
BODY MASS INDEX: 34.77 KG/M2 | HEIGHT: 65 IN | SYSTOLIC BLOOD PRESSURE: 120 MMHG | HEART RATE: 85 BPM | DIASTOLIC BLOOD PRESSURE: 74 MMHG | WEIGHT: 208.69 LBS

## 2024-11-14 DIAGNOSIS — I51.89 DIASTOLIC DYSFUNCTION: ICD-10-CM

## 2024-11-14 DIAGNOSIS — Z79.4 TYPE 2 DIABETES MELLITUS WITH DIABETIC NEUROPATHY, WITH LONG-TERM CURRENT USE OF INSULIN: ICD-10-CM

## 2024-11-14 DIAGNOSIS — I10 ESSENTIAL HYPERTENSION: ICD-10-CM

## 2024-11-14 DIAGNOSIS — Z79.4 TYPE 2 DIABETES MELLITUS WITH STAGE 3A CHRONIC KIDNEY DISEASE, WITH LONG-TERM CURRENT USE OF INSULIN: Primary | ICD-10-CM

## 2024-11-14 DIAGNOSIS — K76.0 FATTY LIVER: ICD-10-CM

## 2024-11-14 DIAGNOSIS — N18.31 TYPE 2 DIABETES MELLITUS WITH STAGE 3A CHRONIC KIDNEY DISEASE, WITH LONG-TERM CURRENT USE OF INSULIN: Primary | ICD-10-CM

## 2024-11-14 DIAGNOSIS — E78.5 HYPERLIPIDEMIA, UNSPECIFIED HYPERLIPIDEMIA TYPE: ICD-10-CM

## 2024-11-14 DIAGNOSIS — E11.40 TYPE 2 DIABETES MELLITUS WITH DIABETIC NEUROPATHY, WITH LONG-TERM CURRENT USE OF INSULIN: ICD-10-CM

## 2024-11-14 DIAGNOSIS — E11.22 TYPE 2 DIABETES MELLITUS WITH STAGE 3A CHRONIC KIDNEY DISEASE, WITH LONG-TERM CURRENT USE OF INSULIN: Primary | ICD-10-CM

## 2024-11-14 DIAGNOSIS — E03.4 HYPOTHYROIDISM DUE TO ACQUIRED ATROPHY OF THYROID: ICD-10-CM

## 2024-11-14 PROCEDURE — 1159F MED LIST DOCD IN RCRD: CPT | Mod: CPTII,S$GLB,, | Performed by: NURSE PRACTITIONER

## 2024-11-14 PROCEDURE — 3074F SYST BP LT 130 MM HG: CPT | Mod: CPTII,S$GLB,, | Performed by: NURSE PRACTITIONER

## 2024-11-14 PROCEDURE — 1126F AMNT PAIN NOTED NONE PRSNT: CPT | Mod: CPTII,S$GLB,, | Performed by: NURSE PRACTITIONER

## 2024-11-14 PROCEDURE — 2023F DILAT RTA XM W/O RTNOPTHY: CPT | Mod: CPTII,S$GLB,, | Performed by: NURSE PRACTITIONER

## 2024-11-14 PROCEDURE — 3008F BODY MASS INDEX DOCD: CPT | Mod: CPTII,S$GLB,, | Performed by: NURSE PRACTITIONER

## 2024-11-14 PROCEDURE — 1160F RVW MEDS BY RX/DR IN RCRD: CPT | Mod: CPTII,S$GLB,, | Performed by: NURSE PRACTITIONER

## 2024-11-14 PROCEDURE — 3066F NEPHROPATHY DOC TX: CPT | Mod: CPTII,S$GLB,, | Performed by: NURSE PRACTITIONER

## 2024-11-14 PROCEDURE — 3052F HG A1C>EQUAL 8.0%<EQUAL 9.0%: CPT | Mod: CPTII,S$GLB,, | Performed by: NURSE PRACTITIONER

## 2024-11-14 PROCEDURE — 4010F ACE/ARB THERAPY RXD/TAKEN: CPT | Mod: CPTII,S$GLB,, | Performed by: NURSE PRACTITIONER

## 2024-11-14 PROCEDURE — 3061F NEG MICROALBUMINURIA REV: CPT | Mod: CPTII,S$GLB,, | Performed by: NURSE PRACTITIONER

## 2024-11-14 PROCEDURE — G2211 COMPLEX E/M VISIT ADD ON: HCPCS | Mod: S$GLB,,, | Performed by: NURSE PRACTITIONER

## 2024-11-14 PROCEDURE — 1101F PT FALLS ASSESS-DOCD LE1/YR: CPT | Mod: CPTII,S$GLB,, | Performed by: NURSE PRACTITIONER

## 2024-11-14 PROCEDURE — 3078F DIAST BP <80 MM HG: CPT | Mod: CPTII,S$GLB,, | Performed by: NURSE PRACTITIONER

## 2024-11-14 PROCEDURE — 99999 PR PBB SHADOW E&M-EST. PATIENT-LVL V: CPT | Mod: PBBFAC,,, | Performed by: NURSE PRACTITIONER

## 2024-11-14 PROCEDURE — 3288F FALL RISK ASSESSMENT DOCD: CPT | Mod: CPTII,S$GLB,, | Performed by: NURSE PRACTITIONER

## 2024-11-14 PROCEDURE — 99214 OFFICE O/P EST MOD 30 MIN: CPT | Mod: S$GLB,,, | Performed by: NURSE PRACTITIONER

## 2024-11-14 RX ORDER — GABAPENTIN 300 MG/1
300 CAPSULE ORAL 3 TIMES DAILY
Qty: 270 CAPSULE | Refills: 3 | Status: SHIPPED | OUTPATIENT
Start: 2024-11-14

## 2024-11-14 RX ORDER — INSULIN ASPART 100 [IU]/ML
10 INJECTION, SOLUTION INTRAVENOUS; SUBCUTANEOUS
Start: 2024-11-14

## 2024-11-14 NOTE — PROGRESS NOTES
CC: Ms. Bing Kearns arrives today for management of Type 2 DM and review of chronic medical conditions, as listed in the Visit Diagnosis section of this encounter.       HPI: Ms. Bing Kearns was diagnosed with Type 2 DM in her late 50s. She was diagnosed based on lab work. Initial treatment consisted of metformin. Januvia was added in . Toujeo was added in 2020. + FH of DM in sister, maternal GF. Denies hospitalizations due to DM.   Follows with Dr. Vanessa for CKD.   Follows with Dr. Henson.     Patient was last seen by me in July.     She had steroid injection in wrist ~ 2 weeks ago.     Patient reports nausea, constipation, diarrhea that she attributes to Ozempic.     She received Dexcom G7 and is ready to schedule training.     BG readings are checked 2x/day.  She reads the following from her log:  Fastin-180  Dinner: 200-250    Hypoglycemia: Rare   Hypoglycemic Symptoms: hunger and jitteriness  Hypoglycemia Treatment: juice or glucose tabs    Missing Insulin/PO medication doses: No    Dietary Habits: Eats 3 meals/day. Occasional snack. Avoids sugary beverages.     Last DM education appointment: not recently        CURRENT DIABETIC MEDS: metformin XR 1000 mg daily, Ozempic 1 mg weekly, Toujeo 40 units QHS, Novolog sliding scale, target 150, ISF 25 (She receives Ozempic through Patient Assistance)  Glucometer type: One Touch Ultra 2    Previous DM treatments:  Trulicity - panic attacks, blood sugars in the 70s  Januvia - $$  Prandin - discontinued since starting Ozempic  Glimepiride - hypoglycemia   Prandin  Pioglitazone     Last Eye Exam: 2024, no DR. + glaucoma. Dr. Turcios.   Last Podiatry Exam: 2024,  Dr. Helms.     REVIEW OF SYSTEMS  Constitutional: no c/o fatigue, weakness, weight loss  Cardiac: no palpitations. + brief intermittent episodes of substernal chest pain. These are self limited. Reports Dr. Henson is aware.   Respiratory: + dry cough. + chronic dyspnea. Follows  "with Pulmonology for pulmonary HTN.   GI: no c/o abdominal pain. Denies h/o pancreatitis. + nausea. + constipation, + chronic diarrhea that she attributes to IBS.  Neuro: + numbness, tingling in feet.   Endocrine: denies polyphagia, polydipsia, polyuria       Personally reviewed Past Medical, Surgical, Social History.    Vital Signs  /74   Pulse 85   Ht 5' 5" (1.651 m)   Wt 94.7 kg (208 lb 10.7 oz)   LMP  (LMP Unknown)   BMI 34.72 kg/m²      Personally reviewed the below labs:    Hemoglobin A1C   Date Value Ref Range Status   11/07/2024 8.2 (H) 4.0 - 5.6 % Final     Comment:     ADA Screening Guidelines:  5.7-6.4%  Consistent with prediabetes  >or=6.5%  Consistent with diabetes    High levels of fetal hemoglobin interfere with the HbA1C  assay. Heterozygous hemoglobin variants (HbS, HgC, etc)do  not significantly interfere with this assay.   However, presence of multiple variants may affect accuracy.     04/26/2024 7.3 (H) 0.0 - 5.6 % Final     Comment:     Reference Interval:  5.0 - 5.6 Normal   5.7 - 6.4 High Risk   > 6.5 Diabetic      Hgb A1c results are standardized based on the (NGSP) National   Glycohemoglobin Standardization Program.      Hemoglobin A1C levels are related to mean serum/plasma glucose   during the preceding 2-3 months.        12/05/2023 7.3 (H) 4.0 - 5.6 % Final     Comment:     ADA Screening Guidelines:  5.7-6.4%  Consistent with prediabetes  >or=6.5%  Consistent with diabetes    High levels of fetal hemoglobin interfere with the HbA1C  assay. Heterozygous hemoglobin variants (HbS, HgC, etc)do  not significantly interfere with this assay.   However, presence of multiple variants may affect accuracy.         Chemistry        Component Value Date/Time     11/07/2024 0915     11/07/2024 0915    K 4.3 11/07/2024 0915    K 4.3 11/07/2024 0915     11/07/2024 0915     11/07/2024 0915    CO2 23 11/07/2024 0915    CO2 23 11/07/2024 0915    BUN 12 11/07/2024 0915    " BUN 13 11/07/2024 0915    CREATININE 1.1 11/07/2024 0915    CREATININE 1.2 11/07/2024 0915     (H) 11/07/2024 0915     (H) 11/07/2024 0915        Component Value Date/Time    CALCIUM 10.3 11/07/2024 0915    CALCIUM 10.6 (H) 11/07/2024 0915    ALKPHOS 87 11/07/2024 0915    AST 41 (H) 11/07/2024 0915    ALT 25 11/07/2024 0915    BILITOT 0.4 11/07/2024 0915    ESTGFRAFRICA >60.0 05/20/2022 0935    EGFRNONAA 52.1 (A) 05/20/2022 0935          Lab Results   Component Value Date    CHOL 162 11/07/2024    CHOL 160 04/11/2024    CHOL 155 01/30/2024     Lab Results   Component Value Date    HDL 40 11/07/2024    HDL 45 04/11/2024    HDL 44 01/30/2024     Lab Results   Component Value Date    LDLCALC 83.8 11/07/2024    LDLCALC 73.2 04/11/2024    LDLCALC 66.0 01/30/2024     Lab Results   Component Value Date    TRIG 191 (H) 11/07/2024    TRIG 209 (H) 04/11/2024    TRIG 225 (H) 01/30/2024     Lab Results   Component Value Date    CHOLHDL 24.7 11/07/2024    CHOLHDL 28.1 04/11/2024    CHOLHDL 28.4 01/30/2024       Lab Results   Component Value Date    MICALBCREAT Unable to calculate 08/21/2024     Lab Results   Component Value Date    TSH 2.802 11/07/2024       CrCl cannot be calculated (Patient's most recent lab result is older than the maximum 7 days allowed.).    Vit D, 25-Hydroxy   Date Value Ref Range Status   08/21/2024 78 30 - 96 ng/mL Final     Comment:     Vitamin D deficiency.........<10 ng/mL                              Vitamin D insufficiency......10-29 ng/mL       Vitamin D sufficiency........> or equal to 30 ng/mL  Vitamin D toxicity............>100 ng/mL           PHYSICAL EXAMINATION  Constitutional: Appears well, no distress  Respiratory: CTA, even and unlabored.  Cardiovascular: RRR, no murmurs, no carotid bruits.   GI: active bowel sounds, no hernia noted.  Skin: warm and dry; no visible wounds  Neuro: oriented to person, place, time  Feet: appropriate footwear.     Goals        HEMOGLOBIN A1C <  7.5                 Assessment/Plan  1. Type 2 diabetes mellitus with stage 3a chronic kidney disease, with long-term current use of insulin -- A1c has increased. Cannot tolerate Ozempic, which is the only GLP-1RA with a PAP. Will add base dose of Novolog.   -- stop Ozempic  -- give Novolog 10 units before meals + correction scale.   -- continue Toujeo, metformin XR  -- DM education for Dexcom G7 start     -- Discussed diagnosis of DM, A1c goals, progression of disease, long term complications and tx options.  -- takes aspirin, ARB, statin   2. Type 2 diabetes mellitus with diabetic neuropathy, with long-term current use of insulin -- optimize DM control   3. Essential hypertension  -- managed by cardiology  -- continue current meds and monitor   4. Hyperlipidemia, unspecified hyperlipidemia type  -- uncontrolled with elevated TRG  -- continue Crestor   5. Hypothyroidism due to acquired atrophy of thyroid  -- stable  -- continue current levothyroxine dose.    6. Diastolic dysfunction  -- following with cardiology.    7. Fatty liver  -- maintain DM control       FOLLOW UP  Follow up in about 3 months (around 2/14/2025).  Patient instructed to bring BG logs to each follow up   Patient encouraged to call for any BG/medication issues, concerns, or questions.      Orders Placed This Encounter   Procedures    Hemoglobin A1C    Comprehensive Metabolic Panel    Ambulatory referral/consult to Diabetes Education

## 2024-11-14 NOTE — TELEPHONE ENCOUNTER
Irene pt cannot tolerate Ozempic. Please address with Karel PAP the following:    Discontinue Ozempic.     Increase Novolog to 10 units three times daily with meals + sliding scale, max TDD 60    Thank you!

## 2024-11-14 NOTE — PATIENT INSTRUCTIONS
Stop Ozempic.    Increase Novolog to 10 units before each meal + sliding scale. If eating a low carb meal, reduce dose by half to 5 units.     Continue Toujeo and metformin XR.

## 2024-11-18 ENCOUNTER — HOSPITAL ENCOUNTER (OUTPATIENT)
Dept: RADIOLOGY | Facility: HOSPITAL | Age: 69
Discharge: HOME | End: 2024-11-18
Attending: INTERNAL MEDICINE
Payer: MEDICARE

## 2024-11-18 ENCOUNTER — NUTRITION (OUTPATIENT)
Dept: DIABETES | Facility: CLINIC | Age: 69
End: 2024-11-18
Payer: MEDICARE

## 2024-11-18 DIAGNOSIS — Z79.4 TYPE 2 DIABETES MELLITUS WITH STAGE 3A CHRONIC KIDNEY DISEASE, WITH LONG-TERM CURRENT USE OF INSULIN: ICD-10-CM

## 2024-11-18 DIAGNOSIS — E11.22 TYPE 2 DIABETES MELLITUS WITH STAGE 3A CHRONIC KIDNEY DISEASE, WITH LONG-TERM CURRENT USE OF INSULIN: ICD-10-CM

## 2024-11-18 DIAGNOSIS — N18.31 TYPE 2 DIABETES MELLITUS WITH STAGE 3A CHRONIC KIDNEY DISEASE, WITH LONG-TERM CURRENT USE OF INSULIN: ICD-10-CM

## 2024-11-18 DIAGNOSIS — I77.810 ACQUIRED DILATION OF ASCENDING AORTA AND AORTIC ROOT: ICD-10-CM

## 2024-11-18 PROCEDURE — 71275 CT ANGIOGRAPHY CHEST: CPT | Mod: TC,PO

## 2024-11-18 PROCEDURE — 71275 CT ANGIOGRAPHY CHEST: CPT | Mod: 26,,, | Performed by: RADIOLOGY

## 2024-11-18 PROCEDURE — 99999 PR PBB SHADOW E&M-EST. PATIENT-LVL II: CPT | Mod: PBBFAC,,, | Performed by: DIETITIAN, REGISTERED

## 2024-11-18 PROCEDURE — 25500020 PHARM REV CODE 255: Mod: PO | Performed by: INTERNAL MEDICINE

## 2024-11-18 PROCEDURE — G0108 DIAB MANAGE TRN  PER INDIV: HCPCS | Mod: S$GLB,,, | Performed by: DIETITIAN, REGISTERED

## 2024-11-18 RX ADMIN — IOHEXOL 100 ML: 350 INJECTION, SOLUTION INTRAVENOUS at 11:11

## 2024-11-19 VITALS — HEIGHT: 65 IN | WEIGHT: 208.75 LBS | BODY MASS INDEX: 34.78 KG/M2

## 2024-11-19 NOTE — PROGRESS NOTES
"Diabetes Care Specialist Progress Note  Author: Christine Hayes RD, CDE  Date: 11/19/2024    Intake    Program Intake  Reason for Diabetes Program Visit:: Intervention- Patient being referred to get her new Dexcom G7 cgm set up  Type of Intervention:: Individual  Individual: Device Training  Device Training: Personal CGM  Current diabetes risk level:: moderate  In the last month, have you used the ER or been admitted to the hospital: Yes  Was the ER or hospital admission related to diabetes?: No    Current Diabetes Treatment: Insulin, Oral Medications (recently stopped Ozempic  Oral Medication Type/Dose:  Metformin  Method of insulin delivery?: Injections  Injection Type: Pens  Pen Type/Dose:  Toujeo 40 units daily, Novolog 10-10-10 plus ss)    Continuous Glucose Monitoring  Patient has CGM: No (will be starting on Dexcom G7 today)    Lab Results   Component Value Date    HGBA1C 8.2 (H) 11/07/2024     Weight: 94.7 kg (208 lb 12.4 oz)   Height: 5' 5" (165.1 cm)   Body mass index is 34.74 kg/m².    Diabetes Self-Management Skills Assessment    Medication Skills Assessment  Patient is able to identify current diabetes medications, dosages, and appropriate timing of medications.: yes  Patient reports problems or concerns with current medication regimen.: no  Patient is  aware that some diabetes medications can cause low blood sugar?: Yes  Medication Skills Assessment Completed:: Yes  Assessment indicates:: Adequate understanding  Area of need?: No    Diabetes Disease Process/Treatment Options  Diabetes Type?: Type II  When were you diagnosed?:  approx 2010- pt stated  What are your goals for this education session?:  get new Dexcom cgm set up  Is patient aware of what causes diabetes?: Yes  Does patient understand the pathophysiology of diabetes?: Yes  Diabetes Disease Process/Treatment Options: Skills Assessment Completed: Yes  Assessment indicates:: Adequate understanding  Area of need?: No    Home Blood Glucose " Monitoring  Patient states that blood sugar is checked at home daily.: yes  Monitoring Method:: home glucometer  Fasting BG range history::  (patient states she runs high- no logs to review today)  How often do you check your blood sugar?:  (1-2 times daily)  What is your A1c Target?:  (<7%)  Home Blood Glucose Monitoring Skills Assessment Completed: : Yes  Assessment indicates:: Instruction Needed  Area of need?: Yes    Assessment Summary and Plan    Based on today's diabetes care assessment, the following areas of need were identified:      Identified Areas of Need      Medication/Current Diabetes Treatment: No   Lifestyle Coping/Support:     Diabetes Disease Process/Treatment Options: No   Nutrition/Healthy Eating:      Physical Activity/Exercise:      Home Blood Glucose Monitoring: Yes - see care plan   Acute Complications:      Chronic Complications:       Today's interventions were provided through individual discussion, instruction, and written materials were provided.      Patient verbalized understanding of instruction and written materials.  Pt was able to return back demonstration of instructions today. Patient understood key points, needs reinforcement and further instruction.     Diabetes Self-Management Care Plan:    Today's Diabetes Self-Management Care Plan was developed with Bing's input. Bing has agreed to work toward the following goal(s) to improve his/her overall diabetes control.      Care Plan: Diabetes Management   Updates made since 11/20/2023 12:00 AM        Problem: Blood Glucose Self-Monitoring         Long-Range Goal: Patient will transition to Dexcom G7    Start Date: 11/18/2024   Priority: High   Barriers: No Barriers Identified      PLACEMENT OF DEXCOM G7 PERSONAL CONTINOUS GLUCOSE MONITORING SYSTEM (CGMS)     Explained to patient the difference between sensor glucose and blood (capillary) glucose and how these 2 readings may not be the same.     Patient is here in clinic today  "for placement of personal continuous glucose monitoring system (CGMS).   Patient has Dexcom G7 , Sensors, & Transmitter. Patient will use her android phone to obtain continuous glucose readings.  Patient verbalizes understanding to change sensor every 10 days. She is also aware that each time a new sensor is placed there is a 30 min warm up period. No calibration is necessary however patient can calibrate if she desires.  How ever, if patient calibrates Dexcom sensor, it is recommended to only calibrate once during the 10-day sensor wear as sensor is factory calibrated.  Patient understands to avoid calibration when glucose levels changing rapidly.      Discussed Dexcom G7 supplies with patient--company/pharmacy to reorder items and who to call (Dexcom technical support) if a sensor/transmitter fails.  If using Dexcom G7 mobile phone shazia, patient educated to leave shazia running in the background (do not swipe off completely) to prevent gaps in CGMS tracings.       A detailed explanation of Dexcom G7 Continuous Glucose Monitoring System was provided. Reviewed the Dexcom "Getting Started Guide" with patient and she has a written copy for home use.    Patient was allowed to practice, and time allowed to ask questions. Patient will notify Endocrinology if glucose levels are above 250 mg/dL consistently or if episode of glucose less than 70 mg/dL presents.  Patient verbalizes understanding.         High alert set to OFF  Low alert set at 70 mg/dL  High rising alert: off  Low dropping alert: off  Signal Loss: on- 20 min    Instructed patient on how to enter insulin doses using Dexcom G7 to better understand timing and amount of insulin being administered and missed doses of insulin.       An appropriate site was selected and prepared. Patient inserted sensor into right arm. Sensor will be changed by patient every 10 days.   Patient has set up personal Dexcom account, and linked data sharing to [facility] " clinic if using Dexcom mobile shazia.  All questions answered.      Follow Up Plan     Follow up if symptoms worsen or fail to improve.  She was set up to share with our clinic.  Patient was encouraged to call or send message if any new blood sugar patterns develop before her next Endocrinology f/u visit or with any questions/concerns.      Today's care plan and follow up schedule was discussed with patient.  Bing verbalized understanding of the care plan, goals, and agrees to follow up plan.        The patient was encouraged to communicate with his/her health care provider/physician and care team regarding his/her condition(s) and treatment.  I provided the patient with my contact information today and encouraged to contact me via phone or [facility] as needed.     Length of Visit   Total Time: 60 Minutes

## 2024-12-03 ENCOUNTER — PATIENT MESSAGE (OUTPATIENT)
Dept: ENDOCRINOLOGY | Facility: CLINIC | Age: 69
End: 2024-12-03
Payer: MEDICARE

## 2024-12-03 DIAGNOSIS — E11.22 TYPE 2 DIABETES MELLITUS WITH STAGE 3A CHRONIC KIDNEY DISEASE, WITH LONG-TERM CURRENT USE OF INSULIN: ICD-10-CM

## 2024-12-03 DIAGNOSIS — E11.40 TYPE 2 DIABETES MELLITUS WITH DIABETIC NEUROPATHY, WITH LONG-TERM CURRENT USE OF INSULIN: ICD-10-CM

## 2024-12-03 DIAGNOSIS — N18.31 TYPE 2 DIABETES MELLITUS WITH STAGE 3A CHRONIC KIDNEY DISEASE, WITH LONG-TERM CURRENT USE OF INSULIN: ICD-10-CM

## 2024-12-03 DIAGNOSIS — Z79.4 TYPE 2 DIABETES MELLITUS WITH STAGE 3A CHRONIC KIDNEY DISEASE, WITH LONG-TERM CURRENT USE OF INSULIN: ICD-10-CM

## 2024-12-03 DIAGNOSIS — F41.9 ANXIETY: ICD-10-CM

## 2024-12-03 DIAGNOSIS — G89.29 CHRONIC UPPER BACK PAIN: ICD-10-CM

## 2024-12-03 DIAGNOSIS — M54.9 CHRONIC UPPER BACK PAIN: ICD-10-CM

## 2024-12-03 DIAGNOSIS — E11.9 TYPE 2 DIABETES MELLITUS WITHOUT COMPLICATION, WITHOUT LONG-TERM CURRENT USE OF INSULIN: ICD-10-CM

## 2024-12-03 DIAGNOSIS — Z79.4 TYPE 2 DIABETES MELLITUS WITH DIABETIC NEUROPATHY, WITH LONG-TERM CURRENT USE OF INSULIN: ICD-10-CM

## 2024-12-03 RX ORDER — DEXTROSE 4 G
TABLET,CHEWABLE ORAL
Qty: 1 EACH | Refills: 0 | Status: SHIPPED | OUTPATIENT
Start: 2024-12-03

## 2024-12-03 RX ORDER — INSULIN ASPART 100 [IU]/ML
14 INJECTION, SOLUTION INTRAVENOUS; SUBCUTANEOUS
Start: 2024-12-03

## 2024-12-03 RX ORDER — CLONAZEPAM 0.5 MG/1
0.5 TABLET ORAL 2 TIMES DAILY PRN
Qty: 30 TABLET | Refills: 0 | Status: SHIPPED | OUTPATIENT
Start: 2024-12-03

## 2024-12-03 RX ORDER — ORPHENADRINE CITRATE 100 MG/1
100 TABLET, EXTENDED RELEASE ORAL 2 TIMES DAILY PRN
Qty: 36 TABLET | Refills: 0 | Status: SHIPPED | OUTPATIENT
Start: 2024-12-03

## 2024-12-03 RX ORDER — INSULIN GLARGINE 300 [IU]/ML
44 INJECTION, SOLUTION SUBCUTANEOUS NIGHTLY
Start: 2024-12-03 | End: 2024-12-05

## 2024-12-03 NOTE — ADDENDUM NOTE
Addended by: LUKE BARCENAS on: 12/3/2024 01:14 PM     Modules accepted: Orders     Appt that was scheduled was then cx, stating that she is feeling better

## 2024-12-03 NOTE — TELEPHONE ENCOUNTER
No care due was identified.  [facility] Embedded Care Due Messages. Reference number: [id number].   12/03/2024 10:25:15 AM CST

## 2024-12-05 RX ORDER — INSULIN GLARGINE 300 [IU]/ML
48 INJECTION, SOLUTION SUBCUTANEOUS NIGHTLY
Start: 2024-12-05

## 2024-12-10 DIAGNOSIS — Z79.4 TYPE 2 DIABETES MELLITUS WITH DIABETIC NEUROPATHY, WITH LONG-TERM CURRENT USE OF INSULIN: Primary | ICD-10-CM

## 2024-12-10 DIAGNOSIS — Z79.4 TYPE 2 DIABETES MELLITUS WITH STAGE 3A CHRONIC KIDNEY DISEASE, WITH LONG-TERM CURRENT USE OF INSULIN: ICD-10-CM

## 2024-12-10 DIAGNOSIS — N18.31 TYPE 2 DIABETES MELLITUS WITH STAGE 3A CHRONIC KIDNEY DISEASE, WITH LONG-TERM CURRENT USE OF INSULIN: ICD-10-CM

## 2024-12-10 DIAGNOSIS — E11.40 TYPE 2 DIABETES MELLITUS WITH DIABETIC NEUROPATHY, WITH LONG-TERM CURRENT USE OF INSULIN: Primary | ICD-10-CM

## 2024-12-10 DIAGNOSIS — E11.22 TYPE 2 DIABETES MELLITUS WITH STAGE 3A CHRONIC KIDNEY DISEASE, WITH LONG-TERM CURRENT USE OF INSULIN: ICD-10-CM

## 2024-12-10 RX ORDER — INSULIN GLARGINE 300 [IU]/ML
48 INJECTION, SOLUTION SUBCUTANEOUS NIGHTLY
Qty: 6 PEN | Refills: 0 | Status: SHIPPED | OUTPATIENT
Start: 2024-12-10

## 2024-12-13 DIAGNOSIS — Z79.4 TYPE 2 DIABETES MELLITUS WITH STAGE 3A CHRONIC KIDNEY DISEASE, WITH LONG-TERM CURRENT USE OF INSULIN: ICD-10-CM

## 2024-12-13 DIAGNOSIS — E11.22 TYPE 2 DIABETES MELLITUS WITH STAGE 3A CHRONIC KIDNEY DISEASE, WITH LONG-TERM CURRENT USE OF INSULIN: ICD-10-CM

## 2024-12-13 DIAGNOSIS — N18.31 TYPE 2 DIABETES MELLITUS WITH STAGE 3A CHRONIC KIDNEY DISEASE, WITH LONG-TERM CURRENT USE OF INSULIN: ICD-10-CM

## 2024-12-13 RX ORDER — METFORMIN HYDROCHLORIDE 500 MG/1
TABLET, EXTENDED RELEASE ORAL
Qty: 180 TABLET | Refills: 3 | Status: SHIPPED | OUTPATIENT
Start: 2024-12-13

## 2024-12-17 DIAGNOSIS — E11.22 TYPE 2 DIABETES MELLITUS WITH STAGE 3A CHRONIC KIDNEY DISEASE, WITH LONG-TERM CURRENT USE OF INSULIN: ICD-10-CM

## 2024-12-17 DIAGNOSIS — Z79.4 TYPE 2 DIABETES MELLITUS WITH STAGE 3A CHRONIC KIDNEY DISEASE, WITH LONG-TERM CURRENT USE OF INSULIN: ICD-10-CM

## 2024-12-17 DIAGNOSIS — N18.31 TYPE 2 DIABETES MELLITUS WITH STAGE 3A CHRONIC KIDNEY DISEASE, WITH LONG-TERM CURRENT USE OF INSULIN: ICD-10-CM

## 2024-12-17 RX ORDER — INSULIN GLARGINE 300 [IU]/ML
56 INJECTION, SOLUTION SUBCUTANEOUS NIGHTLY
Start: 2024-12-17

## 2024-12-19 ENCOUNTER — OFFICE VISIT (OUTPATIENT)
Dept: CARDIOLOGY | Facility: CLINIC | Age: 69
End: 2024-12-19
Payer: MEDICARE

## 2024-12-19 VITALS
WEIGHT: 213.38 LBS | HEART RATE: 71 BPM | SYSTOLIC BLOOD PRESSURE: 116 MMHG | BODY MASS INDEX: 35.55 KG/M2 | HEIGHT: 65 IN | DIASTOLIC BLOOD PRESSURE: 74 MMHG

## 2024-12-19 DIAGNOSIS — R00.2 PALPITATIONS: Primary | ICD-10-CM

## 2024-12-19 DIAGNOSIS — E78.5 HYPERLIPIDEMIA, UNSPECIFIED HYPERLIPIDEMIA TYPE: ICD-10-CM

## 2024-12-19 DIAGNOSIS — I27.20 PULMONARY HTN: ICD-10-CM

## 2024-12-19 DIAGNOSIS — I10 PRIMARY HYPERTENSION: ICD-10-CM

## 2024-12-19 LAB
OHS QRS DURATION: 102 MS
OHS QTC CALCULATION: 473 MS

## 2024-12-19 PROCEDURE — 93010 ELECTROCARDIOGRAM REPORT: CPT | Mod: S$GLB,,, | Performed by: INTERNAL MEDICINE

## 2024-12-19 PROCEDURE — 99999 PR PBB SHADOW E&M-EST. PATIENT-LVL V: CPT | Mod: PBBFAC,,, | Performed by: INTERNAL MEDICINE

## 2024-12-19 PROCEDURE — 93005 ELECTROCARDIOGRAM TRACING: CPT | Mod: PO

## 2024-12-19 NOTE — PROGRESS NOTES
SUBJECTIVE:    Patient ID:  Bing Kearns is a 69 y.o. female who presents for evaluation of palpitations.      Medical history:  HFpEF   Pulmonary hypertension   CKD stage 3   FRANCISCO on CPAP   HTN   Diabetes mellitus type 2    Cardiologist: Dr. Henson    Patient is here for evaluation of palpitations.  She reports palpitations for the past 3 days which has been constant.  This is also been interfering with her sleep.  Previous Holter monitor 06/05/2023 showed occasional PACs 2.7% with no clinically significant tachy or bradyarrhythmias.  Denies angina or CHF symptoms  Denies dyspnea outside of baseline symptoms  No previous arrhythmic history  No alcohol and drinks 2 cups of coffee per day    TTE 05/20/2024:  LVEF 60-65% with grade 1 diastolic dysfunction.  PA systolic pressure 33 mm of mercury.    I personally reviewed the ECG/telemetry strip today. My interpretation is normal sinus rhythm with PAC with aberration.    Past Medical History:   Diagnosis Date    Anemia, unspecified     Anxiety     Cancer     colon    Chronic diarrhea     Chronic kidney disease     stage 3    Diabetes mellitus     Diastolic dysfunction     GERD (gastroesophageal reflux disease)     Glaucoma     History of migraine headaches     Hyperlipemia     Hypertension     PONV (postoperative nausea and vomiting)     Pulmonary hypertension     Sleep apnea with use of continuous positive airway pressure (CPAP)     waiting on mask to arrive    Thyroid disease     hypothyroid       Past Surgical History:   Procedure Laterality Date    ANGIOGRAM, CORONARY, WITH LEFT HEART CATHETERIZATION  7/9/2024    Procedure: Left Heart Cath;  Surgeon: Annie Henson MD;  Location: [facility] CATH;  Service: Cardiology;;    APPENDECTOMY      done during colon resection    CARPAL TUNNEL RELEASE Left 7/22/2021    Procedure: Left carpal tunnel release, Left Thumb, Middle, and Ring Trigger Finger Release;  Surgeon: Pilo Paez MD;  Location: [facility] OR;  Service:  Orthopedics;  Laterality: Left;  Procedure: Left carpal tunnel release, Left Thumb, Middle, and Ring Trigger Finger Release    Position: Supine    Anesthesia: General    Equipment: Basic handset, carpal tunnel set    CATARACT EXTRACTION, BILATERAL Bilateral      SECTION      COLON SURGERY      COLONOSCOPY Left 2015    Procedure: COLONOSCOPY;  Surgeon: Chet Woodard Jr., MD;  Location: [facility] ENDO;  Service: Endoscopy;  Laterality: Left;    EXCISION OF LESION OF BUCCAL MUCOSA Left 2019    Procedure: EXCISION, LESION, BUCCAL MUCOSA;  Surgeon: Dedra Khan MD;  Location: [facility] OR;  Service: ENT;  Laterality: Left;    HERNIA REPAIR      repaired during colon resection    HYSTERECTOMY      Complete    INSERTION OF VENOUS ACCESS PORT      LAPAROSCOPIC CHOLECYSTECTOMY N/A 2021    Procedure: CHOLECYSTECTOMY, LAPAROSCOPIC;  Surgeon: Gab Pierre MD;  Location: [facility] OR;  Service: General;  Laterality: N/A;    OOPHORECTOMY Bilateral     PORTACATH PLACEMENT Left     and later removed     REPLACEMENT OF VASCULAR ACCESS PORT      RIGHT HEART CATHETERIZATION Right 2023    Procedure: Right heart cath;  Surgeon: Annie Henson MD;  Location: [facility] CATH;  Service: Cardiology;  Laterality: Right;    SURGICAL REMOVAL OF NEOPLASM OF MOUTH Left 6/15/2018    Procedure: EXCISION, NEOPLASM, MOUTH;  Surgeon: Velasquez Juárez MD;  Location: [facility] OR [address] [place] [address];  Service: ENT;  Laterality: Left;    TRANSFORAMINAL EPIDURAL INJECTION OF STEROID Left 2019    Procedure: Injection,steroid,epidural,transforaminal approach;  Surgeon: Job Porter MD;  [facility];  Service: Pain Management;  Laterality: Left;  L5-S1       Family History   Problem Relation Name Age of Onset    Cancer Mother          breast cancer at age 42    Breast cancer Mother  42    Heart disease Father      COPD Father      Hypertension Sister      Diabetes Sister      Anemia Sister      Cancer Sister      Supraventricular  tachycardia Sister      Multiple myeloma Sister      Kidney disease Sister      Other Sister          low bp with syncope events    Cancer Brother          stomach cancer at age 24    Hypertension Daughter      Hypertension Son x3        Social History     Socioeconomic History    Marital status:    Tobacco Use    Smoking status: Former     Current packs/day: 0.00     Types: Cigarettes     Quit date:      Years since quittin.9    Smokeless tobacco: Never   Substance and Sexual Activity    Alcohol use: No    Drug use: No    Sexual activity: Not Currently     Social Drivers of Health     Financial Resource Strain: Low Risk  (2023)    Overall Financial Resource Strain (CARDIA)     Difficulty of Paying Living Expenses: Not hard at all   Food Insecurity: No Food Insecurity (2023)    Hunger Vital Sign     Worried About Running Out of Food in the Last Year: Never true     Ran Out of Food in the Last Year: Never true   Transportation Needs: No Transportation Needs (2023)    PRAPARE - Transportation     Lack of Transportation (Medical): No     Lack of Transportation (Non-Medical): No   Physical Activity: Inactive (2023)    Exercise Vital Sign     Days of Exercise per Week: 0 days     Minutes of Exercise per Session: 0 min   Stress: Patient Declined (2023)    [employer] - Occupational Stress Questionnaire     Feeling of Stress : Patient declined   Housing Stability: Low Risk  (2023)    Housing Stability Vital Sign     Unable to Pay for Housing in the Last Year: No     Number of Places Lived in the Last Year: 1     Unstable Housing in the Last Year: No       Review of patient's allergies indicates:   Allergen Reactions    Adcirca [tadalafil (pulm. hypertension)] Other (See Comments)     HA and chest pains    Codeine      shaking    Sulfa (sulfonamide antibiotics)      Unknown      Trulicity [dulaglutide]      Profuse Sweats, anxiety/panic attack, nausea  "      Review of Systems   Constitutional: Negative for chills and fever.   HENT:  Negative for congestion and hearing loss.    Eyes:  Negative for blurred vision and double vision.   Cardiovascular:         See HPI   Respiratory:  Negative for cough, hemoptysis and shortness of breath.    Endocrine: Negative for cold intolerance and heat intolerance.   Musculoskeletal:  Negative for joint pain and joint swelling.   Gastrointestinal:  Negative for abdominal pain, nausea and vomiting.   Genitourinary:  Negative for dysuria and hematuria.   Neurological:  Negative for focal weakness and headaches.   Psychiatric/Behavioral:  Negative for altered mental status.    All other systems reviewed and are negative.           OBJECTIVE:     Vitals:    12/19/24 1029   BP: 116/74   BP Location: Left arm   Patient Position: Sitting   Pulse: 71   Weight: 96.8 kg (213 lb 6.5 oz)   Height: 5' 5" (1.651 m)       BP Readings from Last 5 Encounters:   12/19/24 116/74   11/14/24 120/74   10/11/24 116/80   09/19/24 133/71   08/23/24 116/68        Physical Exam  Vitals and nursing note reviewed.   Constitutional:       General: She is not in acute distress.     Appearance: She is well-developed. She is not diaphoretic.   HENT:      Head: Normocephalic and atraumatic.   Eyes:      General: No scleral icterus.        Right eye: No discharge.         Left eye: No discharge.   Cardiovascular:      Rate and Rhythm: Normal rate. Rhythm irregular. No extrasystoles are present.     Pulses: Normal pulses and intact distal pulses.           Radial pulses are 2+ on the right side and 2+ on the left side.      Heart sounds: Normal heart sounds, S1 normal and S2 normal. Heart sounds not distant. No opening snap. No murmur heard.     No friction rub. No gallop. No S3 or S4 sounds.   Pulmonary:      Effort: Pulmonary effort is normal. No respiratory distress.      Breath sounds: No wheezing or rales.   Abdominal:      General: Bowel sounds are normal. " There is no distension.      Palpations: Abdomen is soft.      Tenderness: There is no abdominal tenderness.   Musculoskeletal:         General: No deformity. Normal range of motion.      Cervical back: Normal range of motion and neck supple.   Skin:     General: Skin is warm and dry.      Findings: No erythema or rash.   Neurological:      Mental Status: She is alert.             Current Outpatient Medications   Medication Instructions    albuterol (PROVENTIL/VENTOLIN HFA) 90 mcg/actuation inhaler 2 puffs, Inhalation, Every 6 hours PRN, Rescue    amLODIPine (NORVASC) 5 mg, Oral, 2 times daily    aspirin (ECOTRIN) 81 mg, Nightly    atenoloL (TENORMIN) 100 mg, Oral, Daily    blood-glucose meter (ONETOUCH ULTRA2 METER) Misc USE AS DIRECTED    celecoxib (CELEBREX) 100 mg, As needed (PRN)    cholecalciferol, vitamin D3, (VITAMIN D3) 2,000 unit Cap 1 capsule, 2 times daily    clonazePAM (KLONOPIN) 0.5 mg, Oral, 2 times daily PRN    co-enzyme Q-10 30 mg    DEXCOM G7  Misc 1 Device, Misc.(Non-Drug; Combo Route), Continuous    DEXCOM G7 SENSOR Samantha 1 Device, Misc.(Non-Drug; Combo Route), Every 10 days    dicyclomine (BENTYL) 10 mg, As needed (PRN)    fenofibrate micronized (LOFIBRA) 200 MG Cap TAKE 1 CAPSULE DAILY    fluticasone-umeclidin-vilanter (TRELEGY ELLIPTA) 200-62.5-25 mcg inhaler 1 puff, Inhalation, Daily    furosemide (LASIX) 20 MG tablet 1 tab every morning, may take add'l 20 mg dose if wt up 2#    gabapentin (NEURONTIN) 300 mg, Oral, 3 times daily    insulin glargine (TOUJEO) (TOUJEO SOLOSTAR U-300 INSULIN) 56 Units, Subcutaneous, Nightly    ipratropium (ATROVENT) 21 mcg (0.03 %) nasal spray 2 sprays, Each Nostril, 2 times daily    latanoprost 0.005 % ophthalmic solution 1 drop, Nightly    levocetirizine (XYZAL) 5 mg, Oral, Nightly    levothyroxine (SYNTHROID) 75 mcg, Oral, Before breakfast    LIDOcaine (LIDODERM) 5 % 1 patch, Daily PRN    loperamide (IMODIUM A-D) 2 mg, As needed (PRN)    metFORMIN  "(GLUCOPHAGE-XR) 500 MG ER 24hr tablet TAKE 2 TABLETS DAILY WITH BREAKFAST    NovoLOG Flexpen U-100 Insulin 18 Units, Subcutaneous, 3 times daily with meals, Inject into skin 3x/day per correction scale,. Max TDD 72u    olmesartan (BENICAR) 40 mg, Daily    omeprazole (PRILOSEC) 40 mg, 2 times daily before meals    ONETOUCH DELICA PLUS LANCET 33 gauge Misc USE 1 TO CHECK GLUCOSE THREE TIMES DAILY AS NEEDED    orphenadrine (NORFLEX) 100 mg, Oral, 2 times daily PRN    pen needle, diabetic (NOVOFINE 32) 32 gauge x 1/4" Ndle use 4 times daily with insulin    pioglitazone (ACTOS) 15 mg, Oral    rOPINIRole (REQUIP) 0.25 mg, Oral, Nightly    rosuvastatin (CRESTOR) 10 MG tablet     sertraline (ZOLOFT) 50 mg, Nightly    TRUE METRIX GLUCOSE TEST STRIP Strp USE 1 STRIP THREE TIMES A DAY    TRUEPLUS LANCETS 33 gauge Misc USE TO TEST BLOOD SUGAR THREE TIMES A DAY       Lipid Panel:   Lab Results   Component Value Date    CHOL 162 11/07/2024    HDL 40 11/07/2024    LDLCALC 83.8 11/07/2024    TRIG 191 (H) 11/07/2024    CHOLHDL 24.7 11/07/2024         The 10-year ASCVD risk score (Mina SARGENT, et al., 2019) is: 19.2%    Values used to calculate the score:      Age: 69 years      Sex: Female      Is Non- : No      Diabetic: Yes      Tobacco smoker: No      Systolic Blood Pressure: 116 mmHg      Is BP treated: Yes      HDL Cholesterol: 40 mg/dL      Total Cholesterol: 162 mg/dL    Most Recent EKG Results  Results for orders placed or performed during the hospital encounter of 07/09/24   EKG 12-lead    Collection Time: 07/09/24  9:51 AM   Result Value Ref Range    QRS Duration 100 ms    [facility] QTC Calculation 455 ms    Narrative    Test Reason : R93.1,    Vent. Rate : 075 BPM     Atrial Rate : 075 BPM     P-R Int : 190 ms          QRS Dur : 100 ms      QT Int : 408 ms       P-R-T Axes : 046 -27 -15 degrees     QTc Int : 455 ms    Normal sinus rhythm  Moderate voltage criteria for LVH, may be normal variant ( R in aVL " ,   [place] product )  Nonspecific T wave abnormality  Abnormal ECG  When compared with ECG of 20-MAY-2024 13:18,  The axis Shifted left  T wave inversion now evident in Inferior leads  Confirmed by Doyle Razo MD ([id number]) on 7/9/2024 11:03:50 PM    Referred By:             Confirmed By:Doyle Razo MD       Most Recent Echocardiogram Results  Results for orders placed during the hospital encounter of 05/20/24    Echo    Interpretation Summary    Left Ventricle: The left ventricle is normal in size. Mild septal thickening. There is normal systolic function with a visually estimated ejection fraction of 60 - 65%. Grade I diastolic dysfunction. Normal left ventricular filling pressure.    Right Ventricle: Normal right ventricular cavity size. Systolic function is normal.    Tricuspid Valve: There is mild regurgitation.    Aorta: Ascending aorta is mildly dilated measuring 3.83 cm.    Pulmonary Artery: No pulmonary hypertension. The estimated pulmonary artery systolic pressure is 33 mmHg.    IVC/SVC: Normal venous pressure at 3 mmHg.      Most Recent Nuclear Stress Test Results  Results for orders placed during the hospital encounter of 06/23/21    Nuclear Stress - Cardiology Interpreted    Interpretation Summary    Normal myocardial perfusion scan. There is no evidence of myocardial ischemia or infarction.    The gated perfusion images showed an ejection fraction of 56% at rest.    The EKG portion of this study is negative for ischemia.      Most Recent Cardiac PET Stress Test Results  No results found for this or any previous visit.      Most Recent Cardiovascular Angiogram results  Results for orders placed during the hospital encounter of 07/09/24    Cardiac catheterization    Conclusion  Procedures:  Moderate sedation  Left heart cath  Coronary angiogram    Conclusions:  Minimal epicardial CAD  Normal left filling pressure    Recommendations:  Primary prevention          Description of procedure:  Access was  obtained using the modified Seldinger technique and a micropuncture needle with ultrasound guidance in the right radial artery. Diagnostic angiography was performed using Herbert catheter(s).    See details below.      Hemostasis:  TR band        Complications:  None  Estimated blood loss:  Minimal      The patient tolerated the procedure well and left the cath lab in stable condition.      Annie Henson MD, [facility]  Interventional Cardiology/Structural Heart Disease  [facility]  Office: [phone]    The clinically important portion of this report has been dictated in the section above. Our Epic reporting system does not allow us to provide a clinically meaningful report without this dictation. Please beware that there may be errors and discrepancies in the computer transcription and all of the clicks required to finalize the report.  The procedure log was documented by Documenter: Fiordaliza Nolasco RCS and verified by Annie Henson MD.    Date: 7/22/2024  Time: 5:46 PM      Other Most Recent Cardiology Results  Results for orders placed in visit on 06/05/23    Holter monitor - 72 hour    Interpretation Summary  · Patient monitored for 1d 22h  · Average heart rate was 79 bpm, Minimum heart rate was 63 bpm on Day 2 / 05:15:15 am, Max heart rate was 97 bpm on Day 1 / 08:34:04 pm  · Atrial Fibrillation or Flutter: Tyrone was 0 %  · SVE(s): Tyrone was 2.71 %  · PVC(s): Tyrone was 0 %  · Pause: 0 events  · Patient recorded 0 events during the monitoring period  · No significant clinical arrhythmia appreciated.        All pertinent data including labs, imaging, EKGs, and studies listed above were reviewed.  All EKG tracing in [facility] were personally interpreted by this provider.    ASSESSMENT:   Bing Kearns is a 69 y.o. female who presents for evaluation of palpitations.  ECG today with PACs and right bundle aberration.    1. Palpitations  Cardiac Monitor - 3-15 Day Adult (Cupid  Only)      2. Hyperlipidemia, unspecified hyperlipidemia type  IN OFFICE EKG 12-LEAD (to Muse)      3. Primary hypertension  IN OFFICE EKG 12-LEAD (to Muse)      4. Pulmonary HTN  IN OFFICE EKG 12-LEAD (to Muse)        PLAN FOR TREATMENT OF ABOVE DIAGNOSES:     Obtain cardiac event monitor.  If having symptomatic atrial or ventricular ectopy would recommend a trial of calcium channel blockers.  Heart rate 72 beats per minute today.  Continue atenolol 100 mg daily  Continue aspirin 81 mg daily   Continue Norvasc 5 mg daily  Continue olmesartan 40 mg daily  Continue Crestor 10 mg daily    Lab for follow up with Dr. Henson.  If EP evaluation or management is needed, please refer her to clinic.    Eliud Carrizales MD  Cardiac Electrophysiology    This note was partially generated using voice recognition and generative artificial intelligence software. While every effort has been made to ensure its accuracy, transcription errors may exist. Please reach out to me with any questions or if clarification is needed.

## 2024-12-30 ENCOUNTER — HOSPITAL ENCOUNTER (OUTPATIENT)
Dept: CARDIOLOGY | Facility: HOSPITAL | Age: 69
Discharge: HOME | End: 2024-12-30
Attending: INTERNAL MEDICINE
Payer: MEDICARE

## 2024-12-30 DIAGNOSIS — R00.2 PALPITATIONS: ICD-10-CM

## 2024-12-30 PROCEDURE — 93246 EXT ECG>7D<15D RECORDING: CPT | Mod: PO

## 2025-01-02 ENCOUNTER — PATIENT MESSAGE (OUTPATIENT)
Dept: ENDOCRINOLOGY | Facility: CLINIC | Age: 70
End: 2025-01-02
Payer: MEDICARE

## 2025-01-02 DIAGNOSIS — N18.31 TYPE 2 DIABETES MELLITUS WITH STAGE 3A CHRONIC KIDNEY DISEASE, WITH LONG-TERM CURRENT USE OF INSULIN: ICD-10-CM

## 2025-01-02 DIAGNOSIS — E11.22 TYPE 2 DIABETES MELLITUS WITH STAGE 3A CHRONIC KIDNEY DISEASE, WITH LONG-TERM CURRENT USE OF INSULIN: ICD-10-CM

## 2025-01-02 DIAGNOSIS — Z79.4 TYPE 2 DIABETES MELLITUS WITH STAGE 3A CHRONIC KIDNEY DISEASE, WITH LONG-TERM CURRENT USE OF INSULIN: ICD-10-CM

## 2025-01-02 DIAGNOSIS — E11.40 TYPE 2 DIABETES MELLITUS WITH DIABETIC NEUROPATHY, WITH LONG-TERM CURRENT USE OF INSULIN: ICD-10-CM

## 2025-01-02 DIAGNOSIS — Z79.4 TYPE 2 DIABETES MELLITUS WITH DIABETIC NEUROPATHY, WITH LONG-TERM CURRENT USE OF INSULIN: ICD-10-CM

## 2025-01-03 ENCOUNTER — TELEPHONE (OUTPATIENT)
Dept: PHARMACY | Facility: CLINIC | Age: 70
End: 2025-01-03
Payer: MEDICARE

## 2025-01-03 RX ORDER — INSULIN GLARGINE 300 [IU]/ML
66 INJECTION, SOLUTION SUBCUTANEOUS NIGHTLY
Start: 2025-01-03

## 2025-01-03 RX ORDER — INSULIN ASPART 100 [IU]/ML
22 INJECTION, SOLUTION INTRAVENOUS; SUBCUTANEOUS
Start: 2025-01-03

## 2025-01-03 NOTE — TELEPHONE ENCOUNTER
We have reached out to MsMell Som via voicemail  to inform her of the [employer] and [employer] application process for Novolog Pens and Toujeo Pen. A follow-up phone call will be made in 5 business days.    Irene Tinsley  Pharmacy Patient Assistance Team

## 2025-01-07 ENCOUNTER — OFFICE VISIT (OUTPATIENT)
Dept: FAMILY MEDICINE | Facility: CLINIC | Age: 70
End: 2025-01-07
Payer: MEDICARE

## 2025-01-07 VITALS — WEIGHT: 216.06 LBS | HEIGHT: 65 IN | BODY MASS INDEX: 36 KG/M2

## 2025-01-07 DIAGNOSIS — G89.4 CHRONIC PAIN SYNDROME: Primary | ICD-10-CM

## 2025-01-07 RX ORDER — DULOXETIN HYDROCHLORIDE 30 MG/1
30 CAPSULE, DELAYED RELEASE ORAL DAILY
Qty: 30 CAPSULE | Refills: 2 | Status: SHIPPED | OUTPATIENT
Start: 2025-01-07 | End: 2026-01-07

## 2025-01-07 NOTE — PROGRESS NOTES
Assessment and Plan:    1. Chronic pain syndrome (Primary)  Long discussion with patient today about workup and management of chronic widespread pain.  Discussed that this often very difficult to find a specific etiology and therefore dictate treatment.  We discussed that with her having painful diabetic neuropathy and widespread arthritis these are likely contributing.  We discussed aberrations in perception of pain.  Reviewed all recent lab work with patient, no additional labs that I think would be beneficial at this time.  Unable to increase dose of gabapentin due to renal function.  We will start by adding low dose of duloxetine, we will continue current dose of sertraline for now.  Follow-up in six weeks and if having some improvement with this would increase dose of duloxetine and stop sertraline at that time.  - DULoxetine (CYMBALTA) 30 MG capsule; Take 1 capsule (30 mg total) by mouth once daily.  Dispense: 30 capsule; Refill: 2    ______________________________________________________________________  Subjective:    Chief Complaint:  Discuss pain    HPI:  Bing is a 69 y.o. year old female patient with telemedicine visit today as consultation for to discuss pain    The patient location is: home in [place]  The chief complaint leading to consultation is: as above    Visit type: audiovisual    Face to Face time with patient: 15 minutes  20 minutes of total time spent on the encounter, which includes face to face time and non-face to face time preparing to see the patient (eg, review of tests), Obtaining and/or reviewing separately obtained history, Documenting clinical information in the electronic or other health record, Independently interpreting results (not separately reported) and communicating results to the patient/family/caregiver, or Care coordination (not separately reported).         Each patient to whom he or she provides medical services by telemedicine is:  (1) informed of the relationship between  the physician and patient and the respective role of any other health care provider with respect to management of the patient; and (2) notified that he or she may decline to receive medical services by telemedicine and may withdraw from such care at any time.    Notes:     She reports that she would like to discuss chronic widespread pain. Notes that she has a history of arthritis and does have pain in her joints, but also notes that she has pain in her muscles. Describes this as sometimes spasm like pain. Notes that this worsening of pain has been going on since October. Notes that she is not able to pinpoint anything that changed around the time that this started. No new medications around the time that this started. Dose of rosuvastatin not changed (on 5 mg only a few times a week due to statin intolerance). Notes that she has also been having chronic headaches. She has been having more depressed mood related to the fact that she is not able to do the things that she wants to do. She notes that she has been having night sweats.    She has been on gabapentin 300 TID for years for diabetic neuropathy. She takes norflex PRN for muscular pain and has been on this for years. She is on sertraline for depression.      Medications:  Current Outpatient Medications on File Prior to Visit   Medication Sig Dispense Refill    albuterol (PROVENTIL/VENTOLIN HFA) 90 mcg/actuation inhaler Inhale 2 puffs into the lungs every 6 (six) hours as needed for Wheezing. Rescue 18 g 3    amLODIPine (NORVASC) 5 MG tablet TAKE 1 TABLET TWICE A  tablet 3    aspirin (ECOTRIN) 81 MG EC tablet Take 81 mg by mouth every evening.      atenoloL (TENORMIN) 100 MG tablet Take 1 tablet (100 mg total) by mouth once daily. 90 tablet 1    blood-glucose meter (ONETOUCH ULTRA2 METER) Misc USE AS DIRECTED 1 each 0    budesonide-glycopyr-formoterol (BREZTRI AEROSPHERE) 160-9-4.8 mcg/actuation HFAA Inhale 2 puffs into the lungs 2 (two) times a day. 10  g 11    celecoxib (CELEBREX) 100 MG capsule Take 100 mg by mouth as needed.      cholecalciferol, vitamin D3, (VITAMIN D3) 2,000 unit Cap Take 1 capsule by mouth 2 (two) times a day.  3    clonazePAM (KLONOPIN) 0.5 MG tablet Take 1 tablet (0.5 mg total) by mouth 2 (two) times daily as needed for Anxiety. 30 tablet 0    co-enzyme Q-10 30 mg capsule Take 30 mg by mouth.      DEXCOM G7  Misc 1 Device by Misc.(Non-Drug; Combo Route) route continuous. 1 each 0    DEXCOM G7 SENSOR Samantha 1 Device by Misc.(Non-Drug; Combo Route) route every 10 days. 9 each 3    dicyclomine (BENTYL) 10 MG capsule Take 10 mg by mouth as needed.      fenofibrate micronized (LOFIBRA) 200 MG Cap TAKE 1 CAPSULE DAILY 90 capsule 3    fluticasone-umeclidin-vilanter (TRELEGY ELLIPTA) 200-62.5-25 mcg inhaler Inhale 1 puff into the lungs once daily. 90 each 3    furosemide (LASIX) 20 MG tablet 1 tab every morning, may take add'l 20 mg dose if wt up 2# 45 tablet 11    gabapentin (NEURONTIN) 300 MG capsule Take 1 capsule (300 mg total) by mouth 3 (three) times daily. 270 capsule 3    insulin glargine, TOUJEO, (TOUJEO SOLOSTAR U-300 INSULIN) 300 unit/mL (1.5 mL) InPn pen Inject 66 Units into the skin every evening.      ipratropium (ATROVENT) 21 mcg (0.03 %) nasal spray 2 sprays by Each Nostril route 2 (two) times daily. 30 mL 5    latanoprost 0.005 % ophthalmic solution Place 1 drop into both eyes nightly.      levocetirizine (XYZAL) 5 MG tablet Take 1 tablet (5 mg total) by mouth every evening. 90 tablet 3    levothyroxine (SYNTHROID) 75 MCG tablet Take 1 tablet (75 mcg total) by mouth before breakfast. 90 tablet 3    LIDOcaine (LIDODERM) 5 % 1 patch daily as needed.      loperamide (IMODIUM A-D) 2 mg Tab Take 2 mg by mouth as needed.      metFORMIN (GLUCOPHAGE-XR) 500 MG ER 24hr tablet TAKE 2 TABLETS DAILY WITH BREAKFAST 180 tablet 3    methylPREDNISolone (MEDROL DOSEPACK) 4 mg tablet use as directed 1 each 0    NOVOLOG FLEXPEN U-100 INSULIN  "100 unit/mL (3 mL) InPn pen Inject 22 Units into the skin 3 (three) times daily with meals. Inject into skin 3x/day per correction scale,. Max TDD 96u      olmesartan (BENICAR) 40 MG tablet Take 40 mg by mouth once daily.      omeprazole (PRILOSEC) 40 MG capsule Take 40 mg by mouth 2 (two) times daily before meals.  0    ONETOUCH DELICA PLUS LANCET 33 gauge Misc USE 1 TO CHECK GLUCOSE THREE TIMES DAILY AS NEEDED 300 each 3    orphenadrine (NORFLEX) 100 mg tablet Take 1 tablet (100 mg total) by mouth 2 (two) times daily as needed. 36 tablet 0    pen needle, diabetic (NOVOFINE 32) 32 gauge x 1/4" Ndle use 4 times daily with insulin 400 each 0    pioglitazone (ACTOS) 15 MG tablet TAKE 1 TABLET DAILY 90 tablet 3    rOPINIRole (REQUIP) 0.25 MG tablet Take 1 tablet (0.25 mg total) by mouth every evening. 90 tablet 3    rosuvastatin (CRESTOR) 10 MG tablet       sertraline (ZOLOFT) 50 MG tablet Take 50 mg by mouth every evening.  2    torsemide (DEMADEX) 20 MG Tab Take 20 mg by mouth.      TRUE METRIX GLUCOSE TEST STRIP Strp USE 1 STRIP THREE TIMES A  strip 2    TRUEPLUS LANCETS 33 gauge Misc USE TO TEST BLOOD SUGAR THREE TIMES A  each 3     Current Facility-Administered Medications on File Prior to Visit   Medication Dose Route Frequency Provider Last Rate Last Admin    0.9%  NaCl infusion   Intravenous Continuous Job Porter MD 10 mL/hr at 12/20/19 1245 New Bag at 12/20/19 1245    ferumoxytoL injection 510 mg  510 mg Intravenous Once Agueda Amador NP        ferumoxytoL injection 510 mg  510 mg Intravenous Once Agueda Amador NP        lactated ringers infusion   Intravenous Once PRN Job Porter MD        sodium chloride 0.9% flush 10 mL  10 mL Intravenous PRN Annie Henson MD           Review of Systems:  Review of Systems   Constitutional:  Positive for unexpected weight change. Negative for activity change.   HENT:  Positive for rhinorrhea and trouble swallowing. Negative for hearing loss.    Eyes:  " "Positive for discharge and visual disturbance.   Respiratory:  Positive for wheezing.    Gastrointestinal:  Positive for diarrhea. Negative for blood in stool, constipation and vomiting.   Genitourinary:  Negative for difficulty urinating, dysuria, hematuria and menstrual problem.   Musculoskeletal:  Positive for arthralgias, joint swelling and neck pain.   Neurological:  Positive for weakness and headaches.   Psychiatric/Behavioral:  Negative for confusion and dysphoric mood.        Past Medical History:  Past Medical History:   Diagnosis Date    Anemia, unspecified     Anxiety     Cancer     colon    Chronic diarrhea     Chronic kidney disease     stage 3    Diabetes mellitus     Diastolic dysfunction     GERD (gastroesophageal reflux disease)     Glaucoma     History of migraine headaches     Hyperlipemia     Hypertension     PONV (postoperative nausea and vomiting)     Pulmonary hypertension     Sleep apnea with use of continuous positive airway pressure (CPAP)     waiting on mask to arrive    Thyroid disease     hypothyroid       Objective:    Vitals:  Vitals:    01/07/25 1325   Weight: 98 kg (216 lb 0.8 oz)   Height: 5' 5" (1.651 m)       Physical Exam  Constitutional:       General: She is not in acute distress.     Appearance: She is well-developed.   HENT:      Head: Normocephalic and atraumatic.   Pulmonary:      Effort: Pulmonary effort is normal. No respiratory distress.   Neurological:      Mental Status: She is alert and oriented to person, place, and time.   Psychiatric:         Behavior: Behavior normal.         Thought Content: Thought content normal.         Judgment: Judgment normal.         Data:  Cr 1.2      Gabi Case MD  Internal Medicine    "

## 2025-01-13 DIAGNOSIS — G89.4 CHRONIC PAIN SYNDROME: ICD-10-CM

## 2025-01-13 DIAGNOSIS — Z79.4 TYPE 2 DIABETES MELLITUS WITH DIABETIC NEUROPATHY, WITH LONG-TERM CURRENT USE OF INSULIN: ICD-10-CM

## 2025-01-13 DIAGNOSIS — E11.40 TYPE 2 DIABETES MELLITUS WITH DIABETIC NEUROPATHY, WITH LONG-TERM CURRENT USE OF INSULIN: ICD-10-CM

## 2025-01-13 RX ORDER — DULOXETIN HYDROCHLORIDE 30 MG/1
CAPSULE, DELAYED RELEASE ORAL
Refills: 0 | OUTPATIENT
Start: 2025-01-13

## 2025-01-13 NOTE — TELEPHONE ENCOUNTER
No care due was identified.  [facility] Embedded Care Due Messages. Reference number: [id number].   1/13/2025 12:11:13 PM CST

## 2025-01-13 NOTE — TELEPHONE ENCOUNTER
Refill Decision Note   Bing Kearns  is requesting a refill authorization.  Brief Assessment and Rationale for Refill:  Quick Discontinue     Medication Therapy Plan:   Pharmacy is requesting new scripts for the following medications without required information, (sig/ frequency/qty/etc)        Comments:   Pharmacies have been requesting medications for patients without required information, (sig, frequency, qty, etc.). In addition, requests are sent for medication(s) pt. are currently not taking, and medications patients have never taken.    We have spoken to the pharmacies about these request types and advised their teams previously that we are unable to assess these New Script requests and require all details for these requests. This is a known issue and has been reported.   Note composed:3:47 PM 01/13/2025

## 2025-01-16 ENCOUNTER — ON-DEMAND VIRTUAL (OUTPATIENT)
Dept: URGENT CARE | Facility: CLINIC | Age: 70
End: 2025-01-16
Payer: MEDICARE

## 2025-01-16 DIAGNOSIS — J10.1 INFLUENZA A: Primary | ICD-10-CM

## 2025-01-16 RX ORDER — OSELTAMIVIR PHOSPHATE 75 MG/1
75 CAPSULE ORAL 2 TIMES DAILY
Qty: 10 CAPSULE | Refills: 0 | Status: SHIPPED | OUTPATIENT
Start: 2025-01-16 | End: 2025-01-21

## 2025-01-16 RX ORDER — PROMETHAZINE HYDROCHLORIDE AND DEXTROMETHORPHAN HYDROBROMIDE 6.25; 15 MG/5ML; MG/5ML
5 SYRUP ORAL 3 TIMES DAILY PRN
Qty: 118 ML | Refills: 0 | Status: SHIPPED | OUTPATIENT
Start: 2025-01-16 | End: 2025-01-25

## 2025-01-16 RX ORDER — BENZONATATE 100 MG/1
100 CAPSULE ORAL 3 TIMES DAILY PRN
Qty: 30 CAPSULE | Refills: 0 | Status: SHIPPED | OUTPATIENT
Start: 2025-01-16 | End: 2025-01-26

## 2025-01-16 NOTE — PROGRESS NOTES
Subjective:      Patient ID: Bing Kearns is a 69 y.o. female.    Vitals:  vitals were not taken for this visit.     Chief Complaint: Influenza      Visit Type: TELE AUDIOVISUAL    Present with the patient at the time of consultation: TELEMED PRESENT WITH PATIENT: Mary Be    Past Medical History:   Diagnosis Date    Anemia, unspecified     Anxiety     Cancer     colon    Chronic diarrhea     Chronic kidney disease     stage 3    Diabetes mellitus     Diastolic dysfunction     GERD (gastroesophageal reflux disease)     Glaucoma     History of migraine headaches     Hyperlipemia     Hypertension     PONV (postoperative nausea and vomiting)     Pulmonary hypertension     Sleep apnea with use of continuous positive airway pressure (CPAP)     waiting on mask to arrive    Thyroid disease     hypothyroid     Past Surgical History:   Procedure Laterality Date    ANGIOGRAM, CORONARY, WITH LEFT HEART CATHETERIZATION  2024    Procedure: Left Heart Cath;  Surgeon: Annie Henson MD;  Location: [facility] CATH;  Service: Cardiology;;    APPENDECTOMY      done during colon resection    CARPAL TUNNEL RELEASE Left 2021    Procedure: Left carpal tunnel release, Left Thumb, Middle, and Ring Trigger Finger Release;  Surgeon: Pilo Paez MD;  Location: [facility] OR;  Service: Orthopedics;  Laterality: Left;  Procedure: Left carpal tunnel release, Left Thumb, Middle, and Ring Trigger Finger Release    Position: Supine    Anesthesia: General    Equipment: Basic handset, carpal tunnel set    CATARACT EXTRACTION, BILATERAL Bilateral      SECTION      COLON SURGERY      COLONOSCOPY Left 2015    Procedure: COLONOSCOPY;  Surgeon: Chet Woodard Jr., MD;  Location: [facility] ENDO;  Service: Endoscopy;  Laterality: Left;    EXCISION OF LESION OF BUCCAL MUCOSA Left 2019    Procedure: EXCISION, LESION, BUCCAL MUCOSA;  Surgeon: Dedra Khan MD;  Location: [facility] OR;  Service: ENT;  Laterality: Left;     HERNIA REPAIR      repaired during colon resection    HYSTERECTOMY  2010    Complete    INSERTION OF VENOUS ACCESS PORT      LAPAROSCOPIC CHOLECYSTECTOMY N/A 11/8/2021    Procedure: CHOLECYSTECTOMY, LAPAROSCOPIC;  Surgeon: Gab Pierre MD;  Location: [facility] OR;  Service: General;  Laterality: N/A;    OOPHORECTOMY Bilateral 2010    PORTACATH PLACEMENT Left     and later removed     REPLACEMENT OF VASCULAR ACCESS PORT      RIGHT HEART CATHETERIZATION Right 9/14/2023    Procedure: Right heart cath;  Surgeon: Annie Henson MD;  Location: [facility] CATH;  Service: Cardiology;  Laterality: Right;    SURGICAL REMOVAL OF NEOPLASM OF MOUTH Left 6/15/2018    Procedure: EXCISION, NEOPLASM, MOUTH;  Surgeon: Velasquez Juárez MD;  [facility];  Service: ENT;  Laterality: Left;    TRANSFORAMINAL EPIDURAL INJECTION OF STEROID Left 12/20/2019    Procedure: Injection,steroid,epidural,transforaminal approach;  Surgeon: Job Porter MD;  Location: [facility] OR;  Service: Pain Management;  Laterality: Left;  L5-S1     Review of patient's allergies indicates:   Allergen Reactions    Adcirca [tadalafil (pulm. hypertension)] Other (See Comments)     HA and chest pains    Codeine      shaking    Sulfa (sulfonamide antibiotics)      Unknown      Tadalafil Other (See Comments)     HA and chest pains    Trulicity [dulaglutide]      Profuse Sweats, anxiety/panic attack, nausea     Current Outpatient Medications on File Prior to Visit   Medication Sig Dispense Refill    albuterol (PROVENTIL/VENTOLIN HFA) 90 mcg/actuation inhaler Inhale 2 puffs into the lungs every 6 (six) hours as needed for Wheezing. Rescue 18 g 3    amLODIPine (NORVASC) 5 MG tablet TAKE 1 TABLET TWICE A  tablet 3    aspirin (ECOTRIN) 81 MG EC tablet Take 81 mg by mouth every evening.      atenoloL (TENORMIN) 100 MG tablet Take 1 tablet (100 mg total) by mouth once daily. 90 tablet 1    blood-glucose meter (ONETOUCH ULTRA2 METER) Misc USE AS DIRECTED 1 each 0     budesonide-glycopyr-formoterol (BREZTRI AEROSPHERE) 160-9-4.8 mcg/actuation HFAA Inhale 2 puffs into the lungs 2 (two) times a day. 10 g 11    celecoxib (CELEBREX) 100 MG capsule Take 100 mg by mouth as needed.      cholecalciferol, vitamin D3, (VITAMIN D3) 2,000 unit Cap Take 1 capsule by mouth 2 (two) times a day.  3    clonazePAM (KLONOPIN) 0.5 MG tablet Take 1 tablet (0.5 mg total) by mouth 2 (two) times daily as needed for Anxiety. 30 tablet 0    co-enzyme Q-10 30 mg capsule Take 30 mg by mouth.      DEXCOM G7  Misc 1 Device by Misc.(Non-Drug; Combo Route) route continuous. 1 each 0    DEXCOM G7 SENSOR Samantha 1 Device by Misc.(Non-Drug; Combo Route) route every 10 days. 9 each 3    dicyclomine (BENTYL) 10 MG capsule Take 10 mg by mouth as needed.      DULoxetine (CYMBALTA) 30 MG capsule Take 1 capsule (30 mg total) by mouth once daily. 30 capsule 2    fenofibrate micronized (LOFIBRA) 200 MG Cap TAKE 1 CAPSULE DAILY 90 capsule 3    fluticasone-umeclidin-vilanter (TRELEGY ELLIPTA) 200-62.5-25 mcg inhaler Inhale 1 puff into the lungs once daily. 90 each 3    furosemide (LASIX) 20 MG tablet 1 tab every morning, may take add'l 20 mg dose if wt up 2# 45 tablet 11    gabapentin (NEURONTIN) 300 MG capsule Take 1 capsule (300 mg total) by mouth 3 (three) times daily. 270 capsule 3    insulin glargine, TOUJEO, (TOUJEO SOLOSTAR U-300 INSULIN) 300 unit/mL (1.5 mL) InPn pen Inject 66 Units into the skin every evening.      ipratropium (ATROVENT) 21 mcg (0.03 %) nasal spray 2 sprays by Each Nostril route 2 (two) times daily. 30 mL 5    latanoprost 0.005 % ophthalmic solution Place 1 drop into both eyes nightly.      levocetirizine (XYZAL) 5 MG tablet Take 1 tablet (5 mg total) by mouth every evening. 90 tablet 3    levothyroxine (SYNTHROID) 75 MCG tablet Take 1 tablet (75 mcg total) by mouth before breakfast. 90 tablet 3    LIDOcaine (LIDODERM) 5 % 1 patch daily as needed.      loperamide (IMODIUM A-D) 2 mg Tab Take 2  "mg by mouth as needed.      metFORMIN (GLUCOPHAGE-XR) 500 MG ER 24hr tablet TAKE 2 TABLETS DAILY WITH BREAKFAST 180 tablet 3    methylPREDNISolone (MEDROL DOSEPACK) 4 mg tablet use as directed 1 each 0    NOVOLOG FLEXPEN U-100 INSULIN 100 unit/mL (3 mL) InPn pen Inject 22 Units into the skin 3 (three) times daily with meals. Inject into skin 3x/day per correction scale,. Max TDD 96u      olmesartan (BENICAR) 40 MG tablet Take 40 mg by mouth once daily.      omeprazole (PRILOSEC) 40 MG capsule Take 40 mg by mouth 2 (two) times daily before meals.  0    ONETOUCH DELICA PLUS LANCET 33 gauge Misc USE 1 TO CHECK GLUCOSE THREE TIMES DAILY AS NEEDED 300 each 3    orphenadrine (NORFLEX) 100 mg tablet Take 1 tablet (100 mg total) by mouth 2 (two) times daily as needed. 36 tablet 0    pen needle, diabetic (NOVOFINE 32) 32 gauge x 1/4" Ndle use 4 times daily with insulin 400 each 0    pioglitazone (ACTOS) 15 MG tablet TAKE 1 TABLET DAILY 90 tablet 3    rOPINIRole (REQUIP) 0.25 MG tablet Take 1 tablet (0.25 mg total) by mouth every evening. 90 tablet 3    rosuvastatin (CRESTOR) 10 MG tablet       sertraline (ZOLOFT) 50 MG tablet Take 50 mg by mouth every evening.  2    torsemide (DEMADEX) 20 MG Tab Take 20 mg by mouth.      TRUE METRIX GLUCOSE TEST STRIP Strp USE 1 STRIP THREE TIMES A  strip 2    TRUEPLUS LANCETS 33 gauge Misc USE TO TEST BLOOD SUGAR THREE TIMES A  each 3     Current Facility-Administered Medications on File Prior to Visit   Medication Dose Route Frequency Provider Last Rate Last Admin    0.9%  NaCl infusion   Intravenous Continuous Job Porter MD 10 mL/hr at 12/20/19 1245 New Bag at 12/20/19 1245    ferumoxytoL injection 510 mg  510 mg Intravenous Once Agueda Amador NP        ferumoxytoL injection 510 mg  510 mg Intravenous Once Agueda Amador NP        lactated ringers infusion   Intravenous Once PRN Job Porter MD        sodium chloride 0.9% flush 10 mL  10 mL Intravenous PRN Annie Henson, " MD         Family History   Problem Relation Name Age of Onset    Cancer Mother          breast cancer at age 42    Breast cancer Mother  42    Heart disease Father      COPD Father      Hypertension Sister      Diabetes Sister      Anemia Sister      Cancer Sister      Supraventricular tachycardia Sister      Multiple myeloma Sister      Kidney disease Sister      Other Sister          low bp with syncope events    Cancer Brother          stomach cancer at age 24    Hypertension Daughter      Hypertension Son x3        Medications Ordered                [address] [place] [address]   [address] [address]    Telephone: [phone]   Fax: [phone]   Hours: Not open 24 hours                         E-Prescribed (3 of 3)              benzonatate (TESSALON) 100 MG capsule    Sig: Take 1 capsule (100 mg total) by mouth 3 (three) times daily as needed for Cough.       Start: 1/16/25     Quantity: 30 capsule Refills: 0                         oseltamivir (TAMIFLU) 75 MG capsule    Sig: Take 1 capsule (75 mg total) by mouth 2 (two) times daily. for 5 days       Start: 1/16/25     Quantity: 10 capsule Refills: 0                         promethazine-dextromethorphan (PROMETHAZINE-DM) 6.25-15 mg/5 mL Syrp    Sig: Take 5 mLs by mouth 3 (three) times daily as needed (cough).       Start: 1/16/25     Quantity: 118 mL Refills: 0                           Ohs Peq Odvv Intake    1/16/2025  6:06 AM CST - Filed by Patient   What is your current physical address in the event of a medical emergency? [address] [id number]   Are you able to take your vital signs? Yes   Systolic Blood Pressure: 126   Diastolic Blood Pressure: 77   Weight: 210   Height: 65   Pulse: 77   Temperature: 100.3   Respiration rate:    Pulse Oxygen: 92   Please attach any relevant images or files    Is your employer contracted with [facility]? No         Pt presents  c/o urias, bodyache, fever, really bad cough x2 days, home Flu test positive for Flu A; needs meds. Denies any CP, worsening SOB, dizziness. Taking Robitussin DM, not really helpful.    Hx of Pulm.HTN  Using her inhalers as directed          Constitution: Positive for chills, fatigue and fever.   HENT:  Positive for congestion, sinus pressure and sore throat.    Cardiovascular:  Negative for chest pain.   Respiratory:  Positive for cough and sputum production. Negative for shortness of breath.    Neurological:  Positive for headaches. Negative for dizziness.        Objective:   The physical exam was conducted virtually.  Physical Exam   Constitutional: She is oriented to person, place, and time. She appears ill. No distress.   HENT:   Head: Normocephalic.   Ears:   Right Ear: External ear normal.   Left Ear: External ear normal.   Eyes: Conjunctivae are normal.   Neck: Neck supple.   Pulmonary/Chest: Effort normal. No respiratory distress.   Neurological: She is alert and oriented to person, place, and time.       Assessment:     1. Influenza A        Plan:       Influenza A  -     oseltamivir (TAMIFLU) 75 MG capsule; Take 1 capsule (75 mg total) by mouth 2 (two) times daily. for 5 days  Dispense: 10 capsule; Refill: 0  -     benzonatate (TESSALON) 100 MG capsule; Take 1 capsule (100 mg total) by mouth 3 (three) times daily as needed for Cough.  Dispense: 30 capsule; Refill: 0  -     promethazine-dextromethorphan (PROMETHAZINE-DM) 6.25-15 mg/5 mL Syrp; Take 5 mLs by mouth 3 (three) times daily as needed (cough).  Dispense: 118 mL; Refill: 0    We appreciate you trusting us with your medical care. We hope you feel better soon. We will be happy to take care of you for all of your future medical needs.     You must understand that you've received Virtual treatment only and that you may be released before all your medical problems are known or treated. You, the patient, will arrange for follow up care as instructed.      Follow up with your PCP or specialty clinic as directed in the next 2-3 days if not improved or as needed. You can call [phone] to schedule an appointment with the appropriate provider.     If your condition worsens we recommend that you receive another evaluation in person, with your primary care provider, urgent care or at the emergency room immediately or contact your primary medical clinics after hours call service to discuss your concerns.

## 2025-01-16 NOTE — PATIENT INSTRUCTIONS
Increase fluids and rest  Pedialyte, powerade, gatorade  Take meds as directed    Warm salt water gargles, tea with honey, chlorseptic spray, throat lozenges   Limit foods that are salty, spicy food, limit carbonated drinks, limit acidic drinks    Tylenol or Motrin as directed for fever or body aches    Continue antihistamine (zyrtec or Claritin), Flonase and saline nasal spray    Okay to take Robitussin DM, Mucinex DM, Dayquil/Nyquil as directed    If increased Shortness of breath or difficulty breathing go to the Urgent Care or ER    If symptoms worsening or not improved f/u in Urgent Care or with PCP

## 2025-01-17 RX ORDER — GABAPENTIN 300 MG/1
300 CAPSULE ORAL 3 TIMES DAILY
Qty: 270 CAPSULE | Refills: 1 | Status: SHIPPED | OUTPATIENT
Start: 2025-01-17

## 2025-01-23 ENCOUNTER — PATIENT MESSAGE (OUTPATIENT)
Dept: ENDOCRINOLOGY | Facility: CLINIC | Age: 70
End: 2025-01-23
Payer: MEDICARE

## 2025-01-23 DIAGNOSIS — N18.31 TYPE 2 DIABETES MELLITUS WITH STAGE 3A CHRONIC KIDNEY DISEASE, WITH LONG-TERM CURRENT USE OF INSULIN: ICD-10-CM

## 2025-01-23 DIAGNOSIS — E11.22 TYPE 2 DIABETES MELLITUS WITH STAGE 3A CHRONIC KIDNEY DISEASE, WITH LONG-TERM CURRENT USE OF INSULIN: ICD-10-CM

## 2025-01-23 DIAGNOSIS — Z79.4 TYPE 2 DIABETES MELLITUS WITH DIABETIC NEUROPATHY, WITH LONG-TERM CURRENT USE OF INSULIN: ICD-10-CM

## 2025-01-23 DIAGNOSIS — Z79.4 TYPE 2 DIABETES MELLITUS WITH STAGE 3A CHRONIC KIDNEY DISEASE, WITH LONG-TERM CURRENT USE OF INSULIN: ICD-10-CM

## 2025-01-23 DIAGNOSIS — E11.40 TYPE 2 DIABETES MELLITUS WITH DIABETIC NEUROPATHY, WITH LONG-TERM CURRENT USE OF INSULIN: ICD-10-CM

## 2025-01-24 ENCOUNTER — TELEPHONE (OUTPATIENT)
Dept: ENDOCRINOLOGY | Facility: CLINIC | Age: 70
End: 2025-01-24

## 2025-01-24 RX ORDER — INSULIN ASPART 100 [IU]/ML
26 INJECTION, SOLUTION INTRAVENOUS; SUBCUTANEOUS
Start: 2025-01-24

## 2025-01-24 RX ORDER — INSULIN GLARGINE 300 U/ML
INJECTION, SOLUTION SUBCUTANEOUS
Qty: 9 ML | Refills: 1 | Status: SHIPPED | OUTPATIENT
Start: 2025-01-24

## 2025-01-24 NOTE — TELEPHONE ENCOUNTER
Irene Owusu. Can you update me as to whether applications were submitted for Toujeo and Novolog? Or are you still waiting on paperwork from her? Dose changes were made today. Thank you

## 2025-01-27 DIAGNOSIS — I10 HYPERTENSION, UNSPECIFIED TYPE: ICD-10-CM

## 2025-01-27 DIAGNOSIS — R00.2 PALPITATIONS: Primary | ICD-10-CM

## 2025-01-27 DIAGNOSIS — I49.3 PVC (PREMATURE VENTRICULAR CONTRACTION): ICD-10-CM

## 2025-01-27 RX ORDER — DILTIAZEM HYDROCHLORIDE 180 MG/1
180 CAPSULE, COATED, EXTENDED RELEASE ORAL DAILY
Qty: 30 CAPSULE | Refills: 11 | Status: SHIPPED | OUTPATIENT
Start: 2025-01-27 | End: 2026-01-27

## 2025-01-30 DIAGNOSIS — Z00.00 ENCOUNTER FOR MEDICARE ANNUAL WELLNESS EXAM: ICD-10-CM

## 2025-02-07 ENCOUNTER — TELEPHONE (OUTPATIENT)
Dept: CARDIOLOGY | Facility: CLINIC | Age: 70
End: 2025-02-07
Payer: MEDICARE

## 2025-02-10 DIAGNOSIS — E03.4 HYPOTHYROIDISM DUE TO ACQUIRED ATROPHY OF THYROID: ICD-10-CM

## 2025-02-10 RX ORDER — LEVOTHYROXINE SODIUM 75 UG/1
75 TABLET ORAL
Qty: 90 TABLET | Refills: 2 | Status: SHIPPED | OUTPATIENT
Start: 2025-02-10

## 2025-02-10 NOTE — TELEPHONE ENCOUNTER
No care due was identified.  [facility] Embedded Care Due Messages. Reference number: [id number].   2/10/2025 1:21:13 AM CST

## 2025-02-10 NOTE — TELEPHONE ENCOUNTER
Refill Decision Note   Bing Kearns  is requesting a refill authorization.  Brief Assessment and Rationale for Refill:  Approve     Medication Therapy Plan:         Comments:     Note composed:11:14 AM 02/10/2025

## 2025-02-11 ENCOUNTER — TELEPHONE (OUTPATIENT)
Dept: CARDIOLOGY | Facility: CLINIC | Age: 70
End: 2025-02-11
Payer: MEDICARE

## 2025-02-11 NOTE — TELEPHONE ENCOUNTER
----- Message from Syeda sent at 2/11/2025  9:28 AM CST -----  Contact: se  Type:  Patient Returning Call    Who Called:self  Who Left Message for Patient:álvaro  Does the patient know what this is regarding?:yes, reschedule appt  Would the patient rather a call back or a response via [facility]? call  Best Call Back Number:[phone] (home)     Additional Information: please advise and thank you.

## 2025-02-12 ENCOUNTER — TELEPHONE (OUTPATIENT)
Dept: NEUROLOGY | Facility: CLINIC | Age: 70
End: 2025-02-12
Payer: MEDICARE

## 2025-02-18 RX ORDER — FENOFIBRATE 200 MG/1
200 CAPSULE ORAL
Qty: 90 CAPSULE | Refills: 2 | Status: SHIPPED | OUTPATIENT
Start: 2025-02-18

## 2025-02-18 NOTE — TELEPHONE ENCOUNTER
Refill Routing Note   Medication(s) are not appropriate for processing by [facility] for the following reason(s):        Drug-disease interaction:     [facility] action(s):  Defer    Medication Therapy Plan: Drug-Disease: fenofibrate micronized and Stage 3a chronic kidney disease; Type 2 diabetes mellitus with stage 3a chronic kidney disease, with long-term current use of insulin;  Drug-Disease: fenofibrate micronized and Calculus of gallbladder with acute cholecystitis without obstruction; Drug-Disease: fenofibrate micronized and Fatty liver    Pharmacist review requested: Yes     Appointments  past 12m or future 3m with PCP    Date Provider   Last Visit   1/7/2025 Gabi Case MD   Next Visit   2/20/2025 Gabi Case MD   ED visits in past 90 days: 0        Note composed:10:09 AM 02/18/2025

## 2025-02-18 NOTE — TELEPHONE ENCOUNTER
No care due was identified.  [facility] Embedded Care Due Messages. Reference number: [id number].   2/17/2025 11:13:39 PM CST

## 2025-02-18 NOTE — TELEPHONE ENCOUNTER
Refill Decision Note   Bing Kearns  is requesting a refill authorization.  Brief Assessment and Rationale for Refill:  Approve     Medication Therapy Plan:         Pharmacist review requested: Yes   Comments:     Note composed:11:20 AM 02/18/2025

## 2025-02-19 ENCOUNTER — OFFICE VISIT (OUTPATIENT)
Dept: ORTHOPEDICS | Facility: CLINIC | Age: 70
End: 2025-02-19
Payer: MEDICARE

## 2025-02-19 ENCOUNTER — OFFICE VISIT (OUTPATIENT)
Dept: CARDIOLOGY | Facility: CLINIC | Age: 70
End: 2025-02-19
Payer: MEDICARE

## 2025-02-19 VITALS
HEIGHT: 65 IN | DIASTOLIC BLOOD PRESSURE: 71 MMHG | SYSTOLIC BLOOD PRESSURE: 108 MMHG | WEIGHT: 213.19 LBS | BODY MASS INDEX: 35.52 KG/M2 | HEART RATE: 82 BPM

## 2025-02-19 VITALS — HEIGHT: 65 IN | BODY MASS INDEX: 35.52 KG/M2 | WEIGHT: 213.19 LBS

## 2025-02-19 DIAGNOSIS — N18.31 TYPE 2 DIABETES MELLITUS WITH STAGE 3A CHRONIC KIDNEY DISEASE, WITH LONG-TERM CURRENT USE OF INSULIN: ICD-10-CM

## 2025-02-19 DIAGNOSIS — E66.811 CLASS 1 OBESITY DUE TO EXCESS CALORIES WITH SERIOUS COMORBIDITY AND BODY MASS INDEX (BMI) OF 34.0 TO 34.9 IN ADULT: ICD-10-CM

## 2025-02-19 DIAGNOSIS — E66.09 CLASS 1 OBESITY DUE TO EXCESS CALORIES WITH SERIOUS COMORBIDITY AND BODY MASS INDEX (BMI) OF 34.0 TO 34.9 IN ADULT: ICD-10-CM

## 2025-02-19 DIAGNOSIS — G56.01 CARPAL TUNNEL SYNDROME ON RIGHT: Primary | ICD-10-CM

## 2025-02-19 DIAGNOSIS — Z79.4 TYPE 2 DIABETES MELLITUS WITH STAGE 3A CHRONIC KIDNEY DISEASE, WITH LONG-TERM CURRENT USE OF INSULIN: ICD-10-CM

## 2025-02-19 DIAGNOSIS — I50.32 CHRONIC HEART FAILURE WITH PRESERVED EJECTION FRACTION (HFPEF): ICD-10-CM

## 2025-02-19 DIAGNOSIS — I27.20 PULMONARY HTN: ICD-10-CM

## 2025-02-19 DIAGNOSIS — I77.810 ACQUIRED DILATION OF ASCENDING AORTA AND AORTIC ROOT: ICD-10-CM

## 2025-02-19 DIAGNOSIS — I25.10 CORONARY ARTERY DISEASE INVOLVING NATIVE CORONARY ARTERY OF NATIVE HEART WITHOUT ANGINA PECTORIS: Primary | ICD-10-CM

## 2025-02-19 DIAGNOSIS — M65.331 TRIGGER FINGER, RIGHT MIDDLE FINGER: ICD-10-CM

## 2025-02-19 DIAGNOSIS — E11.22 TYPE 2 DIABETES MELLITUS WITH STAGE 3A CHRONIC KIDNEY DISEASE, WITH LONG-TERM CURRENT USE OF INSULIN: ICD-10-CM

## 2025-02-19 DIAGNOSIS — I49.49 PREMATURE CONTRACTION: ICD-10-CM

## 2025-02-19 DIAGNOSIS — I10 HYPERTENSION, UNSPECIFIED TYPE: ICD-10-CM

## 2025-02-19 DIAGNOSIS — N18.32 STAGE 3B CHRONIC KIDNEY DISEASE: ICD-10-CM

## 2025-02-19 DIAGNOSIS — E78.5 HYPERLIPIDEMIA, UNSPECIFIED HYPERLIPIDEMIA TYPE: ICD-10-CM

## 2025-02-19 PROCEDURE — 99999 PR PBB SHADOW E&M-EST. PATIENT-LVL IV: CPT | Mod: PBBFAC,,, | Performed by: ORTHOPAEDIC SURGERY

## 2025-02-19 RX ORDER — TORSEMIDE 20 MG/1
20 TABLET ORAL DAILY
Qty: 30 TABLET | Refills: 11 | Status: SHIPPED | OUTPATIENT
Start: 2025-02-19 | End: 2026-02-19

## 2025-02-19 RX ORDER — OLMESARTAN MEDOXOMIL 40 MG/1
40 TABLET ORAL DAILY
Qty: 90 TABLET | Refills: 3 | Status: SHIPPED | OUTPATIENT
Start: 2025-02-19

## 2025-02-19 RX ORDER — ATENOLOL 100 MG/1
100 TABLET ORAL DAILY
Qty: 90 TABLET | Refills: 1 | Status: SHIPPED | OUTPATIENT
Start: 2025-02-19 | End: 2026-02-19

## 2025-02-19 RX ORDER — ROSUVASTATIN CALCIUM 10 MG/1
10 TABLET, COATED ORAL DAILY
Qty: 90 TABLET | Refills: 3 | Status: SHIPPED | OUTPATIENT
Start: 2025-02-19

## 2025-02-19 NOTE — PROGRESS NOTES
2025    Chief Complaint:  Chief Complaint   Patient presents with    Right Wrist - Pain       HPI:  Bing Kearns is a 69 y.o. female, who presents to clinic today she has a history of right carpal tunnel syndrome and right middle finger triggering.  She has tried injections with good relief but there is only temporary relief.  She continues to have recurrence of symptoms.  She is here today to discuss further treatment    PMHX:  Past Medical History:   Diagnosis Date    Anemia, unspecified     Anxiety     Cancer     colon    Chronic diarrhea     Chronic kidney disease     stage 3    Diabetes mellitus     Diastolic dysfunction     GERD (gastroesophageal reflux disease)     Glaucoma     History of migraine headaches     Hyperlipemia     Hypertension     PONV (postoperative nausea and vomiting)     Pulmonary hypertension     Sleep apnea with use of continuous positive airway pressure (CPAP)     waiting on mask to arrive    Thyroid disease     hypothyroid       PSHX:  Past Surgical History:   Procedure Laterality Date    ANGIOGRAM, CORONARY, WITH LEFT HEART CATHETERIZATION  2024    Procedure: Left Heart Cath;  Surgeon: Annie Hensno MD;  Location: [facility] CATH;  Service: Cardiology;;    APPENDECTOMY      done during colon resection    CARPAL TUNNEL RELEASE Left 2021    Procedure: Left carpal tunnel release, Left Thumb, Middle, and Ring Trigger Finger Release;  Surgeon: Pilo Paez MD;  Location: [facility] OR;  Service: Orthopedics;  Laterality: Left;  Procedure: Left carpal tunnel release, Left Thumb, Middle, and Ring Trigger Finger Release    Position: Supine    Anesthesia: General    Equipment: Basic handset, carpal tunnel set    CATARACT EXTRACTION, BILATERAL Bilateral      SECTION      COLON SURGERY      COLONOSCOPY Left 2015    Procedure: COLONOSCOPY;  Surgeon: Chet Woodard Jr., MD;  Location: [facility] ENDO;  Service: Endoscopy;  Laterality: Left;    EXCISION OF LESION OF BUCCAL  MUCOSA Left 2019    Procedure: EXCISION, LESION, BUCCAL MUCOSA;  Surgeon: Dedra Khan MD;  Location: [facility] OR;  Service: ENT;  Laterality: Left;    HERNIA REPAIR      repaired during colon resection    HYSTERECTOMY      Complete    INSERTION OF VENOUS ACCESS PORT      LAPAROSCOPIC CHOLECYSTECTOMY N/A 2021    Procedure: CHOLECYSTECTOMY, LAPAROSCOPIC;  Surgeon: Gab Pierre MD;  Location: [facility] OR;  Service: General;  Laterality: N/A;    OOPHORECTOMY Bilateral 2010    PORTACATH PLACEMENT Left     and later removed     REPLACEMENT OF VASCULAR ACCESS PORT      RIGHT HEART CATHETERIZATION Right 2023    Procedure: Right heart cath;  Surgeon: Annie Henson MD;  Location: [facility] CATH;  Service: Cardiology;  Laterality: Right;    SURGICAL REMOVAL OF NEOPLASM OF MOUTH Left 6/15/2018    Procedure: EXCISION, NEOPLASM, MOUTH;  Surgeon: Velasquez Juárez MD;  Location: [facility] OR [address] [place] [address];  Service: ENT;  Laterality: Left;    TRANSFORAMINAL EPIDURAL INJECTION OF STEROID Left 2019    Procedure: Injection,steroid,epidural,transforaminal approach;  Surgeon: Job Porter MD;  Location: [facility] OR;  Service: Pain Management;  Laterality: Left;  L5-S1       FMHX:  Family History   Problem Relation Name Age of Onset    Cancer Mother          breast cancer at age 42    Breast cancer Mother  42    Heart disease Father      COPD Father      Hypertension Sister      Diabetes Sister      Anemia Sister      Cancer Sister      Supraventricular tachycardia Sister      Multiple myeloma Sister      Kidney disease Sister      Other Sister          low bp with syncope events    Cancer Brother          stomach cancer at age 24    Hypertension Daughter      Hypertension Son x3        SOCHX:  Social History     Tobacco Use    Smoking status: Former     Current packs/day: 0.00     Types: Cigarettes     Quit date:      Years since quittin.1    Smokeless tobacco: Never   Substance Use Topics    Alcohol use: No  "      ALLERGIES:  Adcirca [tadalafil (pulm. hypertension)], Codeine, Sulfa (sulfonamide antibiotics), Tadalafil, and Trulicity [dulaglutide]    CURRENT MEDICATIONS:  Medications Ordered Prior to Encounter[1]    REVIEW OF SYSTEMS:  ROS    GENERAL PHYSICAL EXAM:   Ht 5' 5" (1.651 m)   Wt 96.7 kg (213 lb 3 oz)   LMP  (LMP Unknown)   BMI 35.48 kg/m²    GEN: well developed, well nourished, no acute distress   HENT: Normocephalic, atraumatic   EYES: No discharge, conjunctiva normal   NECK: Supple, non-tender   PULM: No wheezing, no respiratory distress   CV: RRR   ABD: Soft, non-tender    ORTHO EXAM:   Examination of the right wrist and reveals that there is no edema.  There are no skin changes.  Palpation produces tenderness over the A1 pulley of the middle finger.  She does report grossly intact sensation in the median distribution.  Has positive Tinel's and Durkan's test at the wrist.  Has a 2+ radial pulse    RADIOLOGY:   None    ASSESSMENT:   Right carpal tunnel syndrome and middle finger triggering    PLAN:  1. I have discussed treatment with the patient.  I have discussed the possibility of carpal tunnel release and trigger finger release.  I did discuss the risks and benefits of the procedure.  Consent has been obtained     2.  Will send her for cardiac clearance     3. Will proceed with right carpal tunnel release and right middle finger trigger release under general anesthesia     4.  She will follow up with me 2 weeks after the surgery           [1]   Current Outpatient Medications on File Prior to Visit   Medication Sig Dispense Refill    albuterol (PROVENTIL/VENTOLIN HFA) 90 mcg/actuation inhaler Inhale 2 puffs into the lungs every 6 (six) hours as needed for Wheezing. Rescue 18 g 3    aspirin (ECOTRIN) 81 MG EC tablet Take 81 mg by mouth every evening.      blood-glucose meter (ONETOUCH ULTRA2 METER) Misc USE AS DIRECTED 1 each 0    budesonide-glycopyr-formoterol (BREZTRI AEROSPHERE) 160-9-4.8 " mcg/actuation HFAA Inhale 2 puffs into the lungs 2 (two) times a day. (Patient not taking: Reported on 2/19/2025) 10 g 11    celecoxib (CELEBREX) 100 MG capsule Take 100 mg by mouth as needed.      cholecalciferol, vitamin D3, (VITAMIN D3) 2,000 unit Cap Take 1 capsule by mouth 2 (two) times a day.  3    clonazePAM (KLONOPIN) 0.5 MG tablet Take 1 tablet (0.5 mg total) by mouth 2 (two) times daily as needed for Anxiety. (Patient not taking: Reported on 2/19/2025) 30 tablet 0    co-enzyme Q-10 30 mg capsule Take 30 mg by mouth.      DEXCOM G7  Misc 1 Device by Misc.(Non-Drug; Combo Route) route continuous. 1 each 0    DEXCOM G7 SENSOR Samantha 1 Device by Misc.(Non-Drug; Combo Route) route every 10 days. 9 each 3    dicyclomine (BENTYL) 10 MG capsule Take 10 mg by mouth as needed.      DULoxetine (CYMBALTA) 30 MG capsule Take 1 capsule (30 mg total) by mouth once daily. 30 capsule 2    fenofibrate micronized (LOFIBRA) 200 MG Cap TAKE 1 CAPSULE DAILY 90 capsule 2    fluticasone-umeclidin-vilanter (TRELEGY ELLIPTA) 200-62.5-25 mcg inhaler Inhale 1 puff into the lungs once daily. 90 each 3    gabapentin (NEURONTIN) 300 MG capsule Take 1 capsule (300 mg total) by mouth 3 (three) times daily. 270 capsule 1    ipratropium (ATROVENT) 21 mcg (0.03 %) nasal spray 2 sprays by Each Nostril route 2 (two) times daily. 30 mL 5    latanoprost 0.005 % ophthalmic solution Place 1 drop into both eyes nightly.      levocetirizine (XYZAL) 5 MG tablet Take 1 tablet (5 mg total) by mouth every evening. 90 tablet 3    levothyroxine (SYNTHROID) 75 MCG tablet TAKE 1 TABLET BEFORE BREAKFAST 90 tablet 2    LIDOcaine (LIDODERM) 5 % 1 patch daily as needed.      loperamide (IMODIUM A-D) 2 mg Tab Take 2 mg by mouth as needed.      metFORMIN (GLUCOPHAGE-XR) 500 MG ER 24hr tablet TAKE 2 TABLETS DAILY WITH BREAKFAST 180 tablet 3    methylPREDNISolone (MEDROL DOSEPACK) 4 mg tablet use as directed 1 each 0    NOVOLOG FLEXPEN U-100 INSULIN 100  "unit/mL (3 mL) InPn pen Inject 26 Units into the skin 3 (three) times daily with meals. Inject into skin 3x/day per correction scale,. Max TDD 108u      omeprazole (PRILOSEC) 40 MG capsule Take 40 mg by mouth 2 (two) times daily before meals.  0    ONETOUCH DELICA PLUS LANCET 33 gauge Misc USE 1 TO CHECK GLUCOSE THREE TIMES DAILY AS NEEDED 300 each 3    orphenadrine (NORFLEX) 100 mg tablet Take 1 tablet (100 mg total) by mouth 2 (two) times daily as needed. 36 tablet 0    pen needle, diabetic (NOVOFINE 32) 32 gauge x 1/4" Ndle use 4 times daily with insulin 400 each 0    pioglitazone (ACTOS) 15 MG tablet TAKE 1 TABLET DAILY (Patient not taking: Reported on 2/19/2025) 90 tablet 3    rOPINIRole (REQUIP) 0.25 MG tablet Take 1 tablet (0.25 mg total) by mouth every evening. 90 tablet 3    sertraline (ZOLOFT) 50 MG tablet Take 50 mg by mouth every evening. (Patient not taking: Reported on 2/19/2025)  2    TOUJEO SOLOSTAR U-300 INSULIN 300 unit/mL (1.5 mL) InPn pen Inject 76 Units into the skin every evening. 9 mL 1    TRUE METRIX GLUCOSE TEST STRIP Strp USE 1 STRIP THREE TIMES A  strip 2    TRUEPLUS LANCETS 33 gauge Misc USE TO TEST BLOOD SUGAR THREE TIMES A  each 3    [DISCONTINUED] atenoloL (TENORMIN) 100 MG tablet Take 1 tablet (100 mg total) by mouth once daily. 90 tablet 1    [DISCONTINUED] diltiaZEM (CARDIZEM CD) 180 MG 24 hr capsule Take 1 capsule (180 mg total) by mouth once daily. (Patient not taking: Reported on 2/19/2025) 30 capsule 11    [DISCONTINUED] furosemide (LASIX) 20 MG tablet 1 tab every morning, may take add'l 20 mg dose if wt up 2# (Patient not taking: Reported on 2/19/2025) 45 tablet 11    [DISCONTINUED] olmesartan (BENICAR) 40 MG tablet Take 40 mg by mouth once daily.      [DISCONTINUED] rosuvastatin (CRESTOR) 10 MG tablet       [DISCONTINUED] torsemide (DEMADEX) 20 MG Tab Take 20 mg by mouth.       Current Facility-Administered Medications on File Prior to Visit   Medication Dose " Route Frequency Provider Last Rate Last Admin    0.9%  NaCl infusion   Intravenous Continuous Job Porter MD 10 mL/hr at 12/20/19 1245 New Bag at 12/20/19 1245    ferumoxytoL injection 510 mg  510 mg Intravenous Once Agueda Amador NP        ferumoxytoL injection 510 mg  510 mg Intravenous Once Agueda Amador NP        lactated ringers infusion   Intravenous Once PRN Job Porter MD        sodium chloride 0.9% flush 10 mL  10 mL Intravenous PRN Annie Henson MD

## 2025-02-19 NOTE — PROGRESS NOTES
[facility]  [facility]  Date: 2/19/25    Patient: Bing Kearns, 1955, [id number]  Primary Care Provider: Gabi Case MD     Chief Complaint: Follow up     Subjective:       Bing Kearns is a 69 y.o. female who presents for follow up. They follow with Dr. Henson. Would like to follow with Dr. Arevalo upon Dr. Henson's departure.    CBC, CMP, lipid panel stable. At last office visit, patient doing well from cardiac standpoint for which medications were continued as is.     Since then, patient with stable TERRELL. Notes never starting diltiazem as symptoms are tolerable and she is nervous about adjusting medications. Average -120s at home. Weight stable. Denies chest discomfort, dizziness, syncope, orthopnea, PND, edema.    Focused Past History includes:  Coronary artery disease   [facility] 7/2024: minimal epicardial CAD   Chronic heart failure with preserved ejection fraction  TTE 5/2024: LVEF 60-65%, grade I DD, trace MR, mild TR, ascending aorta 3.83 cm, PASP 33  Pulmonary HTN  Premature contractions   Event monitor 12/2024: <1% PACs, <1% PVCs  Dilation of ascending aorta and aortic root  CTA chest aorta 11/2024: ectatic ascending aorta 4 cm  Hypertension  Hyperlipidemia  Type 2 diabetes mellitus   Stage 3b chronic kidney disease  Class 1 obesity  FRANCISCO    Current Outpatient Medications   Medication Sig    albuterol (PROVENTIL/VENTOLIN HFA) 90 mcg/actuation inhaler Inhale 2 puffs into the lungs every 6 (six) hours as needed for Wheezing. Rescue    aspirin (ECOTRIN) 81 MG EC tablet Take 81 mg by mouth every evening.    blood-glucose meter (ONETOUCH ULTRA2 METER) [facility] [place] USE AS DIRECTED    celecoxib (CELEBREX) 100 MG capsule Take 100 mg by mouth as needed.    cholecalciferol, vitamin D3, (VITAMIN D3) 2,000 unit Cap Take 1 capsule by mouth 2 (two) times a day.    co-enzyme Q-10 30 mg capsule Take 30 mg by mouth.    DEXCOM G7  Misc 1 Device by Misc.(Non-Drug; Combo Route) route  "continuous.    DEXCOM G7 SENSOR Samantha 1 Device by Misc.(Non-Drug; Combo Route) route every 10 days.    dicyclomine (BENTYL) 10 MG capsule Take 10 mg by mouth as needed.    DULoxetine (CYMBALTA) 30 MG capsule Take 1 capsule (30 mg total) by mouth once daily.    fenofibrate micronized (LOFIBRA) 200 MG Cap TAKE 1 CAPSULE DAILY    fluticasone-umeclidin-vilanter (TRELEGY ELLIPTA) 200-62.5-25 mcg inhaler Inhale 1 puff into the lungs once daily.    gabapentin (NEURONTIN) 300 MG capsule Take 1 capsule (300 mg total) by mouth 3 (three) times daily.    latanoprost 0.005 % ophthalmic solution Place 1 drop into both eyes nightly.    levocetirizine (XYZAL) 5 MG tablet Take 1 tablet (5 mg total) by mouth every evening.    levothyroxine (SYNTHROID) 75 MCG tablet TAKE 1 TABLET BEFORE BREAKFAST    LIDOcaine (LIDODERM) 5 % 1 patch daily as needed.    loperamide (IMODIUM A-D) 2 mg Tab Take 2 mg by mouth as needed.    metFORMIN (GLUCOPHAGE-XR) 500 MG ER 24hr tablet TAKE 2 TABLETS DAILY WITH BREAKFAST    NOVOLOG FLEXPEN U-100 INSULIN 100 unit/mL (3 mL) InPn pen Inject 26 Units into the skin 3 (three) times daily with meals. Inject into skin 3x/day per correction scale,. Max TDD 108u    omeprazole (PRILOSEC) 40 MG capsule Take 40 mg by mouth 2 (two) times daily before meals.    ONETOUCH DELICA PLUS LANCET 33 gauge Misc USE 1 TO CHECK GLUCOSE THREE TIMES DAILY AS NEEDED    orphenadrine (NORFLEX) 100 mg tablet Take 1 tablet (100 mg total) by mouth 2 (two) times daily as needed.    pen needle, diabetic (NOVOFINE 32) 32 gauge x 1/4" Ndle use 4 times daily with insulin    rOPINIRole (REQUIP) 0.25 MG tablet Take 1 tablet (0.25 mg total) by mouth every evening.    TOUJEO SOLOSTAR U-300 INSULIN 300 unit/mL (1.5 mL) InPn pen Inject 76 Units into the skin every evening.    TRUE METRIX GLUCOSE TEST STRIP Strp USE 1 STRIP THREE TIMES A DAY    TRUEPLUS LANCETS 33 gauge Misc USE TO TEST BLOOD SUGAR THREE TIMES A DAY    atenoloL (TENORMIN) 100 MG tablet " Take 1 tablet (100 mg total) by mouth once daily.    budesonide-glycopyr-formoterol (BREZTRI AEROSPHERE) 160-9-4.8 mcg/actuation HFAA Inhale 2 puffs into the lungs 2 (two) times a day. (Patient not taking: Reported on 2/19/2025)    clonazePAM (KLONOPIN) 0.5 MG tablet Take 1 tablet (0.5 mg total) by mouth 2 (two) times daily as needed for Anxiety. (Patient not taking: Reported on 2/19/2025)    ipratropium (ATROVENT) 21 mcg (0.03 %) nasal spray 2 sprays by Each Nostril route 2 (two) times daily.    methylPREDNISolone (MEDROL DOSEPACK) 4 mg tablet use as directed    olmesartan (BENICAR) 40 MG tablet Take 1 tablet (40 mg total) by mouth once daily.    pioglitazone (ACTOS) 15 MG tablet TAKE 1 TABLET DAILY (Patient not taking: Reported on 2/19/2025)    rosuvastatin (CRESTOR) 10 MG tablet Take 1 tablet (10 mg total) by mouth once daily.    sertraline (ZOLOFT) 50 MG tablet Take 50 mg by mouth every evening. (Patient not taking: Reported on 2/19/2025)    torsemide (DEMADEX) 20 MG Tab Take 1 tablet (20 mg total) by mouth once daily.     Current Facility-Administered Medications   Medication    ferumoxytoL injection 510 mg    ferumoxytoL injection 510 mg    sodium chloride 0.9% flush 10 mL     Facility-Administered Medications Ordered in Other Visits   Medication    0.9%  NaCl infusion    lactated ringers infusion            Objective       Review of Systems  Constitutional: negative for fevers, night sweats, and weight loss  Eyes: negative for visual disturbance, diplopia  Respiratory: negative for cough, hemoptysis, sputum, and wheezing  Cardiovascular: see HPI  Gastrointestinal: negative for abdominal pain, bright red blood per rectum, change in bowel habits, dysphagia, melena, and reflux symptoms  Genitourinary:negative for dysuria, frequency, and hematuria  Hematologic/lymphatic: negative for bleeding, easy bruising, and lymphadenopathy  Musculoskeletal:negative for arthralgias, back pain, and myalgias  Neurological:  negative for gait problems, paresthesia, speech problems, vertigo, and weakness  Behavioral/Psych: negative for excessive alcohol consumption, illegal drug usage, and sleep disturbance    -------------------------------------    Anemia, unspecified    Anxiety    Cancer    colon    Chronic diarrhea    Chronic kidney disease    stage 3    Diabetes mellitus    Diastolic dysfunction    GERD (gastroesophageal reflux disease)    Glaucoma    History of migraine headaches    Hyperlipemia    Hypertension    PONV (postoperative nausea and vomiting)    Pulmonary hypertension    Sleep apnea with use of continuous positive airway pressure (CPAP)    waiting on mask to arrive    Thyroid disease    hypothyroid     ----------------------------    Angiogram, coronary, with left heart catheterization    Procedure: Left Heart Cath;  Surgeon: Annie Henson MD;  Location: [facility] CATH;  Service: Cardiology;;    Appendectomy    done during colon resection    Carpal tunnel release    Procedure: Left carpal tunnel release, Left Thumb, Middle, and Ring Trigger Finger Release;  Surgeon: Pilo Paez MD;  Location: [facility] OR;  Service: Orthopedics;  Laterality: Left;  Procedure: Left carpal tunnel release, Left Thumb, Middle, and Ring Trigger Finger Release    Position: Supine    Anesthesia: General    Equipment: Basic handset, carpal tunnel set    Cataract extraction, bilateral     section    Colon surgery    Colonoscopy    Procedure: COLONOSCOPY;  Surgeon: Chet Woodard Jr., MD;  Location: [facility] ENDO;  Service: Endoscopy;  Laterality: Left;    Excision of lesion of buccal mucosa    Procedure: EXCISION, LESION, BUCCAL MUCOSA;  Surgeon: Dedra Khan MD;  Location: [facility] OR;  Service: ENT;  Laterality: Left;    Hernia repair    repaired during colon resection    Hysterectomy    Complete    Insertion of venous access port    Laparoscopic cholecystectomy    Procedure: CHOLECYSTECTOMY, LAPAROSCOPIC;  Surgeon: Gab Pierre MD;  Location:  "[facility] OR;  Service: General;  Laterality: N/A;    Oophorectomy    Portacath placement    and later removed     Replacement of vascular access port    Right heart catheterization    Procedure: Right heart cath;  Surgeon: Annie Henson MD;  Location: [facility] CATH;  Service: Cardiology;  Laterality: Right;    Surgical removal of neoplasm of mouth    Procedure: EXCISION, NEOPLASM, MOUTH;  Surgeon: Velasquez Juárez MD;  [facility];  Service: ENT;  Laterality: Left;    Transforaminal epidural injection of steroid    Procedure: Injection,steroid,epidural,transforaminal approach;  Surgeon: Job Porter MD;  Location: [facility] OR;  Service: Pain Management;  Laterality: Left;  L5-S1        Family History   Problem Relation Name Age of Onset    Cancer Mother          breast cancer at age 42    Breast cancer Mother  42    Heart disease Father      COPD Father      Hypertension Sister      Diabetes Sister      Anemia Sister      Cancer Sister      Supraventricular tachycardia Sister      Multiple myeloma Sister      Kidney disease Sister      Other Sister          low bp with syncope events    Cancer Brother          stomach cancer at age 24    Hypertension Daughter      Hypertension Son x3      Social History[1]    Physical Exam  /71 (BP Location: Right arm, Patient Position: Sitting)   Pulse 82   Ht 5' 5" (1.651 m)   Wt 96.7 kg (213 lb 3 oz)   LMP  (LMP Unknown)   BMI 35.48 kg/m²   Body surface area is 2.11 meters squared.  Body mass index is 35.48 kg/m².    General appearance: alert, appears stated age, cooperative, and no distress  Head: Normocephalic, without obvious abnormality, atraumatic  Neck: no carotid bruit, no JVD, and supple, symmetrical, trachea midline  Lungs: clear to auscultation bilaterally  Heart: regular rate and rhythm; S1, S2 normal, no murmur, click, rub or gallop  Abdomen: soft, non-tender, no distended  Extremities: extremities atraumatic, no pitting edema  Skin: warm, no cyanosis, " no pathologic ecchymosis in exposed portions  Neurologic: Grossly normal. A&O x3      Lab Review   Lab Results   Component Value Date    WBC 9.02 11/07/2024    HGB 11.6 (L) 11/07/2024    HCT 36.0 (L) 11/07/2024    MCV 92 11/07/2024     11/07/2024         BMP  Lab Results   Component Value Date     12/12/2024    K 4.6 12/12/2024     12/12/2024    CO2 25 12/12/2024    BUN 14 12/12/2024    CREATININE 1.22 12/12/2024    CALCIUM 9.8 12/12/2024    ANIONGAP 8 12/12/2024    ESTGFRAFRICA >60.0 05/20/2022    EGFRNONAA 52.1 (A) 05/20/2022       Lab Results   Component Value Date    ALBUMIN 4.2 12/12/2024       Lab Results   Component Value Date    ALT 25 11/07/2024    AST 41 (H) 11/07/2024    ALKPHOS 87 11/07/2024    BILITOT 0.4 11/07/2024       Lab Results   Component Value Date    TSH 2.802 11/07/2024       Lab Results   Component Value Date    CHOL 162 11/07/2024    CHOL 160 04/11/2024    CHOL 155 01/30/2024     Lab Results   Component Value Date    HDL 40 11/07/2024    HDL 45 04/11/2024    HDL 44 01/30/2024     Lab Results   Component Value Date    LDLCALC 83.8 11/07/2024    LDLCALC 73.2 04/11/2024    LDLCALC 66.0 01/30/2024     Lab Results   Component Value Date    TRIG 191 (H) 11/07/2024    TRIG 209 (H) 04/11/2024    TRIG 225 (H) 01/30/2024     Lab Results   Component Value Date    CHOLHDL 24.7 11/07/2024    CHOLHDL 28.1 04/11/2024    CHOLHDL 28.4 01/30/2024                Assessment & Plan:     This is a 69 y.o. CAD, chronic HFpEF, pulmonary HTN, aortic dilation, HTN., HLD, type 2 DM, CKD, obesity, FRANCISCO. Reports stable TERRELL and palpitations without significant change over time.     1. Coronary artery disease   - Stable anginal equivalents   - Continue ASA 81 mg qd   - Continue rosuvastatin 10 mg qd     2. Chronic heart failure with preserved ejection fraction   - Euvolemic on exam  - Continue atenolol 100 mg qd   - Continue olmesartan 40 mg qd   - Continue torsemide 20 mg qd    3. Pulmonary HTN  -  Stable   - Continue management with pulmonary     4. Premature contractions  - Event monitor 2024: <1% PACs, <1% PVCs  - Stable   - Continue atenolol 100 mg qd     5. Dilation of ascending aorta and aortic root  - TTE 2024: mild ascending aorta dilation 3.83 cm  - CTA chest 2024: ascending aorta 4 cm  - Maintain tight BP control   - Repeat CTA chest in 2025    6. Hypertension  - Well controlled  - Continue atenolol 100 mg qd   - Continue olmesartan 40 mg qd   - Continue torsemide 20 mg qd    7. Hyperlipidemia  - LDL 83.8, HDL 40   - Continue ASA 81 mg qd   - Continue rosuvastatin 10 mg qd   - Continue fenofibrate 200 mg qd     8. Type 2 diabetes mellitus   - A1c uncontrolled at 8.3  - Discussed importance of controlling risk factors in setting of heart disease  - Continue routine labs and management per primary care     9. Stage 3b chronic kidney disease  - Cr stable at 1.22  - Continue routine labs and management per nephrology     10. Class 1 obesity  - Weight stable  - Low level/low impact aerobic exercise 5x's/wk recommended.   - Heart healthy diet and risk factor modification discussed with patient.       Emphasized the importance of modifying lifestyle related risk factors including smoking cessation, limiting alcohol intake, exercise, diet most resembling a Mediterranean diet.    Please follow up in 3 months or sooner if needed.      Jewell Delarosa PA-C  [facility] [place]  Office: [phone]                   [1]   Social History  Tobacco Use    Smoking status: Former     Current packs/day: 0.00     Types: Cigarettes     Quit date: 2000     Years since quittin.1    Smokeless tobacco: Never   Substance Use Topics    Alcohol use: No    Drug use: No

## 2025-02-20 ENCOUNTER — OFFICE VISIT (OUTPATIENT)
Dept: FAMILY MEDICINE | Facility: CLINIC | Age: 70
End: 2025-02-20
Payer: MEDICARE

## 2025-02-20 ENCOUNTER — TELEPHONE (OUTPATIENT)
Dept: ORTHOPEDICS | Facility: CLINIC | Age: 70
End: 2025-02-20
Payer: MEDICARE

## 2025-02-20 VITALS
HEIGHT: 65 IN | BODY MASS INDEX: 35.85 KG/M2 | DIASTOLIC BLOOD PRESSURE: 66 MMHG | SYSTOLIC BLOOD PRESSURE: 124 MMHG | WEIGHT: 215.19 LBS | HEART RATE: 88 BPM

## 2025-02-20 DIAGNOSIS — G89.4 CHRONIC PAIN SYNDROME: ICD-10-CM

## 2025-02-20 RX ORDER — DULOXETIN HYDROCHLORIDE 60 MG/1
60 CAPSULE, DELAYED RELEASE ORAL NIGHTLY
Qty: 90 CAPSULE | Refills: 1 | Status: SHIPPED | OUTPATIENT
Start: 2025-02-20 | End: 2026-02-20

## 2025-02-20 NOTE — PROGRESS NOTES
Assessment and Plan:    1. Chronic pain syndrome  For now, start with increasing dose of cymbalta to 60 mg daily. In 1 month, will follow up about mood with plan to start wellbutrin if she is still struggling with low mood and lack of energy. Notably, she specifically denied any feelings of wanting to hurt herself in any way.  - DULoxetine (CYMBALTA) 60 MG capsule; Take 1 capsule (60 mg total) by mouth every evening.  Dispense: 90 capsule; Refill: 1      ______________________________________________________________________  Subjective:    Chief Complaint:  Follow up after starting cymbalta    HPI:  Bing is a 69 y.o. year old female here for follow-up after starting Cymbalta.    One-month ago we had extensively discussed chronic pain syndrome and had elected to start low-dose Cymbalta with a plan to eventually wean off of sertraline and onto Cymbalta if this was effective.  She reports that she has already weaned herself off of sertraline in his taking just the Cymbalta 30 mg nightly right now.  She reports that she has had some improvement in generalized pain with this but notes that her mood is doing worse.  Reports that she lost interest in things she wants to do.  Lacks energy.      Medications:  Medications Ordered Prior to Encounter[1]    Review of Systems:  Review of Systems   Constitutional:  Positive for activity change and unexpected weight change.   HENT:  Positive for rhinorrhea and trouble swallowing. Negative for hearing loss.    Eyes:  Positive for discharge and visual disturbance.   Respiratory:  Negative for chest tightness.    Cardiovascular:  Positive for palpitations. Negative for chest pain.   Gastrointestinal:  Positive for diarrhea. Negative for blood in stool, constipation and vomiting.   Endocrine: Negative for polydipsia and polyuria.   Genitourinary:  Negative for difficulty urinating, dysuria, hematuria and menstrual problem.   Musculoskeletal:  Positive for arthralgias, joint swelling  "and neck pain.   Neurological:  Positive for weakness and headaches.   Psychiatric/Behavioral:  Positive for dysphoric mood. Negative for confusion.      Entered by patient and reviewed and updated during visit      Past Medical History:  Past Medical History:   Diagnosis Date    Anemia, unspecified     Anxiety     Cancer     colon    Chronic diarrhea     Chronic kidney disease     stage 3    Diabetes mellitus     Diastolic dysfunction     GERD (gastroesophageal reflux disease)     Glaucoma     History of migraine headaches     Hyperlipemia     Hypertension     PONV (postoperative nausea and vomiting)     Pulmonary hypertension     Sleep apnea with use of continuous positive airway pressure (CPAP)     waiting on mask to arrive    Thyroid disease     hypothyroid       Objective:    Vitals:  Vitals:    02/20/25 0934   BP: 124/66   Pulse: 88   Weight: 97.6 kg (215 lb 2.7 oz)   Height: 5' 5" (1.651 m)   PainSc:   4   PainLoc: Generalized       Physical Exam  Vitals reviewed.   Constitutional:       General: She is not in acute distress.     Appearance: She is well-developed.   Eyes:      General:         Right eye: No discharge.         Left eye: No discharge.      Conjunctiva/sclera: Conjunctivae normal.   Cardiovascular:      Rate and Rhythm: Normal rate and regular rhythm.   Pulmonary:      Effort: Pulmonary effort is normal. No respiratory distress.   Skin:     General: Skin is warm and dry.   Neurological:      Mental Status: She is alert and oriented to person, place, and time.   Psychiatric:         Behavior: Behavior normal.         Thought Content: Thought content normal.         Judgment: Judgment normal.         Data:  Cr 1.22    Gabi Case MD  Internal Medicine         [1]   Current Outpatient Medications on File Prior to Visit   Medication Sig Dispense Refill    albuterol (PROVENTIL/VENTOLIN HFA) 90 mcg/actuation inhaler Inhale 2 puffs into the lungs every 6 (six) hours as needed for Wheezing. Rescue 18 g " 3    aspirin (ECOTRIN) 81 MG EC tablet Take 81 mg by mouth every evening.      atenoloL (TENORMIN) 100 MG tablet Take 1 tablet (100 mg total) by mouth once daily. 90 tablet 1    blood-glucose meter (ONETOUCH ULTRA2 METER) Misc USE AS DIRECTED 1 each 0    celecoxib (CELEBREX) 100 MG capsule Take 100 mg by mouth as needed.      cholecalciferol, vitamin D3, (VITAMIN D3) 2,000 unit Cap Take 1 capsule by mouth 2 (two) times a day.  3    clonazePAM (KLONOPIN) 0.5 MG tablet Take 1 tablet (0.5 mg total) by mouth 2 (two) times daily as needed for Anxiety. 30 tablet 0    co-enzyme Q-10 30 mg capsule Take 30 mg by mouth.      DEXCOM G7  Misc 1 Device by Misc.(Non-Drug; Combo Route) route continuous. 1 each 0    DEXCOM G7 SENSOR Samantha 1 Device by Misc.(Non-Drug; Combo Route) route every 10 days. 9 each 3    dicyclomine (BENTYL) 10 MG capsule Take 10 mg by mouth as needed.      DULoxetine (CYMBALTA) 30 MG capsule Take 1 capsule (30 mg total) by mouth once daily. 30 capsule 2    fenofibrate micronized (LOFIBRA) 200 MG Cap TAKE 1 CAPSULE DAILY 90 capsule 2    fluticasone-umeclidin-vilanter (TRELEGY ELLIPTA) 200-62.5-25 mcg inhaler Inhale 1 puff into the lungs once daily. 90 each 3    gabapentin (NEURONTIN) 300 MG capsule Take 1 capsule (300 mg total) by mouth 3 (three) times daily. 270 capsule 1    ipratropium (ATROVENT) 21 mcg (0.03 %) nasal spray 2 sprays by Each Nostril route 2 (two) times daily. 30 mL 5    latanoprost 0.005 % ophthalmic solution Place 1 drop into both eyes nightly.      levocetirizine (XYZAL) 5 MG tablet Take 1 tablet (5 mg total) by mouth every evening. 90 tablet 3    levothyroxine (SYNTHROID) 75 MCG tablet TAKE 1 TABLET BEFORE BREAKFAST 90 tablet 2    LIDOcaine (LIDODERM) 5 % 1 patch daily as needed.      loperamide (IMODIUM A-D) 2 mg Tab Take 2 mg by mouth as needed.      metFORMIN (GLUCOPHAGE-XR) 500 MG ER 24hr tablet TAKE 2 TABLETS DAILY WITH BREAKFAST 180 tablet 3    NOVOLOG FLEXPEN U-100 INSULIN  "100 unit/mL (3 mL) InPn pen Inject 26 Units into the skin 3 (three) times daily with meals. Inject into skin 3x/day per correction scale,. Max TDD 108u      olmesartan (BENICAR) 40 MG tablet Take 1 tablet (40 mg total) by mouth once daily. 90 tablet 3    omeprazole (PRILOSEC) 40 MG capsule Take 40 mg by mouth 2 (two) times daily before meals.  0    ONETOUCH DELICA PLUS LANCET 33 gauge Misc USE 1 TO CHECK GLUCOSE THREE TIMES DAILY AS NEEDED 300 each 3    orphenadrine (NORFLEX) 100 mg tablet Take 1 tablet (100 mg total) by mouth 2 (two) times daily as needed. 36 tablet 0    pen needle, diabetic (NOVOFINE 32) 32 gauge x 1/4" Ndle use 4 times daily with insulin 400 each 0    pioglitazone (ACTOS) 15 MG tablet TAKE 1 TABLET DAILY 90 tablet 3    rOPINIRole (REQUIP) 0.25 MG tablet Take 1 tablet (0.25 mg total) by mouth every evening. 90 tablet 3    rosuvastatin (CRESTOR) 10 MG tablet Take 1 tablet (10 mg total) by mouth once daily. (Patient taking differently: Take 10 mg by mouth every other day.) 90 tablet 3    torsemide (DEMADEX) 20 MG Tab Take 1 tablet (20 mg total) by mouth once daily. 30 tablet 11    TOUJEO SOLOSTAR U-300 INSULIN 300 unit/mL (1.5 mL) InPn pen Inject 76 Units into the skin every evening. 9 mL 1    TRUE METRIX GLUCOSE TEST STRIP Strp USE 1 STRIP THREE TIMES A  strip 2    TRUEPLUS LANCETS 33 gauge Misc USE TO TEST BLOOD SUGAR THREE TIMES A  each 3    budesonide-glycopyr-formoterol (BREZTRI AEROSPHERE) 160-9-4.8 mcg/actuation HFAA Inhale 2 puffs into the lungs 2 (two) times a day. (Patient not taking: Reported on 2/20/2025) 10 g 11    methylPREDNISolone (MEDROL DOSEPACK) 4 mg tablet use as directed (Patient not taking: Reported on 2/20/2025) 1 each 0    sertraline (ZOLOFT) 50 MG tablet Take 50 mg by mouth every evening. (Patient not taking: Reported on 2/20/2025)  2     Current Facility-Administered Medications on File Prior to Visit   Medication Dose Route Frequency Provider Last Rate Last " Admin    0.9%  NaCl infusion   Intravenous Continuous Job Porter MD 10 mL/hr at 12/20/19 1245 New Bag at 12/20/19 1245    ferumoxytoL injection 510 mg  510 mg Intravenous Once Agueda Amador NP        ferumoxytoL injection 510 mg  510 mg Intravenous Once Agueda Amador NP        lactated ringers infusion   Intravenous Once PRN Job Porter MD        sodium chloride 0.9% flush 10 mL  10 mL Intravenous PRN Annie Henson MD

## 2025-02-25 ENCOUNTER — PATIENT MESSAGE (OUTPATIENT)
Dept: ENDOCRINOLOGY | Facility: CLINIC | Age: 70
End: 2025-02-25
Payer: MEDICARE

## 2025-02-25 DIAGNOSIS — N18.31 TYPE 2 DIABETES MELLITUS WITH STAGE 3A CHRONIC KIDNEY DISEASE, WITH LONG-TERM CURRENT USE OF INSULIN: Primary | ICD-10-CM

## 2025-02-25 DIAGNOSIS — Z79.4 TYPE 2 DIABETES MELLITUS WITH STAGE 3A CHRONIC KIDNEY DISEASE, WITH LONG-TERM CURRENT USE OF INSULIN: Primary | ICD-10-CM

## 2025-02-25 DIAGNOSIS — E11.22 TYPE 2 DIABETES MELLITUS WITH STAGE 3A CHRONIC KIDNEY DISEASE, WITH LONG-TERM CURRENT USE OF INSULIN: Primary | ICD-10-CM

## 2025-02-26 NOTE — TELEPHONE ENCOUNTER
Please schedule pt for DM education. She has persistently high blood sugars despite aggressive increases in her insulin doses.

## 2025-03-05 ENCOUNTER — PATIENT MESSAGE (OUTPATIENT)
Dept: CARDIOLOGY | Facility: CLINIC | Age: 70
End: 2025-03-05
Payer: MEDICARE

## 2025-03-05 ENCOUNTER — PATIENT MESSAGE (OUTPATIENT)
Dept: FAMILY MEDICINE | Facility: CLINIC | Age: 70
End: 2025-03-05
Payer: MEDICARE

## 2025-03-05 DIAGNOSIS — I10 HYPERTENSION, UNSPECIFIED TYPE: Primary | ICD-10-CM

## 2025-03-06 ENCOUNTER — TELEPHONE (OUTPATIENT)
Dept: DIABETES | Facility: CLINIC | Age: 70
End: 2025-03-06

## 2025-03-06 ENCOUNTER — PATIENT MESSAGE (OUTPATIENT)
Dept: DIABETES | Facility: CLINIC | Age: 70
End: 2025-03-06

## 2025-03-06 ENCOUNTER — CLINICAL SUPPORT (OUTPATIENT)
Dept: DIABETES | Facility: CLINIC | Age: 70
End: 2025-03-06
Payer: MEDICARE

## 2025-03-06 VITALS — WEIGHT: 217.13 LBS | HEIGHT: 65 IN | BODY MASS INDEX: 36.18 KG/M2

## 2025-03-06 DIAGNOSIS — E11.22 TYPE 2 DIABETES MELLITUS WITH STAGE 3A CHRONIC KIDNEY DISEASE, WITH LONG-TERM CURRENT USE OF INSULIN: ICD-10-CM

## 2025-03-06 DIAGNOSIS — Z79.4 TYPE 2 DIABETES MELLITUS WITH STAGE 3A CHRONIC KIDNEY DISEASE, WITH LONG-TERM CURRENT USE OF INSULIN: ICD-10-CM

## 2025-03-06 DIAGNOSIS — N18.31 TYPE 2 DIABETES MELLITUS WITH STAGE 3A CHRONIC KIDNEY DISEASE, WITH LONG-TERM CURRENT USE OF INSULIN: ICD-10-CM

## 2025-03-06 PROCEDURE — 99999 PR PBB SHADOW E&M-EST. PATIENT-LVL II: CPT | Mod: PBBFAC,,, | Performed by: DIETITIAN, REGISTERED

## 2025-03-06 PROCEDURE — G0108 DIAB MANAGE TRN  PER INDIV: HCPCS | Mod: S$GLB,,, | Performed by: DIETITIAN, REGISTERED

## 2025-03-06 NOTE — TELEPHONE ENCOUNTER
Sent portal message notifying patient to continue with same doses for now.  She was encouraged to call or send a message with questions/concerns.

## 2025-03-06 NOTE — PROGRESS NOTES
"Diabetes Care Specialist Progress Note  Author: Christine Hayes RD, CDE  Date: 3/6/2025    Intake    Program Intake  Reason for Diabetes Program Visit:: Intervention-Patient was last seen for dm education to have her Dexcom initiated in 11/2024.   She is back today due to persistant high blood sugars she is having despite increases in her regimen.  Type of Intervention:: Individual  Individual: Education  Education: Self-Management Skill Review  Current diabetes risk level:: moderate    Current Diabetes Treatment: Oral Medications, Insulin  Oral Medication Type/Dose:  Metformin  Method of insulin delivery?: Injections  Injection Type: Pens  Pen Type/Dose:  Toujeo- 76 units daily, Novolog 26 with meals plus ss    Continuous Glucose Monitoring  Patient has CGM: Yes  Personal CGM type::  (Dexcom G7)  GMI Date: 03/06/25  GMI Value: 8.5 %    Lab Results   Component Value Date    HGBA1C 8.3 (H) 12/12/2024     Weight: 98.5 kg (217 lb 2.5 oz)   Height: 5' 5" (165.1 cm)   Body mass index is 36.14 kg/m².    Diabetes Self-Management Skills Assessment    Medication Skills Assessment  Patient is able to identify current diabetes medications, dosages, and appropriate timing of medications.: no-Patient unaware she should be taking meal time insulin prior to eating if possible.  She is holding her breakfast dose completely when sliding scale dose is not needed thus missing coverage for most breakfast meals  Patient reports problems or concerns with current medication regimen.: yes  Medication regimen problems/concerns:: does not feel like regimen is working  Patient is  aware that some diabetes medications can cause low blood sugar?: Yes  Medication Skills Assessment Completed:: Yes  Assessment indicates:: Instruction Needed  Area of need?: Yes    Diabetes Disease Process/Treatment Options  Diabetes Type?: Type II  When were you diagnosed?:  (in her 50's)  If previous diabetes education, when/where::  (here in 11/2024)  What are " your goals for this education session?:  (discuss current insulin regimen)  Is patient aware of what causes diabetes?: Yes  Does patient understand the pathophysiology of diabetes?: Yes  Diabetes Disease Process/Treatment Options: Skills Assessment Completed: Yes  Assessment indicates:: Adequate understanding  Area of need?: Yes    Nutrition/Healthy Eating  Meal Plan 24 Hour Recall - Breakfast:  English muffin with cream cheese, 1 pk of grits, coffee  Meal Plan 24 Hour Recall - Lunch:  may skip lunch or eat leftovers- usually smaller meal  Meal Plan 24 Hour Recall - Dinner:  Sauteed chicken with potatoes and string beans  Meal Plan 24 Hour Recall - Snack:  if low will have an orange or slice of bread with pb  Meal Plan 24 Hour Recall - Beverage:  diet drinks/water  Who shops/cooks?:  (self)  Patient can identify foods that impact blood sugar.: yes  Challenges to healthy eating:: portion control (skipping meals)  Nutrition/Healthy Eating Skills Assessment Completed:: Yes  Assessment indicates:: Adequate understanding  Area of need?: Yes    Physical Activity/Exercise  Patient's daily activity level:: lightly active  Physical Activity/Exercise Skills Assessment Completed: : Yes  Assessment indicates:: Adequate understanding  Area of need?: Yes    Home Blood Glucose Monitoring  Patient states that blood sugar is checked at home daily.: yes  Monitoring Method:: personal continuous glucose monitor  Personal CGM type::  (Dexcom G7)   What is your current Time in Range?:  (28%)  Home Blood Glucose Monitoring Skills Assessment Completed: : Yes  Assessment indicates:: Adequate understanding  Area of need?: No        Acute Complications  Have you ever had hypoglycemia (low BG 70 or less)?: yes  How often and what are your symptoms?:  (gets shaky under 90 but no recorded episodes under 70)  How do you treat hypoglycemia?:  (juice and slice of bread with pb)  Have you ever had hyperglycemia (high  or more)?: yes  Have you  ever had DKA?: no  Acute Complications Skills Assessment Completed: : Yes  Assessment indicates:: Adequate understanding  Area of need?: Yes    Assessment Summary and Plan    Based on today's diabetes care assessment, the following areas of need were identified:      Identified Areas of Need      Medication/Current Diabetes Treatment: Yes- see care plan   Lifestyle Coping/Support:     Diabetes Disease Process/Treatment Options: Yes- reviewed goal blood sugars and A1c target   Nutrition/Healthy Eating: Yes - patient was encouraged to add protein source with breakfast particularly   Physical Activity/Exercise: Yes - encouraged regular activity to enhance improved blood sugar control   Home Blood Glucose Monitoring: No    Acute Complications: Yes - encouraged patient to use rule of 15 when she experiences hypoglycemic symptoms   Chronic Complications:       Today's interventions were provided through individual discussion, instruction, and written materials were provided.      Patient verbalized understanding of instruction and written materials.  Pt was able to return back demonstration of instructions today. Patient understood key points, needs reinforcement and further instruction.     Diabetes Self-Management Care Plan:    Today's Diabetes Self-Management Care Plan was developed with Bing's input. Bing has agreed to work toward the following goal(s) to improve his/her overall diabetes control.      Care Plan: Diabetes Management   Updates made since 3/6/2024 12:00 AM        Problem: Blood Glucose Self-Monitoring         Long-Range Goal: Patient will transition to Dexcom G7    Start Date: 11/18/2024   This Visit's Progress: Met   Priority: High   Barriers: No Barriers Identified   Note:    3/6/25- Dexcom was downloaded and reviewed today.  Reports uploaded to media and sent message to Thais for clarification on insulin doses.       Task: Dexcom G7 set up completed         Problem: Medications          Long-Range Goal: Patient Agrees to take Diabetes Medications and insulin as prescribed.    Start Date: 3/6/2025   Priority: High   Barriers: No Barriers Identified   Note:    Long discussion about her meal time insulin timing and dosing.  Patient was taking her meal time doses 10-15 minutes after a meal vs before.  We discussed trying to have her take her dose right before eating.  She is compliant with her Toujeo and takes it around 8pm every night.  Discussed the difference between her current base dose of 26 units and her sliding scale dose.  She was holding her dose if blood sugar was at or near 150 even when eating a meal with carbs. She feels that she was missing her breakfast dose 90% of the time.  Message was sent to Thais to clarify doses now that she will be taking them appropriately.  Will notify patient later today what doses she should continue with and will plan to review Dexcom reports early next week     Task: Reviewed with patient all current diabetes medications and provided basic review of the purpose, dosage, frequency, side effects, and storage of both oral and injectable diabetes medications. Completed 3/6/2025        Task: Discussed guidelines for preventing, detecting and treating hypoglycemia and hyperglycemia and reviewed the importance of meal and medication timing with diabetes mediations for prevention of hypoglycemia and maximum drug benefit. Completed 3/6/2025        Follow Up Plan     Follow up in about 6 weeks (around 4/17/2025) or as needed sooner.  Will review Dexcom reports with Thais next week and call her later today with doses Thais would like her to continue.  Written material provided and encouraged pt to call with any questions/concerns in interim.      Today's care plan and follow up schedule was discussed with patient.  Bing verbalized understanding of the care plan, goals, and agrees to follow up plan.        The patient was encouraged to communicate with  his/her health care provider/physician and care team regarding his/her condition(s) and treatment.  I provided the patient with my contact information today and encouraged to contact me via phone or [facility] Portal as needed.     Length of Visit   Total Time: 60 Minutes

## 2025-03-06 NOTE — TELEPHONE ENCOUNTER
I saw patient for dm education today and figured out some of her issues with her insulin regimen.  When she wakes up in the morning if she is at or below the 150 range she is not taking any insulin to cover breakfast.  She didn't realize the difference in the base dose and correction dose and thought she only took it with meals if she had to use the sliding scale.  She says this may be happening 90% of the time particularly in the am.  She was also taking meal time doses 10-15 min after eating.  Explained her prescribed doses in detail and difference of meal and correction doses.  My question is whether we should keep her meal time dose the same now that she will be taking it correctly or do you want to decrease it slightly to prevent any hypoglycemia?  I told her I would check with you and let her know.     Thanks

## 2025-03-07 RX ORDER — AMLODIPINE BESYLATE 5 MG/1
5 TABLET ORAL DAILY
Qty: 30 TABLET | Refills: 0 | Status: SHIPPED | OUTPATIENT
Start: 2025-03-07 | End: 2026-03-07

## 2025-03-14 ENCOUNTER — HOSPITAL ENCOUNTER (OUTPATIENT)
Dept: RADIOLOGY | Facility: HOSPITAL | Age: 70
Discharge: HOME | End: 2025-03-14
Attending: NURSE PRACTITIONER
Payer: MEDICARE

## 2025-03-14 DIAGNOSIS — R91.8 PULMONARY NODULES: ICD-10-CM

## 2025-03-14 PROCEDURE — 71250 CT THORAX DX C-: CPT | Mod: TC,PO

## 2025-03-14 PROCEDURE — 71250 CT THORAX DX C-: CPT | Mod: 26,,, | Performed by: STUDENT IN AN ORGANIZED HEALTH CARE EDUCATION/TRAINING PROGRAM

## 2025-03-19 DIAGNOSIS — M65.331 TRIGGER FINGER, RIGHT MIDDLE FINGER: ICD-10-CM

## 2025-03-19 DIAGNOSIS — G56.01 CARPAL TUNNEL SYNDROME ON RIGHT: Primary | ICD-10-CM

## 2025-03-19 RX ORDER — CEFAZOLIN SODIUM 2 G/50ML
2 SOLUTION INTRAVENOUS
OUTPATIENT
Start: 2025-03-19

## 2025-03-21 ENCOUNTER — PATIENT MESSAGE (OUTPATIENT)
Dept: CARDIOLOGY | Facility: CLINIC | Age: 70
End: 2025-03-21
Payer: MEDICARE

## 2025-03-25 ENCOUNTER — PATIENT MESSAGE (OUTPATIENT)
Dept: CARDIOLOGY | Facility: CLINIC | Age: 70
End: 2025-03-25
Payer: MEDICARE

## 2025-03-25 DIAGNOSIS — I10 PRIMARY HYPERTENSION: Primary | ICD-10-CM

## 2025-03-25 RX ORDER — AMLODIPINE BESYLATE 5 MG/1
5 TABLET ORAL 2 TIMES DAILY
Qty: 180 TABLET | Refills: 3 | Status: SHIPPED | OUTPATIENT
Start: 2025-03-25 | End: 2026-03-25

## 2025-03-28 ENCOUNTER — TELEPHONE (OUTPATIENT)
Dept: ORTHOPEDICS | Facility: CLINIC | Age: 70
End: 2025-03-28
Payer: MEDICARE

## 2025-03-28 ENCOUNTER — TELEPHONE (OUTPATIENT)
Dept: ENDOCRINOLOGY | Facility: CLINIC | Age: 70
End: 2025-03-28
Payer: MEDICARE

## 2025-03-28 DIAGNOSIS — G56.01 CARPAL TUNNEL SYNDROME ON RIGHT: Primary | ICD-10-CM

## 2025-03-28 DIAGNOSIS — M65.331 TRIGGER FINGER, RIGHT MIDDLE FINGER: ICD-10-CM

## 2025-03-28 RX ORDER — CEFAZOLIN SODIUM 2 G/50ML
2 SOLUTION INTRAVENOUS
OUTPATIENT
Start: 2025-03-28

## 2025-03-28 NOTE — TELEPHONE ENCOUNTER
Called patient and Left message apologizing for the new surgery center mix up, and let her know that her surgery is being moved now to [facility] down stairs.

## 2025-03-28 NOTE — TELEPHONE ENCOUNTER
----- Message from Josiah sent at 3/28/2025  3:38 PM CDT -----  Type:  Patient Returning CallWho Called:  PatientWho Left Message for Patient:  Altafgenoveva the patient know what this is regarding?:  [facility] Call Back Number:   [phone] Information:

## 2025-03-29 DIAGNOSIS — Z79.4 TYPE 2 DIABETES MELLITUS WITH STAGE 3A CHRONIC KIDNEY DISEASE, WITH LONG-TERM CURRENT USE OF INSULIN: ICD-10-CM

## 2025-03-29 DIAGNOSIS — N18.31 TYPE 2 DIABETES MELLITUS WITH STAGE 3A CHRONIC KIDNEY DISEASE, WITH LONG-TERM CURRENT USE OF INSULIN: ICD-10-CM

## 2025-03-29 DIAGNOSIS — E11.22 TYPE 2 DIABETES MELLITUS WITH STAGE 3A CHRONIC KIDNEY DISEASE, WITH LONG-TERM CURRENT USE OF INSULIN: ICD-10-CM

## 2025-03-31 RX ORDER — INSULIN GLARGINE 300 U/ML
INJECTION, SOLUTION SUBCUTANEOUS
Qty: 9 ML | Refills: 11 | Status: SHIPPED | OUTPATIENT
Start: 2025-03-31

## 2025-04-01 ENCOUNTER — TELEPHONE (OUTPATIENT)
Dept: ENDOCRINOLOGY | Facility: CLINIC | Age: 70
End: 2025-04-01
Payer: MEDICARE

## 2025-04-03 ENCOUNTER — RESULTS FOLLOW-UP (OUTPATIENT)
Dept: ENDOCRINOLOGY | Facility: CLINIC | Age: 70
End: 2025-04-03

## 2025-04-17 DIAGNOSIS — E11.22 TYPE 2 DIABETES MELLITUS WITH STAGE 3A CHRONIC KIDNEY DISEASE, WITH LONG-TERM CURRENT USE OF INSULIN: ICD-10-CM

## 2025-04-17 DIAGNOSIS — Z79.4 TYPE 2 DIABETES MELLITUS WITH STAGE 3A CHRONIC KIDNEY DISEASE, WITH LONG-TERM CURRENT USE OF INSULIN: ICD-10-CM

## 2025-04-17 DIAGNOSIS — E11.9 TYPE 2 DIABETES MELLITUS WITHOUT COMPLICATION, WITHOUT LONG-TERM CURRENT USE OF INSULIN: ICD-10-CM

## 2025-04-17 DIAGNOSIS — N18.31 TYPE 2 DIABETES MELLITUS WITH STAGE 3A CHRONIC KIDNEY DISEASE, WITH LONG-TERM CURRENT USE OF INSULIN: ICD-10-CM

## 2025-04-17 RX ORDER — INSULIN ASPART 100 [IU]/ML
26 INJECTION, SOLUTION INTRAVENOUS; SUBCUTANEOUS
Qty: 45 EACH | Refills: 0 | Status: SHIPPED | OUTPATIENT
Start: 2025-04-17

## 2025-04-17 RX ORDER — BLOOD SUGAR DIAGNOSTIC
STRIP MISCELLANEOUS
Qty: 400 EACH | Refills: 0 | Status: SHIPPED | OUTPATIENT
Start: 2025-04-17

## 2025-04-24 ENCOUNTER — OFFICE VISIT (OUTPATIENT)
Dept: ENDOCRINOLOGY | Facility: CLINIC | Age: 70
End: 2025-04-24
Payer: MEDICARE

## 2025-04-24 VITALS
DIASTOLIC BLOOD PRESSURE: 70 MMHG | BODY MASS INDEX: 36.55 KG/M2 | SYSTOLIC BLOOD PRESSURE: 130 MMHG | HEIGHT: 65 IN | WEIGHT: 219.38 LBS | HEART RATE: 67 BPM | OXYGEN SATURATION: 93 %

## 2025-04-24 DIAGNOSIS — Z79.4 TYPE 2 DIABETES MELLITUS WITH DIABETIC NEUROPATHY, WITH LONG-TERM CURRENT USE OF INSULIN: ICD-10-CM

## 2025-04-24 DIAGNOSIS — I51.89 DIASTOLIC DYSFUNCTION: ICD-10-CM

## 2025-04-24 DIAGNOSIS — E11.40 TYPE 2 DIABETES MELLITUS WITH DIABETIC NEUROPATHY, WITH LONG-TERM CURRENT USE OF INSULIN: ICD-10-CM

## 2025-04-24 DIAGNOSIS — I10 ESSENTIAL HYPERTENSION: ICD-10-CM

## 2025-04-24 DIAGNOSIS — E11.22 TYPE 2 DIABETES MELLITUS WITH STAGE 3A CHRONIC KIDNEY DISEASE, WITH LONG-TERM CURRENT USE OF INSULIN: Primary | ICD-10-CM

## 2025-04-24 DIAGNOSIS — N18.31 TYPE 2 DIABETES MELLITUS WITH STAGE 3A CHRONIC KIDNEY DISEASE, WITH LONG-TERM CURRENT USE OF INSULIN: Primary | ICD-10-CM

## 2025-04-24 DIAGNOSIS — E66.01 SEVERE OBESITY (BMI 35.0-39.9) WITH COMORBIDITY: ICD-10-CM

## 2025-04-24 DIAGNOSIS — E03.4 HYPOTHYROIDISM DUE TO ACQUIRED ATROPHY OF THYROID: ICD-10-CM

## 2025-04-24 DIAGNOSIS — Z79.4 TYPE 2 DIABETES MELLITUS WITH STAGE 3A CHRONIC KIDNEY DISEASE, WITH LONG-TERM CURRENT USE OF INSULIN: Primary | ICD-10-CM

## 2025-04-24 DIAGNOSIS — K76.0 FATTY LIVER: ICD-10-CM

## 2025-04-24 DIAGNOSIS — E78.5 HYPERLIPIDEMIA, UNSPECIFIED HYPERLIPIDEMIA TYPE: ICD-10-CM

## 2025-04-24 PROCEDURE — 99999 PR PBB SHADOW E&M-EST. PATIENT-LVL V: CPT | Mod: PBBFAC,,, | Performed by: NURSE PRACTITIONER

## 2025-04-24 RX ORDER — INSULIN GLARGINE 300 U/ML
84 INJECTION, SOLUTION SUBCUTANEOUS DAILY
Qty: 25.2 ML | Refills: 3 | Status: SHIPPED | OUTPATIENT
Start: 2025-04-24 | End: 2026-04-24

## 2025-04-24 RX ORDER — INSULIN ASPART 100 [IU]/ML
28 INJECTION, SOLUTION INTRAVENOUS; SUBCUTANEOUS
Start: 2025-04-24

## 2025-04-24 RX ORDER — SERTRALINE HYDROCHLORIDE 50 MG/1
50 TABLET, FILM COATED ORAL
COMMUNITY
Start: 2025-04-13

## 2025-04-24 NOTE — PATIENT INSTRUCTIONS
Increase Toujeo Max to 84 units nightly.     Increase Novolog to 28 units + sliding scale before meals.     Start Jardiance 10 mg daily. Hydrate well.

## 2025-04-24 NOTE — PROGRESS NOTES
CC: Ms. Bing Kearns arrives today for management of Type 2 DM and review of chronic medical conditions, as listed in the Visit Diagnosis section of this encounter.       HPI: Ms. Bing Kearns was diagnosed with Type 2 DM in her late 50s. She was diagnosed based on lab work. Initial treatment consisted of metformin. Januvia was added in 2019. Toujeo was added in 8/2020. + FH of DM in sister, maternal GF. Denies hospitalizations due to DM.   Follows with Dr. Vanessa for CKD.   Follows with Dr. Henson.     Patient was last seen by me in November.     She had to stop Ozmepic in the fall, due to nausea, constipation, diarrhea. Since that time, insulin needed have increased tremendously. She has also had weight gain.    Patient has been paying out of pocket for her insulin. Has not heard from Patient Assistance rep about her status.     BG monitoring per Dexcom G7.    Hypoglycemia: Occasionally between lunch and dinner.  Hypoglycemic Symptoms: hunger and jitteriness  Hypoglycemia Treatment: juice or glucose tabs    Missing Insulin/PO medication doses: No    Dietary Habits: Eats 3 meals/day. Occasional snack after dinner - cereal or cookies.  Avoids sugary beverages.     Last DM education appointment: 3/6/2025        CURRENT DIABETIC MEDS: metformin XR 1000 mg daily, pioglitazone 15 mg daily,  Toujeo 76 units QHS, Novolog 26 units with meals + sliding scale, target 150, ISF 25  Glucometer type: One Touch Ultra 2    Previous DM treatments:  Trulicity - panic attacks, blood sugars in the 70s  Januvia - $$  Prandin - discontinued since starting Ozempic  Glimepiride - hypoglycemia   Prandin  Pioglitazone   Ozempic - nausea, constipation, diarrhea    Last Eye Exam: 6/2024, no DR. + glaucoma. Dr. Turcios.   Last Podiatry Exam: 6/2024,  Dr. Helms.     REVIEW OF SYSTEMS  Constitutional: no c/o fatigue, weakness, weight loss. + 11# weight gain  Cardiac: no palpitations. + brief intermittent episodes of  "substernal chest pain. These are self limited. Has reported to cardiology.  Respiratory: + dry cough. + chronic dyspnea. Follows with Pulmonology for pulmonary HTN.   GI: no c/o abdominal pain, nausea. Denies h/o pancreatitis.   Neuro: + numbness, tingling in feet.   Endocrine: denies polyphagia, polydipsia, polyuria       Personally reviewed Past Medical, Surgical, Social History.    Vital Signs  /70   Pulse 67   Ht 5' 5" (1.651 m)   Wt 99.5 kg (219 lb 5.7 oz)   LMP  (LMP Unknown)   SpO2 (!) 93%   BMI 36.50 kg/m²      Personally reviewed the below labs:    Hemoglobin A1C   Date Value Ref Range Status   04/03/2025 8.0 (H) 4.0 - 5.6 % Final     Comment:     Reference Interval:  5.0 - 5.6 Normal   5.7 - 6.4 High Risk   > 6.5 Diabetic      Hgb A1c results are standardized based on the (NGSP) National   Glycohemoglobin Standardization Program.      Hemoglobin A1C levels are related to mean serum/plasma glucose   during the preceding 2-3 months.        04/03/2025 7.9 (H) 4.0 - 5.6 % Final     Comment:     Reference Interval:  5.0 - 5.6 Normal   5.7 - 6.4 High Risk   > 6.5 Diabetic      Hgb A1c results are standardized based on the (NGSP) National   Glycohemoglobin Standardization Program.      Hemoglobin A1C levels are related to mean serum/plasma glucose   during the preceding 2-3 months.        12/12/2024 8.3 (H) 4.0 - 5.6 % Final     Comment:     Reference Interval:  5.0 - 5.6 Normal   5.7 - 6.4 High Risk   > 6.5 Diabetic      Hgb A1c results are standardized based on the (NGSP) National   Glycohemoglobin Standardization Program.      Hemoglobin A1C levels are related to mean serum/plasma glucose   during the preceding 2-3 months.            Chemistry        Component Value Date/Time     04/03/2025 1200     04/03/2025 1200    K 4.4 04/03/2025 1200    K 4.4 04/03/2025 1200     04/03/2025 1200     04/03/2025 1200    CO2 21 (L) 04/03/2025 1200    CO2 22 (L) 04/03/2025 1200    BUN 24 " (H) 04/03/2025 1200    BUN 24 (H) 04/03/2025 1200    CREATININE 1.39 04/03/2025 1200    CREATININE 1.39 04/03/2025 1200     (H) 04/03/2025 1200     (H) 04/03/2025 1200        Component Value Date/Time    CALCIUM 9.4 04/03/2025 1200    CALCIUM 9.4 04/03/2025 1200    ALKPHOS 87 04/03/2025 1200    AST 40 04/03/2025 1200    ALT 25 04/03/2025 1200    BILITOT 0.4 04/03/2025 1200    ESTGFRAFRICA >60.0 05/20/2022 0935    EGFRNONAA 52.1 (A) 05/20/2022 0935          Lab Results   Component Value Date    CHOL 162 11/07/2024    CHOL 160 04/11/2024    CHOL 155 01/30/2024     Lab Results   Component Value Date    HDL 40 11/07/2024    HDL 45 04/11/2024    HDL 44 01/30/2024     Lab Results   Component Value Date    LDLCALC 83.8 11/07/2024    LDLCALC 73.2 04/11/2024    LDLCALC 66.0 01/30/2024     Lab Results   Component Value Date    TRIG 191 (H) 11/07/2024    TRIG 209 (H) 04/11/2024    TRIG 225 (H) 01/30/2024     Lab Results   Component Value Date    CHOLHDL 24.7 11/07/2024    CHOLHDL 28.1 04/11/2024    CHOLHDL 28.4 01/30/2024       Lab Results   Component Value Date    MICALBCREAT Unable to calculate 08/21/2024     Lab Results   Component Value Date    TSH 2.802 11/07/2024       CrCl cannot be calculated (Patient's most recent lab result is older than the maximum 7 days allowed.).    Vit D, 25-Hydroxy   Date Value Ref Range Status   08/21/2024 78 30 - 96 ng/mL Final     Comment:     Vitamin D deficiency.........<10 ng/mL                              Vitamin D insufficiency......10-29 ng/mL       Vitamin D sufficiency........> or equal to 30 ng/mL  Vitamin D toxicity............>100 ng/mL           PHYSICAL EXAMINATION  Constitutional: Appears well, no distress  Respiratory: CTA, even and unlabored.  Cardiovascular: RRR, no murmurs, no carotid bruits.   GI: active bowel sounds, no hernia noted.  Skin: warm and dry; no visible wounds  Neuro: oriented to person, place, time  Feet: appropriate footwear.      DEXCOM G7  DOWNLOAD: Fasting glucoses are often > 150. Prandial excursions noted. Rare hypoglycemia.          Goals        HEMOGLOBIN A1C < 7.5                 Assessment/Plan  1. Type 2 diabetes mellitus with stage 3a chronic kidney disease, with long-term current use of insulin -- Uncontrolled. Will place new referral to Patient Assistance for insulins and will add SGLT2-I  -- Begin Jardiance  10 mg daily. Discussed mechanism of action and side effects, including genital mycotic infections, UTIs, dehydration, ketoacidosis. Encouraged increased hydration. Avoid diets with extreme carb restriction.   -- change Toujeo to Toujeo Max and increase to 84 units QHS  -- increase Novolog to 28 units + correction scale.   -- continue metformin XR, pioglitazone  -- Cannot tolerate Ozempic, which is the only GLP-1RA with a PAP.  -- Dexcom G7 start     -- Discussed diagnosis of DM, A1c goals, progression of disease, long term complications and tx options.  -- takes aspirin, ARB, statin   2. Type 2 diabetes mellitus with diabetic neuropathy, with long-term current use of insulin -- optimize DM control   3. Essential hypertension  -- controlled  -- managed by cardiology  -- continue current meds and monitor   4. Hyperlipidemia, unspecified hyperlipidemia type  -- uncontrolled with elevated TRG  -- continue Crestor   5. Hypothyroidism due to acquired atrophy of thyroid  -- stable  -- continue current levothyroxine dose.   -- TSH with RTC   6. Diastolic dysfunction  -- following with cardiology.    7. Fatty liver  -- maintain DM control   8. Severe obesity (BMI 35.0-39.9) with comorbidity  -- increases insulin resistance   Body mass index is 36.5 kg/m².       FOLLOW UP  Follow up in about 3 months (around 7/24/2025).  Patient instructed to bring BG logs to each follow up   Patient encouraged to call for any BG/medication issues, concerns, or questions.      Orders Placed This Encounter   Procedures    Hemoglobin A1C    Comprehensive Metabolic  Panel    Microalbumin/Creatinine Ratio, Urine    TSH    Ambulatory referral/consult to Pharmacy Assistance

## 2025-04-24 NOTE — PROGRESS NOTES
Ana     Ms. Kearns's [facility] @[phone]. Patient will be contacted in approximately 2 to 3 business days. Please ensure the chart reflects a current order for the requested medication written by an [facility] provider. The following documentation may be required from the patient to begin the application process:    Proof of household income, Copy of all insurance cards, Completed [facility] authorization forms, and Low Income Subsidy denial letter    Provider may review progress notes by typing pharmacy patient assistance in Epic search box.     Thank you,   [facility] Patient [facility]  [address] [place] [address]  [id number] [place] [id number]  Fax: [phone]  Email: [email or web address]

## 2025-04-25 ENCOUNTER — TELEPHONE (OUTPATIENT)
Dept: SURGERY | Facility: HOSPITAL | Age: 70
End: 2025-04-25
Payer: MEDICARE

## 2025-04-25 ENCOUNTER — TELEPHONE (OUTPATIENT)
Dept: ORTHOPEDICS | Facility: CLINIC | Age: 70
End: 2025-04-25
Payer: MEDICARE

## 2025-04-25 ENCOUNTER — TELEPHONE (OUTPATIENT)
Dept: PHARMACY | Facility: CLINIC | Age: 70
End: 2025-04-25
Payer: MEDICARE

## 2025-04-25 NOTE — TELEPHONE ENCOUNTER
Pt is scheduled for right carpal tunnel release and right middle finger trigger release on 5/1. Should pt hold Aspirin and Celebrex? Please reach out to pt. Thank you.

## 2025-04-25 NOTE — TELEPHONE ENCOUNTER
Spoke with patient and let her know that she did not need to hold the Aspirin and Celebrex. Patient understood.

## 2025-04-25 NOTE — TELEPHONE ENCOUNTER
Attempted to contact patient regarding applications.  Novolog application is approved and patient has received medication.  Toujeo is still pending and I need to get the proof of income to complete the Jardiance application.  Left message for patient to return my call.

## 2025-04-27 ENCOUNTER — NURSE TRIAGE (OUTPATIENT)
Dept: ADMINISTRATIVE | Facility: CLINIC | Age: 70
End: 2025-04-27
Payer: MEDICARE

## 2025-04-28 ENCOUNTER — TELEPHONE (OUTPATIENT)
Dept: ORTHOPEDICS | Facility: CLINIC | Age: 70
End: 2025-04-28
Payer: MEDICARE

## 2025-04-28 PROBLEM — E66.9 OBESITY: Status: ACTIVE | Noted: 2025-04-28

## 2025-04-28 PROBLEM — T50.901A MEDICATION ADMINISTERED IN ERROR: Status: ACTIVE | Noted: 2025-04-28

## 2025-04-28 NOTE — TELEPHONE ENCOUNTER
----- Message from Daniel Solano sent at 4/28/2025  3:40 PM CDT -----  Contact: pt  Inpatient right now. [facility] .  may be D/c  has surgery scheduled this week  Double dosed insulin. Call back   ----- Message -----  From: Xiomara Piper MA  Sent: 4/28/2025   3:41 PM CDT  To: [facility]ig[facility] Staff    Inpatient right now. [facility] .  may be D/cDouble dosed insulin. Call back

## 2025-04-28 NOTE — TELEPHONE ENCOUNTER
Pt reports accidentally taking an additional dose of toujeo.Reports this happened at about 8:30. Reports feeling nervous. Advised, per protocol and verbalizes understanding. Transferred to poison control for further information in this regard.    Reason for Disposition   [1] DOUBLE DOSE (an extra dose or lesser amount) of prescription drug AND [2] any symptoms (e.g., dizziness, nausea, pain, sleepiness)    Protocols used: Medication Question Call-A-AH

## 2025-04-28 NOTE — TELEPHONE ENCOUNTER
Spoke with Dr. Paez. He said he is fine to proceed with surgery Thursday as long as patient is fine by then.

## 2025-05-05 DIAGNOSIS — G56.01 CARPAL TUNNEL SYNDROME ON RIGHT: Primary | ICD-10-CM

## 2025-05-08 ENCOUNTER — TELEPHONE (OUTPATIENT)
Dept: FAMILY MEDICINE | Facility: CLINIC | Age: 70
End: 2025-05-08

## 2025-05-08 ENCOUNTER — OFFICE VISIT (OUTPATIENT)
Dept: FAMILY MEDICINE | Facility: CLINIC | Age: 70
End: 2025-05-08
Payer: MEDICARE

## 2025-05-08 ENCOUNTER — LAB VISIT (OUTPATIENT)
Dept: LAB | Facility: HOSPITAL | Age: 70
End: 2025-05-08
Attending: INTERNAL MEDICINE
Payer: MEDICARE

## 2025-05-08 VITALS
BODY MASS INDEX: 36.55 KG/M2 | SYSTOLIC BLOOD PRESSURE: 118 MMHG | DIASTOLIC BLOOD PRESSURE: 62 MMHG | HEART RATE: 78 BPM | HEIGHT: 65 IN | WEIGHT: 219.38 LBS

## 2025-05-08 DIAGNOSIS — E11.22 TYPE 2 DIABETES MELLITUS WITH STAGE 3B CHRONIC KIDNEY DISEASE, WITH LONG-TERM CURRENT USE OF INSULIN: Primary | ICD-10-CM

## 2025-05-08 DIAGNOSIS — N18.32 TYPE 2 DIABETES MELLITUS WITH STAGE 3B CHRONIC KIDNEY DISEASE, WITH LONG-TERM CURRENT USE OF INSULIN: Primary | ICD-10-CM

## 2025-05-08 DIAGNOSIS — E11.22 TYPE 2 DIABETES MELLITUS WITH STAGE 3B CHRONIC KIDNEY DISEASE, WITH LONG-TERM CURRENT USE OF INSULIN: ICD-10-CM

## 2025-05-08 DIAGNOSIS — N18.32 TYPE 2 DIABETES MELLITUS WITH STAGE 3B CHRONIC KIDNEY DISEASE, WITH LONG-TERM CURRENT USE OF INSULIN: ICD-10-CM

## 2025-05-08 DIAGNOSIS — Z79.4 TYPE 2 DIABETES MELLITUS WITH STAGE 3B CHRONIC KIDNEY DISEASE, WITH LONG-TERM CURRENT USE OF INSULIN: ICD-10-CM

## 2025-05-08 DIAGNOSIS — E11.42 DIABETIC POLYNEUROPATHY ASSOCIATED WITH TYPE 2 DIABETES MELLITUS: ICD-10-CM

## 2025-05-08 DIAGNOSIS — I10 HYPERTENSION, UNSPECIFIED TYPE: ICD-10-CM

## 2025-05-08 DIAGNOSIS — N18.32 STAGE 3B CHRONIC KIDNEY DISEASE: ICD-10-CM

## 2025-05-08 DIAGNOSIS — Z79.4 TYPE 2 DIABETES MELLITUS WITH STAGE 3B CHRONIC KIDNEY DISEASE, WITH LONG-TERM CURRENT USE OF INSULIN: Primary | ICD-10-CM

## 2025-05-08 LAB
ANION GAP (OHS): 11 MMOL/L (ref 8–16)
BUN SERPL-MCNC: 24 MG/DL (ref 8–23)
CALCIUM SERPL-MCNC: 9.7 MG/DL (ref 8.7–10.5)
CHLORIDE SERPL-SCNC: 103 MMOL/L (ref 95–110)
CO2 SERPL-SCNC: 22 MMOL/L (ref 23–29)
CREAT SERPL-MCNC: 1.3 MG/DL (ref 0.5–1.4)
GFR SERPLBLD CREATININE-BSD FMLA CKD-EPI: 44 ML/MIN/1.73/M2
GLUCOSE SERPL-MCNC: 111 MG/DL (ref 70–110)
GLUCOSE SERPL-MCNC: 121 MG/DL (ref 70–110)
POTASSIUM SERPL-SCNC: 4 MMOL/L (ref 3.5–5.1)
SODIUM SERPL-SCNC: 136 MMOL/L (ref 136–145)

## 2025-05-08 PROCEDURE — 99999 PR PBB SHADOW E&M-EST. PATIENT-LVL III: CPT | Mod: PBBFAC,,, | Performed by: INTERNAL MEDICINE

## 2025-05-08 PROCEDURE — 82310 ASSAY OF CALCIUM: CPT

## 2025-05-08 PROCEDURE — 36415 COLL VENOUS BLD VENIPUNCTURE: CPT | Mod: PN

## 2025-05-08 NOTE — TELEPHONE ENCOUNTER
Patient notified to  dexcom per Dr. Case. Patient stated that she will come by tomorrow to pick it up.

## 2025-05-08 NOTE — PROGRESS NOTES
Assessment and Plan:    1. Type 2 diabetes mellitus with stage 3b chronic kidney disease, with long-term current use of insulin (Primary)  Follow-up with endocrinology as planned.  No overall changes to medication regimen today.  We discussed that the Dexcom sensor that she put on yesterday is likely reading inaccurately since fingerstick today was 121 while the Dexcom was reading 64, possibly due to blood in the cannula.  Recommend changing to a new sensor at this time.  - Basic Metabolic Panel; Future  - POCT Glucose, Hand-Held Device    2. Stage 3b chronic kidney disease  - Ambulatory referral/consult to Nephrology; Future    3. Hypertension, unspecified type  - Ambulatory referral/consult to Nephrology; Future    4. Diabetic polyneuropathy associated with type 2 diabetes mellitus  - Ambulatory referral/consult to Podiatry; Future      ______________________________________________________________________  Subjective:    Chief Complaint:  Hospital follow-up    HPI:  Bing is a 69 y.o. year old female here for follow-up from recent hospitalization.    She presented to the ER initially on April 27th with reported history of accidentally taking too much insulin at home.  She had accidentally taken her evening dose of Toujeo 83 units twice.  She had also taken her fast acting insulin.  She was not hypoglycemic on admission.  She was admitted to ICU for observation and monitoring for hypoglycemia.  She was monitored but did not become hypoglycemic.  Home long-acting insulin was restarted at the 24 hour tevin as blood sugar was increasing.  Blood sugar remained stable on this dose.  No medications were changed at the time of discharge.    She reports that she had not been having any hypoglycemia since getting out of the hospital until today.  Today she has had multiple episodes where her glucose has dropped into the 50s and 60s.  She reports that she is not feeling like she normally does when she is hypoglycemic, just  "noticing this on the Dexcom.  She has not been home to correlate this with a fingerstick.  She did put a new sensor on yesterday and noticed that there was some blood when she put this on.    Medications:  Medications Ordered Prior to Encounter[1]    Review of Systems:  Review of Systems   Respiratory:  Negative for shortness of breath.    Cardiovascular:  Negative for chest pain, palpitations and leg swelling.   Gastrointestinal:  Negative for nausea and vomiting.   Neurological:  Negative for dizziness, syncope and light-headedness.       Past Medical History:  Past Medical History:   Diagnosis Date    Anemia, unspecified     Anxiety     Cancer     colon    Chronic diarrhea     Chronic kidney disease     stage 3    Diabetes mellitus     Diastolic dysfunction     GERD (gastroesophageal reflux disease)     Glaucoma     History of migraine headaches     Hyperlipemia     Hypertension     PONV (postoperative nausea and vomiting)     Pulmonary hypertension     Sleep apnea with use of continuous positive airway pressure (CPAP)     waiting on mask to arrive    Thyroid disease     hypothyroid       Objective:    Vitals:  Vitals:    05/08/25 1256   BP: 118/62   Pulse: 78   Weight: 99.5 kg (219 lb 5.7 oz)   Height: 5' 5" (1.651 m)   PainSc:   7       Physical Exam  Vitals reviewed.   Constitutional:       General: She is not in acute distress.     Appearance: She is well-developed.   Eyes:      General:         Right eye: No discharge.         Left eye: No discharge.      Conjunctiva/sclera: Conjunctivae normal.   Cardiovascular:      Rate and Rhythm: Normal rate and regular rhythm.   Pulmonary:      Effort: Pulmonary effort is normal. No respiratory distress.   Skin:     General: Skin is warm and dry.   Neurological:      Mental Status: She is alert and oriented to person, place, and time.   Psychiatric:         Behavior: Behavior normal.         Thought Content: Thought content normal.         Judgment: Judgment normal. "         Data:  Creatinine 1.3, down from 1.39    Gabi Case MD  Internal Medicine         [1]   Current Outpatient Medications on File Prior to Visit   Medication Sig Dispense Refill    albuterol (PROVENTIL/VENTOLIN HFA) 90 mcg/actuation inhaler Inhale 2 puffs into the lungs every 6 (six) hours as needed for Wheezing. Rescue 18 g 6    amLODIPine (NORVASC) 5 MG tablet Take 1 tablet (5 mg total) by mouth 2 (two) times daily. 180 tablet 3    aspirin (ECOTRIN) 81 MG EC tablet Take 81 mg by mouth every evening.      atenoloL (TENORMIN) 100 MG tablet Take 1 tablet (100 mg total) by mouth once daily. 90 tablet 1    blood-glucose meter (ONETOUCH ULTRA2 METER) Misc USE AS DIRECTED 1 each 0    celecoxib (CELEBREX) 100 MG capsule Take 100 mg by mouth as needed.      cholecalciferol, vitamin D3, (VITAMIN D3) 2,000 unit Cap Take 1 capsule by mouth 2 (two) times a day.  3    clonazePAM (KLONOPIN) 0.5 MG tablet Take 1 tablet (0.5 mg total) by mouth 2 (two) times daily as needed for Anxiety. 30 tablet 0    co-enzyme Q-10 30 mg capsule Take 30 mg by mouth.      DEXCOM G7  Misc 1 Device by Misc.(Non-Drug; Combo Route) route continuous. 1 each 0    DEXCOM G7 SENSOR Samantha 1 Device by Misc.(Non-Drug; Combo Route) route every 10 days. 9 each 3    dicyclomine (BENTYL) 10 MG capsule Take 10 mg by mouth as needed.      fenofibrate micronized (LOFIBRA) 200 MG Cap TAKE 1 CAPSULE DAILY 90 capsule 2    fluticasone-umeclidin-vilanter (TRELEGY ELLIPTA) 200-62.5-25 mcg inhaler Inhale 1 puff into the lungs once daily. 90 each 3    gabapentin (NEURONTIN) 300 MG capsule Take 1 capsule (300 mg total) by mouth 3 (three) times daily. 270 capsule 1    ipratropium (ATROVENT) 21 mcg (0.03 %) nasal spray 2 sprays by Each Nostril route 2 (two) times daily. 30 mL 6    latanoprost 0.005 % ophthalmic solution Place 1 drop into both eyes nightly.      levocetirizine (XYZAL) 5 MG tablet Take 1 tablet (5 mg total) by mouth every evening. 90 tablet 3     "levothyroxine (SYNTHROID) 75 MCG tablet TAKE 1 TABLET BEFORE BREAKFAST 90 tablet 2    LIDOcaine (LIDODERM) 5 % 1 patch daily as needed.      loperamide (IMODIUM A-D) 2 mg Tab Take 2 mg by mouth as needed.      metFORMIN (GLUCOPHAGE-XR) 500 MG ER 24hr tablet TAKE 2 TABLETS DAILY WITH BREAKFAST 180 tablet 3    NOVOLOG FLEXPEN U-100 INSULIN 100 unit/mL (3 mL) InPn pen Inject 28 Units into the skin 3 (three) times daily with meals. Inject into skin 3x/day per correction scale,. Max TDD 108u      olmesartan (BENICAR) 40 MG tablet Take 1 tablet (40 mg total) by mouth once daily. 90 tablet 3    omeprazole (PRILOSEC) 40 MG capsule Take 40 mg by mouth 2 (two) times daily before meals.  0    ONETOUCH DELICA PLUS LANCET 33 gauge Misc USE 1 TO CHECK GLUCOSE THREE TIMES DAILY AS NEEDED 300 each 3    orphenadrine (NORFLEX) 100 mg tablet Take 1 tablet (100 mg total) by mouth 2 (two) times daily as needed. 36 tablet 0    pen needle, diabetic (NOVOFINE 32) 32 gauge x 1/4" Ndle use 4 times daily with insulin 400 each 0    rOPINIRole (REQUIP) 0.25 MG tablet Take 1 tablet (0.25 mg total) by mouth every evening. 90 tablet 3    rosuvastatin (CRESTOR) 10 MG tablet Take 1 tablet (10 mg total) by mouth once daily. 90 tablet 3    sertraline (ZOLOFT) 50 MG tablet Take 50 mg by mouth.      torsemide (DEMADEX) 20 MG Tab Take 1 tablet (20 mg total) by mouth once daily. 30 tablet 11    TOUJEO MAX U-300 SOLOSTAR 300 unit/mL (3 mL) insulin pen Inject 84 Units into the skin once daily. Take only 72 units on the evening of 4/29/25, then resume at previous dose. 25.2 mL 3    TRUE METRIX GLUCOSE TEST STRIP Strp USE 1 STRIP THREE TIMES A  strip 2    TRUEPLUS LANCETS 33 gauge Misc USE TO TEST BLOOD SUGAR THREE TIMES A  each 3    [DISCONTINUED] empagliflozin (JARDIANCE) 10 mg tablet Take 1 tablet (10 mg total) by mouth once daily.       Current Facility-Administered Medications on File Prior to Visit   Medication Dose Route Frequency " Provider Last Rate Last Admin    0.9%  NaCl infusion   Intravenous Continuous Job Porter MD 10 mL/hr at 12/20/19 1245 New Bag at 12/20/19 1245    ferumoxytoL injection 510 mg  510 mg Intravenous Once Agueda Amador NP        ferumoxytoL injection 510 mg  510 mg Intravenous Once Agueda Amador NP        lactated ringers infusion   Intravenous Once PRN Job Porter MD        [DISCONTINUED] sodium chloride 0.9% flush 10 mL  10 mL Intravenous PRN Annie Henson MD

## 2025-05-09 ENCOUNTER — RESULTS FOLLOW-UP (OUTPATIENT)
Dept: FAMILY MEDICINE | Facility: CLINIC | Age: 70
End: 2025-05-09

## 2025-05-16 DIAGNOSIS — Z79.4 TYPE 2 DIABETES MELLITUS WITH STAGE 3A CHRONIC KIDNEY DISEASE, WITH LONG-TERM CURRENT USE OF INSULIN: Primary | ICD-10-CM

## 2025-05-16 DIAGNOSIS — N18.31 TYPE 2 DIABETES MELLITUS WITH STAGE 3A CHRONIC KIDNEY DISEASE, WITH LONG-TERM CURRENT USE OF INSULIN: Primary | ICD-10-CM

## 2025-05-16 DIAGNOSIS — E11.22 TYPE 2 DIABETES MELLITUS WITH STAGE 3A CHRONIC KIDNEY DISEASE, WITH LONG-TERM CURRENT USE OF INSULIN: Primary | ICD-10-CM

## 2025-05-16 RX ORDER — INSULIN GLARGINE 300 U/ML
84 INJECTION, SOLUTION SUBCUTANEOUS DAILY
Qty: 18 PEN | Refills: 0 | Status: SHIPPED | OUTPATIENT
Start: 2025-05-16

## 2025-05-19 ENCOUNTER — TELEPHONE (OUTPATIENT)
Dept: ORTHOPEDICS | Facility: CLINIC | Age: 70
End: 2025-05-19
Payer: MEDICARE

## 2025-05-19 NOTE — TELEPHONE ENCOUNTER
----- Message from Med Assistant Solano sent at 5/19/2025 11:15 AM CDT -----  Contact: pt  Calling to change her anesthia type Call back

## 2025-05-20 ENCOUNTER — TELEPHONE (OUTPATIENT)
Dept: ENDOCRINOLOGY | Facility: CLINIC | Age: 70
End: 2025-05-20
Payer: MEDICARE

## 2025-05-21 ENCOUNTER — RESULTS FOLLOW-UP (OUTPATIENT)
Dept: FAMILY MEDICINE | Facility: CLINIC | Age: 70
End: 2025-05-21

## 2025-05-28 ENCOUNTER — PATIENT MESSAGE (OUTPATIENT)
Dept: ENDOCRINOLOGY | Facility: CLINIC | Age: 70
End: 2025-05-28
Payer: MEDICARE

## 2025-06-03 ENCOUNTER — HOSPITAL ENCOUNTER (OUTPATIENT)
Facility: HOSPITAL | Age: 70
Discharge: HOME | End: 2025-06-03
Attending: ORTHOPAEDIC SURGERY | Admitting: ORTHOPAEDIC SURGERY
Payer: MEDICARE

## 2025-06-03 ENCOUNTER — ANESTHESIA (OUTPATIENT)
Dept: SURGERY | Facility: HOSPITAL | Age: 70
End: 2025-06-03
Payer: MEDICARE

## 2025-06-03 ENCOUNTER — ANESTHESIA EVENT (OUTPATIENT)
Dept: SURGERY | Facility: HOSPITAL | Age: 70
End: 2025-06-03
Payer: MEDICARE

## 2025-06-03 ENCOUNTER — TELEPHONE (OUTPATIENT)
Dept: PHARMACY | Facility: CLINIC | Age: 70
End: 2025-06-03
Payer: MEDICARE

## 2025-06-03 DIAGNOSIS — G56.01 CARPAL TUNNEL SYNDROME ON RIGHT: ICD-10-CM

## 2025-06-03 DIAGNOSIS — M65.331 TRIGGER FINGER, RIGHT MIDDLE FINGER: ICD-10-CM

## 2025-06-03 LAB — GLUCOSE SERPL-MCNC: 131 MG/DL (ref 70–110)

## 2025-06-03 PROCEDURE — 63600175 PHARM REV CODE 636 W HCPCS: Mod: PO | Performed by: ORTHOPAEDIC SURGERY

## 2025-06-03 PROCEDURE — 25000003 PHARM REV CODE 250: Mod: PO | Performed by: NURSE ANESTHETIST, CERTIFIED REGISTERED

## 2025-06-03 PROCEDURE — 63600175 PHARM REV CODE 636 W HCPCS: Mod: PO | Performed by: NURSE ANESTHETIST, CERTIFIED REGISTERED

## 2025-06-03 PROCEDURE — 37000008 HC ANESTHESIA 1ST 15 MINUTES: Mod: PO | Performed by: ORTHOPAEDIC SURGERY

## 2025-06-03 PROCEDURE — 36000706: Mod: PO | Performed by: ORTHOPAEDIC SURGERY

## 2025-06-03 PROCEDURE — 37000009 HC ANESTHESIA EA ADD 15 MINS: Mod: PO | Performed by: ORTHOPAEDIC SURGERY

## 2025-06-03 PROCEDURE — 82962 GLUCOSE BLOOD TEST: CPT | Mod: PO | Performed by: ORTHOPAEDIC SURGERY

## 2025-06-03 PROCEDURE — 36000707: Mod: PO | Performed by: ORTHOPAEDIC SURGERY

## 2025-06-03 PROCEDURE — 63600175 PHARM REV CODE 636 W HCPCS: Mod: PO | Performed by: PHYSICIAN ASSISTANT

## 2025-06-03 PROCEDURE — 26055 INCISE FINGER TENDON SHEATH: CPT | Mod: 51,F7,, | Performed by: ORTHOPAEDIC SURGERY

## 2025-06-03 PROCEDURE — 64721 CARPAL TUNNEL SURGERY: CPT | Mod: RT,,, | Performed by: ORTHOPAEDIC SURGERY

## 2025-06-03 PROCEDURE — 71000015 HC POSTOP RECOV 1ST HR: Mod: PO | Performed by: ORTHOPAEDIC SURGERY

## 2025-06-03 PROCEDURE — 71000033 HC RECOVERY, INTIAL HOUR: Mod: PO | Performed by: ORTHOPAEDIC SURGERY

## 2025-06-03 PROCEDURE — 25000242 PHARM REV CODE 250 ALT 637 W/ HCPCS: Mod: PO | Performed by: NURSE ANESTHETIST, CERTIFIED REGISTERED

## 2025-06-03 RX ORDER — LIDOCAINE HYDROCHLORIDE 10 MG/ML
INJECTION, SOLUTION EPIDURAL; INFILTRATION; INTRACAUDAL; PERINEURAL
Status: DISCONTINUED | OUTPATIENT
Start: 2025-06-03 | End: 2025-06-03

## 2025-06-03 RX ORDER — BUPIVACAINE HYDROCHLORIDE 2.5 MG/ML
INJECTION, SOLUTION EPIDURAL; INFILTRATION; INTRACAUDAL; PERINEURAL
Status: DISCONTINUED | OUTPATIENT
Start: 2025-06-03 | End: 2025-06-03 | Stop reason: HOSPADM

## 2025-06-03 RX ORDER — ACETAMINOPHEN 10 MG/ML
INJECTION, SOLUTION INTRAVENOUS
Status: DISCONTINUED | OUTPATIENT
Start: 2025-06-03 | End: 2025-06-03

## 2025-06-03 RX ORDER — DEXAMETHASONE SODIUM PHOSPHATE 4 MG/ML
INJECTION, SOLUTION INTRA-ARTICULAR; INTRALESIONAL; INTRAMUSCULAR; INTRAVENOUS; SOFT TISSUE
Status: DISCONTINUED | OUTPATIENT
Start: 2025-06-03 | End: 2025-06-03

## 2025-06-03 RX ORDER — PROPOFOL 10 MG/ML
VIAL (ML) INTRAVENOUS
Status: DISCONTINUED | OUTPATIENT
Start: 2025-06-03 | End: 2025-06-03

## 2025-06-03 RX ORDER — LIDOCAINE HYDROCHLORIDE 10 MG/ML
INJECTION, SOLUTION EPIDURAL; INFILTRATION; INTRACAUDAL; PERINEURAL
Status: DISCONTINUED | OUTPATIENT
Start: 2025-06-03 | End: 2025-06-03 | Stop reason: HOSPADM

## 2025-06-03 RX ORDER — DEXMEDETOMIDINE HYDROCHLORIDE 100 UG/ML
INJECTION, SOLUTION INTRAVENOUS
Status: DISCONTINUED | OUTPATIENT
Start: 2025-06-03 | End: 2025-06-03

## 2025-06-03 RX ORDER — FAMOTIDINE 10 MG/ML
INJECTION, SOLUTION INTRAVENOUS
Status: DISCONTINUED | OUTPATIENT
Start: 2025-06-03 | End: 2025-06-03

## 2025-06-03 RX ORDER — ALBUTEROL SULFATE 90 UG/1
INHALANT RESPIRATORY (INHALATION)
Status: DISCONTINUED | OUTPATIENT
Start: 2025-06-03 | End: 2025-06-03

## 2025-06-03 RX ORDER — CEFAZOLIN SODIUM 1 G/3ML
2 INJECTION, POWDER, FOR SOLUTION INTRAMUSCULAR; INTRAVENOUS
Status: COMPLETED | OUTPATIENT
Start: 2025-06-03 | End: 2025-06-03

## 2025-06-03 RX ORDER — FENTANYL CITRATE 50 UG/ML
INJECTION, SOLUTION INTRAMUSCULAR; INTRAVENOUS
Status: DISCONTINUED | OUTPATIENT
Start: 2025-06-03 | End: 2025-06-03

## 2025-06-03 RX ORDER — SODIUM CHLORIDE, SODIUM LACTATE, POTASSIUM CHLORIDE, CALCIUM CHLORIDE 600; 310; 30; 20 MG/100ML; MG/100ML; MG/100ML; MG/100ML
INJECTION, SOLUTION INTRAVENOUS CONTINUOUS PRN
Status: DISCONTINUED | OUTPATIENT
Start: 2025-06-03 | End: 2025-06-03

## 2025-06-03 RX ORDER — ONDANSETRON HYDROCHLORIDE 2 MG/ML
INJECTION, SOLUTION INTRAMUSCULAR; INTRAVENOUS
Status: DISCONTINUED | OUTPATIENT
Start: 2025-06-03 | End: 2025-06-03

## 2025-06-03 RX ORDER — KETAMINE HCL IN 0.9 % NACL 50 MG/5 ML
SYRINGE (ML) INTRAVENOUS
Status: DISCONTINUED | OUTPATIENT
Start: 2025-06-03 | End: 2025-06-03

## 2025-06-03 RX ORDER — PHENYLEPHRINE HYDROCHLORIDE 10 MG/ML
INJECTION INTRAVENOUS
Status: DISCONTINUED | OUTPATIENT
Start: 2025-06-03 | End: 2025-06-03

## 2025-06-03 RX ORDER — OXYCODONE HYDROCHLORIDE 5 MG/1
5 TABLET ORAL EVERY 4 HOURS PRN
Qty: 5 TABLET | Refills: 0 | Status: SHIPPED | OUTPATIENT
Start: 2025-06-03

## 2025-06-03 RX ADMIN — DEXAMETHASONE SODIUM PHOSPHATE 4 MG: 4 INJECTION, SOLUTION INTRAMUSCULAR; INTRAVENOUS at 08:06

## 2025-06-03 RX ADMIN — ONDANSETRON 4 MG: 2 INJECTION INTRAMUSCULAR; INTRAVENOUS at 08:06

## 2025-06-03 RX ADMIN — PHENYLEPHRINE HYDROCHLORIDE 100 MCG: 10 INJECTION INTRAVENOUS at 09:06

## 2025-06-03 RX ADMIN — DEXMEDETOMIDINE HYDROCHLORIDE 10 MCG: 100 INJECTION, SOLUTION, CONCENTRATE INTRAVENOUS at 08:06

## 2025-06-03 RX ADMIN — LIDOCAINE HYDROCHLORIDE 50 MG: 10 INJECTION, SOLUTION EPIDURAL; INFILTRATION; INTRACAUDAL; PERINEURAL at 08:06

## 2025-06-03 RX ADMIN — PROPOFOL 160 MG: 10 INJECTION, EMULSION INTRAVENOUS at 08:06

## 2025-06-03 RX ADMIN — SODIUM CHLORIDE, SODIUM LACTATE, POTASSIUM CHLORIDE, AND CALCIUM CHLORIDE: .6; .31; .03; .02 INJECTION, SOLUTION INTRAVENOUS at 08:06

## 2025-06-03 RX ADMIN — Medication 25 MG: at 08:06

## 2025-06-03 RX ADMIN — FENTANYL CITRATE 25 MCG: 50 INJECTION, SOLUTION INTRAMUSCULAR; INTRAVENOUS at 09:06

## 2025-06-03 RX ADMIN — CEFAZOLIN 2 G: 330 INJECTION, POWDER, FOR SOLUTION INTRAMUSCULAR; INTRAVENOUS at 08:06

## 2025-06-03 RX ADMIN — GLYCOPYRROLATE 0.2 MG: 0.2 INJECTION, SOLUTION INTRAMUSCULAR; INTRAVENOUS at 09:06

## 2025-06-03 RX ADMIN — FAMOTIDINE 20 MG: 10 INJECTION, SOLUTION INTRAVENOUS at 09:06

## 2025-06-03 RX ADMIN — ALBUTEROL SULFATE 2 PUFF: 108 AEROSOL, METERED RESPIRATORY (INHALATION) at 09:06

## 2025-06-03 RX ADMIN — ACETAMINOPHEN 1000 MG: 10 INJECTION INTRAVENOUS at 09:06

## 2025-06-04 ENCOUNTER — TELEPHONE (OUTPATIENT)
Dept: ORTHOPEDICS | Facility: CLINIC | Age: 70
End: 2025-06-04
Payer: MEDICARE

## 2025-06-04 VITALS
TEMPERATURE: 98 F | WEIGHT: 220 LBS | DIASTOLIC BLOOD PRESSURE: 58 MMHG | RESPIRATION RATE: 17 BRPM | SYSTOLIC BLOOD PRESSURE: 101 MMHG | HEIGHT: 65 IN | HEART RATE: 68 BPM | OXYGEN SATURATION: 95 % | BODY MASS INDEX: 36.65 KG/M2

## 2025-06-12 ENCOUNTER — TELEPHONE (OUTPATIENT)
Dept: ENDOCRINOLOGY | Facility: CLINIC | Age: 70
End: 2025-06-12
Payer: MEDICARE

## 2025-06-12 ENCOUNTER — PATIENT MESSAGE (OUTPATIENT)
Dept: ENDOCRINOLOGY | Facility: CLINIC | Age: 70
End: 2025-06-12
Payer: MEDICARE

## 2025-06-17 DIAGNOSIS — N18.31 TYPE 2 DIABETES MELLITUS WITH STAGE 3A CHRONIC KIDNEY DISEASE, WITH LONG-TERM CURRENT USE OF INSULIN: ICD-10-CM

## 2025-06-17 DIAGNOSIS — E11.22 TYPE 2 DIABETES MELLITUS WITH STAGE 3A CHRONIC KIDNEY DISEASE, WITH LONG-TERM CURRENT USE OF INSULIN: ICD-10-CM

## 2025-06-17 DIAGNOSIS — Z79.4 TYPE 2 DIABETES MELLITUS WITH STAGE 3A CHRONIC KIDNEY DISEASE, WITH LONG-TERM CURRENT USE OF INSULIN: ICD-10-CM

## 2025-06-17 DIAGNOSIS — E11.9 TYPE 2 DIABETES MELLITUS WITHOUT COMPLICATION, WITHOUT LONG-TERM CURRENT USE OF INSULIN: ICD-10-CM

## 2025-06-17 RX ORDER — PEN NEEDLE, DIABETIC 32 GX 1/6"
1 NEEDLE, DISPOSABLE MISCELLANEOUS 4 TIMES DAILY
Qty: 200 EACH | Refills: 3 | Status: SHIPPED | OUTPATIENT
Start: 2025-06-17 | End: 2025-07-17

## 2025-06-17 RX ORDER — BLOOD-GLUCOSE SENSOR
1 EACH MISCELLANEOUS
Qty: 9 EACH | Refills: 3 | Status: SHIPPED | OUTPATIENT
Start: 2025-06-17 | End: 2026-06-17

## 2025-06-17 NOTE — TELEPHONE ENCOUNTER
"Patient is requesting refill for:     pen needle, diabetic (NOVOFINE 32) 32 gauge x 1/4" Ndle   And also DEXCOM G7 SENSOR Samantha  "

## 2025-06-18 ENCOUNTER — OFFICE VISIT (OUTPATIENT)
Dept: ORTHOPEDICS | Facility: CLINIC | Age: 70
End: 2025-06-18
Payer: MEDICARE

## 2025-06-18 DIAGNOSIS — G56.01 CARPAL TUNNEL SYNDROME ON RIGHT: Primary | ICD-10-CM

## 2025-06-18 DIAGNOSIS — M65.331 TRIGGER FINGER, RIGHT MIDDLE FINGER: ICD-10-CM

## 2025-06-18 PROCEDURE — 1125F AMNT PAIN NOTED PAIN PRSNT: CPT | Mod: CPTII,S$GLB,, | Performed by: ORTHOPAEDIC SURGERY

## 2025-06-18 PROCEDURE — 1159F MED LIST DOCD IN RCRD: CPT | Mod: CPTII,S$GLB,, | Performed by: ORTHOPAEDIC SURGERY

## 2025-06-18 PROCEDURE — 3051F HG A1C>EQUAL 7.0%<8.0%: CPT | Mod: CPTII,S$GLB,, | Performed by: ORTHOPAEDIC SURGERY

## 2025-06-18 PROCEDURE — 4010F ACE/ARB THERAPY RXD/TAKEN: CPT | Mod: CPTII,S$GLB,, | Performed by: ORTHOPAEDIC SURGERY

## 2025-06-18 PROCEDURE — 99024 POSTOP FOLLOW-UP VISIT: CPT | Mod: S$GLB,,, | Performed by: ORTHOPAEDIC SURGERY

## 2025-06-18 PROCEDURE — 99999 PR PBB SHADOW E&M-EST. PATIENT-LVL III: CPT | Mod: PBBFAC,,, | Performed by: ORTHOPAEDIC SURGERY

## 2025-06-18 NOTE — PROGRESS NOTES
Ms Kearns returns to clinic today.  He is status post right carpal tunnel release and right middle finger trigger release.  He is overall doing well.      Physical exam: Examination of the right hand reveals that both wounds are healing well.  There are sutures in place.  There was minimum edema.  There was no erythema or drainage.  She is missing half a cm composite flexion.      Assessment: Status post right middle finger trigger release and right carpal tunnel release     Plan:     1. Sutures were removed today and Steri-Strips are placed     2.  She will begin range of motion at home     3. She will limit weight-bearing to 3-4 lb     4.  She will follow up in 2-3 weeks for repeat evaluation.  If he has not regained full motion and we will consider therapy

## 2025-06-26 ENCOUNTER — OFFICE VISIT (OUTPATIENT)
Dept: CARDIOLOGY | Facility: CLINIC | Age: 70
End: 2025-06-26
Payer: MEDICARE

## 2025-06-26 ENCOUNTER — LAB VISIT (OUTPATIENT)
Dept: LAB | Facility: HOSPITAL | Age: 70
End: 2025-06-26
Payer: MEDICARE

## 2025-06-26 VITALS
DIASTOLIC BLOOD PRESSURE: 68 MMHG | SYSTOLIC BLOOD PRESSURE: 114 MMHG | HEART RATE: 69 BPM | WEIGHT: 224 LBS | BODY MASS INDEX: 37.32 KG/M2 | HEIGHT: 65 IN

## 2025-06-26 DIAGNOSIS — Z79.4 TYPE 2 DIABETES MELLITUS WITH STAGE 3A CHRONIC KIDNEY DISEASE, WITH LONG-TERM CURRENT USE OF INSULIN: ICD-10-CM

## 2025-06-26 DIAGNOSIS — R00.2 PALPITATIONS: ICD-10-CM

## 2025-06-26 DIAGNOSIS — I27.20 PULMONARY HTN: ICD-10-CM

## 2025-06-26 DIAGNOSIS — E11.22 TYPE 2 DIABETES MELLITUS WITH STAGE 3A CHRONIC KIDNEY DISEASE, WITH LONG-TERM CURRENT USE OF INSULIN: ICD-10-CM

## 2025-06-26 DIAGNOSIS — I10 HYPERTENSION, UNSPECIFIED TYPE: ICD-10-CM

## 2025-06-26 DIAGNOSIS — I77.810 ACQUIRED DILATION OF ASCENDING AORTA AND AORTIC ROOT: ICD-10-CM

## 2025-06-26 DIAGNOSIS — N18.31 TYPE 2 DIABETES MELLITUS WITH STAGE 3A CHRONIC KIDNEY DISEASE, WITH LONG-TERM CURRENT USE OF INSULIN: ICD-10-CM

## 2025-06-26 DIAGNOSIS — E66.01 SEVERE OBESITY (BMI 35.0-39.9) WITH COMORBIDITY: ICD-10-CM

## 2025-06-26 DIAGNOSIS — E03.4 HYPOTHYROIDISM DUE TO ACQUIRED ATROPHY OF THYROID: ICD-10-CM

## 2025-06-26 DIAGNOSIS — N18.31 STAGE 3A CHRONIC KIDNEY DISEASE: ICD-10-CM

## 2025-06-26 DIAGNOSIS — E78.5 HYPERLIPIDEMIA, UNSPECIFIED HYPERLIPIDEMIA TYPE: ICD-10-CM

## 2025-06-26 DIAGNOSIS — I50.32 CHRONIC HEART FAILURE WITH PRESERVED EJECTION FRACTION (HFPEF): ICD-10-CM

## 2025-06-26 DIAGNOSIS — I25.10 CORONARY ARTERY DISEASE INVOLVING NATIVE CORONARY ARTERY OF NATIVE HEART WITHOUT ANGINA PECTORIS: Primary | ICD-10-CM

## 2025-06-26 LAB
ABSOLUTE EOSINOPHIL (OHS): 0.31 K/UL
ABSOLUTE MONOCYTE (OHS): 1.17 K/UL (ref 0.3–1)
ABSOLUTE NEUTROPHIL COUNT (OHS): 6.03 K/UL (ref 1.8–7.7)
ANION GAP (OHS): 11 MMOL/L (ref 8–16)
BASOPHILS # BLD AUTO: 0.17 K/UL
BASOPHILS NFR BLD AUTO: 1.7 %
BNP SERPL-MCNC: 71 PG/ML (ref 0–99)
BUN SERPL-MCNC: 19 MG/DL (ref 8–23)
CALCIUM SERPL-MCNC: 9.9 MG/DL (ref 8.7–10.5)
CHLORIDE SERPL-SCNC: 105 MMOL/L (ref 95–110)
CO2 SERPL-SCNC: 21 MMOL/L (ref 23–29)
CREAT SERPL-MCNC: 1.3 MG/DL (ref 0.5–1.4)
EAG (OHS): 177 MG/DL (ref 68–131)
ERYTHROCYTE [DISTWIDTH] IN BLOOD BY AUTOMATED COUNT: 13.4 % (ref 11.5–14.5)
GFR SERPLBLD CREATININE-BSD FMLA CKD-EPI: 44 ML/MIN/1.73/M2
GLUCOSE SERPL-MCNC: 136 MG/DL (ref 70–110)
HBA1C MFR BLD: 7.8 % (ref 4–5.6)
HCT VFR BLD AUTO: 36.1 % (ref 37–48.5)
HGB BLD-MCNC: 11.4 GM/DL (ref 12–16)
IMM GRANULOCYTES # BLD AUTO: 0.06 K/UL (ref 0–0.04)
IMM GRANULOCYTES NFR BLD AUTO: 0.6 % (ref 0–0.5)
LYMPHOCYTES # BLD AUTO: 2.42 K/UL (ref 1–4.8)
MCH RBC QN AUTO: 28 PG (ref 27–31)
MCHC RBC AUTO-ENTMCNC: 31.6 G/DL (ref 32–36)
MCV RBC AUTO: 89 FL (ref 82–98)
NUCLEATED RBC (/100WBC) (OHS): 0 /100 WBC
PLATELET # BLD AUTO: 373 K/UL (ref 150–450)
PMV BLD AUTO: 9.6 FL (ref 9.2–12.9)
POTASSIUM SERPL-SCNC: 4.5 MMOL/L (ref 3.5–5.1)
RBC # BLD AUTO: 4.07 M/UL (ref 4–5.4)
RELATIVE EOSINOPHIL (OHS): 3.1 %
RELATIVE LYMPHOCYTE (OHS): 23.8 % (ref 18–48)
RELATIVE MONOCYTE (OHS): 11.5 % (ref 4–15)
RELATIVE NEUTROPHIL (OHS): 59.3 % (ref 38–73)
SODIUM SERPL-SCNC: 137 MMOL/L (ref 136–145)
TSH SERPL-ACNC: 2.17 UIU/ML (ref 0.4–4)
WBC # BLD AUTO: 10.16 K/UL (ref 3.9–12.7)

## 2025-06-26 PROCEDURE — 36415 COLL VENOUS BLD VENIPUNCTURE: CPT | Mod: PO

## 2025-06-26 PROCEDURE — 99214 OFFICE O/P EST MOD 30 MIN: CPT | Mod: S$GLB,,,

## 2025-06-26 PROCEDURE — 3288F FALL RISK ASSESSMENT DOCD: CPT | Mod: CPTII,S$GLB,,

## 2025-06-26 PROCEDURE — 4010F ACE/ARB THERAPY RXD/TAKEN: CPT | Mod: CPTII,S$GLB,,

## 2025-06-26 PROCEDURE — 3078F DIAST BP <80 MM HG: CPT | Mod: CPTII,S$GLB,,

## 2025-06-26 PROCEDURE — 83880 ASSAY OF NATRIURETIC PEPTIDE: CPT

## 2025-06-26 PROCEDURE — 1101F PT FALLS ASSESS-DOCD LE1/YR: CPT | Mod: CPTII,S$GLB,,

## 2025-06-26 PROCEDURE — 3074F SYST BP LT 130 MM HG: CPT | Mod: CPTII,S$GLB,,

## 2025-06-26 PROCEDURE — 1159F MED LIST DOCD IN RCRD: CPT | Mod: CPTII,S$GLB,,

## 2025-06-26 PROCEDURE — 85025 COMPLETE CBC W/AUTO DIFF WBC: CPT

## 2025-06-26 PROCEDURE — 84443 ASSAY THYROID STIM HORMONE: CPT

## 2025-06-26 PROCEDURE — 3051F HG A1C>EQUAL 7.0%<8.0%: CPT | Mod: CPTII,S$GLB,,

## 2025-06-26 PROCEDURE — 3008F BODY MASS INDEX DOCD: CPT | Mod: CPTII,S$GLB,,

## 2025-06-26 PROCEDURE — 83036 HEMOGLOBIN GLYCOSYLATED A1C: CPT

## 2025-06-26 PROCEDURE — 1126F AMNT PAIN NOTED NONE PRSNT: CPT | Mod: CPTII,S$GLB,,

## 2025-06-26 PROCEDURE — 99999 PR PBB SHADOW E&M-EST. PATIENT-LVL V: CPT | Mod: PBBFAC,,,

## 2025-06-26 PROCEDURE — 82310 ASSAY OF CALCIUM: CPT

## 2025-06-26 NOTE — PROGRESS NOTES
[facility]  Date: 6/26/25    Patient: Bing Kearns, 1955, [id number]  Primary Care Provider: Gabi Case MD     Chief Complaint: Follow up     Subjective:       Bing Kearns is a 69 y.o. female who presents for follow up. They follow with Dr. Arevalo.    CBC, CMP, lipid panel stable. At last office visit, patient doing well from cardiac standpoint for which medications were continued as is.     Since then, patient with improvement in palpitations. Notes  persistent non-exertional dyspnea with new cough. Unsure if she experiences orthopnea as she sleeps with head of bed elevated. Average -120s at home. Weight stable. Denies fever, chills, chest discomfort, dizziness, syncope, orthopnea, PND, edema.    Focused Past History includes:  Coronary artery disease   [facility] 7/2024: minimal epicardial CAD   Chronic heart failure with preserved ejection fraction  TTE 5/2024: LVEF 60-65%, grade I DD, trace MR, mild TR, ascending aorta 3.83 cm, PASP 33  Pulmonary HTN  Premature contractions   Event monitor 12/2024: <1% PACs, <1% PVCs  Dilation of ascending aorta and aortic root  CTA chest aorta 11/2024: ectatic ascending aorta 4 cm  Hypertension  Hyperlipidemia  Type 2 diabetes mellitus   Stage 3b chronic kidney disease  Class 1 obesity  FRANCISCO    Current Outpatient Medications   Medication Sig    albuterol (PROVENTIL/VENTOLIN HFA) 90 mcg/actuation inhaler Inhale 2 puffs into the lungs every 6 (six) hours as needed for Wheezing. Rescue    amLODIPine (NORVASC) 5 MG tablet Take 1 tablet (5 mg total) by mouth 2 (two) times daily.    aspirin (ECOTRIN) 81 MG EC tablet Take 81 mg by mouth every evening.    atenoloL (TENORMIN) 100 MG tablet Take 1 tablet (100 mg total) by mouth once daily.    celecoxib (CELEBREX) 100 MG capsule Take 100 mg by mouth as needed.    cholecalciferol, vitamin D3, (VITAMIN D3) 2,000 unit Cap Take 1 capsule by mouth 2 (two) times a day.    clonazePAM  "(KLONOPIN) 0.5 MG tablet Take 1 tablet (0.5 mg total) by mouth 2 (two) times daily as needed for Anxiety.    co-enzyme Q-10 30 mg capsule Take 30 mg by mouth.    DEXCOM G7  Misc 1 Device by Misc.(Non-Drug; Combo Route) route continuous.    DEXCOM G7 SENSOR Samantha 1 Device by Misc.(Non-Drug; Combo Route) route every 10 days.    dicyclomine (BENTYL) 10 MG capsule Take 10 mg by mouth as needed.    fenofibrate micronized (LOFIBRA) 200 MG Cap TAKE 1 CAPSULE DAILY    fluticasone-umeclidin-vilanter (TRELEGY ELLIPTA) 200-62.5-25 mcg inhaler Inhale 1 puff into the lungs once daily.    gabapentin (NEURONTIN) 300 MG capsule Take 1 capsule (300 mg total) by mouth 3 (three) times daily.    ipratropium (ATROVENT) 21 mcg (0.03 %) nasal spray 2 sprays by Each Nostril route 2 (two) times daily.    latanoprost 0.005 % ophthalmic solution Place 1 drop into both eyes nightly.    levocetirizine (XYZAL) 5 MG tablet Take 1 tablet (5 mg total) by mouth every evening.    levothyroxine (SYNTHROID) 75 MCG tablet TAKE 1 TABLET BEFORE BREAKFAST    LIDOcaine (LIDODERM) 5 % 1 patch daily as needed.    loperamide (IMODIUM A-D) 2 mg Tab Take 2 mg by mouth as needed.    metFORMIN (GLUCOPHAGE-XR) 500 MG ER 24hr tablet TAKE 2 TABLETS DAILY WITH BREAKFAST    NOVOLOG FLEXPEN U-100 INSULIN 100 unit/mL (3 mL) InPn pen Inject 28 Units into the skin 3 (three) times daily with meals. Inject into skin 3x/day per correction scale,. Max TDD 108u    olmesartan (BENICAR) 40 MG tablet Take 1 tablet (40 mg total) by mouth once daily.    omeprazole (PRILOSEC) 40 MG capsule Take 40 mg by mouth 2 (two) times daily before meals.    ONETOUCH DELICA PLUS LANCET 33 gauge Misc USE 1 TO CHECK GLUCOSE THREE TIMES DAILY AS NEEDED    orphenadrine (NORFLEX) 100 mg tablet Take 1 tablet (100 mg total) by mouth 2 (two) times daily as needed.    pen needle, diabetic (NOVOFINE PLUS) 32 gauge x 1/6" Ndle Inject 1 Needle into the skin 4 (four) times daily. use 4 times daily with " insulin    rOPINIRole (REQUIP) 0.25 MG tablet Take 1 tablet (0.25 mg total) by mouth every evening.    rosuvastatin (CRESTOR) 10 MG tablet Take 1 tablet (10 mg total) by mouth once daily.    sertraline (ZOLOFT) 50 MG tablet Take 50 mg by mouth.    torsemide (DEMADEX) 20 MG Tab Take 1 tablet (20 mg total) by mouth once daily.    TOUJEO MAX U-300 SOLOSTAR 300 unit/mL (3 mL) insulin pen Inject 84 Units into the skin once daily. Take only 72 units on the evening of 4/29/25, then resume at previous dose.    TOUJEO SOLOSTAR U-300 INSULIN 300 unit/mL (1.5 mL) InPn pen Inject 84 Units into the skin once daily.    TRUE METRIX GLUCOSE TEST STRIP Strp USE 1 STRIP THREE TIMES A DAY    TRUEPLUS LANCETS 33 gauge Misc USE TO TEST BLOOD SUGAR THREE TIMES A DAY    blood-glucose meter (ONETOUCH ULTRA2 METER) Misc USE AS DIRECTED    oxyCODONE (ROXICODONE) 5 MG immediate release tablet Take 1 tablet (5 mg total) by mouth every 4 (four) hours as needed for Pain. (Patient not taking: Reported on 6/26/2025)     Current Facility-Administered Medications   Medication    ferumoxytoL injection 510 mg    ferumoxytoL injection 510 mg     Facility-Administered Medications Ordered in Other Visits   Medication    0.9%  NaCl infusion    lactated ringers infusion            Objective       Review of Systems  Constitutional: negative for fevers, night sweats, and weight loss  Eyes: negative for visual disturbance, diplopia  Respiratory: negative for cough, hemoptysis, sputum, and wheezing  Cardiovascular: see HPI  Gastrointestinal: negative for abdominal pain, bright red blood per rectum, change in bowel habits, dysphagia, melena, and reflux symptoms  Genitourinary:negative for dysuria, frequency, and hematuria  Hematologic/lymphatic: negative for bleeding, easy bruising, and lymphadenopathy  Musculoskeletal:negative for arthralgias, back pain, and myalgias  Neurological: negative for gait problems, paresthesia, speech problems, vertigo, and  weakness  Behavioral/Psych: negative for excessive alcohol consumption, illegal drug usage, and sleep disturbance    -------------------------------------    Anemia, unspecified    Anxiety    Cancer    colon    Chronic diarrhea    Chronic kidney disease    stage 3    Colon cancer    Diabetes mellitus    Diastolic dysfunction    GERD (gastroesophageal reflux disease)    Glaucoma    History of migraine headaches    Hyperlipemia    Hypertension    PONV (postoperative nausea and vomiting)    Pulmonary hypertension    Sleep apnea with use of continuous positive airway pressure (CPAP)    waiting on mask to arrive    Thyroid disease    hypothyroid     ----------------------------    Angiogram, coronary, with left heart catheterization    Procedure: Left Heart Cath;  Surgeon: Annie Henson MD;  Location: [facility] CATH;  Service: Cardiology;;    Appendectomy    done during colon resection    Carpal tunnel release    Procedure: Left carpal tunnel release, Left Thumb, Middle, and Ring Trigger Finger Release;  Surgeon: Pilo Paez MD;  Location: [facility] OR;  Service: Orthopedics;  Laterality: Left;  Procedure: Left carpal tunnel release, Left Thumb, Middle, and Ring Trigger Finger Release    Position: Supine    Anesthesia: General    Equipment: Basic handset, carpal tunnel set    Carpal tunnel release    Procedure: Right carpal tunnel release;  Surgeon: Pilo Paez MD;  Location: [facility] OR;  Service: Orthopedics;  Laterality: Right;    Cataract extraction, bilateral     section    Colon surgery    Colonoscopy    Procedure: COLONOSCOPY;  Surgeon: Chet Woodard Jr., MD;  Location: [facility] ENDO;  Service: Endoscopy;  Laterality: Left;    Excision of lesion of buccal mucosa    Procedure: EXCISION, LESION, BUCCAL MUCOSA;  Surgeon: Dedra Khan MD;  Location: [facility] OR;  Service: ENT;  Laterality: Left;    Hernia repair    repaired during colon resection    Hysterectomy    Complete    Insertion of venous access port     "Laparoscopic cholecystectomy    Procedure: CHOLECYSTECTOMY, LAPAROSCOPIC;  Surgeon: Gab Pierre MD;  Location: [facility] OR;  Service: General;  Laterality: N/A;    Oophorectomy    Portacath placement    and later removed     Replacement of vascular access port    Right heart catheterization    Procedure: Right heart cath;  Surgeon: Annie Henson MD;  Location: [facility] CATH;  Service: Cardiology;  Laterality: Right;    Surgical removal of neoplasm of mouth    Procedure: EXCISION, NEOPLASM, MOUTH;  Surgeon: Velasquez Juárez MD;  [facility];  Service: ENT;  Laterality: Left;    Transforaminal epidural injection of steroid    Procedure: Injection,steroid,epidural,transforaminal approach;  Surgeon: Job Porter MD;  Location: [facility] OR;  Service: Pain Management;  Laterality: Left;  L5-S1    Trigger finger release    Procedure: Right middle finger trigger release;  Surgeon: Pilo Paez MD;  Location: [facility] OR;  Service: Orthopedics;  Laterality: Right;        Family History   Problem Relation Name Age of Onset    Cancer Mother          breast cancer at age 42    Breast cancer Mother  42    Heart disease Father      COPD Father      Hypertension Sister      Diabetes Sister      Anemia Sister      Cancer Sister      Supraventricular tachycardia Sister      Multiple myeloma Sister      Kidney disease Sister      Other Sister          low bp with syncope events    Cancer Brother          stomach cancer at age 24    Hypertension Daughter      Hypertension Son x3      Social History[1]    Physical Exam  /68 (BP Location: Right arm, Patient Position: Sitting)   Pulse 69   Ht 5' 5" (1.651 m)   Wt 101.6 kg (223 lb 15.8 oz)   LMP  (LMP Unknown)   BMI 37.27 kg/m²   Body surface area is 2.16 meters squared.  Body mass index is 37.27 kg/m².    General appearance: alert, appears stated age, cooperative, and no distress  Head: Normocephalic, without obvious abnormality, atraumatic  Neck: no carotid bruit, " no JVD, and supple, symmetrical, trachea midline  Lungs: clear to auscultation bilaterally  Heart: regular rate and rhythm; S1, S2 normal, no murmur, click, rub or gallop  Abdomen: soft, non-tender, no distended  Extremities: extremities atraumatic, no pitting edema  Skin: warm, no cyanosis, no pathologic ecchymosis in exposed portions  Neurologic: Grossly normal. A&O x3      Lab Review   Lab Results   Component Value Date    WBC 8.85 04/28/2025    HGB 10.8 (L) 04/28/2025    HCT 33.7 (L) 04/28/2025    MCV 89 04/28/2025     04/28/2025         BMP  Lab Results   Component Value Date     05/08/2025    K 4.0 05/08/2025     05/08/2025    CO2 22 (L) 05/08/2025    BUN 24 (H) 05/08/2025    CREATININE 1.3 05/08/2025    CALCIUM 9.7 05/08/2025    ANIONGAP 11 05/08/2025    ESTGFRAFRICA >60.0 05/20/2022    EGFRNONAA 52.1 (A) 05/20/2022       Lab Results   Component Value Date    ALBUMIN 4.0 04/03/2025    ALBUMIN 4.0 04/03/2025       Lab Results   Component Value Date    ALT 25 04/03/2025    AST 40 04/03/2025    ALKPHOS 87 04/03/2025    BILITOT 0.4 04/03/2025       Lab Results   Component Value Date    TSH 2.802 11/07/2024       Lab Results   Component Value Date    CHOL 162 11/07/2024    CHOL 160 04/11/2024    CHOL 155 01/30/2024     Lab Results   Component Value Date    HDL 40 11/07/2024    HDL 45 04/11/2024    HDL 44 01/30/2024     Lab Results   Component Value Date    LDLCALC 83.8 11/07/2024    LDLCALC 73.2 04/11/2024    LDLCALC 66.0 01/30/2024     Lab Results   Component Value Date    TRIG 191 (H) 11/07/2024    TRIG 209 (H) 04/11/2024    TRIG 225 (H) 01/30/2024     Lab Results   Component Value Date    CHOLHDL 24.7 11/07/2024    CHOLHDL 28.1 04/11/2024    CHOLHDL 28.4 01/30/2024                Assessment & Plan:     This is a 69 y.o. CAD, chronic HFpEF, pulmonary HTN, aortic dilation, HTN., HLD, type 2 DM, CKD, obesity, FRANCISCO. Reports stable palpitations. Also with non-exertional dyspnea with new  cough.    1. Coronary artery disease   - Stable anginal equivalents   - Continue ASA 81 mg qd   - Continue rosuvastatin 10 mg qd     2. Chronic heart failure with preserved ejection fraction   - Euvolemic on exam  - Continue atenolol 100 mg qd   - Continue olmesartan 40 mg qd   - Continue torsemide 20 mg qd  - CBC, BMP, BNP today     3. Pulmonary HTN  - Stable   - Continue management with pulmonary     4. Premature contractions  - Event monitor 12/2024: <1% PACs, <1% PVCs  - Stable   - Continue atenolol 100 mg qd     5. Dilation of ascending aorta and aortic root  - TTE 5/2024: mild ascending aorta dilation 3.83 cm  - CTA chest 11/2024: ascending aorta 4 cm  - Maintain tight BP control   - Repeat CTA chest in 11/2025    6. Hypertension  - Well controlled  - Continue atenolol 100 mg qd   - Continue olmesartan 40 mg qd   - Continue torsemide 20 mg qd    7. Hyperlipidemia  - LDL 83.8, HDL 40   - Continue ASA 81 mg qd   - Continue rosuvastatin 10 mg qd   - Continue fenofibrate 200 mg qd     8. Type 2 diabetes mellitus   - A1c uncontrolled at 8.3  - Discussed importance of controlling risk factors in setting of heart disease  - Continue routine labs and management per primary care     9. Stage 3b chronic kidney disease  - Cr stable at 1.22  - Continue routine labs and management per nephrology     10. Class 1 obesity  - Weight stable  - Low level/low impact aerobic exercise 5x's/wk recommended.   - Heart healthy diet and risk factor modification discussed with patient.       Emphasized the importance of modifying lifestyle related risk factors including smoking cessation, limiting alcohol intake, exercise, diet most resembling a Mediterranean diet.    Please follow up in 6 months or sooner if needed.      Jewell Delarosa PA-C  [facility]  Office: [phone]                     [1]   Social History  Tobacco Use    Smoking status: Former     Current packs/day: 0.00     Types: Cigarettes     Quit  date:      Years since quittin.5    Smokeless tobacco: Never   Substance Use Topics    Alcohol use: No    Drug use: No

## 2025-06-27 ENCOUNTER — TELEPHONE (OUTPATIENT)
Dept: CARDIOLOGY | Facility: CLINIC | Age: 70
End: 2025-06-27
Payer: MEDICARE

## 2025-06-27 ENCOUNTER — RESULTS FOLLOW-UP (OUTPATIENT)
Dept: ENDOCRINOLOGY | Facility: CLINIC | Age: 70
End: 2025-06-27

## 2025-06-27 ENCOUNTER — RESULTS FOLLOW-UP (OUTPATIENT)
Dept: CARDIOLOGY | Facility: CLINIC | Age: 70
End: 2025-06-27

## 2025-06-27 NOTE — TELEPHONE ENCOUNTER
----- Message from Jewell Delarosa PA-C sent at 6/27/2025 10:29 AM CDT -----  Please call patient- labs are stable with improving blood count levels, no signs of fluid overload, and no evidence of electrolyte abnormality.  ----- Message -----  From: Lab, Background User  Sent: 6/26/2025   5:13 PM CDT  To: Jewell Delarosa PA-C

## 2025-06-27 NOTE — TELEPHONE ENCOUNTER
Pt called back-directed from contact center. Informed pt of results, she V/U. Pt denied questions during phone call.

## 2025-06-30 ENCOUNTER — PATIENT OUTREACH (OUTPATIENT)
Dept: ADMINISTRATIVE | Facility: HOSPITAL | Age: 70
End: 2025-06-30
Payer: MEDICARE

## 2025-06-30 NOTE — PROGRESS NOTES
[facility] Patients with Rising Risk of ED/Admission >= 20 Points in 30 Days report    Last Primary Care Visit Date:  5/8/2025  Pt carpal tunnel  and trigger finger release   Followed by ortho

## 2025-07-03 ENCOUNTER — TELEPHONE (OUTPATIENT)
Dept: PODIATRY | Facility: CLINIC | Age: 70
End: 2025-07-03
Payer: MEDICARE

## 2025-07-03 NOTE — TELEPHONE ENCOUNTER
Called patient to reschedule appointment per provider's request. First available was scheduled for 09/08/25 @ 3:00 PM. Phone went to voicemail and a message was left for a call back.

## 2025-07-08 ENCOUNTER — OFFICE VISIT (OUTPATIENT)
Dept: FAMILY MEDICINE | Facility: CLINIC | Age: 70
End: 2025-07-08
Payer: MEDICARE

## 2025-07-08 ENCOUNTER — TELEPHONE (OUTPATIENT)
Dept: FAMILY MEDICINE | Facility: CLINIC | Age: 70
End: 2025-07-08
Payer: MEDICARE

## 2025-07-08 VITALS — OXYGEN SATURATION: 92 % | SYSTOLIC BLOOD PRESSURE: 129 MMHG | HEART RATE: 63 BPM | DIASTOLIC BLOOD PRESSURE: 77 MMHG

## 2025-07-08 DIAGNOSIS — Z79.4 TYPE 2 DIABETES MELLITUS WITH STAGE 3B CHRONIC KIDNEY DISEASE, WITH LONG-TERM CURRENT USE OF INSULIN: ICD-10-CM

## 2025-07-08 DIAGNOSIS — I27.20 PULMONARY HYPERTENSION: ICD-10-CM

## 2025-07-08 DIAGNOSIS — R42 DIZZINESS: Primary | ICD-10-CM

## 2025-07-08 DIAGNOSIS — I50.32 CHRONIC HEART FAILURE WITH PRESERVED EJECTION FRACTION (HFPEF): ICD-10-CM

## 2025-07-08 DIAGNOSIS — N18.32 TYPE 2 DIABETES MELLITUS WITH STAGE 3B CHRONIC KIDNEY DISEASE, WITH LONG-TERM CURRENT USE OF INSULIN: ICD-10-CM

## 2025-07-08 DIAGNOSIS — E11.22 TYPE 2 DIABETES MELLITUS WITH STAGE 3B CHRONIC KIDNEY DISEASE, WITH LONG-TERM CURRENT USE OF INSULIN: ICD-10-CM

## 2025-07-08 PROCEDURE — 1160F RVW MEDS BY RX/DR IN RCRD: CPT | Mod: CPTII,95,, | Performed by: INTERNAL MEDICINE

## 2025-07-08 PROCEDURE — 4010F ACE/ARB THERAPY RXD/TAKEN: CPT | Mod: CPTII,95,, | Performed by: INTERNAL MEDICINE

## 2025-07-08 PROCEDURE — 3074F SYST BP LT 130 MM HG: CPT | Mod: CPTII,95,, | Performed by: INTERNAL MEDICINE

## 2025-07-08 PROCEDURE — G2211 COMPLEX E/M VISIT ADD ON: HCPCS | Mod: 95,,, | Performed by: INTERNAL MEDICINE

## 2025-07-08 PROCEDURE — 3078F DIAST BP <80 MM HG: CPT | Mod: CPTII,95,, | Performed by: INTERNAL MEDICINE

## 2025-07-08 PROCEDURE — 98006 SYNCH AUDIO-VIDEO EST MOD 30: CPT | Mod: 95,,, | Performed by: INTERNAL MEDICINE

## 2025-07-08 PROCEDURE — 3051F HG A1C>EQUAL 7.0%<8.0%: CPT | Mod: CPTII,95,, | Performed by: INTERNAL MEDICINE

## 2025-07-08 PROCEDURE — 1159F MED LIST DOCD IN RCRD: CPT | Mod: CPTII,95,, | Performed by: INTERNAL MEDICINE

## 2025-07-08 NOTE — PROGRESS NOTES
Assessment and Plan:    Assessment & Plan      - Considered vertigo as primary cause of dizziness, given sudden onset and spinning sensation.  - Considered benign paroxysmal positional vertigo (BPPV) as alternative diagnosis if MRI is normal.  - Explained difference between vertigo localized to ear (typically benign) vs. brain (potentially serious) and discussed BPPV as possible cause if more serious conditions are ruled out.  - Concerned about potential stroke due to vascular risk factors, requiring urgent MRI evaluation.  - Clarified that if symptoms were due to stroke, acute intervention window has passed.  - Assessed for heart failure exacerbation due to slightly lower oxygen levels, but dizziness likely unrelated given normal vital signs.  - Evaluated recent changes in fluid retention and urination patterns.  - Determined diabetes unlikely cause of acute symptoms as glucose levels remain consistent with baseline.  - Noted arrhythmia not completely ruled out without in-person evaluation.  - Bing to monitor for worsening symptoms, including increased shortness of breath, fluid accumulation, or significant dizziness causing vomiting or fall risk.  - Bing to continue checking BP, pulse, and oxygen levels regularly.  - Continued Torsemide at current dose.  - Ordered labs, including BNP and renal function tests.  - Ordered MRI Brain, to be scheduled as soon as possible (within a few days).  - Potential ENT/audiology referral if initial workup is normal.  - Follow up after test results for further evaluation.  - Contact office if symptoms suddenly worsen before scheduled tests.         1. Dizziness (Primary)  - MRI Brain Without Contrast; Future  - Comprehensive Metabolic Panel; Future  - BNP; Future  - BNP    2. Type 2 diabetes mellitus with stage 3b chronic kidney disease, with long-term current use of insulin  - Comprehensive Metabolic Panel; Future    3. Pulmonary hypertension  - Comprehensive Metabolic Panel;  Future  - BNP; Future  - BNP    4. Chronic heart failure with preserved ejection fraction (HFpEF)  - Comprehensive Metabolic Panel; Future  - BNP; Future  - BNP    ______________________________________________________________________  Subjective:    Chief Complaint:  No chief complaint on file.        HPI:  History of Present Illness    CHIEF COMPLAINT:  Bign presents today for dizziness and lightheadedness.    VERTIGO AND ASSOCIATED SYMPTOMS:  She reports dizziness and vertigo that started 5 days ago, describing a spinning sensation where everything appears to be moving around her, with her moving in the opposite direction. Symptoms are worst in the morning and persist throughout the day, becoming lighter as day progresses. Dizziness comes in spurts and is triggered by turning her head in certain directions. She holds onto the counter for support but denies falls. Symptoms are associated with a dull headache. The onset was gradual throughout the day, not immediately upon waking.    RESPIRATORY STATUS:  She reports ongoing shortness of breath related to her pulmonary condition. Her SpO2 is currently measuring 87-92%, which is lower than her baseline of 92-94%. She reports a very wet cough upon waking but denies pitting edema.    VITAL SIGNS:  Home blood pressure readings were 134/77 in the morning and 129/77 prior to telehealth consultation. Pulse rate is 63 BPM.    HEARING CONCERNS:  She reports potential hearing difficulties, noting frequent need to ask for repetition. She is considering professional evaluation. She denies tinnitus.    DIABETES:  Her baseline blood sugar typically runs just over 200 mg/dL, consistently within her usual range. She denies symptoms of hypoglycemia or hyperglycemia.    EDEMA:  She reports minor ankle swelling, predominantly at the end of the day, with recent increase in frequency and severity. Unknown weight, has not been tracking this.    GENITOURINARY:  She experienced decreased  urination over the weekend, which was unusual for her normal pattern. This has since returned to baseline.             Medications:  Medications Ordered Prior to Encounter[1]    Objective:    Vitals:  Vitals:    07/08/25 1529   BP: 129/77   Pulse: 63   SpO2: (!) 92%       Physical Exam  Constitutional:       General: She is not in acute distress.     Appearance: She is well-developed.   HENT:      Head: Normocephalic and atraumatic.   Pulmonary:      Effort: Pulmonary effort is normal. No respiratory distress.   Neurological:      Mental Status: She is alert and oriented to person, place, and time.   Psychiatric:         Behavior: Behavior normal.         Thought Content: Thought content normal.         Judgment: Judgment normal.           This note was generated with the assistance of ambient listening technology. Verbal consent was obtained by the patient and accompanying visitor(s) for the recording of patient appointment to facilitate this note. I attest to having reviewed and edited the generated note for accuracy, though some syntax or spelling errors may persist. Please contact the author of this note for any clarification.        Gabi Case MD  Internal Medicine                 [1]   Current Outpatient Medications on File Prior to Visit   Medication Sig Dispense Refill    albuterol (PROVENTIL/VENTOLIN HFA) 90 mcg/actuation inhaler Inhale 2 puffs into the lungs every 6 (six) hours as needed for Wheezing. Rescue 18 g 6    amLODIPine (NORVASC) 5 MG tablet Take 1 tablet (5 mg total) by mouth 2 (two) times daily. 180 tablet 3    aspirin (ECOTRIN) 81 MG EC tablet Take 81 mg by mouth every evening.      atenoloL (TENORMIN) 100 MG tablet Take 1 tablet (100 mg total) by mouth once daily. 90 tablet 1    blood-glucose meter (ONETOUCH ULTRA2 METER) Misc USE AS DIRECTED 1 each 0    celecoxib (CELEBREX) 100 MG capsule Take 100 mg by mouth as needed.      cholecalciferol, vitamin D3, (VITAMIN D3) 2,000 unit Cap Take 1  capsule by mouth 2 (two) times a day.  3    clonazePAM (KLONOPIN) 0.5 MG tablet Take 1 tablet (0.5 mg total) by mouth 2 (two) times daily as needed for Anxiety. 30 tablet 0    co-enzyme Q-10 30 mg capsule Take 30 mg by mouth.      DEXCOM G7  Misc 1 Device by Misc.(Non-Drug; Combo Route) route continuous. 1 each 0    DEXCOM G7 SENSOR Samantha 1 Device by Misc.(Non-Drug; Combo Route) route every 10 days. 9 each 3    dicyclomine (BENTYL) 10 MG capsule Take 10 mg by mouth as needed.      fenofibrate micronized (LOFIBRA) 200 MG Cap TAKE 1 CAPSULE DAILY 90 capsule 2    fluticasone-umeclidin-vilanter (TRELEGY ELLIPTA) 200-62.5-25 mcg inhaler Inhale 1 puff into the lungs once daily. 90 each 3    gabapentin (NEURONTIN) 300 MG capsule Take 1 capsule (300 mg total) by mouth 3 (three) times daily. 270 capsule 1    ipratropium (ATROVENT) 21 mcg (0.03 %) nasal spray 2 sprays by Each Nostril route 2 (two) times daily. 30 mL 6    latanoprost 0.005 % ophthalmic solution Place 1 drop into both eyes nightly.      levocetirizine (XYZAL) 5 MG tablet Take 1 tablet (5 mg total) by mouth every evening. 90 tablet 3    levothyroxine (SYNTHROID) 75 MCG tablet TAKE 1 TABLET BEFORE BREAKFAST 90 tablet 2    LIDOcaine (LIDODERM) 5 % 1 patch daily as needed.      loperamide (IMODIUM A-D) 2 mg Tab Take 2 mg by mouth as needed.      metFORMIN (GLUCOPHAGE-XR) 500 MG ER 24hr tablet TAKE 2 TABLETS DAILY WITH BREAKFAST 180 tablet 3    NOVOLOG FLEXPEN U-100 INSULIN 100 unit/mL (3 mL) InPn pen Inject 28 Units into the skin 3 (three) times daily with meals. Inject into skin 3x/day per correction scale,. Max TDD 108u      olmesartan (BENICAR) 40 MG tablet Take 1 tablet (40 mg total) by mouth once daily. 90 tablet 3    omeprazole (PRILOSEC) 40 MG capsule Take 40 mg by mouth 2 (two) times daily before meals.  0    ONETOUCH DELICA PLUS LANCET 33 gauge Misc USE 1 TO CHECK GLUCOSE THREE TIMES DAILY AS NEEDED 300 each 3    orphenadrine (NORFLEX) 100 mg tablet  "Take 1 tablet (100 mg total) by mouth 2 (two) times daily as needed. 36 tablet 0    oxyCODONE (ROXICODONE) 5 MG immediate release tablet Take 1 tablet (5 mg total) by mouth every 4 (four) hours as needed for Pain. (Patient not taking: Reported on 6/26/2025) 5 tablet 0    pen needle, diabetic (NOVOFINE PLUS) 32 gauge x 1/6" Ndle Inject 1 Needle into the skin 4 (four) times daily. use 4 times daily with insulin 200 each 3    rOPINIRole (REQUIP) 0.25 MG tablet Take 1 tablet (0.25 mg total) by mouth every evening. 90 tablet 3    rosuvastatin (CRESTOR) 10 MG tablet Take 1 tablet (10 mg total) by mouth once daily. 90 tablet 3    sertraline (ZOLOFT) 50 MG tablet Take 50 mg by mouth.      torsemide (DEMADEX) 20 MG Tab Take 1 tablet (20 mg total) by mouth once daily. 30 tablet 11    TOUJEO MAX U-300 SOLOSTAR 300 unit/mL (3 mL) insulin pen Inject 84 Units into the skin once daily. Take only 72 units on the evening of 4/29/25, then resume at previous dose. 25.2 mL 3    TOUJEO SOLOSTAR U-300 INSULIN 300 unit/mL (1.5 mL) InPn pen Inject 84 Units into the skin once daily. 36 Pen 0    TRUE METRIX GLUCOSE TEST STRIP Strp USE 1 STRIP THREE TIMES A  strip 2    TRUEPLUS LANCETS 33 gauge Misc USE TO TEST BLOOD SUGAR THREE TIMES A  each 3     Current Facility-Administered Medications on File Prior to Visit   Medication Dose Route Frequency Provider Last Rate Last Admin    0.9%  NaCl infusion   Intravenous Continuous Job Porter MD 10 mL/hr at 12/20/19 1245 New Bag at 12/20/19 1245    ferumoxytoL injection 510 mg  510 mg Intravenous Once Agueda Amador NP        ferumoxytoL injection 510 mg  510 mg Intravenous Once Agueda Amador NP        lactated ringers infusion   Intravenous Once PRN Job Porter MD         "

## 2025-07-09 ENCOUNTER — HOSPITAL ENCOUNTER (OUTPATIENT)
Dept: RADIOLOGY | Facility: HOSPITAL | Age: 70
Discharge: HOME | End: 2025-07-09
Attending: INTERNAL MEDICINE
Payer: MEDICARE

## 2025-07-09 ENCOUNTER — TELEPHONE (OUTPATIENT)
Dept: FAMILY MEDICINE | Facility: CLINIC | Age: 70
End: 2025-07-09
Payer: MEDICARE

## 2025-07-09 DIAGNOSIS — R42 DIZZINESS: ICD-10-CM

## 2025-07-09 DIAGNOSIS — I27.20 PULMONARY HYPERTENSION: ICD-10-CM

## 2025-07-09 DIAGNOSIS — I50.32 CHRONIC HEART FAILURE WITH PRESERVED EJECTION FRACTION (HFPEF): ICD-10-CM

## 2025-07-09 PROCEDURE — 70551 MRI BRAIN STEM W/O DYE: CPT | Mod: TC,PO

## 2025-07-09 PROCEDURE — 70551 MRI BRAIN STEM W/O DYE: CPT | Mod: 26,,, | Performed by: RADIOLOGY

## 2025-07-09 NOTE — TELEPHONE ENCOUNTER
Please let patient know that the MRI is normal, and the labs we have are stable.     Please ask the lab why the CMP was done but not the BNP? This is the second time I have had the lab neglect to draw the BNP, and I suspect they are misreading and thinking it is a BMP. Please reach out to lab supervisor if this is the case. In either case, I do need the BNP done.

## 2025-07-10 ENCOUNTER — PATIENT MESSAGE (OUTPATIENT)
Dept: FAMILY MEDICINE | Facility: CLINIC | Age: 70
End: 2025-07-10
Payer: MEDICARE

## 2025-07-10 DIAGNOSIS — R42 DIZZINESS: Primary | ICD-10-CM

## 2025-07-10 NOTE — TELEPHONE ENCOUNTER
BNP was not linked to lab appt by our staff  Contacted lab, they are not able to add BNP due to needing lavender tube and specimen more than 4 hours old.   Pt will need to be redrawn.   LM for pt to call back to schedule BNP

## 2025-07-11 ENCOUNTER — LAB VISIT (OUTPATIENT)
Dept: LAB | Facility: HOSPITAL | Age: 70
End: 2025-07-11
Attending: INTERNAL MEDICINE
Payer: MEDICARE

## 2025-07-11 DIAGNOSIS — R42 DIZZINESS: ICD-10-CM

## 2025-07-11 DIAGNOSIS — I27.20 PULMONARY HYPERTENSION: ICD-10-CM

## 2025-07-11 DIAGNOSIS — I50.32 CHRONIC HEART FAILURE WITH PRESERVED EJECTION FRACTION (HFPEF): ICD-10-CM

## 2025-07-11 LAB — BNP SERPL-MCNC: 24 PG/ML (ref 0–99)

## 2025-07-11 PROCEDURE — 36415 COLL VENOUS BLD VENIPUNCTURE: CPT | Mod: PN

## 2025-07-11 PROCEDURE — 83880 ASSAY OF NATRIURETIC PEPTIDE: CPT

## 2025-07-11 NOTE — TELEPHONE ENCOUNTER
Phoned pt. Pt has been scheduled to come in today to have BNP drawn per Dr. Case. Pt verbalized understanding.

## 2025-07-11 NOTE — TELEPHONE ENCOUNTER
New order pending, after looking into this, the order wasn't linked because it was sent to [facility]

## 2025-07-14 LAB
LEFT EYE DM RETINOPATHY: NEGATIVE
RIGHT EYE DM RETINOPATHY: NEGATIVE

## 2025-07-15 RX ORDER — MECLIZINE HCL 12.5 MG 12.5 MG/1
12.5 TABLET ORAL 3 TIMES DAILY PRN
Qty: 20 TABLET | Refills: 0 | Status: SHIPPED | OUTPATIENT
Start: 2025-07-15

## 2025-07-21 ENCOUNTER — PATIENT MESSAGE (OUTPATIENT)
Dept: ADMINISTRATIVE | Facility: HOSPITAL | Age: 70
End: 2025-07-21
Payer: MEDICARE

## 2025-07-23 ENCOUNTER — PATIENT OUTREACH (OUTPATIENT)
Dept: ADMINISTRATIVE | Facility: HOSPITAL | Age: 70
End: 2025-07-23
Payer: MEDICARE

## 2025-07-23 NOTE — LETTER
AUTHORIZATION FOR RELEASE OF   CONFIDENTIAL INFORMATION    Dear ELIGIO Turcios MD,    We are seeing Bing Kearns, date of birth 1955, in the clinic at [facility]. Gabi Case MD is the patient's PCP. Bing Kearns has an outstanding lab/procedure at the time we reviewed her chart. In order to help keep her health information updated, she has authorized us to request the following medical record(s):        (  )  MAMMOGRAM                                      (  )  COLONOSCOPY      (  )  PAP SMEAR                                          (  )  OUTSIDE LAB RESULTS     (  )  DEXA SCAN                                          (X)  DIABETIC EYE EXAM            (  )  FOOT EXAM                                          (  )  ENTIRE RECORD     (  )  OUTSIDE IMMUNIZATIONS                 (  )  _______________         Please fax records to [facility], Conlin, Erin M., MD, [phone] or email to [email or web address].    If you have any questions, please contact Kumar Contreras LPN Care Coordinator  at [phone].            Patient Name: Bing Kearns  : 1955  Patient Phone #: [phone]                       Bing Kearns  MRN: [id number]  : 1955  Age: 69 y.o.  Sex: female         Patient/Legal Guardian Signature  This signature was collected at 2025           _______________________________   Printed Name/Relationship to Patient      Consent for Examination and Treatment: I hereby authorize the providers and employees of [facility] ([facility]) to provide medical treatment/services which includes, but is not limited to, performing and administering tests and diagnostic procedures that are deemed necessary, including, but not limited to, imaging examinations, blood tests and other laboratory procedures as may be required by the hospital, clinic, or may be ordered by my physician(s) or persons working under the general and/or  special instructions of my physician(s).      I understand and agree that this consent covers all authorized persons, including but not limited to physicians, residents, nurse practitioners, physicians' assistants, specialists, consultants, student nurses, and independently contracted physicians, who are called upon by the physician in charge, to carry out the diagnostic procedures and medical or surgical treatment.     I hereby authorize [facility] to retain or dispose of any specimens or tissue, should there be such remaining from any test or procedure.     I hereby authorize and give consent for [facility] providers and employees to take photographs, images or videotapes of such diagnostic, surgical or treatment procedures of Patient as may be required by [facility] or as may be ordered by a physician. I further acknowledge and agree that [facility] may use cameras or other devices for patient monitoring.     I am aware that the practice of medicine is not an exact science, and I acknowledge that no guarantees have been made to me as to the outcome of any tests, procedures or treatment.     Authorization for Release of Information: I understand that my insurance company and/or their agents may need information necessary to make determinations about payment/reimbursement. I hereby provide authorization to release to all insurance companies, their successors, assignees, other parties with whom they may have contracted, or others acting on their behalf, that are involved with payment for any hospital and/or clinic charges incurred by the patient, any information that they request and deem necessary for payment/reimbursement, and/or quality review.  I further authorize the release of my health information to physicians or other health care practitioners on staff who are involved in my health care now and in the future, and to other health care providers, entities, or institutions for the purpose of my continued care and  treatment, including referrals.     REGISTRATION AUTHORIZATION  Form No. 03067 (Rev. 3/25/2024)    Page 1 of 3                       Medicare Patient's Certification and Authorization to Release Information and Payment Request:  I certify that the information given by me in applying for payment under Title XVIII of the Social Security Act is correct. I authorize any hills of medical or other information about me to release to the Social SecurityAdministration, or its intermediaries or carriers, any information needed for this or a related Medicare claim. I request that payment of authorized benefits be made on my behalf.     Assignment of Insurance Benefits:   I hereby authorize any and all insurance companies, health plans, defined   benefit plans, health insurers or any entity that is or may be responsible for payment of my medical expenses to pay all hospital and medical benefits now due, and to become due and payable to me under any hospital benefits, sick benefits, injury benefits or any other benefit for services rendered to me, including Major Medical Benefits, direct to [facility] and all independently contracted physicians. I assign any and all rights that I may have against any and all insurance companies, health plans, defined benefit plans, health insurers or any entity that is or may be responsible for payment of my medical expenses, including, but not limited to any right to appeal a denial of a claim, any right to bring any action, lawsuit, administrative proceeding, or other cause of action on my behalf. I specifically assign my right to pursue litigation against any and all insurance companies, health plans, defined benefit plans, health insurers or any entity that is or may be responsible for payment of my medical expenses based upon a refusal to pay charges.            E. Valuables: It is understood and agreed that [facility] is not liable for the damage to or loss of any money, jewelry,   documents,  dentures, eye glasses, hearing aids, prosthetics, or other property of value.     F. Computer Equipment: I understand and agree that should I choose to use computer equipment owned by [facility] or if I choose to access the Internet via [facility]s network, I do so at my own risk. [facility] is not responsible for any damage to my computer equipment or to any damages of any type that might arise from my loss of equipment or data.     G. Acceptance of Financial Responsibility:  I agree that in consideration of the services and   supplies that have been   or will be furnished to the patient, I am hereby obligated to pay all charges made for or on the account of the patient according to the standard rates (in effect at the time the services and supplies are delivered) established by [facility], including its Patient Financial Assistance Policy to the extent it is applicable. I understand that I am responsible for all charges, or portions thereof, not covered by insurance or other sources. Patient refunds will be distributed only after balances at all [facility] facilities are paid.     H. Communication Authorization:  I hereby authorize [facility] and its representatives, along with any billing service   or  who may work on their behalf, to contact me on   my cell phone and/or home phone using pre- recorded messages, artificial voice messages, automatic telephone dialing devices or other computer assisted technology, or by electronic      mail, text messaging, or by any other form of electronic communication. This includes, but is not limited to, appointment reminders, yearly physical exam reminders, preventive care reminders, patient campaigns, welcome calls, and calls about account balances on my account or any account on which I am listed as a guarantor. I understand I have the right to opt out of these communications at any time.      Relationship  Between  Facility and  Provider:      I understand that some, but  not all, providers furnishing services to the patient are not employees or agents of [facility]. The patient is under the care and supervision of his/her attending physician, and it is the responsibility of the facility and its nursing staff to carry out the instructions of such physicians. It is the responsibility of the patient's physician/designee to obtain the patient's informed consent, when required, for medical or surgical treatment, special diagnostic or therapeutic procedures, or hospital services rendered for the patient under the special instructions of the physician/designee.           REGISTRATION AUTHORIZATION  Form No. 56375 (Rev. 3/25/2024)    Page 2 of 3                       Immunizations: [facility] shares immunization information with state sponsored health departments to help you and your doctor keep track of your immunization records. By signing, you consent to have this information shared with the health department in your state:                                [place] - LINKS ([place] Immunization Network for Kids Statewide)                                [place] - MIIX ([place] Immunization Information eXchange)                                [place] - ImmPRINT (Immunization Patient Registry with Integrated Technology)     TERM: This authorization is valid for this and subsequent care/treatment I receive at [facility] and will remain valid unless/until revoked in writing by me.     [facility]: As used in this document, [facility] means all [facility] owned and managed facilities, including, but not limited to, all health centers, surgery centers, clinics, urgent care centers, and hospitals.         [facility] complies with applicable Federal civil rights laws and does not discriminate on the basis of race, color, national origin, age, disability, or sex.  ATENCIÓN: si habla jobañol, tiene a menendez disposición servicios gratuitos de asistencia lingüística. Llame al  [phone].  LEIDA Ý: N?u b?n nói Ti?ng Vi?t, có các d?ch v? h? tr? ngôn ng? mi?n phí dành cho b?n. G?i s? [phone].        REGISTRATION AUTHORIZATION  Form No. 52171 (Rev. 3/25/2024)   Page 3 of 3

## 2025-07-24 ENCOUNTER — PATIENT MESSAGE (OUTPATIENT)
Dept: ENDOCRINOLOGY | Facility: CLINIC | Age: 70
End: 2025-07-24
Payer: MEDICARE

## 2025-07-24 DIAGNOSIS — N18.31 TYPE 2 DIABETES MELLITUS WITH STAGE 3A CHRONIC KIDNEY DISEASE, WITH LONG-TERM CURRENT USE OF INSULIN: Primary | ICD-10-CM

## 2025-07-24 DIAGNOSIS — E11.22 TYPE 2 DIABETES MELLITUS WITH STAGE 3A CHRONIC KIDNEY DISEASE, WITH LONG-TERM CURRENT USE OF INSULIN: Primary | ICD-10-CM

## 2025-07-24 DIAGNOSIS — Z79.4 TYPE 2 DIABETES MELLITUS WITH STAGE 3A CHRONIC KIDNEY DISEASE, WITH LONG-TERM CURRENT USE OF INSULIN: Primary | ICD-10-CM

## 2025-08-04 ENCOUNTER — PATIENT OUTREACH (OUTPATIENT)
Dept: ADMINISTRATIVE | Facility: HOSPITAL | Age: 70
End: 2025-08-04
Payer: MEDICARE

## 2025-08-04 ENCOUNTER — TELEPHONE (OUTPATIENT)
Dept: OTOLARYNGOLOGY | Facility: CLINIC | Age: 70
End: 2025-08-04
Payer: MEDICARE

## 2025-08-04 NOTE — TELEPHONE ENCOUNTER
Called pt to schedule. Message was sent to [facility]. Pt states that she wanted to be seen in [place] as she lives in [place]. Apologized and let pt know that I would send the message to [facility] to have their staff reach out to get her scheduled. Thanks, Liz

## 2025-08-04 NOTE — TELEPHONE ENCOUNTER
Copied from CRM #[id number]. Topic: General Inquiry - Patient Advice  >> Aug 4, 2025  4:04 PM Elina wrote:  Type:  Needs Medical Advice    Who Called: patient at [phone]    Symptoms (please be specific): dizziness    Additional Information: Patient would like to be seen for her dizziness without having to have an audiogram done first but the patient would like an audiogram, the system is stopping the appt from happening based off the decision tree answers, please call patient and advise. Thank you.

## 2025-08-07 DIAGNOSIS — Z79.4 TYPE 2 DIABETES MELLITUS WITH STAGE 3A CHRONIC KIDNEY DISEASE, WITH LONG-TERM CURRENT USE OF INSULIN: ICD-10-CM

## 2025-08-07 DIAGNOSIS — N18.31 TYPE 2 DIABETES MELLITUS WITH STAGE 3A CHRONIC KIDNEY DISEASE, WITH LONG-TERM CURRENT USE OF INSULIN: ICD-10-CM

## 2025-08-07 DIAGNOSIS — E11.22 TYPE 2 DIABETES MELLITUS WITH STAGE 3A CHRONIC KIDNEY DISEASE, WITH LONG-TERM CURRENT USE OF INSULIN: ICD-10-CM

## 2025-08-07 RX ORDER — INSULIN ASPART 100 [IU]/ML
28 INJECTION, SOLUTION INTRAVENOUS; SUBCUTANEOUS
Qty: 45 EACH | Refills: 0 | Status: SHIPPED | OUTPATIENT
Start: 2025-08-07

## 2025-08-12 ENCOUNTER — CLINICAL SUPPORT (OUTPATIENT)
Dept: AUDIOLOGY | Facility: CLINIC | Age: 70
End: 2025-08-12
Payer: MEDICARE

## 2025-08-12 ENCOUNTER — HOSPITAL ENCOUNTER (OUTPATIENT)
Dept: RADIOLOGY | Facility: HOSPITAL | Age: 70
Discharge: HOME | End: 2025-08-12
Attending: NURSE PRACTITIONER
Payer: MEDICARE

## 2025-08-12 ENCOUNTER — OFFICE VISIT (OUTPATIENT)
Dept: OTOLARYNGOLOGY | Facility: CLINIC | Age: 70
End: 2025-08-12
Payer: MEDICARE

## 2025-08-12 VITALS
SYSTOLIC BLOOD PRESSURE: 130 MMHG | DIASTOLIC BLOOD PRESSURE: 62 MMHG | BODY MASS INDEX: 37.13 KG/M2 | WEIGHT: 223.13 LBS

## 2025-08-12 DIAGNOSIS — R09.81 CHRONIC NASAL CONGESTION: ICD-10-CM

## 2025-08-12 DIAGNOSIS — R42 VERTIGO: ICD-10-CM

## 2025-08-12 DIAGNOSIS — R51.9 HEADACHE AROUND THE EYES: ICD-10-CM

## 2025-08-12 DIAGNOSIS — R43.8 HYPOSMIA: ICD-10-CM

## 2025-08-12 DIAGNOSIS — H91.93 BILATERAL HEARING LOSS, UNSPECIFIED HEARING LOSS TYPE: Primary | ICD-10-CM

## 2025-08-12 DIAGNOSIS — R51.9 HEADACHE AROUND THE EYES: Primary | ICD-10-CM

## 2025-08-12 DIAGNOSIS — H90.3 BILATERAL HIGH FREQUENCY SENSORINEURAL HEARING LOSS: ICD-10-CM

## 2025-08-12 DIAGNOSIS — H93.13 TINNITUS OF BOTH EARS: ICD-10-CM

## 2025-08-12 DIAGNOSIS — R42 DIZZINESS: ICD-10-CM

## 2025-08-12 DIAGNOSIS — G47.33 OBSTRUCTIVE SLEEP APNEA ON CPAP: ICD-10-CM

## 2025-08-12 PROCEDURE — 1126F AMNT PAIN NOTED NONE PRSNT: CPT | Mod: CPTII,S$GLB,, | Performed by: NURSE PRACTITIONER

## 2025-08-12 PROCEDURE — 3075F SYST BP GE 130 - 139MM HG: CPT | Mod: CPTII,S$GLB,, | Performed by: NURSE PRACTITIONER

## 2025-08-12 PROCEDURE — 1101F PT FALLS ASSESS-DOCD LE1/YR: CPT | Mod: CPTII,S$GLB,, | Performed by: NURSE PRACTITIONER

## 2025-08-12 PROCEDURE — 70220 X-RAY EXAM OF SINUSES: CPT | Mod: 26,,, | Performed by: RADIOLOGY

## 2025-08-12 PROCEDURE — 99999 PR PBB SHADOW E&M-EST. PATIENT-LVL I: CPT | Mod: PBBFAC,,, | Performed by: AUDIOLOGIST

## 2025-08-12 PROCEDURE — 3078F DIAST BP <80 MM HG: CPT | Mod: CPTII,S$GLB,, | Performed by: NURSE PRACTITIONER

## 2025-08-12 PROCEDURE — 99999 PR PBB SHADOW E&M-EST. PATIENT-LVL V: CPT | Mod: PBBFAC,,, | Performed by: NURSE PRACTITIONER

## 2025-08-12 PROCEDURE — 92567 TYMPANOMETRY: CPT | Mod: S$GLB,,, | Performed by: AUDIOLOGIST

## 2025-08-12 PROCEDURE — 3288F FALL RISK ASSESSMENT DOCD: CPT | Mod: CPTII,S$GLB,, | Performed by: NURSE PRACTITIONER

## 2025-08-12 PROCEDURE — 92557 COMPREHENSIVE HEARING TEST: CPT | Mod: S$GLB,,, | Performed by: AUDIOLOGIST

## 2025-08-12 PROCEDURE — 1159F MED LIST DOCD IN RCRD: CPT | Mod: CPTII,S$GLB,, | Performed by: NURSE PRACTITIONER

## 2025-08-12 PROCEDURE — 70220 X-RAY EXAM OF SINUSES: CPT | Mod: TC,PO

## 2025-08-12 PROCEDURE — 3008F BODY MASS INDEX DOCD: CPT | Mod: CPTII,S$GLB,, | Performed by: NURSE PRACTITIONER

## 2025-08-12 PROCEDURE — 3051F HG A1C>EQUAL 7.0%<8.0%: CPT | Mod: CPTII,S$GLB,, | Performed by: NURSE PRACTITIONER

## 2025-08-12 PROCEDURE — 99204 OFFICE O/P NEW MOD 45 MIN: CPT | Mod: S$GLB,,, | Performed by: NURSE PRACTITIONER

## 2025-08-12 PROCEDURE — 4010F ACE/ARB THERAPY RXD/TAKEN: CPT | Mod: CPTII,S$GLB,, | Performed by: NURSE PRACTITIONER

## 2025-08-12 RX ORDER — FLUTICASONE PROPIONATE 50 MCG
1 SPRAY, SUSPENSION (ML) NASAL 2 TIMES DAILY
Qty: 16 G | Refills: 12 | Status: SHIPPED | OUTPATIENT
Start: 2025-08-12 | End: 2026-08-12

## 2025-08-17 ENCOUNTER — PATIENT MESSAGE (OUTPATIENT)
Dept: FAMILY MEDICINE | Facility: CLINIC | Age: 70
End: 2025-08-17
Payer: MEDICARE

## 2025-08-20 ENCOUNTER — OFFICE VISIT (OUTPATIENT)
Dept: FAMILY MEDICINE | Facility: CLINIC | Age: 70
End: 2025-08-20
Payer: MEDICARE

## 2025-08-20 VITALS
SYSTOLIC BLOOD PRESSURE: 128 MMHG | HEART RATE: 84 BPM | WEIGHT: 223.44 LBS | HEIGHT: 65 IN | BODY MASS INDEX: 37.23 KG/M2 | DIASTOLIC BLOOD PRESSURE: 60 MMHG

## 2025-08-20 DIAGNOSIS — Z79.4 INSULIN DEPENDENT TYPE 2 DIABETES MELLITUS: ICD-10-CM

## 2025-08-20 DIAGNOSIS — I10 HYPERTENSION, UNSPECIFIED TYPE: ICD-10-CM

## 2025-08-20 DIAGNOSIS — E11.9 INSULIN DEPENDENT TYPE 2 DIABETES MELLITUS: ICD-10-CM

## 2025-08-20 DIAGNOSIS — I50.32 CHRONIC HEART FAILURE WITH PRESERVED EJECTION FRACTION (HFPEF): ICD-10-CM

## 2025-08-20 DIAGNOSIS — F41.9 ANXIETY: Primary | ICD-10-CM

## 2025-08-20 DIAGNOSIS — N18.32 STAGE 3B CHRONIC KIDNEY DISEASE: ICD-10-CM

## 2025-08-20 DIAGNOSIS — G47.30 SLEEP APNEA WITH USE OF CONTINUOUS POSITIVE AIRWAY PRESSURE (CPAP): ICD-10-CM

## 2025-08-20 PROCEDURE — 99214 OFFICE O/P EST MOD 30 MIN: CPT | Mod: S$GLB,,, | Performed by: INTERNAL MEDICINE

## 2025-08-20 PROCEDURE — 1159F MED LIST DOCD IN RCRD: CPT | Mod: CPTII,S$GLB,, | Performed by: INTERNAL MEDICINE

## 2025-08-20 PROCEDURE — 1160F RVW MEDS BY RX/DR IN RCRD: CPT | Mod: CPTII,S$GLB,, | Performed by: INTERNAL MEDICINE

## 2025-08-20 PROCEDURE — 4010F ACE/ARB THERAPY RXD/TAKEN: CPT | Mod: CPTII,S$GLB,, | Performed by: INTERNAL MEDICINE

## 2025-08-20 PROCEDURE — 3078F DIAST BP <80 MM HG: CPT | Mod: CPTII,S$GLB,, | Performed by: INTERNAL MEDICINE

## 2025-08-20 PROCEDURE — 2023F DILAT RTA XM W/O RTNOPTHY: CPT | Mod: CPTII,S$GLB,, | Performed by: INTERNAL MEDICINE

## 2025-08-20 PROCEDURE — 3074F SYST BP LT 130 MM HG: CPT | Mod: CPTII,S$GLB,, | Performed by: INTERNAL MEDICINE

## 2025-08-20 PROCEDURE — 3288F FALL RISK ASSESSMENT DOCD: CPT | Mod: CPTII,S$GLB,, | Performed by: INTERNAL MEDICINE

## 2025-08-20 PROCEDURE — 99999 PR PBB SHADOW E&M-EST. PATIENT-LVL V: CPT | Mod: PBBFAC,,, | Performed by: INTERNAL MEDICINE

## 2025-08-20 PROCEDURE — 3051F HG A1C>EQUAL 7.0%<8.0%: CPT | Mod: CPTII,S$GLB,, | Performed by: INTERNAL MEDICINE

## 2025-08-20 PROCEDURE — G2211 COMPLEX E/M VISIT ADD ON: HCPCS | Mod: S$GLB,,, | Performed by: INTERNAL MEDICINE

## 2025-08-20 PROCEDURE — 1101F PT FALLS ASSESS-DOCD LE1/YR: CPT | Mod: CPTII,S$GLB,, | Performed by: INTERNAL MEDICINE

## 2025-08-20 PROCEDURE — 1126F AMNT PAIN NOTED NONE PRSNT: CPT | Mod: CPTII,S$GLB,, | Performed by: INTERNAL MEDICINE

## 2025-08-20 PROCEDURE — 3008F BODY MASS INDEX DOCD: CPT | Mod: CPTII,S$GLB,, | Performed by: INTERNAL MEDICINE

## 2025-08-20 RX ORDER — SERTRALINE HYDROCHLORIDE 50 MG/1
50 TABLET, FILM COATED ORAL NIGHTLY
Qty: 90 TABLET | Refills: 1 | Status: SHIPPED | OUTPATIENT
Start: 2025-08-20

## 2025-08-21 DIAGNOSIS — N18.31 TYPE 2 DIABETES MELLITUS WITH STAGE 3A CHRONIC KIDNEY DISEASE, WITH LONG-TERM CURRENT USE OF INSULIN: ICD-10-CM

## 2025-08-21 DIAGNOSIS — E11.22 TYPE 2 DIABETES MELLITUS WITH STAGE 3A CHRONIC KIDNEY DISEASE, WITH LONG-TERM CURRENT USE OF INSULIN: ICD-10-CM

## 2025-08-21 DIAGNOSIS — Z79.4 TYPE 2 DIABETES MELLITUS WITH STAGE 3A CHRONIC KIDNEY DISEASE, WITH LONG-TERM CURRENT USE OF INSULIN: ICD-10-CM

## 2025-08-21 RX ORDER — INSULIN GLARGINE 300 U/ML
84 INJECTION, SOLUTION SUBCUTANEOUS DAILY
Qty: 36 PEN | Refills: 0 | Status: SHIPPED | OUTPATIENT
Start: 2025-08-21

## 2025-09-02 ENCOUNTER — PATIENT MESSAGE (OUTPATIENT)
Dept: ENDOCRINOLOGY | Facility: CLINIC | Age: 70
End: 2025-09-02
Payer: MEDICARE

## 2025-09-02 DIAGNOSIS — E11.22 TYPE 2 DIABETES MELLITUS WITH STAGE 3A CHRONIC KIDNEY DISEASE, WITH LONG-TERM CURRENT USE OF INSULIN: Primary | ICD-10-CM

## 2025-09-02 DIAGNOSIS — N18.31 TYPE 2 DIABETES MELLITUS WITH STAGE 3A CHRONIC KIDNEY DISEASE, WITH LONG-TERM CURRENT USE OF INSULIN: Primary | ICD-10-CM

## 2025-09-02 DIAGNOSIS — I50.32 CHRONIC HEART FAILURE WITH PRESERVED EJECTION FRACTION (HFPEF): ICD-10-CM

## 2025-09-02 DIAGNOSIS — E03.4 HYPOTHYROIDISM DUE TO ACQUIRED ATROPHY OF THYROID: ICD-10-CM

## 2025-09-02 DIAGNOSIS — Z79.4 TYPE 2 DIABETES MELLITUS WITH STAGE 3A CHRONIC KIDNEY DISEASE, WITH LONG-TERM CURRENT USE OF INSULIN: Primary | ICD-10-CM

## 2025-09-03 RX ORDER — ATENOLOL 100 MG/1
100 TABLET ORAL DAILY
Qty: 90 TABLET | Refills: 1 | Status: SHIPPED | OUTPATIENT
Start: 2025-09-03 | End: 2026-09-03

## (undated) DEVICE — DRAPE STERI-DRAPE 1000 17X11IN

## (undated) DEVICE — TUBING MINIBORE EXTENSION

## (undated) DEVICE — CLIP MED TICALL

## (undated) DEVICE — SEE MEDLINE ITEM 157131

## (undated) DEVICE — ALCOHOL 70% ISOP RUBBING 4OZ

## (undated) DEVICE — SUCTION COAGULATOR 10FR 6IN

## (undated) DEVICE — SEE MEDLINE ITEM 152622

## (undated) DEVICE — PAD CAST SPECIALIST STRL 3

## (undated) DEVICE — GAUZE SPONGE 4X4 12PLY

## (undated) DEVICE — NDL 27G X 1 1/4

## (undated) DEVICE — SEE MEDLINE ITEM 152514

## (undated) DEVICE — NDL SPINAL SPINOCAN 22GX3.5

## (undated) DEVICE — DRAPE STERI INSTRUMENT 1018

## (undated) DEVICE — SEE MEDLINE ITEM 146313

## (undated) DEVICE — CORD BIPOLAR 12 FOOT

## (undated) DEVICE — SKINMARKER & RULER REGULAR X-F

## (undated) DEVICE — SEE MEDLINE ITEM 157128

## (undated) DEVICE — TOURNIQUET SB QC DP 18X4IN

## (undated) DEVICE — FORCEP STRAIGHT DISP

## (undated) DEVICE — APPLICATOR CHLORAPREP ORN 26ML

## (undated) DEVICE — GLOVE SURGEONS ULTRA TOUCH 6.5

## (undated) DEVICE — NDL HYPO REG 25G X 1 1/2

## (undated) DEVICE — Device

## (undated) DEVICE — SYS LABEL CORRECT MED

## (undated) DEVICE — SHEET EENT SPLIT

## (undated) DEVICE — BAND RUBBER STERILE 1/4X3.5IN

## (undated) DEVICE — DRAPE HAND STERILE

## (undated) DEVICE — BANDAGE ELAS SOFTWRAP ST 3X5YD

## (undated) DEVICE — GLOVE SURG ULTRA TOUCH 7.5

## (undated) DEVICE — NDL SAFETY 25G X 1.5 ECLIPSE

## (undated) DEVICE — APPLICATOR CHLORAPREP CLR 10.5

## (undated) DEVICE — SEE MEDLINE ITEM 157173

## (undated) DEVICE — SYR 10CC LUER LOCK

## (undated) DEVICE — KIT SAHARA DRAPE DRAW/LIFT

## (undated) DEVICE — GLOVE PROTEXIS LTX MICRO  7.5

## (undated) DEVICE — SUT LIGACLIP SMALL XTRA

## (undated) DEVICE — BANDAGE ESMARK LATEX FREE 4INX

## (undated) DEVICE — SPONGE PATTY SURGICAL .5X3IN

## (undated) DEVICE — SPONGE COTTON TRAY 4X4IN

## (undated) DEVICE — DRAPE HALF SURGICAL 40X58IN

## (undated) DEVICE — TRAY MINOR GEN SURG

## (undated) DEVICE — SEE L#120831

## (undated) DEVICE — SUT ETHILON 4-0 PS2 18 BLK

## (undated) DEVICE — NDL HYPODERMIC BLUNT 18G 1.5IN

## (undated) DEVICE — GLOVE PROTEXIS LTX MICRO 8

## (undated) DEVICE — DRAPE PLASTIC U 60X72

## (undated) DEVICE — SEE MEDLINE ITEM 152487

## (undated) DEVICE — DRESSING XEROFORM NONADH 1X8IN

## (undated) DEVICE — DRAPE THREE-QTR REINF 53X77IN

## (undated) DEVICE — ELECTRODE REM PLYHSV RETURN 9

## (undated) DEVICE — STRAP OR TABLE 5IN X 72IN

## (undated) DEVICE — SUT 3-0 VICRYL / SH (J416)

## (undated) DEVICE — DRAPE U SPLIT SHEET 54X76IN

## (undated) DEVICE — HANDLE SURG LIGHT NONRIGID

## (undated) DEVICE — GLOVE SURG ULTRA TOUCH 6

## (undated) DEVICE — CHLORAPREP 10.5 ML APPLICATOR

## (undated) DEVICE — BLADE SURG #15 CARBON STEEL

## (undated) DEVICE — BOWL STERILE LARGE 32OZ

## (undated) DEVICE — SYR DISP LL 5CC

## (undated) DEVICE — GOWN SURGICAL X-LARGE

## (undated) DEVICE — GLOVE SENSICARE PI ALOE 8

## (undated) DEVICE — DRESSING XEROFORM FOIL PK 1X8

## (undated) DEVICE — SOL IRR 0.9% NACL 500ML PB

## (undated) DEVICE — ELECTRODE BLADE INSULATED 1 IN

## (undated) DEVICE — SUT PROLENE 4-0 MONO 18IN